# Patient Record
Sex: MALE | Race: WHITE | NOT HISPANIC OR LATINO | Employment: OTHER | URBAN - METROPOLITAN AREA
[De-identification: names, ages, dates, MRNs, and addresses within clinical notes are randomized per-mention and may not be internally consistent; named-entity substitution may affect disease eponyms.]

---

## 2023-03-20 DIAGNOSIS — I10 ESSENTIAL HYPERTENSION: Primary | ICD-10-CM

## 2023-03-20 RX ORDER — CHLORTHALIDONE 25 MG/1
TABLET ORAL DAILY
COMMUNITY
End: 2023-03-20 | Stop reason: SDUPTHER

## 2023-03-20 RX ORDER — CHLORTHALIDONE 25 MG/1
12.5 TABLET ORAL DAILY
Qty: 15 TABLET | Refills: 0 | Status: SHIPPED | OUTPATIENT
Start: 2023-03-20 | End: 2023-04-24

## 2023-04-24 DIAGNOSIS — I10 ESSENTIAL HYPERTENSION: ICD-10-CM

## 2023-04-24 RX ORDER — CHLORTHALIDONE 25 MG/1
TABLET ORAL
Qty: 15 TABLET | Refills: 0 | Status: SHIPPED | OUTPATIENT
Start: 2023-04-24 | End: 2023-05-19 | Stop reason: SDUPTHER

## 2023-05-19 DIAGNOSIS — I10 ESSENTIAL HYPERTENSION: ICD-10-CM

## 2023-05-19 DIAGNOSIS — G89.29 CHRONIC BILATERAL LOW BACK PAIN WITHOUT SCIATICA: ICD-10-CM

## 2023-05-19 DIAGNOSIS — M54.50 CHRONIC BILATERAL LOW BACK PAIN WITHOUT SCIATICA: ICD-10-CM

## 2023-05-19 RX ORDER — CHLORTHALIDONE 25 MG/1
12.5 TABLET ORAL 2 TIMES DAILY
Qty: 15 TABLET | Refills: 3 | Status: SHIPPED | OUTPATIENT
Start: 2023-05-19 | End: 2023-07-14 | Stop reason: SDUPTHER

## 2023-05-19 RX ORDER — DULOXETIN HYDROCHLORIDE 20 MG/1
20 CAPSULE, DELAYED RELEASE ORAL DAILY
Qty: 30 CAPSULE | Refills: 3 | Status: SHIPPED | OUTPATIENT
Start: 2023-05-19 | End: 2023-08-09 | Stop reason: SDUPTHER

## 2023-05-19 RX ORDER — DULOXETIN HYDROCHLORIDE 20 MG/1
20 CAPSULE, DELAYED RELEASE ORAL DAILY
COMMUNITY
End: 2023-05-19 | Stop reason: SDUPTHER

## 2023-07-06 ENCOUNTER — TELEPHONE (OUTPATIENT)
Dept: PRIMARY CARE | Facility: CLINIC | Age: 73
End: 2023-07-06
Payer: MEDICARE

## 2023-07-06 NOTE — TELEPHONE ENCOUNTER
Pt's cousin called; requesting an appt at your earliest convenience due to multiple issues - med review, wt loss, fatigue, diabetes. Pt's cousin stated that they cannot get to the [facility] office as they are reliant on outside transportation. Please advise, thank you.

## 2023-07-06 NOTE — TELEPHONE ENCOUNTER
Called pt's cousin; put pt on the schedule for 08/04/23. Informed pt of recommendations if having any urgent symptoms to go the ER. Thank you.

## 2023-07-14 DIAGNOSIS — I10 ESSENTIAL HYPERTENSION: ICD-10-CM

## 2023-07-14 RX ORDER — CHLORTHALIDONE 25 MG/1
12.5 TABLET ORAL 2 TIMES DAILY
Qty: 30 TABLET | Refills: 0 | Status: SHIPPED | OUTPATIENT
Start: 2023-07-14 | End: 2024-02-15

## 2023-08-02 ENCOUNTER — DOCUMENTATION (OUTPATIENT)
Dept: PRIMARY CARE | Facility: CLINIC | Age: 73
End: 2023-08-02
Payer: MEDICARE

## 2023-08-02 ENCOUNTER — PATIENT OUTREACH (OUTPATIENT)
Dept: PRIMARY CARE | Facility: CLINIC | Age: 73
End: 2023-08-02
Payer: MEDICARE

## 2023-08-02 RX ORDER — AMLODIPINE BESYLATE 5 MG/1
5 TABLET ORAL DAILY
COMMUNITY
Start: 2023-08-01 | End: 2023-08-09 | Stop reason: SDUPTHER

## 2023-08-02 RX ORDER — METFORMIN HYDROCHLORIDE 1000 MG/1
1000 TABLET ORAL 2 TIMES DAILY
COMMUNITY
End: 2023-08-09 | Stop reason: SDUPTHER

## 2023-08-02 RX ORDER — LOSARTAN POTASSIUM 100 MG/1
100 TABLET ORAL DAILY
COMMUNITY
End: 2023-08-09 | Stop reason: SDUPTHER

## 2023-08-02 RX ORDER — METOPROLOL TARTRATE 25 MG/1
12.5 TABLET, FILM COATED ORAL 2 TIMES DAILY
COMMUNITY
End: 2023-08-09 | Stop reason: SDUPTHER

## 2023-08-02 NOTE — PROGRESS NOTES
Discharge Facility:[facility]  Discharge Diagnosis:hypertension, DM2  Admission Date:7/17/2023  Discharge Date: 8/1/2023    PCP Appointment Date:8/4/2023  Specialist Appointment Date: NONE  Hospital Encounter and Summary: Linked   See discharge assessment below for further details    Engagement  Call Start Time: 0911 (8/2/2023  9:11 AM)    Medications  Medications reviewed with patient/caregiver?: Yes (8/2/2023  9:11 AM)  Is the patient having any side effects they believe may be caused by any medication additions or changes?: No (8/2/2023  9:11 AM)  Does the patient have all medications ordered at discharge?: Yes (8/2/2023  9:11 AM)  Care Management Interventions: No intervention needed (8/2/2023  9:11 AM)  Is the patient taking all medications as directed (includes completed medication regime)?: Yes (8/2/2023  9:11 AM)  Medication Comments: see med list (8/2/2023  9:11 AM)    Appointments  Does the patient have a primary care provider?: Yes (8/2/2023  9:11 AM)  Care Management Interventions: Verified appointment date/time/provider (8/2/2023  9:11 AM)  Has the patient kept scheduled appointments due by today?: Yes (8/2/2023  9:11 AM)  Care Management Interventions: Advised patient to keep appointment (8/2/2023  9:11 AM)    Self Management  What is the home health agency?: [facility] with pt/ot ordered from snf- no agency listed (8/2/2023  9:11 AM)  Has home health visited the patient within 72 hours of discharge?: Yes (8/2/2023  9:11 AM)    Patient Teaching  Does the patient have access to their discharge instructions?: Yes (8/2/2023  9:11 AM)  Care Management Interventions: Reviewed instructions with patient (8/2/2023  9:11 AM)  What is the patient's perception of their health status since discharge?: Improving (8/2/2023  9:11 AM)  Is the patient/caregiver able to teach back the hierarchy of who to call/visit for symptoms/problems? PCP, Specialist, Home Health nurse, Urgent Care, ED, 911: Yes (8/2/2023   9:11 AM)    Wrap Up  Wrap Up Additional Comments: spoke with spouse. she stated that patient was doing well. getting around without any assissted devices. all meds ar going to be delievered today. no questions or concerns at this time. has a follow up scheduled with pcp , date and time verified. (8/2/2023  9:11 AM)  Call End Time: 0916 (8/2/2023  9:11 AM)

## 2023-08-04 ENCOUNTER — OFFICE VISIT (OUTPATIENT)
Dept: PRIMARY CARE | Facility: CLINIC | Age: 73
End: 2023-08-04
Payer: MEDICARE

## 2023-08-04 VITALS
WEIGHT: 129 LBS | OXYGEN SATURATION: 99 % | DIASTOLIC BLOOD PRESSURE: 68 MMHG | HEIGHT: 70 IN | BODY MASS INDEX: 18.47 KG/M2 | SYSTOLIC BLOOD PRESSURE: 122 MMHG | HEART RATE: 72 BPM

## 2023-08-04 DIAGNOSIS — I10 ESSENTIAL HYPERTENSION: ICD-10-CM

## 2023-08-04 DIAGNOSIS — D64.9 ANEMIA, UNSPECIFIED TYPE: ICD-10-CM

## 2023-08-04 DIAGNOSIS — Z12.5 SCREENING FOR PROSTATE CANCER: ICD-10-CM

## 2023-08-04 DIAGNOSIS — Z79.4 TYPE 2 DIABETES MELLITUS WITH KETOACIDOSIS WITHOUT COMA, WITH LONG-TERM CURRENT USE OF INSULIN (MULTI): Primary | ICD-10-CM

## 2023-08-04 DIAGNOSIS — R31.9 HEMATURIA, UNSPECIFIED TYPE: ICD-10-CM

## 2023-08-04 DIAGNOSIS — E11.10 TYPE 2 DIABETES MELLITUS WITH KETOACIDOSIS WITHOUT COMA, WITH LONG-TERM CURRENT USE OF INSULIN (MULTI): Primary | ICD-10-CM

## 2023-08-04 DIAGNOSIS — N40.0 BENIGN PROSTATIC HYPERPLASIA, UNSPECIFIED WHETHER LOWER URINARY TRACT SYMPTOMS PRESENT: ICD-10-CM

## 2023-08-04 DIAGNOSIS — Z00.00 ROUTINE GENERAL MEDICAL EXAMINATION AT HEALTH CARE FACILITY: ICD-10-CM

## 2023-08-04 PROBLEM — F41.9 ANXIETY: Status: ACTIVE | Noted: 2023-08-04

## 2023-08-04 PROBLEM — T50.905A BRADYCARDIA, DRUG INDUCED: Status: RESOLVED | Noted: 2023-07-16 | Resolved: 2023-08-04

## 2023-08-04 PROBLEM — L89.150 PRESSURE INJURY OF COCCYGEAL REGION, UNSTAGEABLE (MULTI): Status: RESOLVED | Noted: 2023-07-17 | Resolved: 2023-08-04

## 2023-08-04 PROBLEM — E11.9 TYPE 2 DIABETES MELLITUS WITHOUT COMPLICATION (MULTI): Status: ACTIVE | Noted: 2023-08-04

## 2023-08-04 PROBLEM — R00.1 BRADYCARDIA, DRUG INDUCED: Status: RESOLVED | Noted: 2023-07-16 | Resolved: 2023-08-04

## 2023-08-04 PROBLEM — R00.1 BRADYCARDIA, DRUG INDUCED: Status: ACTIVE | Noted: 2023-07-16

## 2023-08-04 PROBLEM — K85.90 ACUTE PANCREATITIS (HHS-HCC): Status: ACTIVE | Noted: 2023-08-04

## 2023-08-04 PROBLEM — E78.5 HYPERLIPIDEMIA: Status: ACTIVE | Noted: 2023-08-04

## 2023-08-04 PROBLEM — D50.9 IDA (IRON DEFICIENCY ANEMIA): Status: ACTIVE | Noted: 2023-08-04

## 2023-08-04 PROBLEM — R32 URINARY INCONTINENCE: Status: ACTIVE | Noted: 2023-07-17

## 2023-08-04 PROBLEM — R79.89 ABNORMAL TSH: Status: ACTIVE | Noted: 2023-08-04

## 2023-08-04 PROBLEM — M10.9 GOUT: Status: ACTIVE | Noted: 2023-08-04

## 2023-08-04 PROBLEM — E43 SEVERE PROTEIN-CALORIE MALNUTRITION (MULTI): Status: ACTIVE | Noted: 2023-07-13

## 2023-08-04 PROBLEM — K59.09 CHRONIC CONSTIPATION: Status: ACTIVE | Noted: 2023-08-04

## 2023-08-04 PROBLEM — T50.905A BRADYCARDIA, DRUG INDUCED: Status: ACTIVE | Noted: 2023-07-16

## 2023-08-04 PROBLEM — E11.9 TYPE 2 DIABETES MELLITUS WITHOUT COMPLICATION (MULTI): Status: RESOLVED | Noted: 2023-08-04 | Resolved: 2023-08-04

## 2023-08-04 PROBLEM — R79.89 ABNORMAL TSH: Status: RESOLVED | Noted: 2023-08-04 | Resolved: 2023-08-04

## 2023-08-04 PROBLEM — D75.89 MACROCYTOSIS: Status: ACTIVE | Noted: 2023-08-04

## 2023-08-04 PROBLEM — L89.150 PRESSURE INJURY OF COCCYGEAL REGION, UNSTAGEABLE (MULTI): Status: ACTIVE | Noted: 2023-07-17

## 2023-08-04 PROCEDURE — 1159F MED LIST DOCD IN RCRD: CPT | Performed by: FAMILY MEDICINE

## 2023-08-04 PROCEDURE — G0439 PPPS, SUBSEQ VISIT: HCPCS | Performed by: FAMILY MEDICINE

## 2023-08-04 PROCEDURE — 1170F FXNL STATUS ASSESSED: CPT | Performed by: FAMILY MEDICINE

## 2023-08-04 PROCEDURE — 90677 PCV20 VACCINE IM: CPT | Performed by: FAMILY MEDICINE

## 2023-08-04 PROCEDURE — 3078F DIAST BP <80 MM HG: CPT | Performed by: FAMILY MEDICINE

## 2023-08-04 PROCEDURE — 90715 TDAP VACCINE 7 YRS/> IM: CPT | Performed by: FAMILY MEDICINE

## 2023-08-04 PROCEDURE — 4010F ACE/ARB THERAPY RXD/TAKEN: CPT | Performed by: FAMILY MEDICINE

## 2023-08-04 PROCEDURE — 3074F SYST BP LT 130 MM HG: CPT | Performed by: FAMILY MEDICINE

## 2023-08-04 PROCEDURE — 1160F RVW MEDS BY RX/DR IN RCRD: CPT | Performed by: FAMILY MEDICINE

## 2023-08-04 PROCEDURE — 90472 IMMUNIZATION ADMIN EACH ADD: CPT | Performed by: FAMILY MEDICINE

## 2023-08-04 PROCEDURE — 99214 OFFICE O/P EST MOD 30 MIN: CPT | Performed by: FAMILY MEDICINE

## 2023-08-04 PROCEDURE — G0009 ADMIN PNEUMOCOCCAL VACCINE: HCPCS | Performed by: FAMILY MEDICINE

## 2023-08-04 RX ORDER — INSULIN LISPRO 100 [IU]/ML
3 INJECTION, SOLUTION INTRAVENOUS; SUBCUTANEOUS
COMMUNITY
Start: 2023-07-17 | End: 2023-08-09 | Stop reason: SDUPTHER

## 2023-08-04 RX ORDER — AMOXICILLIN 250 MG
1 CAPSULE ORAL 2 TIMES DAILY
COMMUNITY
Start: 2023-07-17

## 2023-08-04 RX ORDER — TAMSULOSIN HYDROCHLORIDE 0.4 MG/1
0.4 CAPSULE ORAL DAILY
Qty: 30 CAPSULE | Refills: 11 | Status: SHIPPED | OUTPATIENT
Start: 2023-08-04 | End: 2023-08-04 | Stop reason: SDUPTHER

## 2023-08-04 RX ORDER — INSULIN GLARGINE 100 [IU]/ML
15 INJECTION, SOLUTION SUBCUTANEOUS
COMMUNITY
Start: 2023-07-17 | End: 2023-08-09 | Stop reason: SDUPTHER

## 2023-08-04 RX ORDER — TAMSULOSIN HYDROCHLORIDE 0.4 MG/1
0.4 CAPSULE ORAL DAILY
Qty: 30 CAPSULE | Refills: 11 | Status: SHIPPED | OUTPATIENT
Start: 2023-08-04 | End: 2024-08-03

## 2023-08-04 ASSESSMENT — ENCOUNTER SYMPTOMS
FATIGUE: 1
LOSS OF SENSATION IN FEET: 0
DEPRESSION: 0
CONSTIPATION: 1
FEVER: 0
WHEEZING: 0
NERVOUS/ANXIOUS: 1
OCCASIONAL FEELINGS OF UNSTEADINESS: 0
HYPERTENSION: 1
ABDOMINAL PAIN: 0
COUGH: 0

## 2023-08-04 ASSESSMENT — ACTIVITIES OF DAILY LIVING (ADL)
TAKING_MEDICATION: TOTAL CARE
DOING_HOUSEWORK: TOTAL CARE
MANAGING_FINANCES: TOTAL CARE
GROCERY_SHOPPING: TOTAL CARE
BATHING: INDEPENDENT
DRESSING: INDEPENDENT

## 2023-08-04 ASSESSMENT — PATIENT HEALTH QUESTIONNAIRE - PHQ9
SUM OF ALL RESPONSES TO PHQ9 QUESTIONS 1 AND 2: 0
2. FEELING DOWN, DEPRESSED OR HOPELESS: NOT AT ALL
1. LITTLE INTEREST OR PLEASURE IN DOING THINGS: NOT AT ALL

## 2023-08-04 NOTE — PROGRESS NOTES
Subjective   Patient ID: Jorge A Madden is a 73 y.o. male who presents for Hypertension and Diabetes.    Hypertension  This is a recurrent problem. Associated symptoms include anxiety.   Diabetes  Hypoglycemia symptoms include nervousness/anxiousness. Associated symptoms include fatigue. There are no hypoglycemic complications. There are no diabetic complications.        Review of Systems   Constitutional:  Positive for fatigue.   Psychiatric/Behavioral:  The patient is nervous/anxious.        Objective   There were no vitals taken for this visit.    Physical Exam    Assessment/Plan   Problem List Items Addressed This Visit       Essential hypertension    Hematuria    Type 2 diabetes mellitus with ketoacidosis without coma (CMS/HCC) - Primary

## 2023-08-04 NOTE — PROGRESS NOTES
Subjective   Patient ID: Jorge A Madden is a 73 y.o. male who presents for Hypertension and Diabetes.    HPI       He is here today for follow-up on hospitalization and annual wellness visit.  He was last seen in our office in September 2022  Past medical history is significant for diabetes mellitus, hypertension, recurrent pancreatitis, and iron deficiency anemia      He was hospitalized at [facility] 7/13 through 7/17/2023 with diabetic ketoacidosis  He had presented to the ED via EMS with symptoms including confusion, nausea, weakness, as well as elevated blood glucose levels in the 600s for 1 week  He was admitted in diagnosed with diabetic ketoacidosis and UTI.  Treated with Rocephin, insulin drip and IV fluids.  He saw endocrinology during his stay.  His A1c during stay was elevated at 14.3.  He was discharged with Lantus and Humalog  Urine cultures were positive for Proteus.  He was switched to p.o. Keflex on discharge  His antihypertensive medications were adjusted.  Chlorthalidone was stopped due to risk of dehydration and he was started on amlodipine.  His dose of metoprolol was decreased from 25 mg twice daily to 12.5 mg twice daily.  Losartan was continued  He was evaluated by PT and skilled nursing placement was recommended    He was then admitted to skilled nursing at the [facility] [place]17 through 8/1/2023 for PT/OT for generalized weakness posthospitalization.  During that stay he received PT and OT and tolerated well and showed improvement, and was discharged home on 8/1/2023      Hypertension: Currently taking amlodipine 5 mg daily, losartan 100 mg daily and metoprolol 12.5 mg twice daily  Diabetes mellitus: Takes metformin 1000 mg twice daily, Lantus 15 units in the morning, and Humalog 4 units with meals.  He had previously been on a sliding scale of Humalog 3 times daily when he was hospitalized      At his last visit with me 9/16/2022 we had discussed several issues including  chronic constipation, hematuria and iron deficiency anemia  Hematuria: UA done September 2022 was positive for hematuria with 14 RBC per high-powered field.  He was referred to urology at that time  Constipation: Present for approximately 5 years.  At that visit I had referred him for a colonoscopy  Iron deficiency anemia: He had labs done last year which showed iron deficiency anemia with hemoglobin of 10.8.  At that time we had started him on ferrous sulfate, ordered a repeat CBC in 6 weeks, and had referred to GI for screening colonoscopy.  Stool Hemoccult test was ordered at that time      He is feeling much better since being discharged home.  His strength has been improving and he has been walking more  Diabetes mellitus: Currently taking Lantus 15 units daily and Humalog 4 units 3 times daily.  Checks glucose out of the office 3 times per day and his glucose readings are variable, and range anywhere from 93-73  He did see a nutritionist in the hospital.  Appetite has been doing well and he has been trying to avoid concentrated sugars in his diet  His constipation has improved since last visit.  He did not get a chance to get the colonoscopy done that was ordered at last visit.  He also did not get a chance to get the urology referral done ordered at last visit  He does have a history of BPH and previously was treated with Flomax.  He states that he has been getting nocturia several times per night.  Denies any stream changes including weak stream  He is a former smoker.  Quit September 2021.  Prior to this he had smoked 1 pack/day for 25 years  He is not currently taking any iron tablets because it was contributing to constipation    Review of Systems   Constitutional:  Negative for fever.   Respiratory:  Negative for cough and wheezing.    Cardiovascular:  Negative for chest pain and leg swelling.   Gastrointestinal:  Positive for constipation. Negative for abdominal pain.       Objective   /68    "Pulse 72   Ht 1.778 m (5' 10\")   Wt 58.5 kg (129 lb)   SpO2 99%   BMI 18.51 kg/m²     Physical Exam  Vitals reviewed.   Constitutional:       General: He is not in acute distress.     Appearance: Normal appearance. He is well-developed.   HENT:      Head: Normocephalic.      Right Ear: Tympanic membrane, ear canal and external ear normal.      Left Ear: Tympanic membrane, ear canal and external ear normal.      Nose: Nose normal.      Mouth/Throat:      Mouth: Mucous membranes are moist.   Eyes:      Conjunctiva/sclera: Conjunctivae normal.   Neck:      Thyroid: No thyromegaly.      Vascular: No JVD.   Cardiovascular:      Rate and Rhythm: Normal rate and regular rhythm.      Heart sounds: Normal heart sounds.   Pulmonary:      Effort: Pulmonary effort is normal.      Breath sounds: Normal breath sounds.   Abdominal:      Palpations: Abdomen is soft.      Tenderness: There is no abdominal tenderness.   Musculoskeletal:         General: Normal range of motion.      Right lower leg: No edema.      Left lower leg: No edema.   Lymphadenopathy:      Cervical: No cervical adenopathy.   Skin:     Findings: No rash.   Neurological:      Mental Status: He is alert and oriented to person, place, and time.   Psychiatric:         Mood and Affect: Mood normal.         Behavior: Behavior normal.         Assessment/Plan   Problem List Items Addressed This Visit          Medium    Essential hypertension    Relevant Orders    CBC and Auto Differential    Basic Metabolic Panel    Lipid Panel    Vitamin B12    Folate    Iron and TIBC    Hematuria    Type 2 diabetes mellitus with ketoacidosis without coma (CMS/HCC) - Primary     Other Visit Diagnoses       Benign prostatic hyperplasia, unspecified whether lower urinary tract symptoms present        Relevant Medications    tamsulosin (Flomax) 0.4 mg 24 hr capsule    Screening for prostate cancer        Relevant Orders    Prostate Specific Antigen, Screen    Anemia, unspecified type  "       Relevant Orders    Iron and TIBC    Routine general medical examination at health care facility              #1 recent hospitalization: We reviewed all of his recent hospital and nursing home summaries.  He was hospitalized 7/13 through 7/17/2023 with diabetic ketoacidosis and UTI, and recently returned home from skilled nursing 3 days ago.  He is overall feeling better and strength has been improving.  His home glucose readings have still been variable and range anywhere from 93-2 73.  Continue Lantus, and we will adjust his Humalog to 3 times per day sliding scale (administer 2 units insulin for every 50 glucose greater than 150).  He is scheduled to see endocrinology next month to discuss further.  We also discussed diet modification.  He did see a nutritionist during hospitalization.  We discussed that he should have a low threshold for returning to the ER if getting glucose readings greater than 400, any confusion, abdominal pain or other new or worsening symptoms  2.  Preventative health  Given tetanus and pneumococcal vaccines today.  I did recommend referral for colonoscopy for colon cancer screening, CT scan for lung cancer screening, and ultrasound for AAA screening.  He declines these today  3.  Hypertension: His blood pressure today is at goal at 122/68.  Continue current medications and follow-up in 3 months for recheck  4.  BPH: We will restart tamsulosin 0.4 mg daily.  Adverse effects discussed including risk of hypotension and dizziness.  Check PSA with next labs  Due to his history of hematuria we will recheck a UA.  If still shows RBCs in urine we will discuss a urology referral at that time  Follow-up in 3 months for recheck

## 2023-08-09 ENCOUNTER — PATIENT OUTREACH (OUTPATIENT)
Dept: PRIMARY CARE | Facility: CLINIC | Age: 73
End: 2023-08-09
Payer: MEDICARE

## 2023-08-09 DIAGNOSIS — F41.9 ANXIETY: ICD-10-CM

## 2023-08-09 DIAGNOSIS — G89.29 CHRONIC BILATERAL LOW BACK PAIN WITHOUT SCIATICA: ICD-10-CM

## 2023-08-09 DIAGNOSIS — M54.50 CHRONIC BILATERAL LOW BACK PAIN WITHOUT SCIATICA: ICD-10-CM

## 2023-08-09 DIAGNOSIS — I10 ESSENTIAL HYPERTENSION: ICD-10-CM

## 2023-08-09 DIAGNOSIS — Z79.4 TYPE 2 DIABETES MELLITUS WITH KETOACIDOSIS WITHOUT COMA, WITH LONG-TERM CURRENT USE OF INSULIN (MULTI): ICD-10-CM

## 2023-08-09 DIAGNOSIS — E11.10 TYPE 2 DIABETES MELLITUS WITH KETOACIDOSIS WITHOUT COMA, WITH LONG-TERM CURRENT USE OF INSULIN (MULTI): ICD-10-CM

## 2023-08-09 RX ORDER — METOPROLOL TARTRATE 25 MG/1
12.5 TABLET, FILM COATED ORAL 2 TIMES DAILY
Qty: 90 TABLET | Refills: 3 | Status: SHIPPED | OUTPATIENT
Start: 2023-08-09

## 2023-08-09 RX ORDER — INSULIN GLARGINE 100 [IU]/ML
15 INJECTION, SOLUTION SUBCUTANEOUS EVERY MORNING
Qty: 3 ML | Refills: 11 | Status: SHIPPED | OUTPATIENT
Start: 2023-08-09 | End: 2024-03-21 | Stop reason: SDUPTHER

## 2023-08-09 RX ORDER — DULOXETIN HYDROCHLORIDE 20 MG/1
20 CAPSULE, DELAYED RELEASE ORAL DAILY
Qty: 30 CAPSULE | Refills: 3 | Status: SHIPPED | OUTPATIENT
Start: 2023-08-09

## 2023-08-09 RX ORDER — LOSARTAN POTASSIUM 100 MG/1
100 TABLET ORAL DAILY
Qty: 90 TABLET | Refills: 3 | Status: SHIPPED | OUTPATIENT
Start: 2023-08-09

## 2023-08-09 RX ORDER — INSULIN LISPRO 100 [IU]/ML
3 INJECTION, SOLUTION INTRAVENOUS; SUBCUTANEOUS
Qty: 10 ML | Refills: 3 | Status: SHIPPED | OUTPATIENT
Start: 2023-08-09 | End: 2024-01-22 | Stop reason: SDUPTHER

## 2023-08-09 RX ORDER — AMLODIPINE BESYLATE 5 MG/1
5 TABLET ORAL DAILY
Qty: 90 TABLET | Refills: 3 | Status: SHIPPED | OUTPATIENT
Start: 2023-08-09 | End: 2024-02-15

## 2023-08-09 RX ORDER — METFORMIN HYDROCHLORIDE 1000 MG/1
1000 TABLET ORAL 2 TIMES DAILY
Qty: 180 TABLET | Refills: 3 | Status: SHIPPED | OUTPATIENT
Start: 2023-08-09

## 2023-08-09 NOTE — PROGRESS NOTES
Call regarding appt. with PCP on 8/4/2023  after hospitalization.  At time of outreach call the patient feels as if their condition has improved since last visit.  Reviewed the PCP appointment with the pt and addressed any questions or concerns.

## 2023-08-09 NOTE — TELEPHONE ENCOUNTER
Rx Refill Request Telephone Encounter    Name:  Jorge A Madden  :  [id number]  Medication Name:    amLODIPine (Norvasc) 5 mg tablet   losartan (Cozaar) 100 mg tablet   metoprolol tartrate (Lopressor) 25 mg tablet   DULoxetine (Cymbalta) 20 mg DR capsule   metFORMIN (Glucophage) 1,000 mg tablet   insulin lispro (HumaLOG) 100 unit/mL injection   Basaglar Kwikpen U-100 insulin pen              Specific Pharmacy location:  [facility], [facility], [place]  Date of last appointment:  n/a  Date of next appointment:  n/a  Best number to reach patient:  [phone]

## 2023-08-10 ENCOUNTER — TELEPHONE (OUTPATIENT)
Dept: PRIMARY CARE | Facility: CLINIC | Age: 73
End: 2023-08-10
Payer: MEDICARE

## 2023-08-10 NOTE — TELEPHONE ENCOUNTER
Patient called stated that  [facility] called and said they couldn't do the visits this week for PT and Nursing because they need an Authorization.   Please Advise

## 2023-08-10 NOTE — TELEPHONE ENCOUNTER
Verbal order was given for authorization for PT OT and Nursing on 08-10-23 . [facility] will fax a copy for signature.

## 2023-08-18 ENCOUNTER — APPOINTMENT (OUTPATIENT)
Dept: PRIMARY CARE | Facility: CLINIC | Age: 73
End: 2023-08-18
Payer: MEDICARE

## 2023-09-08 ENCOUNTER — PATIENT OUTREACH (OUTPATIENT)
Dept: PRIMARY CARE | Facility: CLINIC | Age: 73
End: 2023-09-08
Payer: MEDICARE

## 2023-09-08 NOTE — PROGRESS NOTES
Call placed regarding one month post discharge follow up call.  At time of outreach call the patient feels as if their condition has improved since initial visit with PCP or specialist.  Questions or concerns regarding recovery period addressed at this time. Spoke with wife and she states that he is doing really well. Has sugar under control and is exercising. Has only a couple more therapy visits. No needs or concerns.

## 2023-10-16 ENCOUNTER — TELEPHONE (OUTPATIENT)
Dept: PRIMARY CARE | Facility: CLINIC | Age: 73
End: 2023-10-16
Payer: MEDICARE

## 2023-10-16 DIAGNOSIS — E11.10 TYPE 2 DIABETES MELLITUS WITH KETOACIDOSIS WITHOUT COMA, WITH LONG-TERM CURRENT USE OF INSULIN (MULTI): Primary | ICD-10-CM

## 2023-10-16 DIAGNOSIS — E11.10 TYPE 2 DIABETES MELLITUS WITH KETOACIDOSIS WITHOUT COMA, UNSPECIFIED WHETHER LONG TERM INSULIN USE (MULTI): ICD-10-CM

## 2023-10-16 DIAGNOSIS — Z79.4 TYPE 2 DIABETES MELLITUS WITH KETOACIDOSIS WITHOUT COMA, WITH LONG-TERM CURRENT USE OF INSULIN (MULTI): Primary | ICD-10-CM

## 2023-10-16 RX ORDER — PEN NEEDLE, DIABETIC 30 GX3/16"
NEEDLE, DISPOSABLE MISCELLANEOUS
Qty: 100 EACH | Refills: 3 | Status: CANCELLED | OUTPATIENT
Start: 2023-10-16

## 2023-10-16 RX ORDER — PEN NEEDLE, DIABETIC 30 GX3/16"
1 NEEDLE, DISPOSABLE MISCELLANEOUS 4 TIMES DAILY
Qty: 120 EACH | Refills: 11 | Status: SHIPPED | OUTPATIENT
Start: 2023-10-16 | End: 2023-11-21

## 2023-10-16 NOTE — TELEPHONE ENCOUNTER
Home health calling. [facility]. I did not see a script for them. He takes 15units/d.

## 2023-10-16 NOTE — TELEPHONE ENCOUNTER
"Requested Prescriptions     Pending Prescriptions Disp Refills    pen needle, diabetic (Pen Needle) 31 gauge x 3/16\" needle 100 each 3     Sig: Use with insulin pen. 3-4 times daily       "

## 2023-11-01 ENCOUNTER — PATIENT OUTREACH (OUTPATIENT)
Dept: PRIMARY CARE | Facility: CLINIC | Age: 73
End: 2023-11-01
Payer: MEDICARE

## 2023-11-02 ENCOUNTER — TELEPHONE (OUTPATIENT)
Dept: PRIMARY CARE | Facility: CLINIC | Age: 73
End: 2023-11-02
Payer: MEDICARE

## 2023-11-02 NOTE — TELEPHONE ENCOUNTER
Patient going to get blood work done tomorrow. Wants to know if he need to hold on medication before going to get drawn please advise.

## 2023-11-03 ENCOUNTER — LAB (OUTPATIENT)
Dept: LAB | Facility: LAB | Age: 73
End: 2023-11-03
Payer: MEDICARE

## 2023-11-03 DIAGNOSIS — Z12.5 SCREENING FOR PROSTATE CANCER: ICD-10-CM

## 2023-11-03 DIAGNOSIS — R31.9 HEMATURIA, UNSPECIFIED TYPE: ICD-10-CM

## 2023-11-03 DIAGNOSIS — E11.10 TYPE 2 DIABETES MELLITUS WITH KETOACIDOSIS WITHOUT COMA, WITH LONG-TERM CURRENT USE OF INSULIN (MULTI): ICD-10-CM

## 2023-11-03 DIAGNOSIS — Z79.4 TYPE 2 DIABETES MELLITUS WITH KETOACIDOSIS WITHOUT COMA, WITH LONG-TERM CURRENT USE OF INSULIN (MULTI): Primary | ICD-10-CM

## 2023-11-03 DIAGNOSIS — I10 ESSENTIAL HYPERTENSION: ICD-10-CM

## 2023-11-03 DIAGNOSIS — E11.10 TYPE 2 DIABETES MELLITUS WITH KETOACIDOSIS WITHOUT COMA, WITH LONG-TERM CURRENT USE OF INSULIN (MULTI): Primary | ICD-10-CM

## 2023-11-03 DIAGNOSIS — D64.9 ANEMIA, UNSPECIFIED TYPE: ICD-10-CM

## 2023-11-03 DIAGNOSIS — Z79.4 TYPE 2 DIABETES MELLITUS WITH KETOACIDOSIS WITHOUT COMA, WITH LONG-TERM CURRENT USE OF INSULIN (MULTI): ICD-10-CM

## 2023-11-03 LAB
ANION GAP SERPL CALC-SCNC: 12 MMOL/L (ref 10–20)
APPEARANCE UR: ABNORMAL
BASOPHILS # BLD AUTO: 0.02 X10*3/UL (ref 0–0.1)
BASOPHILS NFR BLD AUTO: 0.3 %
BILIRUB UR STRIP.AUTO-MCNC: NEGATIVE MG/DL
BUN SERPL-MCNC: 28 MG/DL (ref 6–23)
CALCIUM SERPL-MCNC: 9.6 MG/DL (ref 8.6–10.3)
CHLORIDE SERPL-SCNC: 106 MMOL/L (ref 98–107)
CHOLEST SERPL-MCNC: 146 MG/DL (ref 0–199)
CHOLESTEROL/HDL RATIO: 2.8
CO2 SERPL-SCNC: 28 MMOL/L (ref 21–32)
COLOR UR: YELLOW
CREAT SERPL-MCNC: 1.28 MG/DL (ref 0.5–1.3)
EOSINOPHIL # BLD AUTO: 0.18 X10*3/UL (ref 0–0.4)
EOSINOPHIL NFR BLD AUTO: 2.5 %
ERYTHROCYTE [DISTWIDTH] IN BLOOD BY AUTOMATED COUNT: 11.9 % (ref 11.5–14.5)
EST. AVERAGE GLUCOSE BLD GHB EST-MCNC: 131 MG/DL
FOLATE SERPL-MCNC: 11.9 NG/ML
GFR SERPL CREATININE-BSD FRML MDRD: 60 ML/MIN/1.73M*2
GLUCOSE SERPL-MCNC: 87 MG/DL (ref 74–99)
GLUCOSE UR STRIP.AUTO-MCNC: NEGATIVE MG/DL
HBA1C MFR BLD: 6.2 %
HCT VFR BLD AUTO: 36 % (ref 41–52)
HDLC SERPL-MCNC: 52.9 MG/DL
HGB BLD-MCNC: 12 G/DL (ref 13.5–17.5)
HYALINE CASTS #/AREA URNS AUTO: ABNORMAL /LPF
IMM GRANULOCYTES # BLD AUTO: 0.01 X10*3/UL (ref 0–0.5)
IMM GRANULOCYTES NFR BLD AUTO: 0.1 % (ref 0–0.9)
IRON SATN MFR SERPL: 33 % (ref 25–45)
IRON SERPL-MCNC: 55 UG/DL (ref 35–150)
KETONES UR STRIP.AUTO-MCNC: NEGATIVE MG/DL
LDLC SERPL CALC-MCNC: 73 MG/DL
LEUKOCYTE ESTERASE UR QL STRIP.AUTO: ABNORMAL
LYMPHOCYTES # BLD AUTO: 2.9 X10*3/UL (ref 0.8–3)
LYMPHOCYTES NFR BLD AUTO: 39.5 %
MCH RBC QN AUTO: 33.8 PG (ref 26–34)
MCHC RBC AUTO-ENTMCNC: 33.3 G/DL (ref 32–36)
MCV RBC AUTO: 101 FL (ref 80–100)
MONOCYTES # BLD AUTO: 0.49 X10*3/UL (ref 0.05–0.8)
MONOCYTES NFR BLD AUTO: 6.7 %
MUCOUS THREADS #/AREA URNS AUTO: ABNORMAL /LPF
NEUTROPHILS # BLD AUTO: 3.74 X10*3/UL (ref 1.6–5.5)
NEUTROPHILS NFR BLD AUTO: 50.9 %
NITRITE UR QL STRIP.AUTO: NEGATIVE
NON HDL CHOLESTEROL: 93 MG/DL (ref 0–149)
NRBC BLD-RTO: 0 /100 WBCS (ref 0–0)
PH UR STRIP.AUTO: 6 [PH]
PLATELET # BLD AUTO: 222 X10*3/UL (ref 150–450)
POTASSIUM SERPL-SCNC: 4 MMOL/L (ref 3.5–5.3)
PROT UR STRIP.AUTO-MCNC: ABNORMAL MG/DL
PSA SERPL-MCNC: 0.38 NG/ML
RBC # BLD AUTO: 3.55 X10*6/UL (ref 4.5–5.9)
RBC # UR STRIP.AUTO: ABNORMAL /UL
RBC #/AREA URNS AUTO: ABNORMAL /HPF
SODIUM SERPL-SCNC: 142 MMOL/L (ref 136–145)
SP GR UR STRIP.AUTO: 1.01
SQUAMOUS #/AREA URNS AUTO: ABNORMAL /HPF
TIBC SERPL-MCNC: 165 UG/DL (ref 240–445)
TRIGL SERPL-MCNC: 102 MG/DL (ref 0–149)
UIBC SERPL-MCNC: 110 UG/DL (ref 110–370)
UROBILINOGEN UR STRIP.AUTO-MCNC: <2 MG/DL
VIT B12 SERPL-MCNC: 237 PG/ML (ref 211–911)
VLDL: 20 MG/DL (ref 0–40)
WBC # BLD AUTO: 7.3 X10*3/UL (ref 4.4–11.3)
WBC #/AREA URNS AUTO: >50 /HPF
WBC CLUMPS #/AREA URNS AUTO: ABNORMAL /HPF

## 2023-11-03 PROCEDURE — 83550 IRON BINDING TEST: CPT

## 2023-11-03 PROCEDURE — G0103 PSA SCREENING: HCPCS

## 2023-11-03 PROCEDURE — 83036 HEMOGLOBIN GLYCOSYLATED A1C: CPT

## 2023-11-03 PROCEDURE — 82607 VITAMIN B-12: CPT

## 2023-11-03 PROCEDURE — 83540 ASSAY OF IRON: CPT

## 2023-11-03 PROCEDURE — 36415 COLL VENOUS BLD VENIPUNCTURE: CPT

## 2023-11-03 PROCEDURE — 85025 COMPLETE CBC W/AUTO DIFF WBC: CPT

## 2023-11-03 PROCEDURE — 80061 LIPID PANEL: CPT

## 2023-11-03 PROCEDURE — 81001 URINALYSIS AUTO W/SCOPE: CPT

## 2023-11-03 PROCEDURE — 80048 BASIC METABOLIC PNL TOTAL CA: CPT

## 2023-11-03 PROCEDURE — 82746 ASSAY OF FOLIC ACID SERUM: CPT

## 2023-11-09 ENCOUNTER — OFFICE VISIT (OUTPATIENT)
Dept: PRIMARY CARE | Facility: CLINIC | Age: 73
End: 2023-11-09
Payer: MEDICARE

## 2023-11-09 VITALS
OXYGEN SATURATION: 98 % | DIASTOLIC BLOOD PRESSURE: 77 MMHG | BODY MASS INDEX: 18.51 KG/M2 | TEMPERATURE: 97.6 F | WEIGHT: 129 LBS | SYSTOLIC BLOOD PRESSURE: 122 MMHG | HEART RATE: 61 BPM

## 2023-11-09 DIAGNOSIS — I10 ESSENTIAL HYPERTENSION: ICD-10-CM

## 2023-11-09 DIAGNOSIS — E11.10 TYPE 2 DIABETES MELLITUS WITH KETOACIDOSIS WITHOUT COMA, WITH LONG-TERM CURRENT USE OF INSULIN (MULTI): ICD-10-CM

## 2023-11-09 DIAGNOSIS — N39.0 URINARY TRACT INFECTION WITH HEMATURIA, SITE UNSPECIFIED: Primary | ICD-10-CM

## 2023-11-09 DIAGNOSIS — Z79.4 TYPE 2 DIABETES MELLITUS WITH KETOACIDOSIS WITHOUT COMA, WITH LONG-TERM CURRENT USE OF INSULIN (MULTI): ICD-10-CM

## 2023-11-09 DIAGNOSIS — R31.9 URINARY TRACT INFECTION WITH HEMATURIA, SITE UNSPECIFIED: Primary | ICD-10-CM

## 2023-11-09 DIAGNOSIS — D64.9 ANEMIA, UNSPECIFIED TYPE: ICD-10-CM

## 2023-11-09 LAB
POC APPEARANCE, URINE: ABNORMAL
POC BILIRUBIN, URINE: NEGATIVE
POC BLOOD, URINE: ABNORMAL
POC COLOR, URINE: ABNORMAL
POC GLUCOSE, URINE: NEGATIVE MG/DL
POC KETONES, URINE: NEGATIVE MG/DL
POC LEUKOCYTES, URINE: ABNORMAL
POC NITRITE,URINE: POSITIVE
POC PH, URINE: 7.5 PH
POC PROTEIN, URINE: ABNORMAL MG/DL
POC SPECIFIC GRAVITY, URINE: 1.02
POC UROBILINOGEN, URINE: 0.2 EU/DL

## 2023-11-09 PROCEDURE — 99214 OFFICE O/P EST MOD 30 MIN: CPT | Performed by: FAMILY MEDICINE

## 2023-11-09 PROCEDURE — 1160F RVW MEDS BY RX/DR IN RCRD: CPT | Performed by: FAMILY MEDICINE

## 2023-11-09 PROCEDURE — 3044F HG A1C LEVEL LT 7.0%: CPT | Performed by: FAMILY MEDICINE

## 2023-11-09 PROCEDURE — 3078F DIAST BP <80 MM HG: CPT | Performed by: FAMILY MEDICINE

## 2023-11-09 PROCEDURE — 3048F LDL-C <100 MG/DL: CPT | Performed by: FAMILY MEDICINE

## 2023-11-09 PROCEDURE — 81003 URINALYSIS AUTO W/O SCOPE: CPT | Performed by: FAMILY MEDICINE

## 2023-11-09 PROCEDURE — 87186 SC STD MICRODIL/AGAR DIL: CPT

## 2023-11-09 PROCEDURE — 3074F SYST BP LT 130 MM HG: CPT | Performed by: FAMILY MEDICINE

## 2023-11-09 PROCEDURE — 4010F ACE/ARB THERAPY RXD/TAKEN: CPT | Performed by: FAMILY MEDICINE

## 2023-11-09 PROCEDURE — 87086 URINE CULTURE/COLONY COUNT: CPT

## 2023-11-09 PROCEDURE — 1159F MED LIST DOCD IN RCRD: CPT | Performed by: FAMILY MEDICINE

## 2023-11-09 RX ORDER — CEPHALEXIN 500 MG/1
500 CAPSULE ORAL 3 TIMES DAILY
Qty: 21 CAPSULE | Refills: 0 | Status: SHIPPED | OUTPATIENT
Start: 2023-11-09 | End: 2023-11-16

## 2023-11-09 ASSESSMENT — ENCOUNTER SYMPTOMS
FEVER: 0
HEMATURIA: 1
ABDOMINAL PAIN: 0
DYSURIA: 1
VOMITING: 0
DIZZINESS: 0

## 2023-11-09 NOTE — PROGRESS NOTES
Subjective   Patient ID: Jorge A Madden is a 73 y.o. male who presents for Diabetes (A1c checked with labs ) and UTI.    Diabetes  He presents for his follow-up diabetic visit. He has type 2 diabetes mellitus. Pertinent negatives for hypoglycemia include no dizziness. He participates in exercise three times a week. His breakfast blood glucose range is generally 180-200 mg/dl. His dinner blood glucose range is generally 110-130 mg/dl. Eye exam is not current.   UTI   Associated symptoms include hematuria and urgency. Pertinent negatives include no vomiting. His past medical history is significant for recurrent UTIs.        He is here today for follow-up  Diabetes mellitus: He is currently taking Lantus 15 units daily, metformin 1000 mg twice daily, and Humalog sliding scale 3 times daily (administers 2 units of insulin for every 50 glucose greater than 150).  His last A1c prior to this had been elevated at 14.3 when he was previously hospitalized in July.  I reviewed his home glucose readings, and readings have been variable and ranged anywhere from 10 1-2 50 recently.  No hypoglycemia  He is currently taking amlodipine 5 mg daily, losartan 100 mg daily metoprolol 12.5 mg twice daily for hypertension.  He is concerned about a possible UTI.  He has had a 5-day history of dysuria, and urgency and frequency.  No fever.  Urine has been cloudy    Past medical history is significant for a history of hospitalization at [facility] in July with diabetic ketoacidosis, as well as UTI.  During that stay he was treated with IV fluids, insulin and Rocephin.  Urine culture was positive for Proteus and he was discharged with p.o. Keflex    We reviewed his most recent labs from 11/3/2023  A1c: 6.2%  PSA: Normal range at 0.38  CBC showed mild anemia with hemoglobin of 12.0.    Lipid panel showed total cholesterol 146.  LDL 73  B12 was at the lower end of normal at 237  Folate normal.  Iron panel did not show any signs  of significant iron deficiency      Review of Systems   Constitutional:  Negative for fever.   Gastrointestinal:  Negative for abdominal pain and vomiting.   Genitourinary:  Positive for dysuria, hematuria and urgency.   Neurological:  Negative for dizziness.       Objective   /77   Pulse 61   Temp 36.4 °C (97.6 °F) (Temporal)   Wt 58.5 kg (129 lb)   SpO2 98%   BMI 18.51 kg/m²     Physical Exam  Vitals reviewed.   Constitutional:       General: He is not in acute distress.     Appearance: Normal appearance. He is well-developed.   HENT:      Head: Normocephalic.   Eyes:      Conjunctiva/sclera: Conjunctivae normal.   Cardiovascular:      Rate and Rhythm: Normal rate and regular rhythm.      Heart sounds: Normal heart sounds.   Pulmonary:      Effort: Pulmonary effort is normal.      Breath sounds: Normal breath sounds.   Abdominal:      Palpations: Abdomen is soft.      Tenderness: There is no abdominal tenderness. There is no right CVA tenderness or left CVA tenderness.   Musculoskeletal:      Right lower leg: No edema.      Left lower leg: No edema.   Skin:     Findings: No rash.   Neurological:      Mental Status: He is alert.   Psychiatric:         Mood and Affect: Mood normal.         Behavior: Behavior normal.         Assessment/Plan   Problem List Items Addressed This Visit          Medium    Essential hypertension    Type 2 diabetes mellitus with ketoacidosis without coma (CMS/[facility])     Other Visit Diagnoses       Urinary tract infection with hematuria, site unspecified    -  Primary    Relevant Medications    cephalexin (Keflex) 500 mg capsule    Other Relevant Orders    POCT UA Automated manually resulted (Completed)    Urine Culture    Urinalysis with Reflex Microscopic    Anemia, unspecified type        Relevant Orders    Basic Metabolic Panel    CBC and Auto Differential    Vitamin B12          Diabetes mellitus: His A1c has improved significantly from 14.3% and most recently is 6.2%.   Continue Lantus, Humalog and metformin and continue to monitor out of office.  Follow-up in 3 months to recheck A1c  Hypertension: Blood pressure is at goal, continue current medications  UTI: He reports a 5-day history of UTI symptoms and his UA today does indicate UTI.  We will send urine for culture, and get him started on cephalexin, which is the antibiotic that he was discharged with after hospitalization in July with UTI due to Proteus.  With his history, I recommended that they monitor his symptoms very closely, and have a low threshold for going to the ER if getting any significantly elevated glucose readings greater than 300, confusion, fever, or vomiting, or any other new or worsening symptoms.  We will plan on following up by phone on Monday once I have his culture results  His labs did show a mild macrocytic anemia, and B12 level was at the low-end of normal range.  I recommended that he start over-the-counter vitamin B12 100 to 500 mcg daily and we will recheck a CBC and B12 level again in 1 month  His UA today is positive for blood, which is likely due to UTI.  He does have a history of recurrent hematuria in the past, and I had referred him to a urologist previously.  He declines referral today, but would like to recheck this again in 1 month.  We will repeat UA in 1 month to confirm that hematuria has resolved

## 2023-11-12 LAB — BACTERIA UR CULT: ABNORMAL

## 2023-11-21 DIAGNOSIS — E11.10 TYPE 2 DIABETES MELLITUS WITH KETOACIDOSIS WITHOUT COMA, WITH LONG-TERM CURRENT USE OF INSULIN (MULTI): ICD-10-CM

## 2023-11-21 DIAGNOSIS — Z79.4 TYPE 2 DIABETES MELLITUS WITH KETOACIDOSIS WITHOUT COMA, WITH LONG-TERM CURRENT USE OF INSULIN (MULTI): ICD-10-CM

## 2023-11-21 RX ORDER — PEN NEEDLE, DIABETIC 31 GX5/16"
NEEDLE, DISPOSABLE MISCELLANEOUS
Qty: 100 EACH | Refills: 11 | Status: SHIPPED | OUTPATIENT
Start: 2023-11-21

## 2023-11-28 ENCOUNTER — OFFICE VISIT (OUTPATIENT)
Dept: PRIMARY CARE | Facility: CLINIC | Age: 73
End: 2023-11-28
Payer: MEDICARE

## 2023-11-28 VITALS
SYSTOLIC BLOOD PRESSURE: 129 MMHG | RESPIRATION RATE: 14 BRPM | WEIGHT: 122 LBS | DIASTOLIC BLOOD PRESSURE: 82 MMHG | HEART RATE: 62 BPM | TEMPERATURE: 98.2 F | BODY MASS INDEX: 17.47 KG/M2 | HEIGHT: 70 IN

## 2023-11-28 DIAGNOSIS — N39.0 URINARY TRACT INFECTION WITH HEMATURIA, SITE UNSPECIFIED: Primary | ICD-10-CM

## 2023-11-28 DIAGNOSIS — R31.0 GROSS HEMATURIA: ICD-10-CM

## 2023-11-28 DIAGNOSIS — R31.9 URINARY TRACT INFECTION WITH HEMATURIA, SITE UNSPECIFIED: Primary | ICD-10-CM

## 2023-11-28 DIAGNOSIS — R30.0 DYSURIA: ICD-10-CM

## 2023-11-28 LAB
POC APPEARANCE, URINE: ABNORMAL
POC BILIRUBIN, URINE: NEGATIVE
POC BLOOD, URINE: ABNORMAL
POC COLOR, URINE: ABNORMAL
POC GLUCOSE, URINE: NEGATIVE MG/DL
POC KETONES, URINE: NEGATIVE MG/DL
POC LEUKOCYTES, URINE: ABNORMAL
POC NITRITE,URINE: NEGATIVE
POC PH, URINE: 8.5 PH
POC PROTEIN, URINE: ABNORMAL MG/DL
POC SPECIFIC GRAVITY, URINE: 1.02
POC UROBILINOGEN, URINE: 0.2 EU/DL

## 2023-11-28 PROCEDURE — 1160F RVW MEDS BY RX/DR IN RCRD: CPT | Performed by: FAMILY MEDICINE

## 2023-11-28 PROCEDURE — 87186 SC STD MICRODIL/AGAR DIL: CPT

## 2023-11-28 PROCEDURE — 99214 OFFICE O/P EST MOD 30 MIN: CPT | Performed by: FAMILY MEDICINE

## 2023-11-28 PROCEDURE — 4010F ACE/ARB THERAPY RXD/TAKEN: CPT | Performed by: FAMILY MEDICINE

## 2023-11-28 PROCEDURE — 1159F MED LIST DOCD IN RCRD: CPT | Performed by: FAMILY MEDICINE

## 2023-11-28 PROCEDURE — 3079F DIAST BP 80-89 MM HG: CPT | Performed by: FAMILY MEDICINE

## 2023-11-28 PROCEDURE — 87086 URINE CULTURE/COLONY COUNT: CPT

## 2023-11-28 PROCEDURE — 81003 URINALYSIS AUTO W/O SCOPE: CPT | Performed by: FAMILY MEDICINE

## 2023-11-28 PROCEDURE — 3048F LDL-C <100 MG/DL: CPT | Performed by: FAMILY MEDICINE

## 2023-11-28 PROCEDURE — 3074F SYST BP LT 130 MM HG: CPT | Performed by: FAMILY MEDICINE

## 2023-11-28 PROCEDURE — 3044F HG A1C LEVEL LT 7.0%: CPT | Performed by: FAMILY MEDICINE

## 2023-11-28 RX ORDER — CIPROFLOXACIN 250 MG/1
250 TABLET, FILM COATED ORAL 2 TIMES DAILY
Qty: 14 TABLET | Refills: 0 | Status: SHIPPED | OUTPATIENT
Start: 2023-11-28 | End: 2023-12-05

## 2023-11-28 RX ORDER — PNV NO.95/FERROUS FUM/FOLIC AC 28MG-0.8MG
100 TABLET ORAL DAILY
COMMUNITY

## 2023-11-28 ASSESSMENT — ENCOUNTER SYMPTOMS
FEVER: 0
HEMATURIA: 1
NAUSEA: 0
ABDOMINAL PAIN: 0
FREQUENCY: 1
VOMITING: 0

## 2023-11-28 NOTE — PROGRESS NOTES
"Subjective   Patient ID: Jorge A Madden is a 73 y.o. male who presents for Urinary Frequency.    Urinary Frequency   This is a recurrent problem. Episode onset: about 2 weeks ago. The problem occurs intermittently. The quality of the pain is described as burning. The pain is mild. There has been no fever. Associated symptoms include frequency and hematuria. Pertinent negatives include no nausea or vomiting. He has tried antibiotics for the symptoms. The treatment provided moderate relief.      Symptoms started 5 days ago.  He has noticed visible blood in his urine several occasions.  Also has had dysuria and urinary frequency and urgency.  No fever.  No abdominal pain, flank pain, or confusion.  He is a diabetic.  Recent glucose readings have been in the low 100s  He is a smoker.  He smoked 1 pack/day for 25 years and stopped smoking regularly in 2021, but does admit to smoking 1 to 2 cigarettes every few days  He has had 2 other recent UTIs  He was most recently seen on 11/9 with similar symptoms and treated with p.o. cephalexin.  Urine culture at that time was positive for greater than 100,000 Proteus which was sensitive to cephalosporins.  He states that symptoms resolved completely with antibiotic, before returning 5 days ago  He was also previously hospitalized in July of this year with diabetic ketoacidosis.  During that stay he was found to have UTI and was treated with Rocephin and discharged on p.o. Keflex.  Urine culture at that time was also positive for Proteus    Review of Systems   Constitutional:  Negative for fever.   Cardiovascular:  Negative for chest pain.   Gastrointestinal:  Negative for abdominal pain, nausea and vomiting.   Genitourinary:  Positive for frequency and hematuria.       Objective   /82   Pulse 62   Temp 36.8 °C (98.2 °F)   Resp 14   Ht 1.778 m (5' 10\")   Wt 55.3 kg (122 lb)   BMI 17.51 kg/m²     Physical Exam  Vitals reviewed.   Constitutional:       General: He is " not in acute distress.     Appearance: Normal appearance. He is well-developed.   HENT:      Head: Normocephalic.   Eyes:      Conjunctiva/sclera: Conjunctivae normal.   Cardiovascular:      Rate and Rhythm: Normal rate and regular rhythm.      Heart sounds: Normal heart sounds.   Pulmonary:      Effort: Pulmonary effort is normal.      Breath sounds: Normal breath sounds.   Abdominal:      Palpations: Abdomen is soft.      Tenderness: There is no abdominal tenderness. There is no right CVA tenderness or left CVA tenderness.   Skin:     Findings: No rash.   Neurological:      Mental Status: He is alert.   Psychiatric:         Mood and Affect: Mood normal.         Behavior: Behavior normal.         Assessment/Plan   Problem List Items Addressed This Visit          Medium    Hematuria    Relevant Orders    Referral to Urology    CT urography w 3D volume rendered imaging     Other Visit Diagnoses       Urinary tract infection with hematuria, site unspecified    -  Primary    Relevant Medications    ciprofloxacin (Cipro) 250 mg tablet    Other Relevant Orders    Referral to Urology    CT urography w 3D volume rendered imaging    Urine Culture    Dysuria        Relevant Orders    POCT UA Automated manually resulted (Completed)        His UA today is positive for large leukocytes and large blood.  He did recently have a UTI earlier this month, and also in July, both positive for Proteus.  We will send urine for culture and start antibiotic for suspected UTI.  Since this did recur shortly after completing course of cephalexin, I would like to start a different antibiotic.  We will start treatment with Cipro 250 mg twice daily for 7 days.  We discussed adverse effects of fluoroquinolones including risk of tendon injury and neuropathy.  Advise no heavy lifting and discussed that if he does have any numbness/tingling or muscle or tendon pain, he should hold this medication and call us right away.  There is also a risk of  medication interaction with his Cymbalta.  He is only taking 20 mg daily, and is going to hold this medication while taking Cipro.  He has had several other urine studies which were positive for blood this year, including 11/9 (large blood, but no RBCs seen on urinalysis), 7/13/23 (UA was positive for 11-25 RBC), and also 9/6/2022 (UA positive for 14 RBC).  We we will obtain a CT urogram, and also refer to urology to further evaluate recurrent hematuria as well as recurrent UTI.  Plan on following up by phone in the next 3 days once I have urine culture results.  Advised to follow-up in 1 week if symptoms not resolved.  We discussed indications for ED including any high fever, vomiting, flank or abdominal pain, confusion, elevated glucose readings, or any other new or worsening symptoms  He also has an order for labs including CBC and BMP which were ordered at last visit.  He will be getting these done when he has his CT scan completed

## 2023-12-01 LAB — BACTERIA UR CULT: ABNORMAL

## 2023-12-06 ENCOUNTER — ANCILLARY PROCEDURE (OUTPATIENT)
Dept: RADIOLOGY | Facility: CLINIC | Age: 73
End: 2023-12-06
Payer: MEDICARE

## 2023-12-06 ENCOUNTER — LAB (OUTPATIENT)
Dept: LAB | Facility: LAB | Age: 73
End: 2023-12-06
Payer: MEDICARE

## 2023-12-06 ENCOUNTER — TELEPHONE (OUTPATIENT)
Dept: PRIMARY CARE | Facility: CLINIC | Age: 73
End: 2023-12-06

## 2023-12-06 DIAGNOSIS — D64.9 ANEMIA, UNSPECIFIED TYPE: ICD-10-CM

## 2023-12-06 DIAGNOSIS — N39.0 URINARY TRACT INFECTION WITH HEMATURIA, SITE UNSPECIFIED: Primary | ICD-10-CM

## 2023-12-06 DIAGNOSIS — R31.9 URINARY TRACT INFECTION WITH HEMATURIA, SITE UNSPECIFIED: Primary | ICD-10-CM

## 2023-12-06 DIAGNOSIS — R31.0 GROSS HEMATURIA: ICD-10-CM

## 2023-12-06 DIAGNOSIS — N39.0 URINARY TRACT INFECTION WITH HEMATURIA, SITE UNSPECIFIED: ICD-10-CM

## 2023-12-06 DIAGNOSIS — R31.9 URINARY TRACT INFECTION WITH HEMATURIA, SITE UNSPECIFIED: ICD-10-CM

## 2023-12-06 LAB
ANION GAP SERPL CALC-SCNC: 11 MMOL/L (ref 10–20)
APPEARANCE UR: ABNORMAL
BACTERIA #/AREA URNS AUTO: ABNORMAL /HPF
BASOPHILS # BLD AUTO: 0.09 X10*3/UL (ref 0–0.1)
BASOPHILS NFR BLD AUTO: 0.9 %
BILIRUB UR STRIP.AUTO-MCNC: NEGATIVE MG/DL
BUN SERPL-MCNC: 26 MG/DL (ref 6–23)
CALCIUM SERPL-MCNC: 9 MG/DL (ref 8.6–10.3)
CHLORIDE SERPL-SCNC: 103 MMOL/L (ref 98–107)
CO2 SERPL-SCNC: 24 MMOL/L (ref 21–32)
COLOR UR: YELLOW
CREAT SERPL-MCNC: 1.14 MG/DL (ref 0.5–1.3)
EOSINOPHIL # BLD AUTO: 0.56 X10*3/UL (ref 0–0.4)
EOSINOPHIL NFR BLD AUTO: 5.9 %
ERYTHROCYTE [DISTWIDTH] IN BLOOD BY AUTOMATED COUNT: 13.2 % (ref 11.5–14.5)
GFR SERPL CREATININE-BSD FRML MDRD: 69 ML/MIN/1.73M*2
GLUCOSE SERPL-MCNC: 40 MG/DL (ref 74–99)
GLUCOSE UR STRIP.AUTO-MCNC: NEGATIVE MG/DL
HCT VFR BLD AUTO: 32.3 % (ref 41–52)
HGB BLD-MCNC: 11 G/DL (ref 13.5–17.5)
IMM GRANULOCYTES # BLD AUTO: 0.05 X10*3/UL (ref 0–0.5)
IMM GRANULOCYTES NFR BLD AUTO: 0.5 % (ref 0–0.9)
KETONES UR STRIP.AUTO-MCNC: NEGATIVE MG/DL
LEUKOCYTE ESTERASE UR QL STRIP.AUTO: ABNORMAL
LYMPHOCYTES # BLD AUTO: 4.43 X10*3/UL (ref 0.8–3)
LYMPHOCYTES NFR BLD AUTO: 46.7 %
MCH RBC QN AUTO: 33.8 PG (ref 26–34)
MCHC RBC AUTO-ENTMCNC: 34.1 G/DL (ref 32–36)
MCV RBC AUTO: 99 FL (ref 80–100)
MONOCYTES # BLD AUTO: 0.57 X10*3/UL (ref 0.05–0.8)
MONOCYTES NFR BLD AUTO: 6 %
NEUTROPHILS # BLD AUTO: 3.79 X10*3/UL (ref 1.6–5.5)
NEUTROPHILS NFR BLD AUTO: 40 %
NITRITE UR QL STRIP.AUTO: NEGATIVE
NRBC BLD-RTO: 0 /100 WBCS (ref 0–0)
PH UR STRIP.AUTO: 5 [PH]
PLATELET # BLD AUTO: 228 X10*3/UL (ref 150–450)
POTASSIUM SERPL-SCNC: 3.3 MMOL/L (ref 3.5–5.3)
PROT UR STRIP.AUTO-MCNC: NEGATIVE MG/DL
RBC # BLD AUTO: 3.25 X10*6/UL (ref 4.5–5.9)
RBC # UR STRIP.AUTO: ABNORMAL /UL
RBC #/AREA URNS AUTO: ABNORMAL /HPF
SODIUM SERPL-SCNC: 135 MMOL/L (ref 136–145)
SP GR UR STRIP.AUTO: 1.01
UROBILINOGEN UR STRIP.AUTO-MCNC: <2 MG/DL
VIT B12 SERPL-MCNC: 554 PG/ML (ref 211–911)
WBC # BLD AUTO: 9.5 X10*3/UL (ref 4.4–11.3)
WBC #/AREA URNS AUTO: >50 /HPF
WBC CLUMPS #/AREA URNS AUTO: ABNORMAL /HPF

## 2023-12-06 PROCEDURE — 81001 URINALYSIS AUTO W/SCOPE: CPT

## 2023-12-06 PROCEDURE — 76377 3D RENDER W/INTRP POSTPROCES: CPT

## 2023-12-06 PROCEDURE — 2550000001 HC RX 255 CONTRASTS: Performed by: FAMILY MEDICINE

## 2023-12-06 PROCEDURE — 80048 BASIC METABOLIC PNL TOTAL CA: CPT

## 2023-12-06 PROCEDURE — 76376 3D RENDER W/INTRP POSTPROCES: CPT | Performed by: RADIOLOGY

## 2023-12-06 PROCEDURE — 36415 COLL VENOUS BLD VENIPUNCTURE: CPT

## 2023-12-06 PROCEDURE — 74178 CT ABD&PLV WO CNTR FLWD CNTR: CPT | Performed by: RADIOLOGY

## 2023-12-06 PROCEDURE — 85025 COMPLETE CBC W/AUTO DIFF WBC: CPT

## 2023-12-06 PROCEDURE — 82607 VITAMIN B-12: CPT

## 2023-12-06 RX ADMIN — IOHEXOL 100 ML: 350 INJECTION, SOLUTION INTRAVENOUS at 10:56

## 2023-12-06 NOTE — TELEPHONE ENCOUNTER
I spoke to His wife over the phone regarding lab results. His glucose was critically low at 41 checked earlier this morning. He is currently doing well and his most recent glucose is improved to 192. We discussed continuing to monitor. He has been monitoring his glucose three times per day and a regular basis. He will call us if any any further episodes of hypoglycemia

## 2023-12-07 ENCOUNTER — TELEPHONE (OUTPATIENT)
Dept: PRIMARY CARE | Facility: CLINIC | Age: 73
End: 2023-12-07
Payer: MEDICARE

## 2023-12-07 DIAGNOSIS — K86.1 CHRONIC PANCREATITIS, UNSPECIFIED PANCREATITIS TYPE (MULTI): Primary | ICD-10-CM

## 2023-12-07 NOTE — TELEPHONE ENCOUNTER
I spoke to them over the phone regarding CT urogram results  There was a finding of bilateral hydronephrosis and hydroureter, left greater than right.  No obstructing hyperdense calculi seen.  Also finding of over distended urinary bladder with diffuse bladder wall thickening, and may represent inflammatory, infectious process versus sequela of chronic outlet obstruction  This test was ordered due to microscopic hematuria on UA and also recurrent UTI.  He currently does not have any urinary symptoms including dysuria, urgency or frequency.  His last PSA checked 11/3/2023 was normal range at 0.38.  Most recent BUNs/creatinine was 26/1.14 with GFR 69 when checked yesterday.  He does currently take Flomax.  Scheduled to see urology next month.  We discussed continuing Flomax, making sure he stays adequately hydrated, and he will be seeing urology for further evaluation of these findings, as well as possible cystoscopy.  Will contact us if he does develop any urinary symptoms prior to that appointment, and advised ER if any inability to void    There were also findings consistent with chronic pancreatitis on CT scan.  He does have a history of heavier alcohol use in the past and did previously have several episodes of acute pancreatitis.  We will refer him to establish with gastroenterology to discuss further  There was also finding of severe constipation.  Recommended starting daily MiraLAX and will discuss further with GI

## 2024-01-22 ENCOUNTER — TELEPHONE (OUTPATIENT)
Dept: PRIMARY CARE | Facility: CLINIC | Age: 74
End: 2024-01-22
Payer: MEDICARE

## 2024-01-22 DIAGNOSIS — E11.10 TYPE 2 DIABETES MELLITUS WITH KETOACIDOSIS WITHOUT COMA, WITH LONG-TERM CURRENT USE OF INSULIN (MULTI): ICD-10-CM

## 2024-01-22 DIAGNOSIS — Z79.4 TYPE 2 DIABETES MELLITUS WITH KETOACIDOSIS WITHOUT COMA, WITH LONG-TERM CURRENT USE OF INSULIN (MULTI): ICD-10-CM

## 2024-01-22 RX ORDER — INSULIN LISPRO 100 [IU]/ML
3 INJECTION, SOLUTION INTRAVENOUS; SUBCUTANEOUS
Qty: 10 ML | Refills: 3 | Status: SHIPPED | OUTPATIENT
Start: 2024-01-22

## 2024-01-22 RX ORDER — INSULIN LISPRO 100 [IU]/ML
3 INJECTION, SOLUTION INTRAVENOUS; SUBCUTANEOUS
COMMUNITY
End: 2024-01-22

## 2024-01-22 NOTE — TELEPHONE ENCOUNTER
Pt called received Lispro vial instead of Kwik pen from Pharmacy.   Pt needs Kwik pen  [facility] - [place], [place] - [address] Phone: [phone]   Fax: [phone]

## 2024-01-25 ENCOUNTER — OFFICE VISIT (OUTPATIENT)
Dept: UROLOGY | Facility: CLINIC | Age: 74
End: 2024-01-25
Payer: MEDICARE

## 2024-01-25 VITALS
WEIGHT: 121.6 LBS | RESPIRATION RATE: 16 BRPM | DIASTOLIC BLOOD PRESSURE: 65 MMHG | SYSTOLIC BLOOD PRESSURE: 107 MMHG | BODY MASS INDEX: 17.02 KG/M2 | HEIGHT: 71 IN | HEART RATE: 69 BPM

## 2024-01-25 DIAGNOSIS — R33.9 URINARY RETENTION: ICD-10-CM

## 2024-01-25 DIAGNOSIS — N39.0 URINARY TRACT INFECTION WITH HEMATURIA, SITE UNSPECIFIED: ICD-10-CM

## 2024-01-25 DIAGNOSIS — N39.42 URINARY INCONTINENCE WITHOUT SENSORY AWARENESS: Primary | ICD-10-CM

## 2024-01-25 DIAGNOSIS — R31.9 URINARY TRACT INFECTION WITH HEMATURIA, SITE UNSPECIFIED: ICD-10-CM

## 2024-01-25 PROCEDURE — 87086 URINE CULTURE/COLONY COUNT: CPT | Mod: ELYLAB | Performed by: UROLOGY

## 2024-01-25 PROCEDURE — 1160F RVW MEDS BY RX/DR IN RCRD: CPT | Performed by: UROLOGY

## 2024-01-25 PROCEDURE — 3074F SYST BP LT 130 MM HG: CPT | Performed by: UROLOGY

## 2024-01-25 PROCEDURE — 1159F MED LIST DOCD IN RCRD: CPT | Performed by: UROLOGY

## 2024-01-25 PROCEDURE — 3078F DIAST BP <80 MM HG: CPT | Performed by: UROLOGY

## 2024-01-25 PROCEDURE — 4010F ACE/ARB THERAPY RXD/TAKEN: CPT | Performed by: UROLOGY

## 2024-01-25 PROCEDURE — 1126F AMNT PAIN NOTED NONE PRSNT: CPT | Performed by: UROLOGY

## 2024-01-25 PROCEDURE — 99214 OFFICE O/P EST MOD 30 MIN: CPT | Performed by: UROLOGY

## 2024-01-25 PROCEDURE — 99204 OFFICE O/P NEW MOD 45 MIN: CPT | Performed by: UROLOGY

## 2024-01-25 ASSESSMENT — PAIN SCALES - GENERAL: PAINLEVEL: 0-NO PAIN

## 2024-01-25 NOTE — PROGRESS NOTES
History Of Present Illness  73-year-old male referred to me for evaluation of urinary tract infection  PMH: Hypertension, hyperlipidemia, malnutrition, type 2 diabetes, history of ketoacidosis, history of pancreatitis, anxiety, gout, solitary testicle (reports no tube)    Patient was admitted at [facility] 7/17/2023 with DKA secondary to UTI  A1c 14.3  07/2023 - Urine culture grew Proteus  11/28/2023 - Proteus  11/9/23/2023 - Proteus     Scr 1.14  Hgb A1c - 6.2    CT urogram 12/6/2023: Bilateral hydronephrosis and hydroureter, distended bladder, bladder wall thickening.  Fecal impaction. Bladder neck looks open/dilated  Prostate size: 4.5 x 3.1 by 4.1cm = 30g    Pt here with his cousin, who he lives with. Pt reports urinary frequency and occasional frequency, otherwise good stream, no hesitancy. Wears depends at night just in case.   No gross hematuria.   2-3x NTF    Re: constipation, now regular with 1 BM per day    Past Medical History  He has no past medical history on file.    Surgical History  He has a past surgical history that includes Other surgical history (11/14/2019).     Social History  He reports that he has been smoking cigarettes. He has a 30.00 pack-year smoking history. He does not have any smokeless tobacco history on file. No history on file for alcohol use and drug use.    Family History  Family History   Problem Relation Name Age of Onset    Prostate cancer Brother          Allergies  Patient has no known allergies.    ROS: 12 system review was completed and is negative with the exception of those signs and symptoms noted in the history of present illness: A 12 system review was completed and is negative with the exception of those signs and symptoms noted in the history of present illness.     Exam:  General: in NAD, appears stated age  Head: normocephalic, atraumatic  Respiratory: normal effort, no use of accessory muscles  Cardiovascular: no edema noted  Skin: normal turgor, no  "rashes  Neurologic: grossly intact, oriented to person/place/time  Psychiatric: mode and affect appropriate  Abdomen: Soft, scaphoid, no palpable masses or lesions, mild tenderness in the suprapubic area  Digital rectal exam-normal sphincter tone, prostate small, no stool in the vault     Last Recorded Vitals  Blood pressure 107/65, pulse 69, resp. rate 16, height 1.803 m (5' 11\"), weight 55.2 kg (121 lb 9.6 oz).    Lab Results   Component Value Date    PSASCREEN 0.38 11/03/2023    CREATININE 1.14 12/06/2023    HGB 11.0 (L) 12/06/2023         ASSESSMENT/PLAN:  # Urinary retention, bilateral hydronephrosis, recurrent urinary tract infections  -PVR  -Urine culture  -Given small prostate, recommended proceeding with cystoscopy for evaluation  -Continue tamsulosin  -Possibly will need bladder outlet procedure versus urethral dilation  -Initially, suspected fecal impaction may have been the source of obstruction, but this seems to have resolved    Guille Contreras MD    "

## 2024-01-28 LAB — BACTERIA UR CULT: ABNORMAL

## 2024-01-29 ENCOUNTER — TELEPHONE (OUTPATIENT)
Dept: PRIMARY CARE | Facility: CLINIC | Age: 74
End: 2024-01-29
Payer: MEDICARE

## 2024-01-29 NOTE — LETTER
Jorge A Madden  1950    1/29/2024    To Whom This May Concern:    My patient, Jorge A Madden, is unable to perform Jury Duty due to a mental or physical condition that renders the prospective juror unfit for jury service for the remainder of the jury year.    Should you have any questions please do not hesitate to call.    Thank you for your cooperation.    Sincerely,              Dr. Antolin Thao, DO

## 2024-01-29 NOTE — TELEPHONE ENCOUNTER
"Pt was selected to participate in Jury Duty  Pt's cousin called to see if you could write a letter stating pt is not fit for Jury Duty? She states he \"can't even keep his meds straight\"  Requested Jury Duty Dates: 02/09 -02/23  Please advise, thanks!  "

## 2024-02-05 ENCOUNTER — TELEPHONE (OUTPATIENT)
Dept: UROLOGY | Facility: HOSPITAL | Age: 74
End: 2024-02-05
Payer: MEDICARE

## 2024-02-05 DIAGNOSIS — N39.0 COMPLICATED UTI (URINARY TRACT INFECTION): Primary | ICD-10-CM

## 2024-02-05 RX ORDER — CIPROFLOXACIN 500 MG/1
500 TABLET ORAL 2 TIMES DAILY
Qty: 28 TABLET | Refills: 0 | Status: SHIPPED | OUTPATIENT
Start: 2024-02-05 | End: 2024-02-19

## 2024-02-05 NOTE — TELEPHONE ENCOUNTER
Called and informed patient's POA Ekta, who is his cousin, that his urine culture came back positive with Proteus.  Sensitive to Cipro, Bactrim, penicillins.  She reports that he is having some confusion and foul-smelling urine.    -Plan for 14 days of ciprofloxacin for persistent prostatitis, bladder infection  -Cystoscopy with me scheduled in April

## 2024-02-15 ENCOUNTER — OFFICE VISIT (OUTPATIENT)
Dept: PRIMARY CARE | Facility: CLINIC | Age: 74
End: 2024-02-15
Payer: MEDICARE

## 2024-02-15 VITALS
BODY MASS INDEX: 16.6 KG/M2 | WEIGHT: 119 LBS | SYSTOLIC BLOOD PRESSURE: 97 MMHG | OXYGEN SATURATION: 95 % | TEMPERATURE: 97.8 F | HEART RATE: 65 BPM | DIASTOLIC BLOOD PRESSURE: 66 MMHG

## 2024-02-15 DIAGNOSIS — I10 ESSENTIAL HYPERTENSION: Primary | ICD-10-CM

## 2024-02-15 DIAGNOSIS — E11.10 TYPE 2 DIABETES MELLITUS WITH KETOACIDOSIS WITHOUT COMA, WITH LONG-TERM CURRENT USE OF INSULIN (MULTI): ICD-10-CM

## 2024-02-15 DIAGNOSIS — Z79.4 TYPE 2 DIABETES MELLITUS WITH KETOACIDOSIS WITHOUT COMA, WITH LONG-TERM CURRENT USE OF INSULIN (MULTI): ICD-10-CM

## 2024-02-15 PROBLEM — E43 SEVERE PROTEIN-CALORIE MALNUTRITION (MULTI): Status: RESOLVED | Noted: 2023-07-13 | Resolved: 2024-02-15

## 2024-02-15 LAB — POC HEMOGLOBIN A1C: 5.9 % (ref 4.2–6.5)

## 2024-02-15 PROCEDURE — 3074F SYST BP LT 130 MM HG: CPT | Performed by: FAMILY MEDICINE

## 2024-02-15 PROCEDURE — 1124F ACP DISCUSS-NO DSCNMKR DOCD: CPT | Performed by: FAMILY MEDICINE

## 2024-02-15 PROCEDURE — 1159F MED LIST DOCD IN RCRD: CPT | Performed by: FAMILY MEDICINE

## 2024-02-15 PROCEDURE — 1160F RVW MEDS BY RX/DR IN RCRD: CPT | Performed by: FAMILY MEDICINE

## 2024-02-15 PROCEDURE — 3078F DIAST BP <80 MM HG: CPT | Performed by: FAMILY MEDICINE

## 2024-02-15 PROCEDURE — 99214 OFFICE O/P EST MOD 30 MIN: CPT | Performed by: FAMILY MEDICINE

## 2024-02-15 PROCEDURE — 1126F AMNT PAIN NOTED NONE PRSNT: CPT | Performed by: FAMILY MEDICINE

## 2024-02-15 PROCEDURE — 4010F ACE/ARB THERAPY RXD/TAKEN: CPT | Performed by: FAMILY MEDICINE

## 2024-02-15 PROCEDURE — 83036 HEMOGLOBIN GLYCOSYLATED A1C: CPT | Performed by: FAMILY MEDICINE

## 2024-02-15 RX ORDER — ATORVASTATIN CALCIUM 10 MG/1
10 TABLET, FILM COATED ORAL DAILY
Qty: 90 TABLET | Refills: 3 | Status: SHIPPED | OUTPATIENT
Start: 2024-02-15

## 2024-02-15 ASSESSMENT — ENCOUNTER SYMPTOMS
DIABETIC ASSOCIATED SYMPTOMS: 0
COUGH: 1
NUMBNESS: 0
RHINORRHEA: 1
DIZZINESS: 0
FEVER: 0

## 2024-02-15 NOTE — PROGRESS NOTES
Subjective   Patient ID: Jorge A Madden is a 73 y.o. male who presents for Diabetes and URI.    Diabetes  He presents for his follow-up diabetic visit. He has type 2 diabetes mellitus. There are no hypoglycemic associated symptoms. Pertinent negatives for hypoglycemia include no dizziness. There are no diabetic associated symptoms. Pertinent negatives for diabetes include no chest pain. There are no hypoglycemic complications. He never participates in exercise. Eye exam is not current.   URI   This is a new problem. The current episode started in the past 7 days. Associated symptoms include congestion, coughing and rhinorrhea. Pertinent negatives include no chest pain.      He is here today for follow-up on hypertension and diabetes mellitus  He has had URI symptoms over the past few days.  Has had a mild cough, runny nose and stuffy nose.  No fever or wheezing or difficulty breathing.  Symptoms have started to improve  No falls in the past year  He has lost approximately 10 pounds in the last 6 months.  He is still smoking but has cut back  Diabetes mellitus: Currently taking Lantus 15 units daily, metformin 1000 mg twice daily and Humalog sliding scale 3 times per day (2 units for every 50 glucose greater than 150).  He checks his glucose out of the office and most readings are ranging from 77-1 16.  No hypoglycemia.  Checks glucose 2-3 times per day.  He has gotten readings occasionally up to 200.  Has not seen optometry for dilated eye exam in the past year  Hypertension: Currently taking amlodipine 5 mg daily, losartan 100 mg daily and metoprolol 12.5 mg twice daily.  Not checking blood pressure out of the office.  Denies any dizziness or lightheadedness            Review of Systems   Constitutional:  Negative for fever.   HENT:  Positive for congestion and rhinorrhea.    Respiratory:  Positive for cough.    Cardiovascular:  Negative for chest pain and leg swelling.   Neurological:  Negative for dizziness and  numbness.       Objective   BP 97/66   Pulse 65   Temp 36.6 °C (97.8 °F) (Temporal)   Wt 54 kg (119 lb)   SpO2 95%   BMI 16.60 kg/m²     Physical Exam  Vitals reviewed.   Constitutional:       General: He is not in acute distress.     Appearance: Normal appearance. He is well-developed.   HENT:      Head: Normocephalic.      Right Ear: Tympanic membrane, ear canal and external ear normal.      Left Ear: Tympanic membrane, ear canal and external ear normal.      Nose: Congestion present.      Mouth/Throat:      Mouth: Mucous membranes are moist.      Pharynx: No oropharyngeal exudate or posterior oropharyngeal erythema.   Eyes:      Conjunctiva/sclera: Conjunctivae normal.   Cardiovascular:      Rate and Rhythm: Normal rate and regular rhythm.      Heart sounds: Normal heart sounds.   Pulmonary:      Effort: Pulmonary effort is normal.      Breath sounds: Normal breath sounds.   Musculoskeletal:      Right lower leg: No edema.      Left lower leg: No edema.   Lymphadenopathy:      Cervical: No cervical adenopathy.   Skin:     Findings: No rash.   Neurological:      Mental Status: He is alert.   Psychiatric:         Mood and Affect: Mood normal.         Behavior: Behavior normal.         Assessment/Plan   Problem List Items Addressed This Visit          Medium    Type 2 diabetes mellitus with ketoacidosis without coma (CMS/HCC)    Relevant Medications    atorvastatin (Lipitor) 10 mg tablet    Other Relevant Orders    POCT glycosylated hemoglobin (Hb A1C) manually resulted (Completed)   Diabetes mellitus: This is controlled with A1c of 5.9% today.  Continue Lantus, Humalog sliding scale, and metformin and follow-up in 3 to 4 months to recheck A1c  Due to his history of diabetes mellitus, a statin is indicated.  We will start atorvastatin 10 mg daily.  He will call if any adverse effects  Hypertension: His blood pressure today is at the lower end at 97/66.  Due to good control of blood pressure, we will hold  amlodipine and continue metoprolol and losartan.  Recommended trying to monitor blood pressure out of the office, and call if getting any elevated BP readings greater than 140/90.  Recheck at follow-up  Regarding his weight loss, I did discuss the importance of routine age-appropriate cancer screening including colonoscopy and lung cancer screening.  He declines at this time  URI: He reports his symptoms are mild and improving.  Recommend symptomatic care and call in the next 3 to 5 days if not continuing to resolve

## 2024-03-21 ENCOUNTER — TELEPHONE (OUTPATIENT)
Dept: PRIMARY CARE | Facility: CLINIC | Age: 74
End: 2024-03-21
Payer: MEDICARE

## 2024-03-21 DIAGNOSIS — E11.10 TYPE 2 DIABETES MELLITUS WITH KETOACIDOSIS WITHOUT COMA, WITH LONG-TERM CURRENT USE OF INSULIN (MULTI): ICD-10-CM

## 2024-03-21 DIAGNOSIS — Z79.4 TYPE 2 DIABETES MELLITUS WITH KETOACIDOSIS WITHOUT COMA, WITH LONG-TERM CURRENT USE OF INSULIN (MULTI): ICD-10-CM

## 2024-03-21 RX ORDER — INSULIN GLARGINE 100 [IU]/ML
15 INJECTION, SOLUTION SUBCUTANEOUS EVERY MORNING
Qty: 15 ML | Refills: 2 | Status: SHIPPED | OUTPATIENT
Start: 2024-03-21

## 2024-03-21 NOTE — TELEPHONE ENCOUNTER
"Rx Refill Request Telephone Encounter    Name:  Jorge A Madden  :  [id number]  Medication Name:     BD Ultra-Fine Mini Pen Needle 31 gauge x 3/16\" needle [[id number]]    Order Details  Dose, Route, Frequency: As Directed   Dispense Quantity: 100 each Refills: 11          Sig: Inject 1 each under the skin 4 times a day. Use 3-4 times daily with insulin pen         Start Date: 23 End Date: --   Written Date: 23 Rx Expiration Date: 24        Associated Diagnoses: Type 2 diabetes mellitus with ketoacidosis without coma, with long-term current use of insulin (CMS/[facility]) [E11.10, Z79.4]   Original Order: pen needle, diabetic (Pen Needle) 31 gauge x 3/16\" needle [[id number]]   Providers    Ordering and Authorizing Provider: Antolin Thao DO NPI: [id number]   HIEN #: [id number]   Ordering User: Antolin Thao DO          Pharmacy    [facility] [address] [place] [address] - [address]  [address], [place] [address]  Phone: [phone]  Fax: [phone]  HIEN #: --     Warnings Override History    No Interaction Warnings Shown      Pharmacist Clinical Review History    This prescription has not been clinically reviewed.     Order Reconciliation Actions       Order Reconciliation Actions     Outpatient Medication Detail    BD Ultra-Fine Mini Pen Needle 31 gauge x 3/16\" needle        Sig: Inject 1 each under the skin 4 times a day. Use 3-4 times daily with insulin pen        Sent to pharmacy as: BD Ultra-Fine Mini Pen Needle 31 gauge x 3/16\" (pen needle, diabetic)        Class: Normal        E-Prescribing Status: Receipt confirmed by pharmacy (2023  3:21 PM EST)          Event History       Event History     Pharmacy    [facility] [address] [place] [address] - [address]     Tracking Links    Cosign Tracking Order Transmittal Tracking     Relevant Medication Information    None found.               Specific Pharmacy location:  [facility] [address]  Date of last appointment:  NA  Date of " next appointment:  NA  Best number to reach patient:  NA        Quick Pen not the little bottle (5 in a box)

## 2024-03-28 DIAGNOSIS — Z79.4 TYPE 2 DIABETES MELLITUS WITH KETOACIDOSIS WITHOUT COMA, WITH LONG-TERM CURRENT USE OF INSULIN (MULTI): Primary | ICD-10-CM

## 2024-03-28 DIAGNOSIS — E11.10 TYPE 2 DIABETES MELLITUS WITH KETOACIDOSIS WITHOUT COMA, WITH LONG-TERM CURRENT USE OF INSULIN (MULTI): Primary | ICD-10-CM

## 2024-03-28 RX ORDER — INSULIN GLARGINE 100 [IU]/ML
15 INJECTION, SOLUTION SUBCUTANEOUS EVERY MORNING
Qty: 3 ML | Refills: 3 | Status: SHIPPED | OUTPATIENT
Start: 2024-03-28

## 2024-03-28 RX ORDER — INSULIN GLARGINE 100 [IU]/ML
15 INJECTION, SOLUTION SUBCUTANEOUS EVERY MORNING
COMMUNITY
End: 2024-03-28 | Stop reason: SDUPTHER

## 2024-04-05 ENCOUNTER — TELEPHONE (OUTPATIENT)
Dept: PRIMARY CARE | Facility: CLINIC | Age: 74
End: 2024-04-05
Payer: MEDICARE

## 2024-04-05 NOTE — TELEPHONE ENCOUNTER
[place]  Pt usually takes 15 units in the morning. Can't remember  if he did. Waited awhile then took 5 units. He says he feels fine. Please advise.

## 2024-04-12 ENCOUNTER — TELEPHONE (OUTPATIENT)
Dept: PRIMARY CARE | Facility: CLINIC | Age: 74
End: 2024-04-12
Payer: MEDICARE

## 2024-04-12 NOTE — TELEPHONE ENCOUNTER
Checked b/s around 5 am reading was 70. Gave something sweet. Now the reading is 80. Given an addition sweet snack. Pt is disoriented. Ms. Dash was concerned and wants to know what should be done. Please advise.   
Resident

## 2024-04-15 ENCOUNTER — OFFICE VISIT (OUTPATIENT)
Dept: PRIMARY CARE | Facility: CLINIC | Age: 74
End: 2024-04-15
Payer: MEDICARE

## 2024-04-15 ENCOUNTER — TELEPHONE (OUTPATIENT)
Dept: PRIMARY CARE | Facility: CLINIC | Age: 74
End: 2024-04-15

## 2024-04-15 VITALS
OXYGEN SATURATION: 96 % | WEIGHT: 120 LBS | HEART RATE: 67 BPM | SYSTOLIC BLOOD PRESSURE: 118 MMHG | DIASTOLIC BLOOD PRESSURE: 72 MMHG | BODY MASS INDEX: 16.74 KG/M2

## 2024-04-15 DIAGNOSIS — E16.2 HYPOGLYCEMIA: Primary | ICD-10-CM

## 2024-04-15 DIAGNOSIS — Z79.4 TYPE 2 DIABETES MELLITUS WITH KETOACIDOSIS WITHOUT COMA, WITH LONG-TERM CURRENT USE OF INSULIN (MULTI): ICD-10-CM

## 2024-04-15 DIAGNOSIS — E11.10 TYPE 2 DIABETES MELLITUS WITH KETOACIDOSIS WITHOUT COMA, WITH LONG-TERM CURRENT USE OF INSULIN (MULTI): ICD-10-CM

## 2024-04-15 PROCEDURE — 99214 OFFICE O/P EST MOD 30 MIN: CPT | Performed by: FAMILY MEDICINE

## 2024-04-15 PROCEDURE — 1160F RVW MEDS BY RX/DR IN RCRD: CPT | Performed by: FAMILY MEDICINE

## 2024-04-15 PROCEDURE — 1159F MED LIST DOCD IN RCRD: CPT | Performed by: FAMILY MEDICINE

## 2024-04-15 PROCEDURE — 3078F DIAST BP <80 MM HG: CPT | Performed by: FAMILY MEDICINE

## 2024-04-15 PROCEDURE — 4010F ACE/ARB THERAPY RXD/TAKEN: CPT | Performed by: FAMILY MEDICINE

## 2024-04-15 PROCEDURE — 3074F SYST BP LT 130 MM HG: CPT | Performed by: FAMILY MEDICINE

## 2024-04-15 RX ORDER — GLUCAGON 1 MG
1 VIAL (EA) INJECTION ONCE AS NEEDED
Qty: 1 KIT | Refills: 3 | Status: SHIPPED | OUTPATIENT
Start: 2024-04-15

## 2024-04-15 ASSESSMENT — ENCOUNTER SYMPTOMS
FEVER: 0
DIARRHEA: 1
ABDOMINAL PAIN: 0
COUGH: 0

## 2024-04-15 NOTE — TELEPHONE ENCOUNTER
Pt went to the ED. Was advised to not take any insulin until he speaks with Dr. Thao. Please advise.

## 2024-04-15 NOTE — PROGRESS NOTES
Subjective   Patient ID: Jorge A Madden is a 74 y.o. male who presents for Hypoglycemia (Hospital follow up).    HPI   He is here today to follow-up ED for hypoglycemia  Past medical history is significant for diabetes mellitus.  Prior to going to the ED he had been taking Lantus 15 units daily, metformin 1000 mg twice daily, and Humalog sliding scale 3 times per day (administers 2 units for every 50 glucose greater than 150)  He went to the ED on 4/12.  For approximately 3 days prior to going to the ED, he was getting hypoglycemia.  This had gotten as low as 58.  He was having some disorientation at that time  When he was seen in the ED, his glucose was as low as 44.  He had other labs including CMP, magnesium and CBC which were all unremarkable.  His glucose improved to 161 on discharge.  It was recommended that he hold his insulin until he spoke with PCP  After holding his insulin, he did start to get glucose readings as high as the 300s.  He has since started administering the Humalog sliding scale.          Review of Systems   Constitutional:  Negative for fever.   Respiratory:  Negative for cough.    Gastrointestinal:  Positive for diarrhea (He states he started to get diarrhea after starting atorvastatin). Negative for abdominal pain.       Objective   /72   Pulse 67   Wt 54.4 kg (120 lb)   SpO2 96%   BMI 16.74 kg/m²     Physical Exam  Vitals reviewed.   Constitutional:       General: He is not in acute distress.     Appearance: Normal appearance. He is well-developed.   HENT:      Head: Normocephalic.   Eyes:      Conjunctiva/sclera: Conjunctivae normal.   Cardiovascular:      Rate and Rhythm: Normal rate and regular rhythm.      Heart sounds: Normal heart sounds.   Pulmonary:      Effort: Pulmonary effort is normal.      Breath sounds: Normal breath sounds.   Musculoskeletal:      Right lower leg: No edema.      Left lower leg: No edema.   Skin:     Findings: No rash.   Neurological:      Mental  Status: He is alert.   Psychiatric:         Mood and Affect: Mood normal.         Behavior: Behavior normal.         Assessment/Plan   Problem List Items Addressed This Visit          Medium    Type 2 diabetes mellitus with ketoacidosis without coma (Multi)    Relevant Orders    Follow Up In [facility] - Pharmacy     Other Visit Diagnoses       Hypoglycemia    -  Primary    Relevant Medications    glucagon (Glucagon Emergency Kit, human,) 1 mg injection        We had a long discussion today regarding his glucose readings and diabetes management.  He was seen in the ED 3 days ago with hypoglycemia as low as 44.  This did improve to 161 on discharge.  Unfortunately, he has now been getting glucose readings in the 300s after holding his insulin after being seen in the ED.  We will restart Lantus at 5 units twice daily.  Continue metformin 1000 mg twice daily, and also Humalog sliding scale 3 times daily (2 units for every 50 glucose greater than 150).  Declines order for CGM today  Continue to monitor glucose closely and I recommended that he call in 1 week with an update on his home glucose readings  We also discussed what to do if he does develop any further hypoglycemia.  He should always start by taking something sweet such as a hard candy or glass of juice if hypoglycemia occurs with glucose of less than 70.  I did also send in a prescription for a glucagon pen to use if any severe hypoglycemia, or hypoglycemia that does not improve after eating something  They also do mention that the insulin has been very expensive (1 prescription is approximately $300 and the other is approximately $200).  We will consult clinical pharmacy to help with insulin cost  Recheck at follow-up in 2 months.  He will call in 1 week with update on his home glucose readings

## 2024-04-18 ENCOUNTER — APPOINTMENT (OUTPATIENT)
Dept: UROLOGY | Facility: CLINIC | Age: 74
End: 2024-04-18
Payer: MEDICARE

## 2024-05-09 ENCOUNTER — APPOINTMENT (OUTPATIENT)
Dept: UROLOGY | Facility: CLINIC | Age: 74
End: 2024-05-09
Payer: MEDICARE

## 2024-06-20 ENCOUNTER — APPOINTMENT (OUTPATIENT)
Dept: PRIMARY CARE | Facility: CLINIC | Age: 74
End: 2024-06-20
Payer: MEDICARE

## 2024-06-20 VITALS
OXYGEN SATURATION: 95 % | HEART RATE: 55 BPM | SYSTOLIC BLOOD PRESSURE: 146 MMHG | WEIGHT: 118 LBS | TEMPERATURE: 98.2 F | BODY MASS INDEX: 16.46 KG/M2 | DIASTOLIC BLOOD PRESSURE: 70 MMHG

## 2024-06-20 DIAGNOSIS — I10 ESSENTIAL HYPERTENSION: ICD-10-CM

## 2024-06-20 DIAGNOSIS — J20.9 ACUTE BRONCHITIS, UNSPECIFIED ORGANISM: Primary | ICD-10-CM

## 2024-06-20 DIAGNOSIS — Z79.4 TYPE 2 DIABETES MELLITUS WITH KETOACIDOSIS WITHOUT COMA, WITH LONG-TERM CURRENT USE OF INSULIN (MULTI): ICD-10-CM

## 2024-06-20 DIAGNOSIS — E11.10 TYPE 2 DIABETES MELLITUS WITH KETOACIDOSIS WITHOUT COMA, WITH LONG-TERM CURRENT USE OF INSULIN (MULTI): ICD-10-CM

## 2024-06-20 LAB — POC HEMOGLOBIN A1C: 6.1 % (ref 4.2–6.5)

## 2024-06-20 PROCEDURE — 3077F SYST BP >= 140 MM HG: CPT | Performed by: FAMILY MEDICINE

## 2024-06-20 PROCEDURE — 1159F MED LIST DOCD IN RCRD: CPT | Performed by: FAMILY MEDICINE

## 2024-06-20 PROCEDURE — 99214 OFFICE O/P EST MOD 30 MIN: CPT | Performed by: FAMILY MEDICINE

## 2024-06-20 PROCEDURE — 83036 HEMOGLOBIN GLYCOSYLATED A1C: CPT | Performed by: FAMILY MEDICINE

## 2024-06-20 PROCEDURE — 3078F DIAST BP <80 MM HG: CPT | Performed by: FAMILY MEDICINE

## 2024-06-20 PROCEDURE — 1160F RVW MEDS BY RX/DR IN RCRD: CPT | Performed by: FAMILY MEDICINE

## 2024-06-20 PROCEDURE — 4010F ACE/ARB THERAPY RXD/TAKEN: CPT | Performed by: FAMILY MEDICINE

## 2024-06-20 RX ORDER — AMLODIPINE BESYLATE 5 MG/1
1 TABLET ORAL
COMMUNITY
Start: 2024-05-16

## 2024-06-20 RX ORDER — INSULIN LISPRO 100 [IU]/ML
INJECTION, SOLUTION INTRAVENOUS; SUBCUTANEOUS
Qty: 10 ML | Refills: 3 | Status: SHIPPED | OUTPATIENT
Start: 2024-06-20

## 2024-06-20 RX ORDER — INSULIN GLARGINE 100 [IU]/ML
5 INJECTION, SOLUTION SUBCUTANEOUS 2 TIMES DAILY
Start: 2024-06-20

## 2024-06-20 RX ORDER — ALBUTEROL SULFATE 90 UG/1
2 AEROSOL, METERED RESPIRATORY (INHALATION) EVERY 6 HOURS PRN
Qty: 18 G | Refills: 0 | Status: SHIPPED | OUTPATIENT
Start: 2024-06-20

## 2024-06-20 RX ORDER — AZITHROMYCIN 250 MG/1
TABLET, FILM COATED ORAL DAILY
Qty: 6 TABLET | Refills: 0 | Status: SHIPPED | OUTPATIENT
Start: 2024-06-20 | End: 2024-06-24

## 2024-06-20 ASSESSMENT — ENCOUNTER SYMPTOMS
COUGH: 1
DIABETIC ASSOCIATED SYMPTOMS: 0
FEVER: 0
WHEEZING: 0

## 2024-06-20 NOTE — PROGRESS NOTES
Subjective   Patient ID: Jorge A Madden is a 74 y.o. male who presents for Diabetes.    Diabetes  He presents for his follow-up diabetic visit. He has type 2 diabetes mellitus. There are no hypoglycemic associated symptoms. There are no diabetic associated symptoms. Pertinent negatives for diabetes include no chest pain.   URI   This is a new problem. The current episode started in the past 7 days. Associated symptoms include congestion and coughing. Pertinent negatives include no chest pain or wheezing. Associated symptoms comments: Runny nose.      Here today for follow-up.  Would also like to discuss URI symptoms  He has a history of diabetes mellitus currently taking metformin 1000 mg twice daily, Lantus 5 units twice daily and Humalog sliding scale 3 times daily  (administers 2 units for every 50 glucose greater than 150)  He had been seen in the ED in April with hypoglycemia, and we had adjusted his insulin after labs.  He has had only 3 episodes of hypoglycemia since then  He checks his glucose out of the office.  Average has been 100-1 50.  Hypertension: Currently takes amlodipine 5 mg daily, losartan 100 mg daily, and metoprolol 12.5 mg twice daily.  Does not check blood pressure out of the office  He takes atorvastatin 10 mg daily for hyperlipidemia.  Last lipid panel checked 11/3/2023 showed LDL 73  He has had a productive cough and sinus pressure which has been present for 1 week.  No wheezing or fever.  Currently smokes 1 cigarette/day      Review of Systems   Constitutional:  Negative for fever.   HENT:  Positive for congestion.    Respiratory:  Positive for cough. Negative for wheezing.    Cardiovascular:  Negative for chest pain and leg swelling.       Objective   There were no vitals taken for this visit.    Physical Exam  Vitals reviewed.   Constitutional:       General: He is not in acute distress.     Appearance: Normal appearance. He is well-developed.   HENT:      Head: Normocephalic.   Eyes:       Conjunctiva/sclera: Conjunctivae normal.   Cardiovascular:      Rate and Rhythm: Normal rate and regular rhythm.      Heart sounds: Normal heart sounds.   Pulmonary:      Effort: Pulmonary effort is normal.      Breath sounds: Normal breath sounds.      Comments: Coarse bilateral rhonchi with mild expiratory wheezing.  No focal crackles  Abdominal:      Palpations: Abdomen is soft.      Tenderness: There is no abdominal tenderness.   Musculoskeletal:      Right lower leg: No edema.      Left lower leg: No edema.   Skin:     Findings: No rash.   Neurological:      Mental Status: He is alert.   Psychiatric:         Mood and Affect: Mood normal.         Behavior: Behavior normal.         Assessment/Plan   Problem List Items Addressed This Visit       Type 2 diabetes mellitus with ketoacidosis without coma (Multi)    Relevant Orders    POCT glycosylated hemoglobin (Hb A1C) manually resulted   Diabetes mellitus: A1c today is at goal at 6.1%.  Will continue Lantus, metformin and Humalog at current dose and follow-up in 3 months to recheck A1c  Hypertension: His blood pressure today was slightly elevated at 146/70.  His blood pressure was at goal his last 2 visits, most recently 118/72.  Blood pressure today may be elevated because he has been sick.  I recommended that he start monitoring blood pressure out of the office and we will call him in 3 weeks to get an update on his home BP readings.  Continue amlodipine, losartan and metoprolol  Hyperlipidemia: This is stable and last LDL was at goal, continue atorvastatin at current dose  Acute bronchitis: Will treat with azithromycin and albuterol as needed.  Follow-up in 1 week if not improved  He also did mention to me at the end of the visit that he has lost weight.  We discussed that the first step in evaluating weight loss would be to make sure that he is up-to-date on age-appropriate cancer screening.  At his last physical he had declined routine cancer screening  including lung cancer screening and colon cancer screening.  If he does change his mind on this, he can contact me and we can order this for him  Follow-up in 3 months for recheck and annual wellness visit

## 2024-06-21 ENCOUNTER — TELEPHONE (OUTPATIENT)
Dept: PRIMARY CARE | Facility: CLINIC | Age: 74
End: 2024-06-21
Payer: MEDICARE

## 2024-06-21 DIAGNOSIS — Z79.4 TYPE 2 DIABETES MELLITUS WITH KETOACIDOSIS WITHOUT COMA, WITH LONG-TERM CURRENT USE OF INSULIN (MULTI): Primary | ICD-10-CM

## 2024-06-21 DIAGNOSIS — E11.10 TYPE 2 DIABETES MELLITUS WITH KETOACIDOSIS WITHOUT COMA, WITH LONG-TERM CURRENT USE OF INSULIN (MULTI): Primary | ICD-10-CM

## 2024-06-21 RX ORDER — INSULIN LISPRO 100 [IU]/ML
10 INJECTION, SOLUTION INTRAVENOUS; SUBCUTANEOUS
Qty: 10 ML | Refills: 3 | Status: SHIPPED | OUTPATIENT
Start: 2024-06-21

## 2024-06-21 RX ORDER — INSULIN LISPRO 100 [IU]/ML
10 INJECTION, SOLUTION INTRAVENOUS; SUBCUTANEOUS
COMMUNITY
End: 2024-06-21 | Stop reason: SDUPTHER

## 2024-06-21 NOTE — TELEPHONE ENCOUNTER
Patient's cousin called and states that they were seen yesterday and Dr. Thao had sent a Rx for the patient's Humalog to [facility] in [place].  Patient's cousin said, that the Humalog was in vials, but he uses the Kwik Pen.  She is asking if you can please send the Humalog Kwik Pen to the pharmacy as soon as possible. Thank you!

## 2024-07-11 ENCOUNTER — TELEPHONE (OUTPATIENT)
Dept: PRIMARY CARE | Facility: CLINIC | Age: 74
End: 2024-07-11
Payer: MEDICARE

## 2024-07-11 ENCOUNTER — APPOINTMENT (OUTPATIENT)
Dept: UROLOGY | Facility: CLINIC | Age: 74
End: 2024-07-11
Payer: MEDICARE

## 2024-07-11 VITALS — DIASTOLIC BLOOD PRESSURE: 80 MMHG | SYSTOLIC BLOOD PRESSURE: 120 MMHG

## 2024-07-11 NOTE — TELEPHONE ENCOUNTER
His blood pressure was elevated at last visit, and we had discussed having him start monitoring blood pressure out of the office.  Please call him and get an update on his home blood pressure readings

## 2024-07-15 DIAGNOSIS — N40.0 BENIGN PROSTATIC HYPERPLASIA, UNSPECIFIED WHETHER LOWER URINARY TRACT SYMPTOMS PRESENT: ICD-10-CM

## 2024-07-15 RX ORDER — TAMSULOSIN HYDROCHLORIDE 0.4 MG/1
0.4 CAPSULE ORAL DAILY
Qty: 30 CAPSULE | Refills: 11 | Status: SHIPPED | OUTPATIENT
Start: 2024-07-15 | End: 2025-07-15

## 2024-08-06 DIAGNOSIS — E11.10 TYPE 2 DIABETES MELLITUS WITH KETOACIDOSIS WITHOUT COMA, WITH LONG-TERM CURRENT USE OF INSULIN (MULTI): ICD-10-CM

## 2024-08-06 DIAGNOSIS — I10 ESSENTIAL HYPERTENSION: ICD-10-CM

## 2024-08-06 DIAGNOSIS — Z79.4 TYPE 2 DIABETES MELLITUS WITH KETOACIDOSIS WITHOUT COMA, WITH LONG-TERM CURRENT USE OF INSULIN (MULTI): ICD-10-CM

## 2024-08-06 RX ORDER — METOPROLOL TARTRATE 25 MG/1
12.5 TABLET, FILM COATED ORAL 2 TIMES DAILY
Qty: 90 TABLET | Refills: 3 | Status: SHIPPED | OUTPATIENT
Start: 2024-08-06

## 2024-08-06 RX ORDER — METFORMIN HYDROCHLORIDE 1000 MG/1
1000 TABLET ORAL 2 TIMES DAILY
Qty: 180 TABLET | Refills: 3 | Status: SHIPPED | OUTPATIENT
Start: 2024-08-06

## 2024-08-14 DIAGNOSIS — I10 ESSENTIAL HYPERTENSION: ICD-10-CM

## 2024-08-14 RX ORDER — LOSARTAN POTASSIUM 100 MG/1
100 TABLET ORAL DAILY
Qty: 90 TABLET | Refills: 3 | Status: SHIPPED | OUTPATIENT
Start: 2024-08-14

## 2024-08-14 RX ORDER — AMLODIPINE BESYLATE 5 MG/1
5 TABLET ORAL
Qty: 90 TABLET | Refills: 3 | Status: SHIPPED | OUTPATIENT
Start: 2024-08-14

## 2024-09-16 DIAGNOSIS — E11.10 TYPE 2 DIABETES MELLITUS WITH KETOACIDOSIS WITHOUT COMA, WITH LONG-TERM CURRENT USE OF INSULIN: ICD-10-CM

## 2024-09-16 DIAGNOSIS — Z79.4 TYPE 2 DIABETES MELLITUS WITH KETOACIDOSIS WITHOUT COMA, WITH LONG-TERM CURRENT USE OF INSULIN: ICD-10-CM

## 2024-09-16 RX ORDER — INSULIN GLARGINE 100 [IU]/ML
5 INJECTION, SOLUTION SUBCUTANEOUS 2 TIMES DAILY
Qty: 3 ML | Refills: 3 | Status: SHIPPED | OUTPATIENT
Start: 2024-09-16

## 2024-09-16 NOTE — TELEPHONE ENCOUNTER
Rx Refill Request Telephone Encounter    Name:  Jorge A Madden  :  [id number]  Medication Name:    insulin glargine (Lantus) 100 unit/mL (3 mL) pen              Specific Pharmacy location:  [facility]  Date of last appointment:  na  Date of next appointment:  na  Best number to reach patient:  na

## 2024-09-27 ENCOUNTER — APPOINTMENT (OUTPATIENT)
Dept: PRIMARY CARE | Facility: CLINIC | Age: 74
End: 2024-09-27
Payer: MEDICARE

## 2024-09-27 VITALS
DIASTOLIC BLOOD PRESSURE: 82 MMHG | BODY MASS INDEX: 16.94 KG/M2 | HEIGHT: 71 IN | HEART RATE: 51 BPM | WEIGHT: 121 LBS | TEMPERATURE: 97 F | RESPIRATION RATE: 18 BRPM | SYSTOLIC BLOOD PRESSURE: 132 MMHG | OXYGEN SATURATION: 95 %

## 2024-09-27 DIAGNOSIS — Z12.5 SCREENING FOR PROSTATE CANCER: ICD-10-CM

## 2024-09-27 DIAGNOSIS — I10 ESSENTIAL HYPERTENSION: ICD-10-CM

## 2024-09-27 DIAGNOSIS — E78.5 HYPERLIPIDEMIA, UNSPECIFIED HYPERLIPIDEMIA TYPE: ICD-10-CM

## 2024-09-27 DIAGNOSIS — Z00.00 ROUTINE GENERAL MEDICAL EXAMINATION AT HEALTH CARE FACILITY: Primary | ICD-10-CM

## 2024-09-27 DIAGNOSIS — Z79.4 TYPE 2 DIABETES MELLITUS WITH KETOACIDOSIS WITHOUT COMA, WITH LONG-TERM CURRENT USE OF INSULIN: ICD-10-CM

## 2024-09-27 DIAGNOSIS — E11.10 TYPE 2 DIABETES MELLITUS WITH KETOACIDOSIS WITHOUT COMA, WITH LONG-TERM CURRENT USE OF INSULIN: ICD-10-CM

## 2024-09-27 PROBLEM — E83.52 HYPERCALCEMIA: Status: ACTIVE | Noted: 2024-09-27

## 2024-09-27 PROBLEM — R10.13 EPIGASTRIC PAIN: Status: ACTIVE | Noted: 2024-09-27

## 2024-09-27 PROBLEM — R73.09 INCREASED GLUCOSE LEVEL: Status: ACTIVE | Noted: 2024-09-27

## 2024-09-27 PROBLEM — D64.9 ANEMIA: Status: ACTIVE | Noted: 2023-11-03

## 2024-09-27 PROBLEM — N39.0 URINARY TRACT INFECTION: Status: ACTIVE | Noted: 2023-12-06

## 2024-09-27 PROBLEM — N40.0 BENIGN PROSTATIC HYPERPLASIA: Status: ACTIVE | Noted: 2024-09-27

## 2024-09-27 PROCEDURE — 3079F DIAST BP 80-89 MM HG: CPT | Performed by: FAMILY MEDICINE

## 2024-09-27 PROCEDURE — 1170F FXNL STATUS ASSESSED: CPT | Performed by: FAMILY MEDICINE

## 2024-09-27 PROCEDURE — 1123F ACP DISCUSS/DSCN MKR DOCD: CPT | Performed by: FAMILY MEDICINE

## 2024-09-27 PROCEDURE — 1160F RVW MEDS BY RX/DR IN RCRD: CPT | Performed by: FAMILY MEDICINE

## 2024-09-27 PROCEDURE — 3075F SYST BP GE 130 - 139MM HG: CPT | Performed by: FAMILY MEDICINE

## 2024-09-27 PROCEDURE — G0439 PPPS, SUBSEQ VISIT: HCPCS | Performed by: FAMILY MEDICINE

## 2024-09-27 PROCEDURE — 1159F MED LIST DOCD IN RCRD: CPT | Performed by: FAMILY MEDICINE

## 2024-09-27 PROCEDURE — 3008F BODY MASS INDEX DOCD: CPT | Performed by: FAMILY MEDICINE

## 2024-09-27 PROCEDURE — 99214 OFFICE O/P EST MOD 30 MIN: CPT | Performed by: FAMILY MEDICINE

## 2024-09-27 PROCEDURE — 4010F ACE/ARB THERAPY RXD/TAKEN: CPT | Performed by: FAMILY MEDICINE

## 2024-09-27 ASSESSMENT — ACTIVITIES OF DAILY LIVING (ADL)
TAKING_MEDICATION: INDEPENDENT
GROCERY_SHOPPING: INDEPENDENT
BATHING: INDEPENDENT
MANAGING_FINANCES: INDEPENDENT
DOING_HOUSEWORK: INDEPENDENT
DRESSING: INDEPENDENT

## 2024-09-27 ASSESSMENT — PATIENT HEALTH QUESTIONNAIRE - PHQ9
2. FEELING DOWN, DEPRESSED OR HOPELESS: NOT AT ALL
1. LITTLE INTEREST OR PLEASURE IN DOING THINGS: NOT AT ALL
SUM OF ALL RESPONSES TO PHQ9 QUESTIONS 1 AND 2: 0

## 2024-09-27 ASSESSMENT — ENCOUNTER SYMPTOMS
COUGH: 0
HEADACHES: 0
DIZZINESS: 0
FEVER: 0
ABDOMINAL PAIN: 0
OCCASIONAL FEELINGS OF UNSTEADINESS: 0
LOSS OF SENSATION IN FEET: 0
DEPRESSION: 0

## 2024-09-27 NOTE — PROGRESS NOTES
"Subjective   Reason for Visit: Jorge A Madden is an 74 y.o. male here for a Medicare Wellness visit.     Past Medical, Surgical, and Family History reviewed and updated in chart.    Reviewed all medications by prescribing practitioner or clinical pharmacist (such as prescriptions, OTCs, herbal therapies and supplements) and documented in the medical record.    HPI      He is here today for his annual wellness visit and also 3-month follow-up  Diabetes mellitus: He is currently taking Lantus 5 units twice daily, metformin 1000 mg twice daily and Humalog sliding scale 3 times daily (administers 2 units for every 50 glucose greater than 150.  His A1c at last visit was controlled at 6.1%.  He has been checking his glucose) out of the office 3 times per day.  His home readings have been variable.  Some readings will be between 122 and 132, but others can range from 2 12-2 53  He does not get much sweets in his diet.  Due for diabetic eye exam  Hypertension: Currently takes amlodipine 5 mg daily, metoprolol 12.5 mg twice daily and losartan 100 mg daily.  Does not check blood pressure out of the office  He is taking atorvastatin 10 mg daily for hyperlipidemia.  His last LDL checked November 2023 was 73  He is still smoking.  Currently smokes 2 cigarettes/day.  He has overall averaged 1 pack/day for approximately 25 years  He received Tdap vaccine and Prevnar 20 vaccine in 2023        Patient Care Team:  Antolin Thao DO as PCP - General  Antolin Thao DO as PCP - [facility]     Review of Systems   Constitutional:  Negative for fever.   Respiratory:  Negative for cough.    Cardiovascular:  Negative for chest pain and leg swelling.   Gastrointestinal:  Negative for abdominal pain.   Neurological:  Negative for dizziness and headaches.   All other systems reviewed and are negative.      Objective   Vitals:  /82   Pulse 51   Temp 36.1 °C (97 °F)   Resp 18   Ht 1.803 m (5' 11\")   Wt 54.9 kg (121 lb)  "  SpO2 95%   BMI 16.88 kg/m²       Physical Exam  Vitals reviewed.   Constitutional:       General: He is not in acute distress.     Appearance: Normal appearance. He is well-developed.   HENT:      Head: Normocephalic.      Right Ear: Tympanic membrane, ear canal and external ear normal.      Left Ear: Tympanic membrane, ear canal and external ear normal.      Nose: Nose normal.      Mouth/Throat:      Mouth: Mucous membranes are moist.   Eyes:      Conjunctiva/sclera: Conjunctivae normal.   Neck:      Thyroid: No thyromegaly.      Vascular: No JVD.   Cardiovascular:      Rate and Rhythm: Normal rate and regular rhythm.      Heart sounds: Normal heart sounds.   Pulmonary:      Effort: Pulmonary effort is normal.      Breath sounds: Normal breath sounds.   Abdominal:      Palpations: Abdomen is soft.      Tenderness: There is no abdominal tenderness.   Musculoskeletal:         General: Normal range of motion.   Lymphadenopathy:      Cervical: No cervical adenopathy.   Skin:     Findings: No rash.   Neurological:      Mental Status: He is alert and oriented to person, place, and time.   Psychiatric:         Mood and Affect: Mood normal.         Behavior: Behavior normal.         Assessment & Plan  Routine general medical examination at health care facility    Orders:    1 Year Follow Up In [facility] - PCP - Wellness Exam; Future    CBC and Auto Differential; Future    Comprehensive Metabolic Panel; Future    Lipid Panel; Future    TSH with reflex to Free T4 if abnormal; Future    Albumin-Creatinine Ratio, Urine Random; Future    Type 2 diabetes mellitus with ketoacidosis without coma, with long-term current use of insulin    Orders:    CBC and Auto Differential; Future    Comprehensive Metabolic Panel; Future    Lipid Panel; Future    TSH with reflex to Free T4 if abnormal; Future    Albumin-Creatinine Ratio, Urine Random; Future    Hemoglobin A1C; Future    Essential hypertension    Orders:    CBC and  Auto Differential; Future    Comprehensive Metabolic Panel; Future    Lipid Panel; Future    TSH with reflex to Free T4 if abnormal; Future    Albumin-Creatinine Ratio, Urine Random; Future     Screening for prostate cancer    Orders:    CBC and Auto Differential; Future    Comprehensive Metabolic Panel; Future    Lipid Panel; Future    TSH with reflex to Free T4 if abnormal; Future    Prostate Specific Antigen, Screen; Future    Albumin-Creatinine Ratio, Urine Random; Future    Hyperlipidemia, unspecified hyperlipidemia type         #1 preventative health  Healthy diet and exercise recommended.  Up-to-date on tetanus and pneumococcal vaccines.  He declines flu vaccine today.  I did recommend influenza, RSV and shingles vaccine  He declines all routine cancer screening including low-dose CT lung cancer screening and colon cancer screening.  Has declined AAA screening due to smoking history  2.  Diabetes mellitus: His last A1c was at goal at 6.1%.  We did attempt to get a fingerstick A1c on him today but were not able to run test due to a machine error.  We will add an A1c to his labs.  Continue Lantus, Humalog sliding scale and metformin and follow-up in 4 months  Hypertension: Blood pressure 132/82.  Continue amlodipine, losartan and metoprolol current dose.  Recommended monitoring periodically out of the office and call if getting any readings greater than 140/90  Hyperlipidemia: Stable on atorvastatin.  Last lipid panel was at goal.  Will recheck a lipid panel  Check full labs including CBC, CMP, lipid panel, screening PSA, and A1c.  Follow-up in 4 months for recheck

## 2024-09-27 NOTE — ASSESSMENT & PLAN NOTE
Orders:    CBC and Auto Differential; Future    Comprehensive Metabolic Panel; Future    Lipid Panel; Future    TSH with reflex to Free T4 if abnormal; Future    Albumin-Creatinine Ratio, Urine Random; Future    Hemoglobin A1C; Future

## 2024-09-27 NOTE — PROGRESS NOTES
"Subjective   Patient ID: Jorge A Madden is a 74 y.o. male who presents for Medicare Annual Wellness Visit Subsequent.    HPI     Review of Systems    Objective   /82   Pulse 51   Temp 36.1 °C (97 °F)   Resp 18   Ht 1.803 m (5' 11\")   Wt 54.9 kg (121 lb)   SpO2 95%   BMI 16.88 kg/m²     Physical Exam    Assessment/Plan   Assessment & Plan  Type 2 diabetes mellitus with ketoacidosis without coma, with long-term current use of insulin    Orders:    CBC and Auto Differential; Future    Comprehensive Metabolic Panel; Future    Lipid Panel; Future    TSH with reflex to Free T4 if abnormal; Future    Albumin-Creatinine Ratio, Urine Random; Future    Hemoglobin A1C; Future       "

## 2024-09-27 NOTE — ASSESSMENT & PLAN NOTE
Orders:    CBC and Auto Differential; Future    Comprehensive Metabolic Panel; Future    Lipid Panel; Future    TSH with reflex to Free T4 if abnormal; Future    Albumin-Creatinine Ratio, Urine Random; Future

## 2024-11-27 DIAGNOSIS — E11.10 TYPE 2 DIABETES MELLITUS WITH KETOACIDOSIS WITHOUT COMA, WITH LONG-TERM CURRENT USE OF INSULIN: ICD-10-CM

## 2024-11-27 DIAGNOSIS — Z79.4 TYPE 2 DIABETES MELLITUS WITH KETOACIDOSIS WITHOUT COMA, WITH LONG-TERM CURRENT USE OF INSULIN: ICD-10-CM

## 2024-11-27 RX ORDER — LORATADINE, PSEUDOEPHEDRINE 5; 120 MG/1; MG/1
TABLET, EXTENDED RELEASE ORAL
Qty: 100 EACH | Refills: 11 | Status: SHIPPED | OUTPATIENT
Start: 2024-11-27

## 2025-01-01 ENCOUNTER — APPOINTMENT (OUTPATIENT)
Dept: RADIOLOGY | Facility: HOSPITAL | Age: 75
End: 2025-01-01
Payer: MEDICARE

## 2025-01-01 ENCOUNTER — HOSPITAL ENCOUNTER (INPATIENT)
Facility: HOSPITAL | Age: 75
LOS: 1 days | End: 2025-07-16
Attending: INTERNAL MEDICINE | Admitting: INTERNAL MEDICINE
Payer: MEDICARE

## 2025-01-01 ENCOUNTER — APPOINTMENT (OUTPATIENT)
Dept: CARDIOLOGY | Facility: HOSPITAL | Age: 75
End: 2025-01-01
Payer: MEDICARE

## 2025-01-01 VITALS
SYSTOLIC BLOOD PRESSURE: 162 MMHG | RESPIRATION RATE: 18 BRPM | OXYGEN SATURATION: 85 % | HEART RATE: 82 BPM | DIASTOLIC BLOOD PRESSURE: 95 MMHG | TEMPERATURE: 100 F

## 2025-01-01 PROCEDURE — 71045 X-RAY EXAM CHEST 1 VIEW: CPT

## 2025-01-01 PROCEDURE — 99238 HOSP IP/OBS DSCHRG MGMT 30/<: CPT

## 2025-01-01 PROCEDURE — 74018 RADEX ABDOMEN 1 VIEW: CPT

## 2025-01-01 PROCEDURE — 2500000004 HC RX 250 GENERAL PHARMACY W/ HCPCS (ALT 636 FOR OP/ED)

## 2025-01-01 PROCEDURE — 93005 ELECTROCARDIOGRAM TRACING: CPT

## 2025-01-01 PROCEDURE — 71275 CT ANGIOGRAPHY CHEST: CPT

## 2025-01-01 PROCEDURE — 93306 TTE W/DOPPLER COMPLETE: CPT

## 2025-01-01 PROCEDURE — 74177 CT ABD & PELVIS W/CONTRAST: CPT

## 2025-01-01 PROCEDURE — 1150000001 HC HOSPICE PRIVATE ROOM DAILY

## 2025-01-01 PROCEDURE — 70450 CT HEAD/BRAIN W/O DYE: CPT

## 2025-01-01 PROCEDURE — 99239 HOSP IP/OBS DSCHRG MGMT >30: CPT | Performed by: INTERNAL MEDICINE

## 2025-01-01 RX ORDER — MORPHINE SULFATE 2 MG/ML
2 INJECTION, SOLUTION INTRAMUSCULAR; INTRAVENOUS
Status: DISCONTINUED | OUTPATIENT
Start: 2025-01-01 | End: 2025-01-01 | Stop reason: HOSPADM

## 2025-01-01 RX ORDER — HALOPERIDOL LACTATE 5 MG/ML
1 INJECTION, SOLUTION INTRAMUSCULAR EVERY 4 HOURS PRN
Status: DISCONTINUED | OUTPATIENT
Start: 2025-01-01 | End: 2025-01-01 | Stop reason: HOSPADM

## 2025-01-01 RX ORDER — MORPHINE SULFATE 2 MG/ML
2 INJECTION, SOLUTION INTRAMUSCULAR; INTRAVENOUS EVERY 4 HOURS
Status: DISCONTINUED | OUTPATIENT
Start: 2025-01-01 | End: 2025-01-01 | Stop reason: HOSPADM

## 2025-01-01 RX ORDER — KETOROLAC TROMETHAMINE 15 MG/ML
15 INJECTION, SOLUTION INTRAMUSCULAR; INTRAVENOUS EVERY 6 HOURS PRN
Status: DISCONTINUED | OUTPATIENT
Start: 2025-01-01 | End: 2025-01-01 | Stop reason: HOSPADM

## 2025-01-01 RX ORDER — GLYCOPYRROLATE 0.2 MG/ML
0.2 INJECTION INTRAMUSCULAR; INTRAVENOUS EVERY 4 HOURS PRN
Status: DISCONTINUED | OUTPATIENT
Start: 2025-01-01 | End: 2025-01-01 | Stop reason: HOSPADM

## 2025-01-01 RX ORDER — MIDAZOLAM HYDROCHLORIDE 1 MG/ML
2 INJECTION, SOLUTION INTRAMUSCULAR; INTRAVENOUS AS NEEDED
Status: DISCONTINUED | OUTPATIENT
Start: 2025-01-01 | End: 2025-01-01 | Stop reason: HOSPADM

## 2025-01-01 RX ADMIN — MORPHINE SULFATE 2 MG: 2 INJECTION, SOLUTION INTRAMUSCULAR; INTRAVENOUS at 20:19

## 2025-01-01 RX ADMIN — MORPHINE SULFATE 2 MG: 2 INJECTION, SOLUTION INTRAMUSCULAR; INTRAVENOUS at 00:37

## 2025-01-01 RX ADMIN — MORPHINE SULFATE 2 MG: 2 INJECTION, SOLUTION INTRAMUSCULAR; INTRAVENOUS at 17:01

## 2025-01-01 ASSESSMENT — COGNITIVE AND FUNCTIONAL STATUS - GENERAL
DRESSING REGULAR LOWER BODY CLOTHING: TOTAL
PERSONAL GROOMING: TOTAL
MOVING TO AND FROM BED TO CHAIR: TOTAL
PERSONAL GROOMING: TOTAL
DRESSING REGULAR UPPER BODY CLOTHING: TOTAL
MOVING FROM LYING ON BACK TO SITTING ON SIDE OF FLAT BED WITH BEDRAILS: A LOT
MOVING FROM LYING ON BACK TO SITTING ON SIDE OF FLAT BED WITH BEDRAILS: A LOT
MOBILITY SCORE: 7
TOILETING: TOTAL
MOVING TO AND FROM BED TO CHAIR: TOTAL
WALKING IN HOSPITAL ROOM: TOTAL
TURNING FROM BACK TO SIDE WHILE IN FLAT BAD: TOTAL
CLIMB 3 TO 5 STEPS WITH RAILING: TOTAL
EATING MEALS: A LOT
DAILY ACTIVITIY SCORE: 7
HELP NEEDED FOR BATHING: TOTAL
DAILY ACTIVITIY SCORE: 7
HELP NEEDED FOR BATHING: TOTAL
DRESSING REGULAR UPPER BODY CLOTHING: TOTAL
EATING MEALS: A LOT
DRESSING REGULAR LOWER BODY CLOTHING: TOTAL
STANDING UP FROM CHAIR USING ARMS: TOTAL
CLIMB 3 TO 5 STEPS WITH RAILING: TOTAL
MOBILITY SCORE: 7
TOILETING: TOTAL
STANDING UP FROM CHAIR USING ARMS: TOTAL
TURNING FROM BACK TO SIDE WHILE IN FLAT BAD: TOTAL
WALKING IN HOSPITAL ROOM: TOTAL

## 2025-01-01 ASSESSMENT — PAIN - FUNCTIONAL ASSESSMENT
PAIN_FUNCTIONAL_ASSESSMENT: 0-10
PAIN_FUNCTIONAL_ASSESSMENT: UNABLE TO SELF-REPORT

## 2025-01-01 ASSESSMENT — PAIN SCALES - GENERAL: PAINLEVEL_OUTOF10: 0 - NO PAIN

## 2025-01-28 ENCOUNTER — APPOINTMENT (OUTPATIENT)
Dept: PRIMARY CARE | Facility: CLINIC | Age: 75
End: 2025-01-28
Payer: MEDICARE

## 2025-01-28 VITALS
TEMPERATURE: 97.9 F | BODY MASS INDEX: 16.6 KG/M2 | OXYGEN SATURATION: 98 % | DIASTOLIC BLOOD PRESSURE: 96 MMHG | SYSTOLIC BLOOD PRESSURE: 164 MMHG | WEIGHT: 119 LBS | HEART RATE: 67 BPM

## 2025-01-28 DIAGNOSIS — E78.5 HYPERLIPIDEMIA, UNSPECIFIED HYPERLIPIDEMIA TYPE: ICD-10-CM

## 2025-01-28 DIAGNOSIS — E11.10 TYPE 2 DIABETES MELLITUS WITH KETOACIDOSIS WITHOUT COMA, WITH LONG-TERM CURRENT USE OF INSULIN: Primary | ICD-10-CM

## 2025-01-28 DIAGNOSIS — I10 ESSENTIAL HYPERTENSION: ICD-10-CM

## 2025-01-28 DIAGNOSIS — Z12.5 SCREENING FOR PROSTATE CANCER: ICD-10-CM

## 2025-01-28 DIAGNOSIS — Z79.4 TYPE 2 DIABETES MELLITUS WITH KETOACIDOSIS WITHOUT COMA, WITH LONG-TERM CURRENT USE OF INSULIN: Primary | ICD-10-CM

## 2025-01-28 PROBLEM — D64.9 ANEMIA: Status: RESOLVED | Noted: 2023-11-03 | Resolved: 2025-01-28

## 2025-01-28 LAB — POC HEMOGLOBIN A1C: 5.9 % (ref 4.2–6.5)

## 2025-01-28 PROCEDURE — 83036 HEMOGLOBIN GLYCOSYLATED A1C: CPT | Performed by: FAMILY MEDICINE

## 2025-01-28 PROCEDURE — 3077F SYST BP >= 140 MM HG: CPT | Performed by: FAMILY MEDICINE

## 2025-01-28 PROCEDURE — 1160F RVW MEDS BY RX/DR IN RCRD: CPT | Performed by: FAMILY MEDICINE

## 2025-01-28 PROCEDURE — 3080F DIAST BP >= 90 MM HG: CPT | Performed by: FAMILY MEDICINE

## 2025-01-28 PROCEDURE — 1123F ACP DISCUSS/DSCN MKR DOCD: CPT | Performed by: FAMILY MEDICINE

## 2025-01-28 PROCEDURE — 1159F MED LIST DOCD IN RCRD: CPT | Performed by: FAMILY MEDICINE

## 2025-01-28 PROCEDURE — 99214 OFFICE O/P EST MOD 30 MIN: CPT | Performed by: FAMILY MEDICINE

## 2025-01-28 PROCEDURE — 4010F ACE/ARB THERAPY RXD/TAKEN: CPT | Performed by: FAMILY MEDICINE

## 2025-01-28 PROCEDURE — G2211 COMPLEX E/M VISIT ADD ON: HCPCS | Performed by: FAMILY MEDICINE

## 2025-01-28 RX ORDER — INSULIN LISPRO 100 [IU]/ML
10 INJECTION, SOLUTION INTRAVENOUS; SUBCUTANEOUS
Qty: 10 ML | Refills: 3 | Status: SHIPPED | OUTPATIENT
Start: 2025-01-28

## 2025-01-28 RX ORDER — INSULIN GLARGINE-YFGN 100 [IU]/ML
INJECTION, SOLUTION SUBCUTANEOUS
COMMUNITY
Start: 2024-12-12 | End: 2025-01-28

## 2025-01-28 RX ORDER — AMLODIPINE BESYLATE 10 MG/1
10 TABLET ORAL
Qty: 90 TABLET | Refills: 3 | Status: SHIPPED | OUTPATIENT
Start: 2025-01-28

## 2025-01-28 RX ORDER — INSULIN GLARGINE 100 [IU]/ML
5 INJECTION, SOLUTION SUBCUTANEOUS 2 TIMES DAILY
Qty: 3 ML | Refills: 3 | Status: SHIPPED | OUTPATIENT
Start: 2025-01-28

## 2025-01-28 RX ORDER — ATORVASTATIN CALCIUM 10 MG/1
10 TABLET, FILM COATED ORAL DAILY
Qty: 90 TABLET | Refills: 3 | Status: SHIPPED | OUTPATIENT
Start: 2025-01-28

## 2025-01-28 ASSESSMENT — PATIENT HEALTH QUESTIONNAIRE - PHQ9
SUM OF ALL RESPONSES TO PHQ9 QUESTIONS 1 & 2: 0
2. FEELING DOWN, DEPRESSED OR HOPELESS: NOT AT ALL
1. LITTLE INTEREST OR PLEASURE IN DOING THINGS: NOT AT ALL

## 2025-01-28 ASSESSMENT — ENCOUNTER SYMPTOMS
FEVER: 0
COUGH: 0

## 2025-01-28 NOTE — ASSESSMENT & PLAN NOTE
Orders:    CBC and Auto Differential; Future    Comprehensive Metabolic Panel; Future    Lipid Panel; Future    TSH with reflex to Free T4 if abnormal; Future    Vitamin B12; Future    Albumin-Creatinine Ratio, Urine Random

## 2025-01-28 NOTE — ASSESSMENT & PLAN NOTE
Orders:    CBC and Auto Differential; Future    Comprehensive Metabolic Panel; Future    Lipid Panel; Future    TSH with reflex to Free T4 if abnormal; Future    Vitamin B12; Future    Albumin-Creatinine Ratio, Urine Random    Diabetic Retinopathy Luminetics; Future    Referral to Ophthalmology; Future    atorvastatin (Lipitor) 10 mg tablet; Take 1 tablet (10 mg) by mouth once daily.    insulin glargine (Lantus) 100 unit/mL (3 mL) pen; Inject 5 Units under the skin 2 times a day.    insulin lispro (HumaLOG KwikPen Insulin) 100 unit/mL injection; Inject 10 Units under the skin 3 times daily (morning, midday, late afternoon). Use 3x/day per sliding scale. Max 30 U per day    POCT glycosylated hemoglobin (Hb A1C) manually resulted

## 2025-01-28 NOTE — PROGRESS NOTES
Subjective   Patient ID: Jorge A Madden is a 74 y.o. male who presents for Diabetes and Other (PHQ2- negative ).    HPI     Here today for follow-up  Diabetes mellitus: Currently takes Lantus 5 units twice daily, metformin 1000 mg twice daily and Humalog sliding scale 3 times per day (2 units for every 50 glucose greater than 150)  He checks his glucose out of the office.  In the mornings his average is little bit over 100.  In the afternoons his glucose will increase to 1   He has not had a diabetic eye exam in the past year  He has a history of hypertension currently taking amlodipine 5 mg daily, metoprolol 12.5 mg twice daily, and losartan 100 mg daily.  Does not check blood pressure out of the office  He takes atorvastatin 10 mg daily for hyperlipidemia.  Last lipid panel checked 11/3/2023 showed LDL of 73  He has had forgetfulness which has gradually been getting worse over the past few years.  He will forget things like to take his medications, or what he was going to do when he walks into a room.          Patient Health Questionnaire-2 Score: 0 (1/28/2025  9:50 AM)        Review of Systems   Constitutional:  Negative for fever.   Respiratory:  Negative for cough.    Cardiovascular:  Negative for chest pain and leg swelling.       Objective   BP (!) 164/96   Pulse 67   Temp 36.6 °C (97.9 °F) (Temporal)   Wt 54 kg (119 lb)   SpO2 98%   BMI 16.60 kg/m²     Physical Exam  Vitals reviewed.   Constitutional:       General: He is not in acute distress.     Appearance: Normal appearance. He is well-developed.   HENT:      Head: Normocephalic.   Eyes:      Conjunctiva/sclera: Conjunctivae normal.   Cardiovascular:      Rate and Rhythm: Normal rate and regular rhythm.      Heart sounds: Normal heart sounds.   Pulmonary:      Effort: Pulmonary effort is normal.      Breath sounds: Normal breath sounds.   Musculoskeletal:      Right lower leg: No edema.   Skin:     Findings: No rash.   Neurological:       Mental Status: He is alert.   Psychiatric:         Mood and Affect: Mood normal.         Behavior: Behavior normal.         Assessment/Plan   Assessment & Plan  Type 2 diabetes mellitus with ketoacidosis without coma, with long-term current use of insulin    Orders:    CBC and Auto Differential; Future    Comprehensive Metabolic Panel; Future    Lipid Panel; Future    TSH with reflex to Free T4 if abnormal; Future    Vitamin B12; Future    Albumin-Creatinine Ratio, Urine Random    Diabetic Retinopathy Luminetics; Future    Referral to Ophthalmology; Future    atorvastatin (Lipitor) 10 mg tablet; Take 1 tablet (10 mg) by mouth once daily.    insulin glargine (Lantus) 100 unit/mL (3 mL) pen; Inject 5 Units under the skin 2 times a day.    insulin lispro (HumaLOG KwikPen Insulin) 100 unit/mL injection; Inject 10 Units under the skin 3 times daily (morning, midday, late afternoon). Use 3x/day per sliding scale. Max 30 U per day    POCT glycosylated hemoglobin (Hb A1C) manually resulted    Essential hypertension    Orders:    CBC and Auto Differential; Future    Comprehensive Metabolic Panel; Future    Lipid Panel; Future    TSH with reflex to Free T4 if abnormal; Future    Vitamin B12; Future    Albumin-Creatinine Ratio, Urine Random    amLODIPine (Norvasc) 10 mg tablet; Take 1 tablet (10 mg) by mouth early in the morning..    Hyperlipidemia, unspecified hyperlipidemia type    Orders:    CBC and Auto Differential; Future    Comprehensive Metabolic Panel; Future    Lipid Panel; Future    TSH with reflex to Free T4 if abnormal; Future    Vitamin B12; Future    Albumin-Creatinine Ratio, Urine Random    Screening for prostate cancer    Orders:    CBC and Auto Differential; Future    Comprehensive Metabolic Panel; Future    Lipid Panel; Future    TSH with reflex to Free T4 if abnormal; Future    Vitamin B12; Future    Prostate Specific Antigen, Screen; Future    Albumin-Creatinine Ratio, Urine Random    Diabetes mellitus:  This is well-controlled with A1c of 5.9%.  Will continue current meds including Lantus, metformin and Humalog  His diabetic eye exam today is inconclusive.  Refer to see ophthalmology for a full diabetic eye exam  Hypertension: Blood pressure is elevated at 164/96.  Will increase dose of amlodipine from 5 to 10 mg daily.  Recommended monitoring out of the office and we will recheck blood pressure follow-up  Hyperlipidemia: Stable on atorvastatin.  Last LDL was at goal.  Check labs including lipid panel  Regarding his forgetfulness, we will check labs including TSH and B12.  Declines referral to neurology at this time.  If this progresses or worsens, can contact me and we will plan on referring him to neurology  Follow-up in June for recheck

## 2025-01-28 NOTE — ASSESSMENT & PLAN NOTE
Orders:    CBC and Auto Differential; Future    Comprehensive Metabolic Panel; Future    Lipid Panel; Future    TSH with reflex to Free T4 if abnormal; Future    Vitamin B12; Future    Albumin-Creatinine Ratio, Urine Random    amLODIPine (Norvasc) 10 mg tablet; Take 1 tablet (10 mg) by mouth early in the morning..

## 2025-01-29 LAB
ALBUMIN/CREAT UR: 221 MG/G CREAT
CREAT UR-MCNC: 67 MG/DL (ref 20–320)
MICROALBUMIN UR-MCNC: 14.8 MG/DL

## 2025-06-13 LAB
ALBUMIN SERPL-MCNC: 4.4 G/DL (ref 3.6–5.1)
ALP SERPL-CCNC: 99 U/L (ref 35–144)
ALT SERPL-CCNC: 18 U/L (ref 9–46)
ANION GAP SERPL CALCULATED.4IONS-SCNC: 12 MMOL/L (CALC) (ref 7–17)
AST SERPL-CCNC: 15 U/L (ref 10–35)
BASOPHILS # BLD AUTO: 91 CELLS/UL (ref 0–200)
BASOPHILS NFR BLD AUTO: 1.3 %
BILIRUB SERPL-MCNC: 0.4 MG/DL (ref 0.2–1.2)
BUN SERPL-MCNC: 38 MG/DL (ref 7–25)
CALCIUM SERPL-MCNC: 9.5 MG/DL (ref 8.6–10.3)
CHLORIDE SERPL-SCNC: 106 MMOL/L (ref 98–110)
CHOLEST SERPL-MCNC: 146 MG/DL
CHOLEST/HDLC SERPL: 2.2 (CALC)
CO2 SERPL-SCNC: 21 MMOL/L (ref 20–32)
CREAT SERPL-MCNC: 1.31 MG/DL (ref 0.7–1.28)
EGFRCR SERPLBLD CKD-EPI 2021: 57 ML/MIN/1.73M2
EOSINOPHIL # BLD AUTO: 427 CELLS/UL (ref 15–500)
EOSINOPHIL NFR BLD AUTO: 6.1 %
ERYTHROCYTE [DISTWIDTH] IN BLOOD BY AUTOMATED COUNT: 13.3 % (ref 11–15)
GLUCOSE SERPL-MCNC: 83 MG/DL (ref 65–99)
HCT VFR BLD AUTO: 38.1 % (ref 38.5–50)
HDLC SERPL-MCNC: 66 MG/DL
HGB BLD-MCNC: 13.1 G/DL (ref 13.2–17.1)
LDLC SERPL CALC-MCNC: 59 MG/DL (CALC)
LYMPHOCYTES # BLD AUTO: 2681 CELLS/UL (ref 850–3900)
LYMPHOCYTES NFR BLD AUTO: 38.3 %
MCH RBC QN AUTO: 35.6 PG (ref 27–33)
MCHC RBC AUTO-ENTMCNC: 34.4 G/DL (ref 32–36)
MCV RBC AUTO: 103.5 FL (ref 80–100)
MONOCYTES # BLD AUTO: 441 CELLS/UL (ref 200–950)
MONOCYTES NFR BLD AUTO: 6.3 %
NEUTROPHILS # BLD AUTO: 3360 CELLS/UL (ref 1500–7800)
NEUTROPHILS NFR BLD AUTO: 48 %
NONHDLC SERPL-MCNC: 80 MG/DL (CALC)
PLATELET # BLD AUTO: 195 THOUSAND/UL (ref 140–400)
PMV BLD REES-ECKER: 9.7 FL (ref 7.5–12.5)
POTASSIUM SERPL-SCNC: 4.1 MMOL/L (ref 3.5–5.3)
PROT SERPL-MCNC: 6.7 G/DL (ref 6.1–8.1)
PSA SERPL-MCNC: 0.75 NG/ML
RBC # BLD AUTO: 3.68 MILLION/UL (ref 4.2–5.8)
SODIUM SERPL-SCNC: 139 MMOL/L (ref 135–146)
T4 FREE SERPL-MCNC: 1.2 NG/DL (ref 0.8–1.8)
TRIGL SERPL-MCNC: 129 MG/DL
TSH SERPL-ACNC: 5.19 MIU/L (ref 0.4–4.5)
WBC # BLD AUTO: 7 THOUSAND/UL (ref 3.8–10.8)

## 2025-06-17 ENCOUNTER — APPOINTMENT (OUTPATIENT)
Dept: PRIMARY CARE | Facility: CLINIC | Age: 75
End: 2025-06-17
Payer: MEDICARE

## 2025-06-17 VITALS
SYSTOLIC BLOOD PRESSURE: 166 MMHG | DIASTOLIC BLOOD PRESSURE: 85 MMHG | HEART RATE: 54 BPM | TEMPERATURE: 97.6 F | WEIGHT: 117 LBS | BODY MASS INDEX: 16.32 KG/M2 | OXYGEN SATURATION: 98 %

## 2025-06-17 DIAGNOSIS — E11.10 TYPE 2 DIABETES MELLITUS WITH KETOACIDOSIS WITHOUT COMA, WITH LONG-TERM CURRENT USE OF INSULIN: Primary | ICD-10-CM

## 2025-06-17 DIAGNOSIS — K40.90 NON-RECURRENT UNILATERAL INGUINAL HERNIA WITHOUT OBSTRUCTION OR GANGRENE: ICD-10-CM

## 2025-06-17 DIAGNOSIS — E78.5 HYPERLIPIDEMIA, UNSPECIFIED HYPERLIPIDEMIA TYPE: ICD-10-CM

## 2025-06-17 DIAGNOSIS — R94.4 DECREASED GFR: ICD-10-CM

## 2025-06-17 DIAGNOSIS — R79.89 ABNORMAL TSH: ICD-10-CM

## 2025-06-17 DIAGNOSIS — D64.9 ANEMIA, UNSPECIFIED TYPE: ICD-10-CM

## 2025-06-17 DIAGNOSIS — Z79.4 TYPE 2 DIABETES MELLITUS WITH KETOACIDOSIS WITHOUT COMA, WITH LONG-TERM CURRENT USE OF INSULIN: Primary | ICD-10-CM

## 2025-06-17 DIAGNOSIS — I10 ESSENTIAL HYPERTENSION: ICD-10-CM

## 2025-06-17 PROBLEM — N39.0 URINARY TRACT INFECTION: Status: RESOLVED | Noted: 2023-12-06 | Resolved: 2025-06-17

## 2025-06-17 PROBLEM — E83.52 HYPERCALCEMIA: Status: RESOLVED | Noted: 2024-09-27 | Resolved: 2025-06-17

## 2025-06-17 LAB — POC HEMOGLOBIN A1C: 6.1 % (ref 4.2–6.5)

## 2025-06-17 PROCEDURE — 4010F ACE/ARB THERAPY RXD/TAKEN: CPT | Performed by: FAMILY MEDICINE

## 2025-06-17 PROCEDURE — 1159F MED LIST DOCD IN RCRD: CPT | Performed by: FAMILY MEDICINE

## 2025-06-17 PROCEDURE — G2211 COMPLEX E/M VISIT ADD ON: HCPCS | Performed by: FAMILY MEDICINE

## 2025-06-17 PROCEDURE — 3044F HG A1C LEVEL LT 7.0%: CPT | Performed by: FAMILY MEDICINE

## 2025-06-17 PROCEDURE — 3077F SYST BP >= 140 MM HG: CPT | Performed by: FAMILY MEDICINE

## 2025-06-17 PROCEDURE — 99214 OFFICE O/P EST MOD 30 MIN: CPT | Performed by: FAMILY MEDICINE

## 2025-06-17 PROCEDURE — 83036 HEMOGLOBIN GLYCOSYLATED A1C: CPT | Performed by: FAMILY MEDICINE

## 2025-06-17 PROCEDURE — 1160F RVW MEDS BY RX/DR IN RCRD: CPT | Performed by: FAMILY MEDICINE

## 2025-06-17 PROCEDURE — 3079F DIAST BP 80-89 MM HG: CPT | Performed by: FAMILY MEDICINE

## 2025-06-17 RX ORDER — NAPROXEN 500 MG/1
500 TABLET ORAL 2 TIMES DAILY PRN
Qty: 30 TABLET | Refills: 0 | Status: SHIPPED | OUTPATIENT
Start: 2025-06-17

## 2025-06-17 RX ORDER — METOPROLOL TARTRATE 25 MG/1
25 TABLET, FILM COATED ORAL 2 TIMES DAILY
Qty: 180 TABLET | Refills: 3 | Status: SHIPPED | OUTPATIENT
Start: 2025-06-17

## 2025-06-17 ASSESSMENT — ENCOUNTER SYMPTOMS
COUGH: 0
FEVER: 0

## 2025-06-17 NOTE — ASSESSMENT & PLAN NOTE
Orders:    CBC and Auto Differential; Future    Vitamin B12; Future    Folate; Future    TSH with reflex to Free T4 if abnormal; Future    Basic Metabolic Panel; Future    metoprolol tartrate (Lopressor) 25 mg tablet; Take 1 tablet (25 mg) by mouth 2 times a day.

## 2025-06-17 NOTE — PROGRESS NOTES
Subjective   Patient ID: Jorge A Madden is a 75 y.o. male who presents for Diabetes and Back Pain (Placed something on a high shelf next day developed a bump. Pain to straighten up. 2 weeks. Pain 8/10. ).    HPI       He is here today for follow-up  Diabetes mellitus: Currently takes Lantus 5 units twice daily, metformin 1000 mg twice daily and Humalog sliding scale (2 units for every 50 glucose greater than 150)  He monitors his glucose regularly out of the office.  In the mornings were ranged from 113-118.  After eating in the afternoon, his readings can increase to 280  He has history of hypertension currently take amlodipine 10 mg daily, losartan 100 mg daily, and metoprolol 25 mg twice daily.  Does not check blood pressure at the office.  No lightheadedness  He is taking atorvastatin 10 mg daily for hyperlipidemia  He has had a tender area of swelling in his left groin area for approximately 2 weeks.  He noticed that the day after he was reaching to put a crockpot on a high shelf.    We reviewed his most recent labs from 6/12/2025  TSH: Elevated at 5.19  Lipid panel: LDL 59  CMP: GFR slightly decreased at 59  PSA: Normal range  CBC: Showed macrocytic anemia with hemoglobin of 13.1.  .5    Review of Systems   Constitutional:  Negative for fever.   Respiratory:  Negative for cough.    Cardiovascular:  Negative for chest pain.       Objective   /85   Pulse 54   Temp 36.4 °C (97.6 °F) (Temporal)   Wt 53.1 kg (117 lb)   SpO2 98%   BMI 16.32 kg/m²     Physical Exam  Vitals reviewed.   Constitutional:       General: He is not in acute distress.     Appearance: Normal appearance. He is well-developed.   HENT:      Head: Normocephalic.   Eyes:      Conjunctiva/sclera: Conjunctivae normal.   Cardiovascular:      Rate and Rhythm: Normal rate and regular rhythm.      Heart sounds: Normal heart sounds.   Pulmonary:      Effort: Pulmonary effort is normal.      Breath sounds: Normal breath sounds.    Abdominal:      Hernia: A hernia is present.      Comments: There is a left inguinal hernia present measuring approximately 2 cm in diameter.  This is soft and reducible   Musculoskeletal:      Right lower leg: No edema.      Left lower leg: No edema.   Skin:     Findings: No rash.   Neurological:      Mental Status: He is alert.   Psychiatric:         Mood and Affect: Mood normal.         Behavior: Behavior normal.         Assessment/Plan   Assessment & Plan  Type 2 diabetes mellitus with ketoacidosis without coma, with long-term current use of insulin    Orders:    POCT glycosylated hemoglobin (Hb A1C) manually resulted    CBC and Auto Differential; Future    Vitamin B12; Future    Folate; Future    TSH with reflex to Free T4 if abnormal; Future    Basic Metabolic Panel; Future    Abnormal TSH    Orders:    CBC and Auto Differential; Future    Vitamin B12; Future    Folate; Future    TSH with reflex to Free T4 if abnormal; Future    Basic Metabolic Panel; Future    Anemia, unspecified type    Orders:    CBC and Auto Differential; Future    Vitamin B12; Future    Folate; Future    TSH with reflex to Free T4 if abnormal; Future    Basic Metabolic Panel; Future    Decreased GFR    Orders:    CBC and Auto Differential; Future    Vitamin B12; Future    Folate; Future    TSH with reflex to Free T4 if abnormal; Future    Basic Metabolic Panel; Future    Essential hypertension    Orders:    CBC and Auto Differential; Future    Vitamin B12; Future    Folate; Future    TSH with reflex to Free T4 if abnormal; Future    Basic Metabolic Panel; Future    metoprolol tartrate (Lopressor) 25 mg tablet; Take 1 tablet (25 mg) by mouth 2 times a day.    Hyperlipidemia, unspecified hyperlipidemia type    Orders:    CBC and Auto Differential; Future    Vitamin B12; Future    Folate; Future    TSH with reflex to Free T4 if abnormal; Future    Basic Metabolic Panel; Future    Non-recurrent unilateral inguinal hernia without obstruction  or gangrene    Orders:    Referral to General Surgery; Future    naproxen (Naprosyn) 500 mg tablet; Take 1 tablet (500 mg) by mouth 2 times a day as needed (pain).    Diabetes mellitus: A1c today is at goal at 6.1%.  Continue Lantus, metformin and Humalog at current dose and follow-up in 3 months  Hypertension: Blood pressure today is elevated 166/85.  This is 170/93 on recheck.  Will increase dose of metoprolol from 12.5 to 25 mg twice daily.  Continue amlodipine and losartan at current dose.  Recommended that he start to monitor his blood pressure out of the office.  Follow-up in 3 months to recheck BP  Hyperlipidemia: This is controlled with LDL 59.  Continue atorvastatin  Elevated TSH: He had elevated TSH at 5.19 on recent labs.  Will recheck this in 1 to 2 months and evaluate further still abnormal  Decreased GFR: GFR is slightly decreased at 57.  We discussed importance of adequate fluid intake and we will recheck this in 1 to 2 months also  Macrocytic anemia: Recheck CBC along with B12 and folate levels in 1 to 2 months  Left inguinal hernia: This is soft and reducible.  I gave him a prescription for naproxen to take as needed for pain, and will refer him to general surgery.  Discussed importance of going to ED if any signs of incarceration or strangulation of hernia occur  Follow-up in 3 months for recheck   General Sunscreen Counseling: I recommended a broad spectrum sunscreen with a SPF of 30 or higher.  I explained that SPF 30 sunscreens block approximately 97 percent of the sun's harmful rays.  Sunscreens should be applied at least 15 minutes prior to expected sun exposure and then every 2 hours after that as long as sun exposure continues. If swimming or exercising sunscreen should be reapplied every 45 minutes to an hour after getting wet or sweating.  One ounce, or the equivalent of a shot glass full of sunscreen, is adequate to protect the skin not covered by a bathing suit. I also recommended a lip balm with a sunscreen as well. Sun protective clothing can be used in lieu of sunscreen but must be worn the entire time you are exposed to the sun's rays. Detail Level: Zone Products Recommended: such as: neutrogena sheer zinc, cerave am, la roche posay toleriane uv

## 2025-06-17 NOTE — ASSESSMENT & PLAN NOTE
Orders:    POCT glycosylated hemoglobin (Hb A1C) manually resulted    CBC and Auto Differential; Future    Vitamin B12; Future    Folate; Future    TSH with reflex to Free T4 if abnormal; Future    Basic Metabolic Panel; Future

## 2025-06-17 NOTE — ASSESSMENT & PLAN NOTE
Orders:    CBC and Auto Differential; Future    Vitamin B12; Future    Folate; Future    TSH with reflex to Free T4 if abnormal; Future    Basic Metabolic Panel; Future

## 2025-06-18 DIAGNOSIS — I10 ESSENTIAL HYPERTENSION: ICD-10-CM

## 2025-06-18 RX ORDER — LOSARTAN POTASSIUM 100 MG/1
100 TABLET ORAL DAILY
Qty: 90 TABLET | Refills: 3 | Status: SHIPPED | OUTPATIENT
Start: 2025-06-18

## 2025-07-02 ENCOUNTER — HOSPITAL ENCOUNTER (INPATIENT)
Facility: HOSPITAL | Age: 75
End: 2025-07-02
Attending: EMERGENCY MEDICINE
Payer: MEDICARE

## 2025-07-02 DIAGNOSIS — R09.02 HYPOXEMIA: ICD-10-CM

## 2025-07-02 DIAGNOSIS — N28.1 RENAL CYST: ICD-10-CM

## 2025-07-02 DIAGNOSIS — J96.02 ACUTE RESPIRATORY FAILURE WITH HYPOXIA AND HYPERCAPNIA: ICD-10-CM

## 2025-07-02 DIAGNOSIS — R53.1 GENERAL WEAKNESS: ICD-10-CM

## 2025-07-02 DIAGNOSIS — K59.00 CONSTIPATION, UNSPECIFIED CONSTIPATION TYPE: ICD-10-CM

## 2025-07-02 DIAGNOSIS — K86.2 PANCREAS CYST (HHS-HCC): ICD-10-CM

## 2025-07-02 DIAGNOSIS — E16.2 HYPOGLYCEMIA: ICD-10-CM

## 2025-07-02 DIAGNOSIS — J96.01 ACUTE RESPIRATORY FAILURE WITH HYPOXIA AND HYPERCAPNIA: ICD-10-CM

## 2025-07-02 DIAGNOSIS — J18.9 PNEUMONIA OF LEFT LOWER LOBE DUE TO INFECTIOUS ORGANISM: Primary | ICD-10-CM

## 2025-07-02 LAB
ALBUMIN SERPL BCP-MCNC: 3.8 G/DL (ref 3.4–5)
ALP SERPL-CCNC: 71 U/L (ref 33–136)
ALT SERPL W P-5'-P-CCNC: 22 U/L (ref 10–52)
ANION GAP BLDV CALCULATED.4IONS-SCNC: 11 MMOL/L (ref 10–25)
ANION GAP SERPL CALC-SCNC: 15 MMOL/L (ref 10–20)
APPEARANCE UR: CLEAR
AST SERPL W P-5'-P-CCNC: 14 U/L (ref 9–39)
BACTERIA #/AREA URNS AUTO: ABNORMAL /HPF
BASE EXCESS BLDV CALC-SCNC: -6.2 MMOL/L (ref -2–3)
BASOPHILS # BLD AUTO: 0.03 X10*3/UL (ref 0–0.1)
BASOPHILS NFR BLD AUTO: 0.4 %
BILIRUB SERPL-MCNC: 0.6 MG/DL (ref 0–1.2)
BILIRUB UR STRIP.AUTO-MCNC: NEGATIVE MG/DL
BNP SERPL-MCNC: 89 PG/ML (ref 0–99)
BODY TEMPERATURE: ABNORMAL
BUN SERPL-MCNC: 42 MG/DL (ref 6–23)
CA-I BLDV-SCNC: 1.25 MMOL/L (ref 1.1–1.33)
CALCIUM SERPL-MCNC: 9.5 MG/DL (ref 8.6–10.3)
CARDIAC TROPONIN I PNL SERPL HS: 8 NG/L (ref 0–20)
CARDIAC TROPONIN I PNL SERPL HS: 9 NG/L (ref 0–20)
CHLORIDE BLDV-SCNC: 110 MMOL/L (ref 98–107)
CHLORIDE SERPL-SCNC: 107 MMOL/L (ref 98–107)
CO2 SERPL-SCNC: 18 MMOL/L (ref 21–32)
COLOR UR: ABNORMAL
CREAT SERPL-MCNC: 1.14 MG/DL (ref 0.5–1.3)
CRITICAL CALL TIME: 1055
CRITICAL CALLED BY: ABNORMAL
CRITICAL CALLED TO: ABNORMAL
CRITICAL READ BACK: ABNORMAL
EGFRCR SERPLBLD CKD-EPI 2021: 68 ML/MIN/1.73M*2
EOSINOPHIL # BLD AUTO: 0.12 X10*3/UL (ref 0–0.4)
EOSINOPHIL NFR BLD AUTO: 1.8 %
ERYTHROCYTE [DISTWIDTH] IN BLOOD BY AUTOMATED COUNT: 12.9 % (ref 11.5–14.5)
FLUAV RNA RESP QL NAA+PROBE: NOT DETECTED
FLUBV RNA RESP QL NAA+PROBE: NOT DETECTED
GLUCOSE BLD MANUAL STRIP-MCNC: 154 MG/DL (ref 74–99)
GLUCOSE BLD MANUAL STRIP-MCNC: 265 MG/DL (ref 74–99)
GLUCOSE BLD MANUAL STRIP-MCNC: 40 MG/DL (ref 74–99)
GLUCOSE BLD MANUAL STRIP-MCNC: 96 MG/DL (ref 74–99)
GLUCOSE BLDV-MCNC: 44 MG/DL (ref 74–99)
GLUCOSE SERPL-MCNC: 45 MG/DL (ref 74–99)
GLUCOSE UR STRIP.AUTO-MCNC: NORMAL MG/DL
HCO3 BLDV-SCNC: 17.7 MMOL/L (ref 22–26)
HCT VFR BLD AUTO: 38.5 % (ref 41–52)
HCT VFR BLD EST: 41 % (ref 41–52)
HGB BLD-MCNC: 13.8 G/DL (ref 13.5–17.5)
HGB BLDV-MCNC: 13.6 G/DL (ref 13.5–17.5)
IMM GRANULOCYTES # BLD AUTO: 0.04 X10*3/UL (ref 0–0.5)
IMM GRANULOCYTES NFR BLD AUTO: 0.6 % (ref 0–0.9)
INHALED O2 CONCENTRATION: 21 %
KETONES UR STRIP.AUTO-MCNC: NEGATIVE MG/DL
LACTATE BLDV-SCNC: 0.8 MMOL/L (ref 0.4–2)
LEUKOCYTE ESTERASE UR QL STRIP.AUTO: ABNORMAL
LYMPHOCYTES # BLD AUTO: 1.42 X10*3/UL (ref 0.8–3)
LYMPHOCYTES NFR BLD AUTO: 21 %
MAGNESIUM SERPL-MCNC: 1.87 MG/DL (ref 1.6–2.4)
MCH RBC QN AUTO: 35.3 PG (ref 26–34)
MCHC RBC AUTO-ENTMCNC: 35.8 G/DL (ref 32–36)
MCV RBC AUTO: 99 FL (ref 80–100)
MONOCYTES # BLD AUTO: 0.7 X10*3/UL (ref 0.05–0.8)
MONOCYTES NFR BLD AUTO: 10.4 %
NEUTROPHILS # BLD AUTO: 4.44 X10*3/UL (ref 1.6–5.5)
NEUTROPHILS NFR BLD AUTO: 65.8 %
NITRITE UR QL STRIP.AUTO: ABNORMAL
NRBC BLD-RTO: 0 /100 WBCS (ref 0–0)
OXYHGB MFR BLDV: 85.9 % (ref 45–75)
PCO2 BLDV: 30 MM HG (ref 41–51)
PH BLDV: 7.38 PH (ref 7.33–7.43)
PH UR STRIP.AUTO: 5 [PH]
PHOSPHATE SERPL-MCNC: 3.5 MG/DL (ref 2.5–4.9)
PLATELET # BLD AUTO: 195 X10*3/UL (ref 150–450)
PO2 BLDV: 51 MM HG (ref 35–45)
POTASSIUM BLDV-SCNC: 3.3 MMOL/L (ref 3.5–5.3)
POTASSIUM SERPL-SCNC: 3.4 MMOL/L (ref 3.5–5.3)
PROT SERPL-MCNC: 7.3 G/DL (ref 6.4–8.2)
PROT UR STRIP.AUTO-MCNC: ABNORMAL MG/DL
RBC # BLD AUTO: 3.91 X10*6/UL (ref 4.5–5.9)
RBC # UR STRIP.AUTO: ABNORMAL MG/DL
RBC #/AREA URNS AUTO: ABNORMAL /HPF
RSV RNA RESP QL NAA+PROBE: NOT DETECTED
SAO2 % BLDV: 88 % (ref 45–75)
SARS-COV-2 RNA RESP QL NAA+PROBE: NOT DETECTED
SODIUM BLDV-SCNC: 135 MMOL/L (ref 136–145)
SODIUM SERPL-SCNC: 137 MMOL/L (ref 136–145)
SP GR UR STRIP.AUTO: 1.02
T4 FREE SERPL-MCNC: 0.95 NG/DL (ref 0.61–1.12)
TSH SERPL-ACNC: 4.34 MIU/L (ref 0.44–3.98)
UROBILINOGEN UR STRIP.AUTO-MCNC: NORMAL MG/DL
WBC # BLD AUTO: 6.8 X10*3/UL (ref 4.4–11.3)
WBC #/AREA URNS AUTO: ABNORMAL /HPF
WBC CLUMPS #/AREA URNS AUTO: ABNORMAL /HPF

## 2025-07-02 PROCEDURE — 87086 URINE CULTURE/COLONY COUNT: CPT | Mod: STJLAB | Performed by: PHYSICIAN ASSISTANT

## 2025-07-02 PROCEDURE — 84439 ASSAY OF FREE THYROXINE: CPT | Performed by: PHYSICIAN ASSISTANT

## 2025-07-02 PROCEDURE — 96365 THER/PROPH/DIAG IV INF INIT: CPT | Mod: 59

## 2025-07-02 PROCEDURE — 2500000004 HC RX 250 GENERAL PHARMACY W/ HCPCS (ALT 636 FOR OP/ED)

## 2025-07-02 PROCEDURE — 36415 COLL VENOUS BLD VENIPUNCTURE: CPT | Performed by: PHYSICIAN ASSISTANT

## 2025-07-02 PROCEDURE — 2500000002 HC RX 250 W HCPCS SELF ADMINISTERED DRUGS (ALT 637 FOR MEDICARE OP, ALT 636 FOR OP/ED)

## 2025-07-02 PROCEDURE — 2500000005 HC RX 250 GENERAL PHARMACY W/O HCPCS: Performed by: PHYSICIAN ASSISTANT

## 2025-07-02 PROCEDURE — 83880 ASSAY OF NATRIURETIC PEPTIDE: CPT | Performed by: PHYSICIAN ASSISTANT

## 2025-07-02 PROCEDURE — 2550000001 HC RX 255 CONTRASTS: Performed by: EMERGENCY MEDICINE

## 2025-07-02 PROCEDURE — 87449 NOS EACH ORGANISM AG IA: CPT | Mod: STJLAB

## 2025-07-02 PROCEDURE — 96367 TX/PROPH/DG ADDL SEQ IV INF: CPT

## 2025-07-02 PROCEDURE — 84132 ASSAY OF SERUM POTASSIUM: CPT | Performed by: PHYSICIAN ASSISTANT

## 2025-07-02 PROCEDURE — 83735 ASSAY OF MAGNESIUM: CPT | Performed by: PHYSICIAN ASSISTANT

## 2025-07-02 PROCEDURE — 84484 ASSAY OF TROPONIN QUANT: CPT | Performed by: PHYSICIAN ASSISTANT

## 2025-07-02 PROCEDURE — 87637 SARSCOV2&INF A&B&RSV AMP PRB: CPT | Performed by: PHYSICIAN ASSISTANT

## 2025-07-02 PROCEDURE — 84075 ASSAY ALKALINE PHOSPHATASE: CPT | Performed by: PHYSICIAN ASSISTANT

## 2025-07-02 PROCEDURE — 85025 COMPLETE CBC W/AUTO DIFF WBC: CPT | Performed by: PHYSICIAN ASSISTANT

## 2025-07-02 PROCEDURE — 96361 HYDRATE IV INFUSION ADD-ON: CPT

## 2025-07-02 PROCEDURE — 96375 TX/PRO/DX INJ NEW DRUG ADDON: CPT

## 2025-07-02 PROCEDURE — 94640 AIRWAY INHALATION TREATMENT: CPT

## 2025-07-02 PROCEDURE — 82947 ASSAY GLUCOSE BLOOD QUANT: CPT

## 2025-07-02 PROCEDURE — 87040 BLOOD CULTURE FOR BACTERIA: CPT | Mod: STJLAB | Performed by: PHYSICIAN ASSISTANT

## 2025-07-02 PROCEDURE — 71045 X-RAY EXAM CHEST 1 VIEW: CPT | Performed by: RADIOLOGY

## 2025-07-02 PROCEDURE — 2500000002 HC RX 250 W HCPCS SELF ADMINISTERED DRUGS (ALT 637 FOR MEDICARE OP, ALT 636 FOR OP/ED): Performed by: PHYSICIAN ASSISTANT

## 2025-07-02 PROCEDURE — 99285 EMERGENCY DEPT VISIT HI MDM: CPT | Performed by: PHYSICIAN ASSISTANT

## 2025-07-02 PROCEDURE — 2500000004 HC RX 250 GENERAL PHARMACY W/ HCPCS (ALT 636 FOR OP/ED): Performed by: PHYSICIAN ASSISTANT

## 2025-07-02 PROCEDURE — 87899 AGENT NOS ASSAY W/OPTIC: CPT | Mod: STJLAB

## 2025-07-02 PROCEDURE — 99223 1ST HOSP IP/OBS HIGH 75: CPT

## 2025-07-02 PROCEDURE — 99285 EMERGENCY DEPT VISIT HI MDM: CPT | Mod: 25 | Performed by: EMERGENCY MEDICINE

## 2025-07-02 PROCEDURE — 2500000005 HC RX 250 GENERAL PHARMACY W/O HCPCS: Performed by: EMERGENCY MEDICINE

## 2025-07-02 PROCEDURE — 84443 ASSAY THYROID STIM HORMONE: CPT | Performed by: PHYSICIAN ASSISTANT

## 2025-07-02 PROCEDURE — 84100 ASSAY OF PHOSPHORUS: CPT | Performed by: PHYSICIAN ASSISTANT

## 2025-07-02 PROCEDURE — 96372 THER/PROPH/DIAG INJ SC/IM: CPT

## 2025-07-02 PROCEDURE — 81001 URINALYSIS AUTO W/SCOPE: CPT | Performed by: PHYSICIAN ASSISTANT

## 2025-07-02 PROCEDURE — 51798 US URINE CAPACITY MEASURE: CPT

## 2025-07-02 PROCEDURE — 2500000001 HC RX 250 WO HCPCS SELF ADMINISTERED DRUGS (ALT 637 FOR MEDICARE OP)

## 2025-07-02 RX ORDER — AMLODIPINE BESYLATE 10 MG/1
10 TABLET ORAL
Status: DISCONTINUED | OUTPATIENT
Start: 2025-07-03 | End: 2025-07-06

## 2025-07-02 RX ORDER — LOSARTAN POTASSIUM 100 MG/1
100 TABLET ORAL DAILY
Status: DISCONTINUED | OUTPATIENT
Start: 2025-07-03 | End: 2025-07-06

## 2025-07-02 RX ORDER — ENOXAPARIN SODIUM 100 MG/ML
40 INJECTION SUBCUTANEOUS EVERY 24 HOURS
Status: DISPENSED | OUTPATIENT
Start: 2025-07-02

## 2025-07-02 RX ORDER — AZITHROMYCIN 500 MG/1
500 TABLET, FILM COATED ORAL EVERY 24 HOURS
Status: DISCONTINUED | OUTPATIENT
Start: 2025-07-03 | End: 2025-07-03

## 2025-07-02 RX ORDER — DEXTROSE 50 % IN WATER (D50W) INTRAVENOUS SYRINGE
12.5
Status: ACTIVE | OUTPATIENT
Start: 2025-07-02

## 2025-07-02 RX ORDER — CEFTRIAXONE 1 G/50ML
1 INJECTION, SOLUTION INTRAVENOUS ONCE
Status: COMPLETED | OUTPATIENT
Start: 2025-07-02 | End: 2025-07-02

## 2025-07-02 RX ORDER — PNV NO.95/FERROUS FUM/FOLIC AC 28MG-0.8MG
100 TABLET ORAL DAILY
Status: DISPENSED | OUTPATIENT
Start: 2025-07-03

## 2025-07-02 RX ORDER — SODIUM CHLORIDE, SODIUM LACTATE, POTASSIUM CHLORIDE, CALCIUM CHLORIDE 600; 310; 30; 20 MG/100ML; MG/100ML; MG/100ML; MG/100ML
75 INJECTION, SOLUTION INTRAVENOUS CONTINUOUS
Status: DISCONTINUED | OUTPATIENT
Start: 2025-07-02 | End: 2025-07-03

## 2025-07-02 RX ORDER — CEFTRIAXONE 1 G/50ML
1 INJECTION, SOLUTION INTRAVENOUS EVERY 24 HOURS
Status: DISCONTINUED | OUTPATIENT
Start: 2025-07-03 | End: 2025-07-06

## 2025-07-02 RX ORDER — DOCUSATE SODIUM 100 MG/1
100 CAPSULE, LIQUID FILLED ORAL 2 TIMES DAILY
Status: DISCONTINUED | OUTPATIENT
Start: 2025-07-02 | End: 2025-07-06

## 2025-07-02 RX ORDER — DEXTROSE 50 % IN WATER (D50W) INTRAVENOUS SYRINGE
25
Status: ACTIVE | OUTPATIENT
Start: 2025-07-02

## 2025-07-02 RX ORDER — POTASSIUM CHLORIDE 20 MEQ/1
40 TABLET, EXTENDED RELEASE ORAL ONCE
Status: COMPLETED | OUTPATIENT
Start: 2025-07-02 | End: 2025-07-02

## 2025-07-02 RX ORDER — METOPROLOL TARTRATE 25 MG/1
25 TABLET, FILM COATED ORAL 2 TIMES DAILY
Status: DISPENSED | OUTPATIENT
Start: 2025-07-02

## 2025-07-02 RX ORDER — IPRATROPIUM BROMIDE AND ALBUTEROL SULFATE 2.5; .5 MG/3ML; MG/3ML
3 SOLUTION RESPIRATORY (INHALATION)
Status: DISCONTINUED | OUTPATIENT
Start: 2025-07-02 | End: 2025-07-03

## 2025-07-02 RX ORDER — INSULIN LISPRO 100 [IU]/ML
0-10 INJECTION, SOLUTION INTRAVENOUS; SUBCUTANEOUS
Status: DISCONTINUED | OUTPATIENT
Start: 2025-07-02 | End: 2025-07-06

## 2025-07-02 RX ORDER — POLYETHYLENE GLYCOL 3350 17 G/17G
17 POWDER, FOR SOLUTION ORAL DAILY
Status: DISPENSED | OUTPATIENT
Start: 2025-07-02

## 2025-07-02 RX ORDER — DEXTROSE 50 % IN WATER (D50W) INTRAVENOUS SYRINGE
25 ONCE
Status: COMPLETED | OUTPATIENT
Start: 2025-07-02 | End: 2025-07-02

## 2025-07-02 RX ORDER — TAMSULOSIN HYDROCHLORIDE 0.4 MG/1
0.4 CAPSULE ORAL DAILY
Status: DISCONTINUED | OUTPATIENT
Start: 2025-07-03 | End: 2025-07-06

## 2025-07-02 RX ADMIN — METOPROLOL TARTRATE 25 MG: 25 TABLET, FILM COATED ORAL at 21:57

## 2025-07-02 RX ADMIN — ENOXAPARIN SODIUM 40 MG: 100 INJECTION SUBCUTANEOUS at 16:59

## 2025-07-02 RX ADMIN — Medication 4 L/MIN: at 13:30

## 2025-07-02 RX ADMIN — DEXTROSE MONOHYDRATE 25 G: 25 INJECTION, SOLUTION INTRAVENOUS at 10:58

## 2025-07-02 RX ADMIN — SODIUM CHLORIDE 1000 ML: 0.9 INJECTION, SOLUTION INTRAVENOUS at 10:48

## 2025-07-02 RX ADMIN — AZITHROMYCIN MONOHYDRATE 500 MG: 500 INJECTION, POWDER, LYOPHILIZED, FOR SOLUTION INTRAVENOUS at 14:19

## 2025-07-02 RX ADMIN — IPRATROPIUM BROMIDE AND ALBUTEROL SULFATE 3 ML: 2.5; .5 SOLUTION RESPIRATORY (INHALATION) at 19:16

## 2025-07-02 RX ADMIN — CEFTRIAXONE 1 G: 1 INJECTION, SOLUTION INTRAVENOUS at 13:24

## 2025-07-02 RX ADMIN — POTASSIUM CHLORIDE 40 MEQ: 1500 TABLET, EXTENDED RELEASE ORAL at 13:24

## 2025-07-02 RX ADMIN — Medication 4.5 L/MIN: at 19:26

## 2025-07-02 RX ADMIN — DOCUSATE SODIUM 100 MG: 100 CAPSULE, LIQUID FILLED ORAL at 21:57

## 2025-07-02 RX ADMIN — IOHEXOL 75 ML: 350 INJECTION, SOLUTION INTRAVENOUS at 12:06

## 2025-07-02 RX ADMIN — Medication 4 L/MIN: at 12:41

## 2025-07-02 RX ADMIN — INSULIN LISPRO 2 UNITS: 100 INJECTION, SOLUTION INTRAVENOUS; SUBCUTANEOUS at 16:58

## 2025-07-02 RX ADMIN — SODIUM CHLORIDE, SODIUM LACTATE, POTASSIUM CHLORIDE, AND CALCIUM CHLORIDE 75 ML/HR: .6; .31; .03; .02 INJECTION, SOLUTION INTRAVENOUS at 16:59

## 2025-07-02 ASSESSMENT — ENCOUNTER SYMPTOMS
UNEXPECTED WEIGHT CHANGE: 1
DIFFICULTY URINATING: 1
ALLERGIC/IMMUNOLOGIC NEGATIVE: 1
ABDOMINAL PAIN: 1
WEAKNESS: 1
CARDIOVASCULAR NEGATIVE: 1
ACTIVITY CHANGE: 1
SHORTNESS OF BREATH: 1
APPETITE CHANGE: 1
PSYCHIATRIC NEGATIVE: 1
FATIGUE: 1
ENDOCRINE NEGATIVE: 1

## 2025-07-02 ASSESSMENT — PAIN SCALES - GENERAL
PAINLEVEL_OUTOF10: 0 - NO PAIN

## 2025-07-02 ASSESSMENT — LIFESTYLE VARIABLES
HAVE PEOPLE ANNOYED YOU BY CRITICIZING YOUR DRINKING: NO
EVER FELT BAD OR GUILTY ABOUT YOUR DRINKING: NO
HAVE YOU EVER FELT YOU SHOULD CUT DOWN ON YOUR DRINKING: NO
EVER HAD A DRINK FIRST THING IN THE MORNING TO STEADY YOUR NERVES TO GET RID OF A HANGOVER: NO
TOTAL SCORE: 0

## 2025-07-02 ASSESSMENT — PAIN - FUNCTIONAL ASSESSMENT: PAIN_FUNCTIONAL_ASSESSMENT: 0-10

## 2025-07-02 NOTE — H&P
History Of Present Illness  Jorge A Madden is a 75 y.o. male with past medical history of pancreatitis, T2DM insulin dependent and HTN, HLD. Presenting to ED from home with weakness, generalized malaise, poor oral intake and SOB over the last few days, additionally patient admits to weight loss over the last several months. States SOB has increased over the last 2 days, denies respiratory disease but does currently smoke cigarettes, denies chest pain. Patient does walk with a cane at baseline but states he has barely been able to get around at home. Endorses mild abdominal pain but denies n/v/d. Denies fevers, chills, cough, congestion. Does state presence of an inguinal hernia for which he has appointment scheduled with a surgeon for next week.     In ED patients lab work was notable for glucose of 45 upon arrival, given 1 amp of D50 with repeat 96, potassium of 3.4 given 40 mg oral replacement, Troponins 9-->8, chest xray showed scant linear atelectasis versus scarring at the left lung base. Viral swabs negative. CT PE showing multiple findings including bibasilar pneumonia with dense consolidation particularly in the left lower lobe. Patient started on IV rocephin and azithromycin. Additional findings including extensive pancreatic calcifications with pancreatic ductal dilatation and beading consistent with chronic pancreatitis. 2. Multiple small cystic lesions in the pancreas which could represent small pseudocysts, small cystic neoplasms or intraductal mucinous papillary neoplasms. Patient will be admitted to medicine service for treatment of pneumonia along with GI consult and possible MRCP related to CT findings.         Past Review History  Review of Systems   Constitutional:  Positive for activity change, appetite change, fatigue and unexpected weight change.   HENT: Negative.     Respiratory:  Positive for shortness of breath.    Cardiovascular: Negative.    Gastrointestinal:  Positive for abdominal  pain.   Endocrine: Negative.    Genitourinary:  Positive for difficulty urinating.   Musculoskeletal:  Positive for gait problem.   Skin: Negative.    Allergic/Immunologic: Negative.    Neurological:  Positive for weakness.   Psychiatric/Behavioral: Negative.         Past Medical History  Essential hypertension  Hyperlipidemia  Type 2 diabetes mellitus with ketoacidosis without coma ([facility])  Acute pancreatitis ([facility])  Chronic constipation  Epigastric pain  Hematuria  Urinary incontinence  Urinary retention  Benign prostatic hyperplasia  MARTI (iron deficiency anemia)  Macrocytosis  Anxiety  Chronic bilateral low back pain without sciatica  Gout    Surgical History  He has a past surgical history that includes Other surgical history (11/14/2019).     Social History  He reports that he has been smoking cigarettes. He has a 30 pack-year smoking history. He has never used smokeless tobacco. He reports that he does not drink alcohol and does not use drugs.    Family History  Family History[1]     Allergies  Patient has no known allergies.    Scheduled medications  Scheduled Medications[2]  Continuous medications  Continuous Medications[3]  PRN medications  PRN Medications[4]      Physical Exam:  Physical Exam  Constitutional:       Appearance: He is ill-appearing.      Comments: cachectic   HENT:      Head: Normocephalic and atraumatic.      Mouth/Throat:      Mouth: Mucous membranes are dry.   Eyes:      Pupils: Pupils are equal, round, and reactive to light.   Cardiovascular:      Rate and Rhythm: Normal rate and regular rhythm.      Pulses: Normal pulses.      Heart sounds: Normal heart sounds.   Pulmonary:      Effort: Pulmonary effort is normal.      Breath sounds: Normal breath sounds.   Abdominal:      General: Abdomen is flat.   Musculoskeletal:      Cervical back: Normal range of motion.      Comments: weakness   Skin:     General: Skin is dry.      Coloration: Skin is pale.   Neurological:      General: No  focal deficit present.   Psychiatric:         Mood and Affect: Mood normal.          Last Recorded Vitals  /83   Pulse 65   Temp 36.6 °C (97.9 °F)   Resp (!) 22   Wt 56.7 kg (125 lb)   SpO2 (!) 87%     Relevant Results  Imaging  CT angio chest for pulmonary embolism  Result Date: 7/2/2025  CHEST: 1.  No pulmonary embolism. 2. Bibasilar pneumonia with dense consolidation particularly in the left lower lobe.   ABDOMEN AND PELVIS: 1.  Extensive pancreatic calcifications with pancreatic ductal dilatation and beading consistent with chronic pancreatitis. 2. Multiple small cystic lesions in the pancreas which could represent small pseudocysts, small cystic neoplasms or intraductal mucinous papillary neoplasms. 3. Bilateral nonobstructing intrarenal calculi. 4. 1.4 cm Bosniak 2 left renal cyst and simple left renal cyst. 5. Distended urinary bladder with a volume of 501 cc. 6. Marked constipation and possible fecal impaction.   MACRO: None   Signed by: Erin Moreira 7/2/2025 12:29 PM Dictation workstation:   [id number]    CT abdomen pelvis w IV contrast  Result Date: 7/2/2025  CHEST: 1.  No pulmonary embolism. 2. Bibasilar pneumonia with dense consolidation particularly in the left lower lobe.   ABDOMEN AND PELVIS: 1.  Extensive pancreatic calcifications with pancreatic ductal dilatation and beading consistent with chronic pancreatitis. 2. Multiple small cystic lesions in the pancreas which could represent small pseudocysts, small cystic neoplasms or intraductal mucinous papillary neoplasms. 3. Bilateral nonobstructing intrarenal calculi. 4. 1.4 cm Bosniak 2 left renal cyst and simple left renal cyst. 5. Distended urinary bladder with a volume of 501 cc. 6. Marked constipation and possible fecal impaction.   MACRO: None   Signed by: Erin Moreira 7/2/2025 12:29 PM Dictation workstation:   [id number]    XR chest 1 view  Result Date: 7/2/2025  Scant linear atelectasis versus scarring at the left lung base.    Arthritic changes in the thoracic spine as described.     MACRO: None   Signed by: Shaggy Morris 7/2/2025 11:01 AM Dictation workstation:   [id number]      Cardiology, Vascular, and Other Imaging  No other imaging results found for the past 7 days     Results for orders placed or performed during the hospital encounter of 07/02/25 (from the past 24 hours)   CBC and Auto Differential   Result Value Ref Range    WBC 6.8 4.4 - 11.3 x10*3/uL    nRBC 0.0 0.0 - 0.0 /100 WBCs    RBC 3.91 (L) 4.50 - 5.90 x10*6/uL    Hemoglobin 13.8 13.5 - 17.5 g/dL    Hematocrit 38.5 (L) 41.0 - 52.0 %    MCV 99 80 - 100 fL    MCH 35.3 (H) 26.0 - 34.0 pg    MCHC 35.8 32.0 - 36.0 g/dL    RDW 12.9 11.5 - 14.5 %    Platelets 195 150 - 450 x10*3/uL    Neutrophils % 65.8 40.0 - 80.0 %    Immature Granulocytes %, Automated 0.6 0.0 - 0.9 %    Lymphocytes % 21.0 13.0 - 44.0 %    Monocytes % 10.4 2.0 - 10.0 %    Eosinophils % 1.8 0.0 - 6.0 %    Basophils % 0.4 0.0 - 2.0 %    Neutrophils Absolute 4.44 1.60 - 5.50 x10*3/uL    Immature Granulocytes Absolute, Automated 0.04 0.00 - 0.50 x10*3/uL    Lymphocytes Absolute 1.42 0.80 - 3.00 x10*3/uL    Monocytes Absolute 0.70 0.05 - 0.80 x10*3/uL    Eosinophils Absolute 0.12 0.00 - 0.40 x10*3/uL    Basophils Absolute 0.03 0.00 - 0.10 x10*3/uL   Comprehensive metabolic panel   Result Value Ref Range    Glucose 45 (LL) 74 - 99 mg/dL    Sodium 137 136 - 145 mmol/L    Potassium 3.4 (L) 3.5 - 5.3 mmol/L    Chloride 107 98 - 107 mmol/L    Bicarbonate 18 (L) 21 - 32 mmol/L    Anion Gap 15 10 - 20 mmol/L    Urea Nitrogen 42 (H) 6 - 23 mg/dL    Creatinine 1.14 0.50 - 1.30 mg/dL    eGFR 68 >60 mL/min/1.73m*2    Calcium 9.5 8.6 - 10.3 mg/dL    Albumin 3.8 3.4 - 5.0 g/dL    Alkaline Phosphatase 71 33 - 136 U/L    Total Protein 7.3 6.4 - 8.2 g/dL    AST 14 9 - 39 U/L    Bilirubin, Total 0.6 0.0 - 1.2 mg/dL    ALT 22 10 - 52 U/L   Magnesium   Result Value Ref Range    Magnesium 1.87 1.60 - 2.40 mg/dL   TSH with reflex to  Free T4 if abnormal   Result Value Ref Range    Thyroid Stimulating Hormone 4.34 (H) 0.44 - 3.98 mIU/L   B-type natriuretic peptide   Result Value Ref Range    BNP 89 0 - 99 pg/mL   Phosphorus   Result Value Ref Range    Phosphorus 3.5 2.5 - 4.9 mg/dL   Troponin I, High Sensitivity, Initial   Result Value Ref Range    Troponin I, High Sensitivity 9 0 - 20 ng/L   Thyroxine, Free   Result Value Ref Range    Thyroxine, Free 0.95 0.61 - 1.12 ng/dL   BLOOD GAS VENOUS FULL PANEL   Result Value Ref Range    POCT pH, Venous 7.38 7.33 - 7.43 pH    POCT pCO2, Venous 30 (L) 41 - 51 mm Hg    POCT pO2, Venous 51 (H) 35 - 45 mm Hg    POCT SO2, Venous 88 (H) 45 - 75 %    POCT Oxy Hemoglobin, Venous 85.9 (H) 45.0 - 75.0 %    POCT Hematocrit Calculated, Venous 41.0 41.0 - 52.0 %    POCT Sodium, Venous 135 (L) 136 - 145 mmol/L    POCT Potassium, Venous 3.3 (L) 3.5 - 5.3 mmol/L    POCT Chloride, Venous 110 (H) 98 - 107 mmol/L    POCT Ionized Calicum, Venous 1.25 1.10 - 1.33 mmol/L    POCT Glucose, Venous 44 (LL) 74 - 99 mg/dL    POCT Lactate, Venous 0.8 0.4 - 2.0 mmol/L    POCT Base Excess, Venous -6.2 (L) -2.0 - 3.0 mmol/L    POCT HCO3 Calculated, Venous 17.7 (L) 22.0 - 26.0 mmol/L    POCT Hemoglobin, Venous 13.6 13.5 - 17.5 g/dL    POCT Anion Gap, Venous 11.0 10.0 - 25.0 mmol/L    Patient Temperature      FiO2 21 %    Critical Called By CLEMENTINE     Critical Called To JYOTI ARELLANO     Critical Call Time 1055     Critical Read Back Y    Sars-CoV-2, Influenza A/B and RSV PCR   Result Value Ref Range    Coronavirus 2019, PCR Not Detected Not Detected    Flu A Result Not Detected Not Detected    Flu B Result Not Detected Not Detected    RSV PCR Not Detected Not Detected   POCT GLUCOSE   Result Value Ref Range    POCT Glucose 40 (L) 74 - 99 mg/dL   POCT GLUCOSE   Result Value Ref Range    POCT Glucose 96 74 - 99 mg/dL   Troponin, High Sensitivity, 1 Hour   Result Value Ref Range    Troponin I, High Sensitivity 8 0 - 20 ng/L        Assessment/Plan    Jorge A Madden is a 75 y.o. male on day 0 of admission presenting with Pneumonia of left lower lobe due to infectious organism.  Assessment & Plan  Pneumonia of left lower lobe due to infectious organism  Admit to inpatient with tele monitoring and cont pulse ox  Continue supplemental oxygen, to wean down as tolerated  Continue IV rocephin  Continue oral Azithromycin  Sputum cultures  Incentive spirometry  Urine legionella/urine strept  Hypoxemia  Continuous pulse ox  Continue supplemental oxygen, to maintain stats above 92%, to wean down as tolerated  Breathing treatments  Incentive spirometry  Hypoglycemia  Q4 accuchecks  Hypoglycemia protocol  Constipation, unspecified constipation type  Soap suds enema   Monitor stool count  Strict I/O's  Pancreas cyst ([facility])  GI consult   Possible MRCP  General weakness  PT/OT consult   Social work consult   Nutrition consult d/t poor oral intake    Urinary Retention  Q4 bladder scans  Straight cath with volume of 200cc or greater    AM lab work  DVTp: lovenox  Code status: Full code  Dispo: Likely requiring >2 midnight stays for treatment, further workup and safe discharge plan    Plan discussed with patient and attending provider. I spent >50 min in the overall care of this patient.    Lis Morris, [facility] Medicine    This is a shared visit. I have reviewed the Advanced Practice Provider's encounter note, approve the Advanced Practice Provider's documentation, and provide the following additional information from my personal encounter.     75 YOM presenting with complaints of worsening generalized weakness, malaise, anorexia, constipation progressively worsening over the last month, also having subjective fevers and cold sweats.  Found to have pneumonia with new O2 requirement, acute urinary retention, and pancreatic lesion, possible cyst but needs further evaluation. He appears cachetic on exam, and with the subjective fever and coldsweats I am concerned  for occult malignancy. Plan to treat CAP with rocepjohn avila, GI consult for further evaluation of pancreatic cyst. Inpatient vs outpatient MRCP.   Laxatives for constipation, was able to urinate on his own eventually, had 300 ml out.     Complexity high, anticipate > 2 [place] stays    Mohaumd Rodriguez DO  Internal Medicine        [1]   Family History  Problem Relation Name Age of Onset    Prostate cancer Brother     [2] azithromycin, 500 mg, intravenous, Once  cefTRIAXone, 1 g, intravenous, Once  oxygen, , inhalation, Continuous - Inhalation  potassium chloride CR, 40 mEq, oral, Once    [3]    [4] PRN medications: dextrose, dextrose, glucagon, glucagon, oxygen

## 2025-07-02 NOTE — ASSESSMENT & PLAN NOTE
Admit to inpatient with tele monitoring and cont pulse ox  Continue supplemental oxygen, to wean down as tolerated  Continue IV rocephin  Continue oral Azithromycin  Sputum cultures  Incentive spirometry  Urine legionella/urine strept

## 2025-07-02 NOTE — ED PROVIDER NOTES
Call placed to patient informing him that the vitamin D 2000 international units  Was sent to his pharmacy, also informed him that insurance may not cover it since it is over-the-counter. Patient voiced understanding   Emergency Department Provider Note        History of Present Illness     History provided by: Patient  Limitations to History: None  External Records Reviewed with Brief Summary: pmhx    HPI:  Jorge A Madden is a 75 y.o. male with history of hypertension, hyperlipidemia, pancreatitis, diabetes who was sugar for EMS on the way here was in the 70s who presents today for evaluation of 2 days of shortness of breath and generalized weakness, patient states he lives with his grandson who is accompanying him here today, Guille, states that he normally ambulates independently with a cane but the last 2 days has been feeling more weak, states he is still able to ambulate to get to the bathroom, denies any recent falls.  Patient states is a generalized weakness, not unilateral, no slurred speech facial droop.  Patient is noted to be cachectic, states that he has not had any recent weight loss but states he has been losing weight over a period of time, unsure how much.  He states that the shortness of breath started pretty suddenly 2 days ago, he denies any chest pain, hemoptysis, cough, recent surgeries hospitalizations or travel, known history of malignancy, hormone use, leg pain or swelling.  He denies any cough but does endorse he has some slight nasal congestion and a runny nose without fevers or chills.  Denies urinary symptoms, has been eating and drinking without difficulty, no nausea vomiting abdominal pain or diarrhea.  No bloody stools or dark tarry stools.    Physical Exam   Triage vitals:  T 36.6 °C (97.9 °F)  HR 66  /74  RR 20  O2 (!) 93 % None (Room air)    Physical Exam  Vitals and nursing note reviewed.   Constitutional:       General: He is not in acute distress.     Appearance: Normal appearance. He is not toxic-appearing.      Comments: Cachectic in appearance   HENT:      Head: Normocephalic and atraumatic.      Nose: Nose normal.      Mouth/Throat:      Mouth: Mucous membranes are moist.       Pharynx: No oropharyngeal exudate or posterior oropharyngeal erythema.   Eyes:      General: No visual field deficit.     Extraocular Movements: Extraocular movements intact.      Pupils: Pupils are equal, round, and reactive to light.   Cardiovascular:      Rate and Rhythm: Normal rate and regular rhythm.      Heart sounds: No murmur heard.     No gallop.   Pulmonary:      Effort: Pulmonary effort is normal.      Comments: Bilateral end inspiratory crackling in lung bases  Abdominal:      Palpations: Abdomen is soft.      Tenderness: There is abdominal tenderness (suprapubic). There is no right CVA tenderness or left CVA tenderness.   Musculoskeletal:         General: Normal range of motion.      Cervical back: Normal range of motion and neck supple.      Comments: No swelling   Skin:     General: Skin is warm and dry.   Neurological:      General: No focal deficit present.      Mental Status: He is alert and oriented to person, place, and time.      GCS: GCS eye subscore is 4. GCS verbal subscore is 5. GCS motor subscore is 6.      Cranial Nerves: Cranial nerves 2-12 are intact. No cranial nerve deficit, dysarthria or facial asymmetry.      Sensory: Sensation is intact. No sensory deficit.      Motor: Motor function is intact. No weakness, tremor, seizure activity or pronator drift.      Comments: Patient has good strength with  strength and push pull as well as lifting both legs off the bed and resisting.   Psychiatric:         Mood and Affect: Mood normal.         Thought Content: Thought content normal.       CT angio chest for pulmonary embolism   Final Result   CHEST:   1.  No pulmonary embolism.   2. Bibasilar pneumonia with dense consolidation particularly in the   left lower lobe.        ABDOMEN AND PELVIS:   1.  Extensive pancreatic calcifications with pancreatic ductal   dilatation and beading consistent with chronic pancreatitis.   2. Multiple small cystic lesions in the pancreas which could    represent small pseudocysts, small cystic neoplasms or intraductal   mucinous papillary neoplasms.   3. Bilateral nonobstructing intrarenal calculi.   4. 1.4 cm Bosniak 2 left renal cyst and simple left renal cyst.   5. Distended urinary bladder with a volume of 501 cc.   6. Marked constipation and possible fecal impaction.        MACRO:   None        Signed by: Erin Moreira 7/2/2025 12:29 PM   Dictation workstation:   [email or web address]      CT abdomen pelvis w IV contrast   Final Result   CHEST:   1.  No pulmonary embolism.   2. Bibasilar pneumonia with dense consolidation particularly in the   left lower lobe.        ABDOMEN AND PELVIS:   1.  Extensive pancreatic calcifications with pancreatic ductal   dilatation and beading consistent with chronic pancreatitis.   2. Multiple small cystic lesions in the pancreas which could   represent small pseudocysts, small cystic neoplasms or intraductal   mucinous papillary neoplasms.   3. Bilateral nonobstructing intrarenal calculi.   4. 1.4 cm Bosniak 2 left renal cyst and simple left renal cyst.   5. Distended urinary bladder with a volume of 501 cc.   6. Marked constipation and possible fecal impaction.        MACRO:   None        Signed by: Erin Moreira 7/2/2025 12:29 PM   Dictation workstation:   [id number]      XR chest 1 view   Final Result   Scant linear atelectasis versus scarring at the left lung base.        Arthritic changes in the thoracic spine as described.             MACRO:   None        Signed by: Shaggy Morris 7/2/2025 11:01 AM   Dictation workstation:   [id number]        Labs Reviewed   CBC WITH AUTO DIFFERENTIAL - Abnormal       Result Value    WBC 6.8      nRBC 0.0      RBC 3.91 (*)     Hemoglobin 13.8      Hematocrit 38.5 (*)     MCV 99      MCH 35.3 (*)     MCHC 35.8      RDW 12.9      Platelets 195      Neutrophils % 65.8      Immature Granulocytes %, Automated 0.6      Lymphocytes % 21.0      Monocytes % 10.4      Eosinophils % 1.8       Basophils % 0.4      Neutrophils Absolute 4.44      Immature Granulocytes Absolute, Automated 0.04      Lymphocytes Absolute 1.42      Monocytes Absolute 0.70      Eosinophils Absolute 0.12      Basophils Absolute 0.03     COMPREHENSIVE METABOLIC PANEL - Abnormal    Glucose 45 (*)     Sodium 137      Potassium 3.4 (*)     Chloride 107      Bicarbonate 18 (*)     Anion Gap 15      Urea Nitrogen 42 (*)     Creatinine 1.14      eGFR 68      Calcium 9.5      Albumin 3.8      Alkaline Phosphatase 71      Total Protein 7.3      AST 14      Bilirubin, Total 0.6      ALT 22     TSH WITH REFLEX TO FREE T4 IF ABNORMAL - Abnormal    Thyroid Stimulating Hormone 4.34 (*)     Narrative:     TSH testing is performed using different testing methodology at [facility] than at other [facility]. Direct result comparisons should only be made within the same method.     BLOOD GAS VENOUS FULL PANEL - Abnormal    POCT pH, Venous 7.38      POCT pCO2, Venous 30 (*)     POCT pO2, Venous 51 (*)     POCT SO2, Venous 88 (*)     POCT Oxy Hemoglobin, Venous 85.9 (*)     POCT Hematocrit Calculated, Venous 41.0      POCT Sodium, Venous 135 (*)     POCT Potassium, Venous 3.3 (*)     POCT Chloride, Venous 110 (*)     POCT Ionized Calicum, Venous 1.25      POCT Glucose, Venous 44 (*)     POCT Lactate, Venous 0.8      POCT Base Excess, Venous -6.2 (*)     POCT HCO3 Calculated, Venous 17.7 (*)     POCT Hemoglobin, Venous 13.6      POCT Anion Gap, Venous 11.0      Patient Temperature        FiO2 21      Critical Called By CLEMENTINE      Critical Called To JYOTI ARELLANO      Critical Call Time 1055      Critical Read Back Y     URINALYSIS WITH REFLEX CULTURE AND MICROSCOPIC - Abnormal    Color, Urine Light-Yellow      Appearance, Urine Clear      Specific Gravity, Urine 1.020      pH, Urine 5.0      Protein, Urine 10 (TRACE)      Glucose, Urine Normal      Blood, Urine 0.1 (1+) (*)     Ketones, Urine NEGATIVE      Bilirubin, Urine NEGATIVE       Urobilinogen, Urine Normal      Nitrite, Urine 2+ (*)     Leukocyte Esterase, Urine 25 Jose/uL (*)    MICROSCOPIC ONLY, URINE - Abnormal    WBC, Urine 11-20 (*)     WBC Clumps, Urine RARE      RBC, Urine 6-10 (*)     Bacteria, Urine 1+ (*)    POCT GLUCOSE - Abnormal    POCT Glucose 40 (*)    POCT GLUCOSE - Abnormal    POCT Glucose 154 (*)    MAGNESIUM - Normal    Magnesium 1.87     B-TYPE NATRIURETIC PEPTIDE - Normal    BNP 89      Narrative:        <100 pg/mL - Heart failure unlikely  100-299 pg/mL - Intermediate probability of acute heart                  failure exacerbation. Correlate with clinical                  context and patient history.    >=300 pg/mL - Heart Failure likely. Correlate with clinical                  context and patient history.    BNP testing is performed using different testing methodology at [facility] than at other [facility] hospitals. Direct result comparisons should only be made within the same method.      SARS-COV-2, INFLUENZA A/B AND RSV PCR - Normal    Coronavirus 2019, PCR Not Detected      Flu A Result Not Detected      Flu B Result Not Detected      RSV PCR Not Detected      Narrative:     This assay is an FDA-cleared, in vitro diagnostic nucleic acid amplification test for the qualitative detection and differentiation of SARS CoV-2/ Influenza A/B/ RSV from nasopharyngeal specimens collected from individuals with signs and symptoms of respiratory tract infections, and has been validated for use at [facility]. Negative results do not preclude COVID-19/ Influenza A/B/ RSV infections and should not be used as the sole basis for diagnosis, treatment, or other management decisions. Testing for SARS CoV-2 is recommended only for patients who meet current clinical and/or epidemiological criteria defined by federal, state, or local public health directives.   PHOSPHORUS - Normal    Phosphorus 3.5     SERIAL TROPONIN-INITIAL - Normal    Troponin I, High  Sensitivity 9      Narrative:     Less than 99th percentile of normal range cutoff-  Female and children under 18 years old <14 ng/L; Male <21 ng/L: Negative  Repeat testing should be performed if clinically indicated.     Female and children under 18 years old 14-50 ng/L; Male 21-50 ng/L:  Consistent with possible cardiac damage and possible increased clinical   risk. Serial measurements may help to assess extent of myocardial damage.     >50 ng/L: Consistent with cardiac damage, increased clinical risk and  myocardial infarction. Serial measurements may help assess extent of   myocardial damage.      NOTE: Children less than 1 year old may have higher baseline troponin   levels and results should be interpreted in conjunction with the overall   clinical context.     NOTE: Troponin I testing is performed using a different   testing methodology at [facility] than at other   [facility]. Direct result comparisons should only   be made within the same method.   SERIAL TROPONIN, 1 HOUR - Normal    Troponin I, High Sensitivity 8      Narrative:     Less than 99th percentile of normal range cutoff-  Female and children under 18 years old <14 ng/L; Male <21 ng/L: Negative  Repeat testing should be performed if clinically indicated.     Female and children under 18 years old 14-50 ng/L; Male 21-50 ng/L:  Consistent with possible cardiac damage and possible increased clinical   risk. Serial measurements may help to assess extent of myocardial damage.     >50 ng/L: Consistent with cardiac damage, increased clinical risk and  myocardial infarction. Serial measurements may help assess extent of   myocardial damage.      NOTE: Children less than 1 year old may have higher baseline troponin   levels and results should be interpreted in conjunction with the overall   clinical context.     NOTE: Troponin I testing is performed using a different   testing methodology at [facility] than at other    [facility]. Direct result comparisons should only   be made within the same method.   THYROXINE, FREE - Normal    Thyroxine, Free 0.95      Narrative:     Thyroxine Free testing is performed using different testing methodology at [facility] than at other [facility]. Direct result comparisons should only be made within the same method.    Biotin can cause falsely elevated free T4 results. Patients taking a Biotin dose of up to 10 mg/day should refrain from taking Biotin for 24 hours before sample collection. Patient taking a Biotin dose of >10 mg/day should consult with their physician or the laboratory before the blood draw.   POCT GLUCOSE - Normal    POCT Glucose 96     BLOOD CULTURE   BLOOD CULTURE   RESPIRATORY CULTURE/SMEAR   LEGIONELLA ANTIGEN, URINE   STREPTOCOCCUS PNEUMONIAE ANTIGEN, URINE   URINE CULTURE   TROPONIN SERIES- (INITIAL, 1 HR)    Narrative:     The following orders were created for panel order Troponin I Series, High Sensitivity (0, 1 HR).  Procedure                               Abnormality         Status                     ---------                               -----------         ------                     Troponin I, High Sensiti...[[id number]]  Normal              Final result               Troponin, High Sensitivi...[[id number]]  Normal              Final result                 Please view results for these tests on the individual orders.   URINALYSIS WITH REFLEX CULTURE AND MICROSCOPIC    Narrative:     The following orders were created for panel order Urinalysis with Reflex Culture and Microscopic.  Procedure                               Abnormality         Status                     ---------                               -----------         ------                     Urinalysis with Reflex C...[[id number]]  Abnormal            Final result               Extra Urine Gray Tube[[id number]]                            In process                   Please view  results for these tests on the individual orders.   EXTRA URINE GRAY TUBE   PROCALCITONIN   POCT FINGERSTICK GLUCOSE   POCT GLUCOSE METER   POCT GLUCOSE METER   POCT GLUCOSE METER   POCT GLUCOSE METER     ED Course as of 25   1057 POCT Glucose, Venous(!!): 44  Ordered repeat poc glucose and amp d50 []   1125 Patient states he took his insulin today but then did not eat anything. []      ED Course User Index  [] Odalys Thomas PA-C         Diagnoses as of 25   Pneumonia of left lower lobe due to infectious organism   Hypoxemia   Hypoglycemia   Constipation, unspecified constipation type   Pancreas cyst ([facility])   General weakness   Renal cyst          Medical Decision Making & ED Course   Medical Decision Makin y.o. male presents for shortness of breath and generalized weakness, is noted to have an O2 saturation between 91 and 93%, with patient's weight loss, cachectic appearance and sudden onset shortness of breath I do have concerns for a PE, potentially secondary to undiagnosed malignancy, patient is also tender to the lower abdomen, I did order a CT PE study as well as CT abdomen pelvis in addition to labs and viral swabs.    Patient was hypoxic in the low 90s and high 80s prompting a new oxygen requirement of 2 L, he was initially on VBG noted to be hypoglycemic, told the nurse that he took his insulin this morning and then did not eat, he was given an amp of D50 and his glucose improved.  CBC without leukocytosis or anemia, his TSH was high with a normal T4, phosphorus, BMP, magnesium, free T4 and troponin were all within normal limits, CT showed:    IMPRESSION:  CHEST:  1.  No pulmonary embolism.  2. Bibasilar pneumonia with dense consolidation particularly in the  left lower lobe.      ABDOMEN AND PELVIS:  1.  Extensive pancreatic calcifications with pancreatic ductal  dilatation and beading consistent with chronic pancreatitis.  2. Multiple small cystic  lesions in the pancreas which could  represent small pseudocysts, small cystic neoplasms or intraductal  mucinous papillary neoplasms.  3. Bilateral nonobstructing intrarenal calculi.  4. 1.4 cm Bosniak 2 left renal cyst and simple left renal cyst.  5. Distended urinary bladder with a volume of 501 cc.  6. Marked constipation and possible fecal impaction.        I discussed these findings with the patient, he is aware of the chronic pancreatitis however I discussed with him that I am going to referred him to the [facility] as it is unclear whether this could be neoplastic and patient expressed understanding of the need to rule out cancer with the [facility].  With regards to his pneumonia I did order blood cultures and started azithromycin and Rocephin.  Patient CT of the abdomen showed a distended bladder with 500 cc of fluids however I suspect this may be related more to constipation and fecal impaction for this reason I did order him a soapsuds enema and hopefully he will be able to urinate.  Regardless at this point patient warrants admission for new oxygen requirement and pneumonia, will be admitted for further evaluation management.  ----      Differential diagnoses considered include but are not limited to: Pneumonia, infection, anemia, malignancy, PE, heart failure, MI, etc.     Social Determinants of Health which Significantly Impact Care: None identified     EKG Independent Interpretation: EKG interpreted by myself. Please see ED Course for full interpretation.    Independent Result Review and Interpretation: Relevant laboratory and radiographic results were reviewed and independently interpreted by myself.  As necessary, they are commented on in the ED Course.    Chronic conditions affecting the patient's care: As documented above in MDM    The patient was discussed with the following consultants/services: Hospitalist/Admitting Provider who accepted the patient for  admission    Care Considerations: As documented above in MDM    ED Course:  ED Course as of 07/02/25 1840 Wed Jul 02, 2025   1057 POCT Glucose, Venous(!!): 44  Ordered repeat poc glucose and amp d50 []   1125 Patient states he took his insulin today but then did not eat anything. 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  The patient was informed of his diagnosis.  The patient was given the opportunity to ask questions and I answered them. The patient agreed to be admitted to the hospital.    Procedures   Procedures    This was a shared visit with an ED attending.  The patient was seen and discussed with the ED attending    Odalys Thomas PA-C  Emergency Medicine    ===============================  ATTENDING ATTESTATION:    This patient was seen by the advanced practice provider.  I have personally performed a substantive portion of the encounter.  I have seen and examined the patient; agree with the workup, evaluation, MDM, management and diagnosis.  The care plan has been discussed.      I personally saw the patient and made/approved the management plan.    History of Present Illness  This is a patient with a history of high blood pressure, high cholesterol, and diabetes presenting with generalized weakness. The patient arrived on foot.    The patient reports experiencing generalized weakness, which has made it difficult to walk even with the aid of a cane. They describe a sensation of falling forward when they attempt to stand upright. They have been diagnosed with an inguinal hernia, which they believe is contributing to their forward-leaning posture due to the associated  pain. The pain intensifies when they stand, causing them to lean forward.  They have had this issue for a couple of weeks.  They report no nausea, vomiting, diarrhea, fevers, or chills. They have an appointment with a surgeon scheduled for next Tuesday.    Over the past two days, they have noticed a significant increase in shortness of breath, but they report no chest pain. They also mention difficulty in expectorating mucus. They have no history of lung conditions such as COPD, emphysema, or asthma. The patient smokes cigarettes, although not heavily.    Patient did have some recent unintentional weight loss, but it has leveled off and he is not losing weight currently.  He states he has been eating well.    SOCIAL HISTORY  The patient still smokes, but not a lot.       Physical Exam  General Appearance: Appears well, alert  Vital signs: Within normal limits  HEENT: Head atraumatic, Eyes normal inspection, Normal ENT inspection  Respiratory: Coarse breath sounds noted on the left side during exhalation  Cardiovascular: Heart rate and rhythm regular, no murmurs  Gastrointestinal: Mild pain elicited upon palpation of the upper abdomen. Fullness noted in the left lower abdomen when I have him cough, but no signs of incarceration or entrapment.  Hernia easily reduces.  Back, Musculoskeletal: Normal  Extremities: Non-tender, Full ROM, Normal appearance  Skin: Warm and dry, no rash  Neurological: Normal  Psychiatric: Normal     EKG: Normal sinus rhythm with a ventricular rate of 63.  .  QTc fonder 31.  No ST changes.    No acidosis.  CO2 is actually somewhat low.  Lactic acid is normal.  No leukocytosis.  No significant anemia.  TSH is elevated and free thyroxine is normal.  Magnesium is normal.  Glucose was low.  Patient is given dextrose.  Potassium slightly low.  Bicarb slightly low.  There could be a dehydration component of this.  He is given fluids.  COVID and flu are negative.    X-ray shows atelectasis in  the left lung base.  ===============================         Odalys Thomas PA-C  07/02/25 [id number]

## 2025-07-03 ENCOUNTER — APPOINTMENT (OUTPATIENT)
Dept: RADIOLOGY | Facility: HOSPITAL | Age: 75
DRG: 208 | End: 2025-07-03
Payer: MEDICARE

## 2025-07-03 LAB
ALBUMIN SERPL BCP-MCNC: 3.4 G/DL (ref 3.4–5)
ALP SERPL-CCNC: 68 U/L (ref 33–136)
ALT SERPL W P-5'-P-CCNC: 15 U/L (ref 10–52)
ANION GAP SERPL CALC-SCNC: 13 MMOL/L (ref 10–20)
AST SERPL W P-5'-P-CCNC: 7 U/L (ref 9–39)
BACTERIA UR CULT: NORMAL
BASOPHILS # BLD AUTO: 0.03 X10*3/UL (ref 0–0.1)
BASOPHILS NFR BLD AUTO: 0.4 %
BILIRUB SERPL-MCNC: 0.4 MG/DL (ref 0–1.2)
BUN SERPL-MCNC: 28 MG/DL (ref 6–23)
CALCIUM SERPL-MCNC: 9 MG/DL (ref 8.6–10.3)
CHLORIDE SERPL-SCNC: 102 MMOL/L (ref 98–107)
CO2 SERPL-SCNC: 18 MMOL/L (ref 21–32)
CREAT SERPL-MCNC: 1.01 MG/DL (ref 0.5–1.3)
EGFRCR SERPLBLD CKD-EPI 2021: 79 ML/MIN/1.73M*2
EOSINOPHIL # BLD AUTO: 0.12 X10*3/UL (ref 0–0.4)
EOSINOPHIL NFR BLD AUTO: 1.7 %
ERYTHROCYTE [DISTWIDTH] IN BLOOD BY AUTOMATED COUNT: 13 % (ref 11.5–14.5)
GLUCOSE BLD MANUAL STRIP-MCNC: 170 MG/DL (ref 74–99)
GLUCOSE BLD MANUAL STRIP-MCNC: 212 MG/DL (ref 74–99)
GLUCOSE BLD MANUAL STRIP-MCNC: 225 MG/DL (ref 74–99)
GLUCOSE BLD MANUAL STRIP-MCNC: 299 MG/DL (ref 74–99)
GLUCOSE BLD MANUAL STRIP-MCNC: 322 MG/DL (ref 74–99)
GLUCOSE BLD MANUAL STRIP-MCNC: 391 MG/DL (ref 74–99)
GLUCOSE SERPL-MCNC: 328 MG/DL (ref 74–99)
HCT VFR BLD AUTO: 35.3 % (ref 41–52)
HGB BLD-MCNC: 12.4 G/DL (ref 13.5–17.5)
HOLD SPECIMEN: NORMAL
IMM GRANULOCYTES # BLD AUTO: 0.05 X10*3/UL (ref 0–0.5)
IMM GRANULOCYTES NFR BLD AUTO: 0.7 % (ref 0–0.9)
LEGIONELLA AG UR QL: NEGATIVE
LIPASE SERPL-CCNC: 3 U/L (ref 9–82)
LYMPHOCYTES # BLD AUTO: 1.18 X10*3/UL (ref 0.8–3)
LYMPHOCYTES NFR BLD AUTO: 16.3 %
MAGNESIUM SERPL-MCNC: 1.76 MG/DL (ref 1.6–2.4)
MCH RBC QN AUTO: 35.1 PG (ref 26–34)
MCHC RBC AUTO-ENTMCNC: 35.1 G/DL (ref 32–36)
MCV RBC AUTO: 100 FL (ref 80–100)
MONOCYTES # BLD AUTO: 0.63 X10*3/UL (ref 0.05–0.8)
MONOCYTES NFR BLD AUTO: 8.7 %
NEUTROPHILS # BLD AUTO: 5.25 X10*3/UL (ref 1.6–5.5)
NEUTROPHILS NFR BLD AUTO: 72.2 %
NRBC BLD-RTO: 0 /100 WBCS (ref 0–0)
PLATELET # BLD AUTO: 164 X10*3/UL (ref 150–450)
POTASSIUM SERPL-SCNC: 3.8 MMOL/L (ref 3.5–5.3)
PROCALCITONIN SERPL-MCNC: 0.37 NG/ML
PROT SERPL-MCNC: 6.3 G/DL (ref 6.4–8.2)
RBC # BLD AUTO: 3.53 X10*6/UL (ref 4.5–5.9)
S PNEUM AG UR QL: POSITIVE
SODIUM SERPL-SCNC: 129 MMOL/L (ref 136–145)
WBC # BLD AUTO: 7.3 X10*3/UL (ref 4.4–11.3)

## 2025-07-03 PROCEDURE — 96366 THER/PROPH/DIAG IV INF ADDON: CPT

## 2025-07-03 PROCEDURE — 96372 THER/PROPH/DIAG INJ SC/IM: CPT

## 2025-07-03 PROCEDURE — 2550000001 HC RX 255 CONTRASTS

## 2025-07-03 PROCEDURE — 2500000002 HC RX 250 W HCPCS SELF ADMINISTERED DRUGS (ALT 637 FOR MEDICARE OP, ALT 636 FOR OP/ED)

## 2025-07-03 PROCEDURE — 2500000004 HC RX 250 GENERAL PHARMACY W/ HCPCS (ALT 636 FOR OP/ED)

## 2025-07-03 PROCEDURE — 84075 ASSAY ALKALINE PHOSPHATASE: CPT

## 2025-07-03 PROCEDURE — 2500000001 HC RX 250 WO HCPCS SELF ADMINISTERED DRUGS (ALT 637 FOR MEDICARE OP): Performed by: STUDENT IN AN ORGANIZED HEALTH CARE EDUCATION/TRAINING PROGRAM

## 2025-07-03 PROCEDURE — 82947 ASSAY GLUCOSE BLOOD QUANT: CPT

## 2025-07-03 PROCEDURE — 85025 COMPLETE CBC W/AUTO DIFF WBC: CPT

## 2025-07-03 PROCEDURE — 2500000005 HC RX 250 GENERAL PHARMACY W/O HCPCS

## 2025-07-03 PROCEDURE — 96376 TX/PRO/DX INJ SAME DRUG ADON: CPT

## 2025-07-03 PROCEDURE — A9575 INJ GADOTERATE MEGLUMI 0.1ML: HCPCS

## 2025-07-03 PROCEDURE — 76376 3D RENDER W/INTRP POSTPROCES: CPT | Performed by: RADIOLOGY

## 2025-07-03 PROCEDURE — 83735 ASSAY OF MAGNESIUM: CPT

## 2025-07-03 PROCEDURE — 84145 PROCALCITONIN (PCT): CPT | Mod: STJLAB

## 2025-07-03 PROCEDURE — 2500000001 HC RX 250 WO HCPCS SELF ADMINISTERED DRUGS (ALT 637 FOR MEDICARE OP)

## 2025-07-03 PROCEDURE — 96361 HYDRATE IV INFUSION ADD-ON: CPT

## 2025-07-03 PROCEDURE — 2500000004 HC RX 250 GENERAL PHARMACY W/ HCPCS (ALT 636 FOR OP/ED): Mod: JZ | Performed by: STUDENT IN AN ORGANIZED HEALTH CARE EDUCATION/TRAINING PROGRAM

## 2025-07-03 PROCEDURE — 96374 THER/PROPH/DIAG INJ IV PUSH: CPT | Mod: 59

## 2025-07-03 PROCEDURE — 36415 COLL VENOUS BLD VENIPUNCTURE: CPT

## 2025-07-03 PROCEDURE — 74183 MRI ABD W/O CNTR FLWD CNTR: CPT | Performed by: RADIOLOGY

## 2025-07-03 PROCEDURE — 99233 SBSQ HOSP IP/OBS HIGH 50: CPT

## 2025-07-03 PROCEDURE — 94640 AIRWAY INHALATION TREATMENT: CPT

## 2025-07-03 PROCEDURE — 83690 ASSAY OF LIPASE: CPT | Performed by: NURSE PRACTITIONER

## 2025-07-03 PROCEDURE — 74183 MRI ABD W/O CNTR FLWD CNTR: CPT

## 2025-07-03 PROCEDURE — 94664 DEMO&/EVAL PT USE INHALER: CPT

## 2025-07-03 PROCEDURE — 99221 1ST HOSP IP/OBS SF/LOW 40: CPT | Performed by: NURSE PRACTITIONER

## 2025-07-03 PROCEDURE — 1200000002 HC GENERAL ROOM WITH TELEMETRY DAILY

## 2025-07-03 RX ORDER — INSULIN GLARGINE 100 [IU]/ML
10 INJECTION, SOLUTION SUBCUTANEOUS EVERY 24 HOURS
Status: DISPENSED | OUTPATIENT
Start: 2025-07-03

## 2025-07-03 RX ORDER — SODIUM CHLORIDE 9 MG/ML
100 INJECTION, SOLUTION INTRAVENOUS CONTINUOUS
Status: ACTIVE | OUTPATIENT
Start: 2025-07-03 | End: 2025-07-03

## 2025-07-03 RX ORDER — ACETAMINOPHEN 500 MG
10 TABLET ORAL DAILY
Status: DISCONTINUED | OUTPATIENT
Start: 2025-07-03 | End: 2025-07-06

## 2025-07-03 RX ORDER — SODIUM CHLORIDE, SODIUM LACTATE, POTASSIUM CHLORIDE, CALCIUM CHLORIDE 600; 310; 30; 20 MG/100ML; MG/100ML; MG/100ML; MG/100ML
100 INJECTION, SOLUTION INTRAVENOUS CONTINUOUS
Status: ACTIVE | OUTPATIENT
Start: 2025-07-03 | End: 2025-07-04

## 2025-07-03 RX ORDER — LIDOCAINE 560 MG/1
1 PATCH PERCUTANEOUS; TOPICAL; TRANSDERMAL DAILY
Status: DISPENSED | OUTPATIENT
Start: 2025-07-03

## 2025-07-03 RX ORDER — IPRATROPIUM BROMIDE AND ALBUTEROL SULFATE 2.5; .5 MG/3ML; MG/3ML
3 SOLUTION RESPIRATORY (INHALATION) 4 TIMES DAILY
Status: DISCONTINUED | OUTPATIENT
Start: 2025-07-04 | End: 2025-07-04

## 2025-07-03 RX ORDER — GADOTERATE MEGLUMINE 376.9 MG/ML
11 INJECTION INTRAVENOUS
Status: COMPLETED | OUTPATIENT
Start: 2025-07-03 | End: 2025-07-03

## 2025-07-03 RX ADMIN — METOPROLOL TARTRATE 25 MG: 25 TABLET, FILM COATED ORAL at 08:46

## 2025-07-03 RX ADMIN — AZITHROMYCIN DIHYDRATE 500 MG: 500 TABLET ORAL at 12:46

## 2025-07-03 RX ADMIN — POLYETHYLENE GLYCOL 3350 17 G: 17 POWDER, FOR SOLUTION ORAL at 08:46

## 2025-07-03 RX ADMIN — ENOXAPARIN SODIUM 40 MG: 100 INJECTION SUBCUTANEOUS at 16:01

## 2025-07-03 RX ADMIN — INSULIN LISPRO 10 UNITS: 100 INJECTION, SOLUTION INTRAVENOUS; SUBCUTANEOUS at 09:53

## 2025-07-03 RX ADMIN — GADOTERATE MEGLUMINE 11 ML: 376.9 INJECTION INTRAVENOUS at 15:15

## 2025-07-03 RX ADMIN — METOPROLOL TARTRATE 25 MG: 25 TABLET, FILM COATED ORAL at 20:59

## 2025-07-03 RX ADMIN — HYDROMORPHONE HYDROCHLORIDE 0.5 MG: 1 INJECTION, SOLUTION INTRAMUSCULAR; INTRAVENOUS; SUBCUTANEOUS at 01:30

## 2025-07-03 RX ADMIN — IPRATROPIUM BROMIDE AND ALBUTEROL SULFATE 3 ML: 2.5; .5 SOLUTION RESPIRATORY (INHALATION) at 13:17

## 2025-07-03 RX ADMIN — INSULIN LISPRO 2 UNITS: 100 INJECTION, SOLUTION INTRAVENOUS; SUBCUTANEOUS at 12:47

## 2025-07-03 RX ADMIN — VITAM B12 100 MCG: 100 TAB at 09:25

## 2025-07-03 RX ADMIN — INSULIN GLARGINE 10 UNITS: 100 INJECTION, SOLUTION SUBCUTANEOUS at 20:59

## 2025-07-03 RX ADMIN — LIDOCAINE 4% 1 PATCH: 40 PATCH TOPICAL at 16:44

## 2025-07-03 RX ADMIN — DOCUSATE SODIUM 100 MG: 100 CAPSULE, LIQUID FILLED ORAL at 08:46

## 2025-07-03 RX ADMIN — TAMSULOSIN HYDROCHLORIDE 0.4 MG: 0.4 CAPSULE ORAL at 08:46

## 2025-07-03 RX ADMIN — AMLODIPINE BESYLATE 10 MG: 10 TABLET ORAL at 06:07

## 2025-07-03 RX ADMIN — SODIUM CHLORIDE, POTASSIUM CHLORIDE, SODIUM LACTATE AND CALCIUM CHLORIDE 100 ML/HR: 600; 310; 30; 20 INJECTION, SOLUTION INTRAVENOUS at 16:44

## 2025-07-03 RX ADMIN — HYDROMORPHONE HYDROCHLORIDE 0.5 MG: 1 INJECTION, SOLUTION INTRAMUSCULAR; INTRAVENOUS; SUBCUTANEOUS at 14:29

## 2025-07-03 RX ADMIN — IPRATROPIUM BROMIDE AND ALBUTEROL SULFATE 3 ML: 2.5; .5 SOLUTION RESPIRATORY (INHALATION) at 20:09

## 2025-07-03 RX ADMIN — CEFTRIAXONE 1 G: 1 INJECTION, SOLUTION INTRAVENOUS at 12:46

## 2025-07-03 RX ADMIN — Medication 4 L/MIN: at 00:26

## 2025-07-03 RX ADMIN — INSULIN LISPRO 4 UNITS: 100 INJECTION, SOLUTION INTRAVENOUS; SUBCUTANEOUS at 17:49

## 2025-07-03 RX ADMIN — LOSARTAN POTASSIUM 100 MG: 100 TABLET, FILM COATED ORAL at 08:46

## 2025-07-03 RX ADMIN — SODIUM CHLORIDE 100 ML/HR: 0.9 INJECTION, SOLUTION INTRAVENOUS at 09:25

## 2025-07-03 RX ADMIN — Medication 10 MG: at 21:26

## 2025-07-03 RX ADMIN — IPRATROPIUM BROMIDE AND ALBUTEROL SULFATE 3 ML: 2.5; .5 SOLUTION RESPIRATORY (INHALATION) at 00:26

## 2025-07-03 RX ADMIN — DOCUSATE SODIUM 100 MG: 100 CAPSULE, LIQUID FILLED ORAL at 20:59

## 2025-07-03 SDOH — ECONOMIC STABILITY: FOOD INSECURITY: WITHIN THE PAST 12 MONTHS, THE FOOD YOU BOUGHT JUST DIDN'T LAST AND YOU DIDN'T HAVE MONEY TO GET MORE.: NEVER TRUE

## 2025-07-03 SDOH — SOCIAL STABILITY: SOCIAL INSECURITY: ARE YOU OR HAVE YOU BEEN THREATENED OR ABUSED PHYSICALLY, EMOTIONALLY, OR SEXUALLY BY ANYONE?: NO

## 2025-07-03 SDOH — ECONOMIC STABILITY: HOUSING INSECURITY: IN THE LAST 12 MONTHS, WAS THERE A TIME WHEN YOU WERE NOT ABLE TO PAY THE MORTGAGE OR RENT ON TIME?: NO

## 2025-07-03 SDOH — ECONOMIC STABILITY: FOOD INSECURITY: HOW HARD IS IT FOR YOU TO PAY FOR THE VERY BASICS LIKE FOOD, HOUSING, MEDICAL CARE, AND HEATING?: NOT VERY HARD

## 2025-07-03 SDOH — SOCIAL STABILITY: SOCIAL INSECURITY
WITHIN THE LAST YEAR, HAVE YOU BEEN KICKED, HIT, SLAPPED, OR OTHERWISE PHYSICALLY HURT BY YOUR PARTNER OR EX-PARTNER?: NO

## 2025-07-03 SDOH — SOCIAL STABILITY: SOCIAL INSECURITY: WITHIN THE LAST YEAR, HAVE YOU BEEN HUMILIATED OR EMOTIONALLY ABUSED IN OTHER WAYS BY YOUR PARTNER OR EX-PARTNER?: NO

## 2025-07-03 SDOH — SOCIAL STABILITY: SOCIAL INSECURITY
WITHIN THE LAST YEAR, HAVE YOU BEEN RAPED OR FORCED TO HAVE ANY KIND OF SEXUAL ACTIVITY BY YOUR PARTNER OR EX-PARTNER?: NO

## 2025-07-03 SDOH — ECONOMIC STABILITY: HOUSING INSECURITY: AT ANY TIME IN THE PAST 12 MONTHS, WERE YOU HOMELESS OR LIVING IN A SHELTER (INCLUDING NOW)?: NO

## 2025-07-03 SDOH — SOCIAL STABILITY: SOCIAL INSECURITY: DO YOU FEEL UNSAFE GOING BACK TO THE PLACE WHERE YOU ARE LIVING?: NO

## 2025-07-03 SDOH — ECONOMIC STABILITY: FOOD INSECURITY: WITHIN THE PAST 12 MONTHS, YOU WORRIED THAT YOUR FOOD WOULD RUN OUT BEFORE YOU GOT THE MONEY TO BUY MORE.: NEVER TRUE

## 2025-07-03 SDOH — ECONOMIC STABILITY: INCOME INSECURITY: IN THE PAST 12 MONTHS HAS THE ELECTRIC, GAS, OIL, OR WATER COMPANY THREATENED TO SHUT OFF SERVICES IN YOUR HOME?: NO

## 2025-07-03 SDOH — ECONOMIC STABILITY: HOUSING INSECURITY: IN THE PAST 12 MONTHS, HOW MANY TIMES HAVE YOU MOVED WHERE YOU WERE LIVING?: 0

## 2025-07-03 SDOH — SOCIAL STABILITY: SOCIAL INSECURITY: HAVE YOU HAD THOUGHTS OF HARMING ANYONE ELSE?: NO

## 2025-07-03 SDOH — SOCIAL STABILITY: SOCIAL INSECURITY: WITHIN THE LAST YEAR, HAVE YOU BEEN AFRAID OF YOUR PARTNER OR EX-PARTNER?: NO

## 2025-07-03 SDOH — SOCIAL STABILITY: SOCIAL INSECURITY: HAVE YOU HAD ANY THOUGHTS OF HARMING ANYONE ELSE?: NO

## 2025-07-03 SDOH — SOCIAL STABILITY: SOCIAL INSECURITY: ARE THERE ANY APPARENT SIGNS OF INJURIES/BEHAVIORS THAT COULD BE RELATED TO ABUSE/NEGLECT?: NO

## 2025-07-03 SDOH — SOCIAL STABILITY: SOCIAL INSECURITY: HAS ANYONE EVER THREATENED TO HURT YOUR FAMILY OR YOUR PETS?: NO

## 2025-07-03 SDOH — SOCIAL STABILITY: SOCIAL INSECURITY: ABUSE: ADULT

## 2025-07-03 SDOH — SOCIAL STABILITY: SOCIAL INSECURITY: DO YOU FEEL ANYONE HAS EXPLOITED OR TAKEN ADVANTAGE OF YOU FINANCIALLY OR OF YOUR PERSONAL PROPERTY?: NO

## 2025-07-03 SDOH — ECONOMIC STABILITY: TRANSPORTATION INSECURITY: IN THE PAST 12 MONTHS, HAS LACK OF TRANSPORTATION KEPT YOU FROM MEDICAL APPOINTMENTS OR FROM GETTING MEDICATIONS?: NO

## 2025-07-03 SDOH — SOCIAL STABILITY: SOCIAL INSECURITY: DOES ANYONE TRY TO KEEP YOU FROM HAVING/CONTACTING OTHER FRIENDS OR DOING THINGS OUTSIDE YOUR HOME?: NO

## 2025-07-03 ASSESSMENT — PAIN SCALES - GENERAL
PAINLEVEL_OUTOF10: 0 - NO PAIN
PAINLEVEL_OUTOF10: 8
PAINLEVEL_OUTOF10: 8
PAINLEVEL_OUTOF10: 3
PAINLEVEL_OUTOF10: 0 - NO PAIN
PAINLEVEL_OUTOF10: 3

## 2025-07-03 ASSESSMENT — COGNITIVE AND FUNCTIONAL STATUS - GENERAL
HELP NEEDED FOR BATHING: A LITTLE
MOBILITY SCORE: 17
DRESSING REGULAR UPPER BODY CLOTHING: A LITTLE
STANDING UP FROM CHAIR USING ARMS: A LITTLE
DRESSING REGULAR LOWER BODY CLOTHING: A LITTLE
MOVING FROM LYING ON BACK TO SITTING ON SIDE OF FLAT BED WITH BEDRAILS: A LITTLE
MOVING TO AND FROM BED TO CHAIR: A LITTLE
DAILY ACTIVITIY SCORE: 18
PERSONAL GROOMING: A LITTLE
STANDING UP FROM CHAIR USING ARMS: A LITTLE
TOILETING: A LOT
HELP NEEDED FOR BATHING: A LITTLE
HELP NEEDED FOR BATHING: A LITTLE
WALKING IN HOSPITAL ROOM: A LITTLE
TURNING FROM BACK TO SIDE WHILE IN FLAT BAD: A LITTLE
WALKING IN HOSPITAL ROOM: A LOT
DRESSING REGULAR LOWER BODY CLOTHING: A LITTLE
MOBILITY SCORE: 17
CLIMB 3 TO 5 STEPS WITH RAILING: A LOT
DAILY ACTIVITIY SCORE: 18
DRESSING REGULAR UPPER BODY CLOTHING: A LITTLE
CLIMB 3 TO 5 STEPS WITH RAILING: A LOT
DRESSING REGULAR UPPER BODY CLOTHING: A LITTLE
WALKING IN HOSPITAL ROOM: A LITTLE
MOBILITY SCORE: 15
MOVING FROM LYING ON BACK TO SITTING ON SIDE OF FLAT BED WITH BEDRAILS: A LITTLE
PERSONAL GROOMING: A LITTLE
MOVING FROM LYING ON BACK TO SITTING ON SIDE OF FLAT BED WITH BEDRAILS: A LITTLE
TOILETING: A LOT
STANDING UP FROM CHAIR USING ARMS: A LOT
MOVING TO AND FROM BED TO CHAIR: A LITTLE
PATIENT BASELINE BEDBOUND: NO
CLIMB 3 TO 5 STEPS WITH RAILING: A LOT
DRESSING REGULAR LOWER BODY CLOTHING: A LITTLE
MOVING TO AND FROM BED TO CHAIR: A LITTLE
TURNING FROM BACK TO SIDE WHILE IN FLAT BAD: A LITTLE
PERSONAL GROOMING: A LITTLE
TURNING FROM BACK TO SIDE WHILE IN FLAT BAD: A LITTLE
EATING MEALS: A LITTLE
DAILY ACTIVITIY SCORE: 17
TOILETING: A LOT

## 2025-07-03 ASSESSMENT — PAIN - FUNCTIONAL ASSESSMENT
PAIN_FUNCTIONAL_ASSESSMENT: 0-10

## 2025-07-03 ASSESSMENT — LIFESTYLE VARIABLES
HOW MANY STANDARD DRINKS CONTAINING ALCOHOL DO YOU HAVE ON A TYPICAL DAY: PATIENT DOES NOT DRINK
HOW OFTEN DO YOU HAVE A DRINK CONTAINING ALCOHOL: NEVER
AUDIT-C TOTAL SCORE: 0
HOW OFTEN DO YOU HAVE 6 OR MORE DRINKS ON ONE OCCASION: NEVER
AUDIT-C TOTAL SCORE: 0
SKIP TO QUESTIONS 9-10: 1

## 2025-07-03 ASSESSMENT — PATIENT HEALTH QUESTIONNAIRE - PHQ9
SUM OF ALL RESPONSES TO PHQ9 QUESTIONS 1 & 2: 0
1. LITTLE INTEREST OR PLEASURE IN DOING THINGS: NOT AT ALL
2. FEELING DOWN, DEPRESSED OR HOPELESS: NOT AT ALL

## 2025-07-03 ASSESSMENT — ACTIVITIES OF DAILY LIVING (ADL)
LACK_OF_TRANSPORTATION: NO
GROOMING: INDEPENDENT
WALKS IN HOME: NEEDS ASSISTANCE
BATHING: INDEPENDENT
TOILETING: NEEDS ASSISTANCE
ADEQUATE_TO_COMPLETE_ADL: YES
DRESSING YOURSELF: INDEPENDENT
FEEDING YOURSELF: INDEPENDENT
HEARING - RIGHT EAR: FUNCTIONAL
HEARING - LEFT EAR: FUNCTIONAL
JUDGMENT_ADEQUATE_SAFELY_COMPLETE_DAILY_ACTIVITIES: YES
PATIENT'S MEMORY ADEQUATE TO SAFELY COMPLETE DAILY ACTIVITIES?: YES

## 2025-07-03 ASSESSMENT — PAIN DESCRIPTION - LOCATION
LOCATION: BACK
LOCATION: BACK

## 2025-07-03 ASSESSMENT — PAIN DESCRIPTION - ORIENTATION
ORIENTATION: RIGHT
ORIENTATION: MID

## 2025-07-03 ASSESSMENT — PAIN DESCRIPTION - DESCRIPTORS
DESCRIPTORS: ACHING
DESCRIPTORS: ACHING

## 2025-07-03 NOTE — CARE PLAN
"The patient's goals for the shift include \"to feel better.\"    The clinical goals for the shift include Patient will verbalize a decrease in SOB by end of shift.    Patient admitted this afternoon, IV fluids ordered and tele in place.  "

## 2025-07-03 NOTE — CONSULTS
"Reason for consult  Concern for pancreatic neoplasm on CT    HPI  Jorge A Madden is a 75 y.o. male with PMH of T2DM, HTN, HLD presenting from home with progressive weakness, decreased oral intake, increasing SOB as well as reported weight loss though unable to quantify.  He states that he started using a cane at home over the last couple of weeks.  This has progressed to him having much difficulty getting around at home. He endorses left groin discomfort that he attributes to a hernia.  He states that 2 days ago, he began to have significant difficulty breathing prompting him to come to the hospital. He was given 2 breathing treatments with some improvement.  He endorses moist nonproductive cough.  He states that he quit smoking heavily a year ago and only smokes 1-2 of his wife's cigarette butts per day.  He quit heavy alcohol use when he was hospitalized for COVID a few years ago, stating that it took a big toll on him.  fever, chills, lightheadedness, N/V/D, hematochezia or melena.  He has never had an EGD or colonoscopy.  He does not recall having pancreatitis in the past.    CT chest PE protocol notes extensive pancreatic calcifications, PD dilation and beading as well as multiple cystic lesions in the pancreas prompting GI consult for further evaluation for neoplasm.     PMH  T2DM, HTN, HLD    PSH  He has a past surgical history that includes Other surgical history (11/14/2019).    Family  Family History[1]    Social  He reports that he has been smoking cigarettes. He has a 30 pack-year smoking history. He has never used smokeless tobacco. He reports that he does not drink alcohol and does not use drugs.      Review of Systems  ROS negative unless stated otherwise in HPI     Objective  /86 (BP Location: Right arm, Patient Position: Lying)   Pulse 59   Temp 36.1 °C (97 °F) (Temporal)   Resp 16   Ht 1.778 m (5' 10\")   Wt 56.7 kg (125 lb)   SpO2 96%   BMI 17.94 kg/m²     Physical " "Exam  Constitutional: Alert, pleasant and interactive thin male, in NAD  Eyes: PERRL, sclera clear, no conjunctival injection  Skin: Warm and dry, no rash or ecchymosis  ENMT: Mucous membranes moist, no lesions noted  Resp: CTAB, even and unlabored, moist nonproductive cough  CV: RRR, normal S1, S2, no m,r,g  GI: +BS, soft, round, NT, no rebound tenderness or guarding, no palpable masses or organomegaly  Extremities: Extremities warm, no edema, contusions, wounds or cyanosis  Neuro: Alert and oriented x3  Psych: Appropriate mood and behavior    Medications  Scheduled medications  Scheduled Medications[2]  Continuous medications  Continuous Medications[3]  PRN medications  PRN Medications[4]     Labs  Lab Results   Component Value Date    WBC 7.3 07/03/2025    HGB 12.4 (L) 07/03/2025    HCT 35.3 (L) 07/03/2025     07/03/2025     07/03/2025     Lab Results   Component Value Date    GLUCOSE 328 (H) 07/03/2025    CALCIUM 9.0 07/03/2025     (L) 07/03/2025    K 3.8 07/03/2025    CO2 18 (L) 07/03/2025     07/03/2025    BUN 28 (H) 07/03/2025    CREATININE 1.01 07/03/2025     Lab Results   Component Value Date    ALT 15 07/03/2025    AST 7 (L) 07/03/2025    ALKPHOS 68 07/03/2025    BILITOT 0.4 07/03/2025     Lab Results   Component Value Date    IRON 55 11/03/2023    TIBC 165 (L) 11/03/2023    FERRITIN 831 (H) 09/13/2022     No results found for: \"INR\", \"PROTIME\"    Radiology  CT angio chest for PE 7/2/25 noting:  Impression:     CHEST:  1.  No pulmonary embolism.  2. Bibasilar pneumonia with dense consolidation particularly in the  left lower lobe.      ABDOMEN AND PELVIS:  1.  Extensive pancreatic calcifications with pancreatic ductal  dilatation and beading consistent with chronic pancreatitis.  2. Multiple small cystic lesions in the pancreas which could  represent small pseudocysts, small cystic neoplasms or intraductal  mucinous papillary neoplasms.  3. Bilateral nonobstructing intrarenal " calculi.  4. 1.4 cm Bosniak 2 left renal cyst and simple left renal cyst.  5. Distended urinary bladder with a volume of 501 cc.  6. Marked constipation and possible fecal impaction.    Signed by: Erin Moreira 7/2/2025 12:29 PM  Dictation workstation:   [id number]     CT A/P with IV contrast 7/2/2025 noting:  Impression:     CHEST:  1.  No pulmonary embolism.  2. Bibasilar pneumonia with dense consolidation particularly in the  left lower lobe.      ABDOMEN AND PELVIS:  1.  Extensive pancreatic calcifications with pancreatic ductal  dilatation and beading consistent with chronic pancreatitis.  2. Multiple small cystic lesions in the pancreas which could  represent small pseudocysts, small cystic neoplasms or intraductal  mucinous papillary neoplasms.  3. Bilateral nonobstructing intrarenal calculi.  4. 1.4 cm Bosniak 2 left renal cyst and simple left renal cyst.  5. Distended urinary bladder with a volume of 501 cc.  6. Marked constipation and possible fecal impaction.    Signed by: Erin Moreira 7/2/2025 12:29 PM  Dictation workstation:   [id number]     Assessment:  Jorge A Madden is a 75 y.o. male with PMH of HTN, HLD, T2DM presenting from home with progressive weakness, increasing SOB, weight loss, mild left inguinal discomfort he attributes to a hernia.  He denies previous pancreatitis.  Imaging consistent with chronic pancreatitis with multiple small cystic lesions with concern for malignancy prompting GI consult.  Patient with no leukocytosis.  No significant liver enzyme abnormality.  No lipase obtained.  Patient also noted to have pneumonia.    Plan:  - continue supportive care  - keep NPO for  MRCP  - follow up lipase  - continue analgesics and antiemetics as needed  - continue antibiotics per primary team/ID recs    Plan has been discussed with Dr. Nolen. GI will continue to follow.     Enedina Broussard, APRN/CNP           [1]   Family History  Problem Relation Name Age of Onset    Prostate cancer  Brother     [2] amLODIPine, 10 mg, oral, Daily  azithromycin, 500 mg, oral, q24h  cefTRIAXone, 1 g, intravenous, q24h  cyanocobalamin, 100 mcg, oral, Daily  docusate sodium, 100 mg, oral, BID  enoxaparin, 40 mg, subcutaneous, q24h  insulin lispro, 0-10 Units, subcutaneous, TID AC  ipratropium-albuteroL, 3 mL, nebulization, q6h  losartan, 100 mg, oral, Daily  metoprolol tartrate, 25 mg, oral, BID  oxygen, , inhalation, Continuous - Inhalation  polyethylene glycol, 17 g, oral, Daily  tamsulosin, 0.4 mg, oral, Daily  [3] lactated Ringer's, 75 mL/hr, Last Rate: 75 mL/hr (07/02/25 2205)  [4] PRN medications: dextrose, dextrose, glucagon, glucagon, HYDROmorphone, oxygen, oxygen

## 2025-07-03 NOTE — PROGRESS NOTES
Jorge A Madden is a 75 y.o. male on day 0 of admission presenting with Pneumonia of left lower lobe due to infectious organism.      Subjective   Seen and examined at bedside. SARAH. Has no new complaints. Had a BM overnight, c/o lower back pain, still not having much of an appetite. No fever, chills, chest pain or SOB.       Objective     Last Recorded Vitals  /88 (BP Location: Right arm, Patient Position: Lying)   Pulse 67   Temp 36.4 °C (97.5 °F) (Temporal)   Resp 18   Wt 56.7 kg (125 lb)   SpO2 99%   Intake/Output last 3 Shifts:    Intake/Output Summary (Last 24 hours) at 7/3/2025 1548  Last data filed at 7/3/2025 1434  Gross per 24 hour   Intake 1921.25 ml   Output 1775 ml   Net 146.25 ml       Admission Weight  Weight: 56.7 kg (125 lb) (07/02/25 1006)    Daily Weight  07/02/25 : 56.7 kg (125 lb)    Image Results  CT angio chest for pulmonary embolism, CT abdomen pelvis w IV contrast  Narrative: Interpreted By:  Erin Moreira,   STUDY:  CT ANGIO CHEST FOR PULMONARY EMBOLISM; CT ABDOMEN PELVIS W IV  CONTRAST;  7/2/2025 12:16 pm      INDICATION:  Signs/Symptoms:sob, hypoxia, concern for undiagnosed cancer causing  pe; Signs/Symptoms:abd ttp.      COMPARISON:  None      ACCESSION NUMBER(S):  [id number]; [id number]      ORDERING CLINICIAN:  ADAN CHRISTIAN      TECHNIQUE:  CT of the chest, abdomen, and pelvis was performed.  Contiguous axial images were obtained at 3 mm slice thickness through  the chest, abdomen and pelvis. Coronal and sagittal reconstructions  at 3 mm slice thickness were performed.  75 ML of Omnipaque 350 was administered intravenously without  immediate complication.      FINDINGS:  CHEST:      LUNG/PLEURA/LARGE AIRWAYS:  There is bibasilar pneumonia with dense consolidation in the left  lower lobe that. There is no pleural fluid. There is no pulmonary  nodule.      VESSELS:  There is no contrast within the thoracic aorta to evaluate for  dissection. There is atherosclerotic  calcification of the thoracic  aorta. There is no pulmonary embolism.      HEART:  The heart is unremarkable.      MEDIASTINUM AND TALIB:  There is no hilar or mediastinal adenopathy.      CHEST WALL AND LOWER NECK:  The chest wall is unremarkable. There is no abnormality of the lower  neck.      ABDOMEN:      LIVER:  There is no hepatic mass.      BILE DUCTS:  There is no intrahepatic, common hepatic or common bile ductal  dilatation.      GALLBLADDER:  The gallbladder is unremarkable.      PANCREAS:  There are extensive pancreatic calcifications. There is pancreatic  ductal dilatation and beading. Findings are consistent with chronic  pancreatitis. There are multiple cystic lesions in the pancreas which  could represent cirrhosis, small cystic neoplasms or intraductal  mucinous papillary neoplasms.      SPLEEN:  The spleen is unremarkable. There is no splenic mass. There is no  splenomegaly      ADRENAL GLANDS:  The adrenal glands are unremarkable.      KIDNEYS AND URETERS:.  The kidneys function symmetrically.  There is a 1.4 cm cyst projecting posteriorly off the cortex of the  left kidney with an extremely calcification consistent with a Bosniak  2 renal cyst. There is a small benign simple left renal cortical cyst.  There are bilateral nonobstructing intrarenal calculi.          PELVIS:      BLADDER:  The bladder is distended measuring 13.7 x 9.5 x 7.4 cm with a volume  of 500 mL 11 cc.      REPRODUCTIVE ORGANS:  There is no abnormality of the reproductive organs.      BOWEL:  There is no bowel wall thickening, dilatation or obstruction.  There is large amount of stool throughout the colon particularly in  the rectum-possible fecal impaction.      VESSELS:  There is marked atherosclerotic calcification of the abdominal aorta,  iliac and femoral arteries.      PERITONEUM/RETROPERITONEUM/LYMPH NODES:  There is no retroperitoneal or pelvic adenopathy.  There is no  ascites.      ABDOMINAL WALL:  The abdominal wall  is unremarkable.      BONES AND SOFT TISSUES:  There is no acute osseous finding.      There is no soft tissue abnormality.      Impression: CHEST:  1.  No pulmonary embolism.  2. Bibasilar pneumonia with dense consolidation particularly in the  left lower lobe.      ABDOMEN AND PELVIS:  1.  Extensive pancreatic calcifications with pancreatic ductal  dilatation and beading consistent with chronic pancreatitis.  2. Multiple small cystic lesions in the pancreas which could  represent small pseudocysts, small cystic neoplasms or intraductal  mucinous papillary neoplasms.  3. Bilateral nonobstructing intrarenal calculi.  4. 1.4 cm Bosniak 2 left renal cyst and simple left renal cyst.  5. Distended urinary bladder with a volume of 501 cc.  6. Marked constipation and possible fecal impaction.      MACRO:  None      Signed by: Erin Moreira 7/2/2025 12:29 PM  Dictation workstation:   [id number]  XR chest 1 view  Narrative: Interpreted By:  Shaggy Morris,   STUDY:  XR CHEST 1 VIEW;  7/2/2025 10:48 am      INDICATION:  Signs/Symptoms:sob.      COMPARISON:  CT scan abdomen and pelvis from 12/06/2023.      ACCESSION NUMBER(S):  [id number]      ORDERING CLINICIAN:  ADAN CHRISTIAN      TECHNIQUE:  Single AP portable view of the chest was obtained.      FINDINGS:  MEDIASTINUM/ LUNGS/ TALIB:  No cardiomegaly, vascular congestion, or pleural effusion. Scant  linear opacity horizontally oriented at the left lung base just above  the left diaphragm. Calcifications throughout the thoracic aorta. No  abnormal opacity in either lung worrisome for tumor or pneumonia. No  pneumothorax. No tracheal deviation.  No abnormal hilar fullness or gross mass on either side.      BONES:  No lytic or blastic destructive bone lesion. Mild multilevel mid and  distal thoracic spine endplate osteophytosis. There are also some  syndesmophytes present.      UPPER ABDOMEN:  Grossly intact.      Impression: Scant linear atelectasis versus scarring at the  left lung base.      Arthritic changes in the thoracic spine as described.          MACRO:  None      Signed by: Shaggy Morris 7/2/2025 11:01 AM  Dictation workstation:   [id number]      Physical Exam    Relevant Results                              Assessment & Plan  Pneumonia of left lower lobe due to infectious organism  Admit to inpatient with tele monitoring and cont pulse ox  Continue supplemental oxygen, to wean down as tolerated  Continue IV rocephin  Continue oral Azithromycin  Sputum cultures  Incentive spirometry  Urine legionella/urine strept    75 YOM presenting with complaints of worsening generalized weakness, malaise, anorexia, constipation progressively worsening over the last month, also having subjective fevers and cold sweats. Found to have pneumonia with new O2 requirement, acute urinary retention, and pancreatic lesion, possible cyst but needs further evaluation. He appears cachetic on exam, and with the subjective fever and coldsweats I am concerned for occult malignancy. Plan to treat CAP with rocephin, azithro, GI consult for further evaluation of pancreatic cyst. Inpatient vs outpatient MRCP. Laxatives for constipation, was able to urinate on his own eventually, had 300 ml out.        - strep pneumo urine AG positive, procalcitonin 0.31  - Blood cultures pending  - Rocephin/zithromax started -> DC zithromax, 7 days of rocephin/augmentin   - Maintain SpO2 > 92%  - Pulmonary toillet  - Duonebs q6 prn   - MRCP today per GI  - IVF due to poor PO intake, now has mild metabolic acidosis   - SSI for DM, initially hypoglycemic, but now hyperglycemic  - Start Lantus    IVF: LR  DVT Ppx: Lovenox  Diet: regular  Code Status: FULL CODE    Complexity high, anticipate 1-2 additional midnight stays      Mohamud Rodriguez,

## 2025-07-04 LAB
ALBUMIN SERPL BCP-MCNC: 3.6 G/DL (ref 3.4–5)
ALP SERPL-CCNC: 68 U/L (ref 33–136)
ALT SERPL W P-5'-P-CCNC: 14 U/L (ref 10–52)
ANION GAP SERPL CALC-SCNC: 12 MMOL/L (ref 10–20)
AST SERPL W P-5'-P-CCNC: 8 U/L (ref 9–39)
BASOPHILS # BLD AUTO: 0.04 X10*3/UL (ref 0–0.1)
BASOPHILS NFR BLD AUTO: 0.6 %
BILIRUB SERPL-MCNC: 0.4 MG/DL (ref 0–1.2)
BUN SERPL-MCNC: 21 MG/DL (ref 6–23)
CALCIUM SERPL-MCNC: 9.4 MG/DL (ref 8.6–10.3)
CHLORIDE SERPL-SCNC: 104 MMOL/L (ref 98–107)
CO2 SERPL-SCNC: 23 MMOL/L (ref 21–32)
CREAT SERPL-MCNC: 1.01 MG/DL (ref 0.5–1.3)
EGFRCR SERPLBLD CKD-EPI 2021: 79 ML/MIN/1.73M*2
EOSINOPHIL # BLD AUTO: 0.26 X10*3/UL (ref 0–0.4)
EOSINOPHIL NFR BLD AUTO: 3.7 %
ERYTHROCYTE [DISTWIDTH] IN BLOOD BY AUTOMATED COUNT: 12.8 % (ref 11.5–14.5)
GLUCOSE BLD MANUAL STRIP-MCNC: 187 MG/DL (ref 74–99)
GLUCOSE BLD MANUAL STRIP-MCNC: 210 MG/DL (ref 74–99)
GLUCOSE BLD MANUAL STRIP-MCNC: 291 MG/DL (ref 74–99)
GLUCOSE BLD MANUAL STRIP-MCNC: 306 MG/DL (ref 74–99)
GLUCOSE SERPL-MCNC: 182 MG/DL (ref 74–99)
HCT VFR BLD AUTO: 35.9 % (ref 41–52)
HGB BLD-MCNC: 12.6 G/DL (ref 13.5–17.5)
IMM GRANULOCYTES # BLD AUTO: 0.06 X10*3/UL (ref 0–0.5)
IMM GRANULOCYTES NFR BLD AUTO: 0.9 % (ref 0–0.9)
LYMPHOCYTES # BLD AUTO: 1.57 X10*3/UL (ref 0.8–3)
LYMPHOCYTES NFR BLD AUTO: 22.4 %
MAGNESIUM SERPL-MCNC: 1.85 MG/DL (ref 1.6–2.4)
MCH RBC QN AUTO: 34.6 PG (ref 26–34)
MCHC RBC AUTO-ENTMCNC: 35.1 G/DL (ref 32–36)
MCV RBC AUTO: 99 FL (ref 80–100)
MONOCYTES # BLD AUTO: 0.55 X10*3/UL (ref 0.05–0.8)
MONOCYTES NFR BLD AUTO: 7.8 %
NEUTROPHILS # BLD AUTO: 4.54 X10*3/UL (ref 1.6–5.5)
NEUTROPHILS NFR BLD AUTO: 64.6 %
NRBC BLD-RTO: 0 /100 WBCS (ref 0–0)
PHOSPHATE SERPL-MCNC: 3 MG/DL (ref 2.5–4.9)
PLATELET # BLD AUTO: 180 X10*3/UL (ref 150–450)
POTASSIUM SERPL-SCNC: 3.6 MMOL/L (ref 3.5–5.3)
PROT SERPL-MCNC: 6.5 G/DL (ref 6.4–8.2)
RBC # BLD AUTO: 3.64 X10*6/UL (ref 4.5–5.9)
SODIUM SERPL-SCNC: 135 MMOL/L (ref 136–145)
WBC # BLD AUTO: 7 X10*3/UL (ref 4.4–11.3)

## 2025-07-04 PROCEDURE — 1200000002 HC GENERAL ROOM WITH TELEMETRY DAILY

## 2025-07-04 PROCEDURE — 2500000005 HC RX 250 GENERAL PHARMACY W/O HCPCS

## 2025-07-04 PROCEDURE — 83735 ASSAY OF MAGNESIUM: CPT

## 2025-07-04 PROCEDURE — 2500000001 HC RX 250 WO HCPCS SELF ADMINISTERED DRUGS (ALT 637 FOR MEDICARE OP)

## 2025-07-04 PROCEDURE — 85025 COMPLETE CBC W/AUTO DIFF WBC: CPT

## 2025-07-04 PROCEDURE — 82947 ASSAY GLUCOSE BLOOD QUANT: CPT

## 2025-07-04 PROCEDURE — 2500000002 HC RX 250 W HCPCS SELF ADMINISTERED DRUGS (ALT 637 FOR MEDICARE OP, ALT 636 FOR OP/ED)

## 2025-07-04 PROCEDURE — 2500000004 HC RX 250 GENERAL PHARMACY W/ HCPCS (ALT 636 FOR OP/ED)

## 2025-07-04 PROCEDURE — 99233 SBSQ HOSP IP/OBS HIGH 50: CPT

## 2025-07-04 PROCEDURE — 99232 SBSQ HOSP IP/OBS MODERATE 35: CPT | Performed by: INTERNAL MEDICINE

## 2025-07-04 PROCEDURE — 36415 COLL VENOUS BLD VENIPUNCTURE: CPT

## 2025-07-04 PROCEDURE — 2500000001 HC RX 250 WO HCPCS SELF ADMINISTERED DRUGS (ALT 637 FOR MEDICARE OP): Performed by: STUDENT IN AN ORGANIZED HEALTH CARE EDUCATION/TRAINING PROGRAM

## 2025-07-04 PROCEDURE — 84100 ASSAY OF PHOSPHORUS: CPT

## 2025-07-04 PROCEDURE — 80053 COMPREHEN METABOLIC PANEL: CPT

## 2025-07-04 RX ORDER — IPRATROPIUM BROMIDE AND ALBUTEROL SULFATE 2.5; .5 MG/3ML; MG/3ML
3 SOLUTION RESPIRATORY (INHALATION) AS NEEDED
Status: DISPENSED | OUTPATIENT
Start: 2025-07-04

## 2025-07-04 RX ORDER — LIDOCAINE 560 MG/1
1 PATCH PERCUTANEOUS; TOPICAL; TRANSDERMAL DAILY
Status: DISPENSED | OUTPATIENT
Start: 2025-07-04

## 2025-07-04 RX ORDER — BISACODYL 10 MG/1
10 SUPPOSITORY RECTAL DAILY
Status: DISCONTINUED | OUTPATIENT
Start: 2025-07-04 | End: 2025-07-06

## 2025-07-04 RX ADMIN — LIDOCAINE 4% 1 PATCH: 40 PATCH TOPICAL at 12:30

## 2025-07-04 RX ADMIN — METOPROLOL TARTRATE 25 MG: 25 TABLET, FILM COATED ORAL at 08:59

## 2025-07-04 RX ADMIN — SODIUM CHLORIDE, POTASSIUM CHLORIDE, SODIUM LACTATE AND CALCIUM CHLORIDE 100 ML/HR: 600; 310; 30; 20 INJECTION, SOLUTION INTRAVENOUS at 05:23

## 2025-07-04 RX ADMIN — BISACODYL 10 MG: 10 SUPPOSITORY RECTAL at 12:30

## 2025-07-04 RX ADMIN — AMLODIPINE BESYLATE 10 MG: 10 TABLET ORAL at 09:03

## 2025-07-04 RX ADMIN — Medication 10 MG: at 21:22

## 2025-07-04 RX ADMIN — INSULIN LISPRO 6 UNITS: 100 INJECTION, SOLUTION INTRAVENOUS; SUBCUTANEOUS at 16:54

## 2025-07-04 RX ADMIN — INSULIN GLARGINE 10 UNITS: 100 INJECTION, SOLUTION SUBCUTANEOUS at 21:24

## 2025-07-04 RX ADMIN — TAMSULOSIN HYDROCHLORIDE 0.4 MG: 0.4 CAPSULE ORAL at 08:59

## 2025-07-04 RX ADMIN — METOPROLOL TARTRATE 25 MG: 25 TABLET, FILM COATED ORAL at 21:23

## 2025-07-04 RX ADMIN — DOCUSATE SODIUM 100 MG: 100 CAPSULE, LIQUID FILLED ORAL at 08:59

## 2025-07-04 RX ADMIN — CEFTRIAXONE 1 G: 1 INJECTION, SOLUTION INTRAVENOUS at 12:30

## 2025-07-04 RX ADMIN — LOSARTAN POTASSIUM 100 MG: 100 TABLET, FILM COATED ORAL at 08:59

## 2025-07-04 RX ADMIN — INSULIN LISPRO 4 UNITS: 100 INJECTION, SOLUTION INTRAVENOUS; SUBCUTANEOUS at 12:31

## 2025-07-04 RX ADMIN — POLYETHYLENE GLYCOL 3350 17 G: 17 POWDER, FOR SOLUTION ORAL at 09:01

## 2025-07-04 RX ADMIN — INSULIN LISPRO 2 UNITS: 100 INJECTION, SOLUTION INTRAVENOUS; SUBCUTANEOUS at 09:00

## 2025-07-04 RX ADMIN — ENOXAPARIN SODIUM 40 MG: 100 INJECTION SUBCUTANEOUS at 16:54

## 2025-07-04 RX ADMIN — VITAM B12 100 MCG: 100 TAB at 08:59

## 2025-07-04 ASSESSMENT — COGNITIVE AND FUNCTIONAL STATUS - GENERAL
HELP NEEDED FOR BATHING: A LITTLE
STANDING UP FROM CHAIR USING ARMS: A LITTLE
DRESSING REGULAR LOWER BODY CLOTHING: A LITTLE
HELP NEEDED FOR BATHING: A LITTLE
WALKING IN HOSPITAL ROOM: A LOT
TURNING FROM BACK TO SIDE WHILE IN FLAT BAD: A LITTLE
MOVING TO AND FROM BED TO CHAIR: A LITTLE
DRESSING REGULAR UPPER BODY CLOTHING: A LITTLE
WALKING IN HOSPITAL ROOM: A LOT
TOILETING: A LITTLE
STANDING UP FROM CHAIR USING ARMS: A LITTLE
DRESSING REGULAR UPPER BODY CLOTHING: A LITTLE
MOBILITY SCORE: 16
MOVING TO AND FROM BED TO CHAIR: A LITTLE
TURNING FROM BACK TO SIDE WHILE IN FLAT BAD: A LITTLE
MOVING FROM LYING ON BACK TO SITTING ON SIDE OF FLAT BED WITH BEDRAILS: A LITTLE
TOILETING: A LITTLE
DRESSING REGULAR LOWER BODY CLOTHING: A LITTLE
MOBILITY SCORE: 16
PERSONAL GROOMING: A LITTLE
CLIMB 3 TO 5 STEPS WITH RAILING: A LOT
MOVING FROM LYING ON BACK TO SITTING ON SIDE OF FLAT BED WITH BEDRAILS: A LITTLE
DAILY ACTIVITIY SCORE: 19
PERSONAL GROOMING: A LITTLE
CLIMB 3 TO 5 STEPS WITH RAILING: A LOT
DAILY ACTIVITIY SCORE: 19

## 2025-07-04 ASSESSMENT — PAIN SCALES - GENERAL
PAINLEVEL_OUTOF10: 0 - NO PAIN
PAINLEVEL_OUTOF10: 0 - NO PAIN

## 2025-07-04 ASSESSMENT — PAIN - FUNCTIONAL ASSESSMENT: PAIN_FUNCTIONAL_ASSESSMENT: 0-10

## 2025-07-04 NOTE — CARE PLAN
The patient's goals for the shift include      The clinical goals for the shift include Pt will remain heodynamically stable      Problem: Pain - Adult  Goal: Verbalizes/displays adequate comfort level or baseline comfort level  Outcome: Progressing     Problem: Safety - Adult  Goal: Free from fall injury  Outcome: Progressing     Problem: Discharge Planning  Goal: Discharge to home or other facility with appropriate resources  Outcome: Progressing     Problem: Chronic Conditions and Co-morbidities  Goal: Patient's chronic conditions and co-morbidity symptoms are monitored and maintained or improved  Outcome: Progressing     Problem: Nutrition  Goal: Nutrient intake appropriate for maintaining nutritional needs  Outcome: Progressing     Problem: Pain  Goal: Takes deep breaths with improved pain control throughout the shift  Outcome: Progressing  Goal: Turns in bed with improved pain control throughout the shift  Outcome: Progressing  Goal: Walks with improved pain control throughout the shift  Outcome: Progressing     Problem: Fall/Injury  Goal: Not fall by end of shift  Outcome: Progressing  Goal: Be free from injury by end of the shift  Outcome: Progressing  Goal: Use assistive devices by end of the shift  Outcome: Progressing  Goal: Pace activities to prevent fatigue by end of the shift  Outcome: Progressing     Problem: Skin  Goal: Prevent/minimize sheer/friction injuries  Outcome: Progressing  Goal: Promote/optimize nutrition  Outcome: Progressing  Goal: Promote skin healing  Outcome: Progressing  Flowsheets (Taken 7/3/2025 2247 by Marni Watson RN)  Promote skin healing:   Assess skin/pad under line(s)/device(s)   Turn/reposition every 2 hours/use positioning/transfer devices

## 2025-07-04 NOTE — NURSING NOTE
Patient became very agitated and started yelling. I tried to explain to him that he was in the hospital and being treated for pneumonia. He did state he wants something for sleep. I reached out to the hospitalist.

## 2025-07-04 NOTE — NURSING NOTE
Pt has been incontinent and gotten up to the BSC with assistance. He did urinate in the bedside commode.

## 2025-07-04 NOTE — PROGRESS NOTES
"Jorge A Madden is a 75 y.o. male on day 1 of admission presenting with Pneumonia of left lower lobe due to infectious organism.    Subjective      Oing well  MRCP done showed changes consistent with chronic pancreatitis     Objective     Physical Exam  Vitals reviewed.   Constitutional:       Appearance: He is ill-appearing and toxic-appearing.   Cardiovascular:      Rate and Rhythm: Normal rate and regular rhythm.      Pulses: Normal pulses.   Pulmonary:      Effort: Pulmonary effort is normal.      Breath sounds: Normal breath sounds.   Abdominal:      General: Abdomen is flat. Bowel sounds are normal. There is no distension.      Palpations: Abdomen is soft.      Tenderness: There is no abdominal tenderness.   Musculoskeletal:         General: Normal range of motion.         Last Recorded Vitals  Blood pressure (!) 183/92, pulse 57, temperature 36.7 °C (98.1 °F), temperature source Temporal, resp. rate 14, height 1.778 m (5' 10\"), weight 49.4 kg (109 lb), SpO2 98%.  Intake/Output last 3 Shifts:  I/O last 3 completed shifts:  In: 4201.3 (85 mL/kg) [P.O.:700; I.V.:3451.3 (69.8 mL/kg); IV Piggyback:50]  Out: 2125 (43 mL/kg) [Urine:2125 (1.2 mL/kg/hr)]  Weight: 49.4 kg            Assessment & Plan      Jorge A Madden is a 75 y.o. male with PMH of HTN, HLD, T2DM presenting from home with progressive weakness, increasing SOB, weight loss, mild left inguinal discomfort he attributes to a hernia.  He denies previous pancreatitis.  Imaging consistent with chronic pancreatitis with multiple small cystic lesions with concern for malignancy prompting GI consult.      MRCP showed changes of chronic pancreatitis with no evidence of malignancy.    Plan:  - Continue supportive care.  Patient will follow-up in the GI office as an outpatient for further evaluation of her chronic pancreatitis.  At this point he does not have significant abdominal pain or diarrhea so does not need to be on any pancreatic enzymes.      Adam CHAMORRO" MD Madhu

## 2025-07-04 NOTE — CARE PLAN
The patient's goals for the shift include      The clinical goals for the shift include Patient will verbalize a decrease in SOB by end of shift.

## 2025-07-04 NOTE — CARE PLAN
The patient's goals for the shift include      The clinical goals for the shift include Pt will remain heodynamically stable

## 2025-07-05 LAB
ALBUMIN SERPL BCP-MCNC: 3.3 G/DL (ref 3.4–5)
ALP SERPL-CCNC: 63 U/L (ref 33–136)
ALT SERPL W P-5'-P-CCNC: 12 U/L (ref 10–52)
ANION GAP SERPL CALC-SCNC: 11 MMOL/L (ref 10–20)
AST SERPL W P-5'-P-CCNC: 8 U/L (ref 9–39)
BILIRUB SERPL-MCNC: 0.4 MG/DL (ref 0–1.2)
BUN SERPL-MCNC: 21 MG/DL (ref 6–23)
CALCIUM SERPL-MCNC: 9.2 MG/DL (ref 8.6–10.3)
CHLORIDE SERPL-SCNC: 102 MMOL/L (ref 98–107)
CO2 SERPL-SCNC: 25 MMOL/L (ref 21–32)
CREAT SERPL-MCNC: 1.1 MG/DL (ref 0.5–1.3)
EGFRCR SERPLBLD CKD-EPI 2021: 71 ML/MIN/1.73M*2
GLUCOSE BLD MANUAL STRIP-MCNC: 152 MG/DL (ref 74–99)
GLUCOSE BLD MANUAL STRIP-MCNC: 152 MG/DL (ref 74–99)
GLUCOSE BLD MANUAL STRIP-MCNC: 168 MG/DL (ref 74–99)
GLUCOSE BLD MANUAL STRIP-MCNC: 360 MG/DL (ref 74–99)
GLUCOSE SERPL-MCNC: 127 MG/DL (ref 74–99)
HOLD SPECIMEN: NORMAL
MAGNESIUM SERPL-MCNC: 1.78 MG/DL (ref 1.6–2.4)
POTASSIUM SERPL-SCNC: 3.2 MMOL/L (ref 3.5–5.3)
PROT SERPL-MCNC: 6.2 G/DL (ref 6.4–8.2)
SODIUM SERPL-SCNC: 135 MMOL/L (ref 136–145)

## 2025-07-05 PROCEDURE — 2500000001 HC RX 250 WO HCPCS SELF ADMINISTERED DRUGS (ALT 637 FOR MEDICARE OP): Performed by: STUDENT IN AN ORGANIZED HEALTH CARE EDUCATION/TRAINING PROGRAM

## 2025-07-05 PROCEDURE — 1200000002 HC GENERAL ROOM WITH TELEMETRY DAILY

## 2025-07-05 PROCEDURE — 2500000001 HC RX 250 WO HCPCS SELF ADMINISTERED DRUGS (ALT 637 FOR MEDICARE OP)

## 2025-07-05 PROCEDURE — 83735 ASSAY OF MAGNESIUM: CPT

## 2025-07-05 PROCEDURE — 2500000004 HC RX 250 GENERAL PHARMACY W/ HCPCS (ALT 636 FOR OP/ED)

## 2025-07-05 PROCEDURE — 36415 COLL VENOUS BLD VENIPUNCTURE: CPT

## 2025-07-05 PROCEDURE — 2500000005 HC RX 250 GENERAL PHARMACY W/O HCPCS

## 2025-07-05 PROCEDURE — 2500000002 HC RX 250 W HCPCS SELF ADMINISTERED DRUGS (ALT 637 FOR MEDICARE OP, ALT 636 FOR OP/ED)

## 2025-07-05 PROCEDURE — 94640 AIRWAY INHALATION TREATMENT: CPT

## 2025-07-05 PROCEDURE — 97161 PT EVAL LOW COMPLEX 20 MIN: CPT | Mod: GP

## 2025-07-05 PROCEDURE — 97165 OT EVAL LOW COMPLEX 30 MIN: CPT | Mod: GO | Performed by: OCCUPATIONAL THERAPIST

## 2025-07-05 PROCEDURE — 94664 DEMO&/EVAL PT USE INHALER: CPT

## 2025-07-05 PROCEDURE — 99291 CRITICAL CARE FIRST HOUR: CPT

## 2025-07-05 PROCEDURE — 80053 COMPREHEN METABOLIC PANEL: CPT

## 2025-07-05 PROCEDURE — 82947 ASSAY GLUCOSE BLOOD QUANT: CPT

## 2025-07-05 RX ORDER — HYDROXYZINE HYDROCHLORIDE 25 MG/1
25 TABLET, FILM COATED ORAL ONCE
Status: COMPLETED | OUTPATIENT
Start: 2025-07-05 | End: 2025-07-05

## 2025-07-05 RX ORDER — ACETAMINOPHEN 325 MG/1
975 TABLET ORAL ONCE
Status: COMPLETED | OUTPATIENT
Start: 2025-07-05 | End: 2025-07-05

## 2025-07-05 RX ORDER — OXYCODONE HYDROCHLORIDE 5 MG/1
5 TABLET ORAL ONCE
Status: DISCONTINUED | OUTPATIENT
Start: 2025-07-05 | End: 2025-07-05

## 2025-07-05 RX ORDER — POTASSIUM CHLORIDE 1.5 G/1.58G
40 POWDER, FOR SOLUTION ORAL ONCE
Status: COMPLETED | OUTPATIENT
Start: 2025-07-05 | End: 2025-07-05

## 2025-07-05 RX ADMIN — ACETAMINOPHEN 975 MG: 325 TABLET ORAL at 17:16

## 2025-07-05 RX ADMIN — INSULIN LISPRO 8 UNITS: 100 INJECTION, SOLUTION INTRAVENOUS; SUBCUTANEOUS at 16:56

## 2025-07-05 RX ADMIN — CEFTRIAXONE 1 G: 1 INJECTION, SOLUTION INTRAVENOUS at 12:46

## 2025-07-05 RX ADMIN — ENOXAPARIN SODIUM 40 MG: 100 INJECTION SUBCUTANEOUS at 16:55

## 2025-07-05 RX ADMIN — VITAM B12 100 MCG: 100 TAB at 08:29

## 2025-07-05 RX ADMIN — LIDOCAINE 4% 1 PATCH: 40 PATCH TOPICAL at 08:35

## 2025-07-05 RX ADMIN — DOCUSATE SODIUM 100 MG: 100 CAPSULE, LIQUID FILLED ORAL at 21:01

## 2025-07-05 RX ADMIN — METOPROLOL TARTRATE 25 MG: 25 TABLET, FILM COATED ORAL at 08:29

## 2025-07-05 RX ADMIN — POTASSIUM CHLORIDE 40 MEQ: 1.5 POWDER, FOR SOLUTION ORAL at 11:28

## 2025-07-05 RX ADMIN — Medication 2 L/MIN: at 23:12

## 2025-07-05 RX ADMIN — INSULIN LISPRO 2 UNITS: 100 INJECTION, SOLUTION INTRAVENOUS; SUBCUTANEOUS at 08:29

## 2025-07-05 RX ADMIN — IPRATROPIUM BROMIDE AND ALBUTEROL SULFATE 3 ML: 2.5; .5 SOLUTION RESPIRATORY (INHALATION) at 22:59

## 2025-07-05 RX ADMIN — HYDROXYZINE HYDROCHLORIDE 25 MG: 25 TABLET, FILM COATED ORAL at 11:28

## 2025-07-05 RX ADMIN — INSULIN GLARGINE 10 UNITS: 100 INJECTION, SOLUTION SUBCUTANEOUS at 21:01

## 2025-07-05 RX ADMIN — INSULIN LISPRO 2 UNITS: 100 INJECTION, SOLUTION INTRAVENOUS; SUBCUTANEOUS at 12:46

## 2025-07-05 RX ADMIN — METOPROLOL TARTRATE 25 MG: 25 TABLET, FILM COATED ORAL at 21:01

## 2025-07-05 RX ADMIN — LOSARTAN POTASSIUM 100 MG: 100 TABLET, FILM COATED ORAL at 08:28

## 2025-07-05 RX ADMIN — Medication 10 MG: at 21:01

## 2025-07-05 RX ADMIN — AMLODIPINE BESYLATE 10 MG: 10 TABLET ORAL at 06:08

## 2025-07-05 RX ADMIN — TAMSULOSIN HYDROCHLORIDE 0.4 MG: 0.4 CAPSULE ORAL at 08:29

## 2025-07-05 ASSESSMENT — COGNITIVE AND FUNCTIONAL STATUS - GENERAL
WALKING IN HOSPITAL ROOM: A LITTLE
STANDING UP FROM CHAIR USING ARMS: A LITTLE
CLIMB 3 TO 5 STEPS WITH RAILING: A LOT
DRESSING REGULAR UPPER BODY CLOTHING: A LITTLE
CLIMB 3 TO 5 STEPS WITH RAILING: A LITTLE
WALKING IN HOSPITAL ROOM: A LOT
DRESSING REGULAR LOWER BODY CLOTHING: TOTAL
PERSONAL GROOMING: A LITTLE
TOILETING: A LOT
MOBILITY SCORE: 18
MOVING TO AND FROM BED TO CHAIR: A LITTLE
DRESSING REGULAR LOWER BODY CLOTHING: A LITTLE
DAILY ACTIVITIY SCORE: 19
EATING MEALS: A LITTLE
MOBILITY SCORE: 18
HELP NEEDED FOR BATHING: A LITTLE
PERSONAL GROOMING: A LITTLE
TOILETING: A LITTLE
MOVING FROM LYING ON BACK TO SITTING ON SIDE OF FLAT BED WITH BEDRAILS: A LITTLE
TURNING FROM BACK TO SIDE WHILE IN FLAT BAD: A LITTLE
STANDING UP FROM CHAIR USING ARMS: A LITTLE
MOVING TO AND FROM BED TO CHAIR: A LITTLE
HELP NEEDED FOR BATHING: A LOT
DAILY ACTIVITIY SCORE: 13
DRESSING REGULAR UPPER BODY CLOTHING: A LOT

## 2025-07-05 ASSESSMENT — PAIN SCALES - GENERAL
PAINLEVEL_OUTOF10: 0 - NO PAIN
PAINLEVEL_OUTOF10: 8
PAINLEVEL_OUTOF10: 9
PAINLEVEL_OUTOF10: 0 - NO PAIN
PAINLEVEL_OUTOF10: 5 - MODERATE PAIN
PAINLEVEL_OUTOF10: 5 - MODERATE PAIN

## 2025-07-05 ASSESSMENT — PAIN DESCRIPTION - ORIENTATION
ORIENTATION: LEFT
ORIENTATION: LEFT;UPPER

## 2025-07-05 ASSESSMENT — PAIN DESCRIPTION - LOCATION
LOCATION: SHOULDER
LOCATION: BACK

## 2025-07-05 ASSESSMENT — ACTIVITIES OF DAILY LIVING (ADL): ADL_ASSISTANCE: INDEPENDENT

## 2025-07-05 ASSESSMENT — PAIN - FUNCTIONAL ASSESSMENT
PAIN_FUNCTIONAL_ASSESSMENT: 0-10

## 2025-07-05 ASSESSMENT — PAIN DESCRIPTION - DESCRIPTORS
DESCRIPTORS: ACHING
DESCRIPTORS: ACHING

## 2025-07-05 NOTE — PROGRESS NOTES
Occupational Therapy    Evaluation    Patient Name: Jorge A Madden  MRN: [id number]  Today's Date: 7/5/2025  Time Calculation  Start Time: 1209  Stop Time: 1220  Time Calculation (min): 11 min  3121/3121-A  Eval only     Assessment  IP OT Assessment  Prognosis: Fair  End of Session Communication: Bedside nurse  End of Session Patient Position: Bed, 3 rail up, Alarm on  Patient presents with decline in ADLs, functional transfers, and functional mobility tasks and would benefit from OT during acute stay to maximize functional independence and safety.  Patient requires 24 hours hands on assistance.  Recommend moderate intensity OT to maximize functional independence and safety.     Plan:  Treatment Interventions: ADL retraining, Functional transfer training, Patient/family training, Equipment evaluation/education, Compensatory technique education  OT Frequency: 3 times per week (during acute hospitalization)  OT Discharge Recommendations: Moderate intensity level of continued care  OT - OK to Discharge: Yes from acute care OT services to the next level of care when cleared by medical team      Subjective     Current Problem:  1. Pneumonia of left lower lobe due to infectious organism        2. Hypoxemia        3. Hypoglycemia        4. Constipation, unspecified constipation type        5. 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 General weakness        7. Renal cyst            General:  General  Reason for Referral: ADLs  Referred By: Arti Boucher DO  Past Medical History Relevant to Rehab: per EMR:75 y.o. male with past medical history of pancreatitis, T2DM insulin dependent and HTN, HLD. Presenting to ED from home with weakness, generalized malaise, poor oral intake and SOB over the last few days, additionally patient admits to weight loss over the last several months. States SOB has increased over the last 2 days, denies respiratory disease but does currently smoke cigarettes, denies  "chest pain. Patient does walk with a cane at baseline but states he has barely been able to get around at home. Endorses mild abdominal pain but denies n/v/d. Denies fevers, chills, cough, congestion. Does state presence of an inguinal hernia for which he has appointment scheduled with a surgeon for next week.  Co-Treatment: PT  Co-Treatment Reason: for discharge planning  Prior to Session Communication: Bedside nurse  Patient Position Received: Bed, 3 rail up, Alarm on  Preferred Learning Style: verbal  General Comment: Patient in long legged sitting in bed and agreeable to participate in OT evaluation.    Precautions:  Medical Precautions: Fall precautions    Pain:  Pain Assessment  Pain Assessment: 0-10  0-10 (Numeric) Pain Score: 9  Pain Location: Back (left shoulder)  Pain Interventions: Rest (notified RN, patient with pain patch in place)    Objective   Cognition:  Overall Cognitive Status:  (forgetful), perseverating on \"getting out of this place and \"going to a place that is 20 years ahead.\"     Home Living:  Type of Home: Apartment  Lives With:  (family)     Prior Function:  ADL Assistance: Independent  Homemaking Assistance:  (spouse completes IADLs)  Ambulatory Assistance:  (independent without assistive device. Owns cane. reports 1 fall)    ADL:  LE Dressing Assistance: Total    Activity Tolerance:  Endurance: Decreased tolerance for upright activites    Bed Mobility/Transfers:   Bed Mobility  Bed Mobility:  (SBA supine <>sit)  Transfers  Transfer:  (Min assist sit <>stand with max verbal cues for proper hand placement and technique.  Patient pulled self to stand on stabilized walker. CGA to side step to head of bed with WW.  Patient unable to complete further distance due to fatigue.)    Extremities: RUE   RUE : Within Functional Limits and LUE   LUE: Within Functional Limits    Outcome Measures: [facility] Daily Activity  Putting on and taking off regular lower body clothing: Total  Bathing (including " washing, rinsing, drying): A lot  Putting on and taking off regular upper body clothing: A lot  Toileting, which includes using toilet, bedpan or urinal: A lot  Taking care of personal grooming such as brushing teeth: A little  Eating Meals: A little  Daily Activity - Total Score: 13       EDUCATION:  Education  Individual(s) Educated: Patient  Education Provided: Fall precautons  Patient Response to Education: Patient/Caregiver Verbalized Understanding of Information    Goals:   Encounter Problems       Encounter Problems (Active)       Dressings Lower Extremities       STG - Patient will complete lower body dressing with mod assist with comp strategies and AE  (Progressing)       Start:  07/05/25    Expected End:  07/19/25               Functional Balance       STG-Patient will be SBA with assistive device dynamic stand task >5 minutes for ADL completion   (Progressing)       Start:  07/05/25    Expected End:  07/19/25               Grooming       STG - Patient completes grooming with min assist (Progressing)       Start:  07/05/25    Expected End:  07/19/25               OT Transfers       STG-Patient will be SBA with functional transfers demonstrating good safety   (Progressing)       Start:  07/05/25    Expected End:  07/19/25

## 2025-07-05 NOTE — CARE PLAN
The patient's goals for the shift include      The clinical goals for the shift include free from falls    Problem: Pain - Adult  Goal: Verbalizes/displays adequate comfort level or baseline comfort level  Outcome: Progressing     Problem: Safety - Adult  Goal: Free from fall injury  Outcome: Progressing     Problem: Chronic Conditions and Co-morbidities  Goal: Patient's chronic conditions and co-morbidity symptoms are monitored and maintained or improved  Outcome: Progressing     Problem: Nutrition  Goal: Nutrient intake appropriate for maintaining nutritional needs  Outcome: Progressing     Problem: Pain  Goal: Takes deep breaths with improved pain control throughout the shift  Outcome: Progressing  Goal: Turns in bed with improved pain control throughout the shift  Outcome: Progressing     Problem: Fall/Injury  Goal: Not fall by end of shift  Outcome: Progressing  Goal: Be free from injury by end of the shift  Outcome: Progressing  Goal: Use assistive devices by end of the shift  Outcome: Progressing  Goal: Pace activities to prevent fatigue by end of the shift  Outcome: Progressing     Problem: Skin  Goal: Prevent/minimize sheer/friction injuries  Outcome: Progressing  Goal: Promote/optimize nutrition  Outcome: Progressing  Goal: Promote skin healing  Outcome: Progressing

## 2025-07-05 NOTE — PROGRESS NOTES
"Nutrition Diet Education:   Nutrition Assessment         Patient is a 75 y.o. male presenting with from home with grandson with weakness, generalized malaise, poor oral intake and SOB over the last few days, additionally patient admits to weight loss over the last several months. CT- pneumonia left lower lobe and pancreatic calcifications. GI was consulted, had MRCP. Chronic pancreatitis, no malignancy, no need for pancreatic enzymes at this time. Pt to follow up with GI outpatient. Plans to discharge to SNF for rehab.      Nutrition History:  Energy Intake: Good > 75 %  Food and Nutrient History: Reports very good appetite at home. states he eats a lot of food but has not been able to gain any weight. Eats 3 meals/day with some snacks between. Lives with his wife and grandchildren. [facility] services and diet order reviewed. No further questions. Will recc. to send Glucerna and Ensure HP for pt to try.  Vitamin/Herbal Supplement Use: B12  Food Allergy:  (nkfa)  Food Intolerance:  (nkfa)       Anthropometrics:  Height: 177.8 cm (5' 10\")   Weight: 49.4 kg (109 lb)   BMI (Calculated): 15.64  IBW/kg (Dietitian Calculated): 75.3 kg  Percent of IBW: 65.66 %                      Weight History:   Wt Readings from Last 10 Encounters:   07/03/25 49.4 kg (109 lb)   06/17/25 53.1 kg (117 lb)   01/28/25 54 kg (119 lb)   09/27/24 54.9 kg (121 lb)   06/20/24 53.5 kg (118 lb)   04/15/24 54.4 kg (120 lb)   02/15/24 54 kg (119 lb)   01/25/24 55.2 kg (121 lb 9.6 oz)   11/28/23 55.3 kg (122 lb)   11/09/23 58.5 kg (129 lb)       Weight Change %:  Weight History / % Weight Change: 7% weight loss over 30 days. UBW is around 120#.  Significant Weight Loss: Yes  Interpretation of Weight Loss: >5% in 1 month    Nutrition Focused Physical Exam Findings:    Subcutaneous Fat Loss:   Orbital Fat Pads: Severe (dark circles, hollowing and loose skin)  Buccal Fat Pads: Severe (hollow, sunken and narrow face)  Triceps: Severe (negligible fat " tissue)  Muscle Wasting:  Temporalis: Severe (hollowed scooping depression)  Pectoralis (Clavicular Region): Severe (protruding prominent clavicle)  Deltoid/Trapezius: Severe (squared shoulders, acromion process prominent)  Interosseous: Severe (depressed area between thumb and forefinger)  Edema:  Edema: none  Physical Findings:  Skin: Negative    Nutrition Significant Labs:  CBC Trend:   Results from last 7 days   Lab Units 07/04/25  0918 07/03/25 0433 07/02/25  1019   WBC AUTO x10*3/uL 7.0 7.3 6.8   RBC AUTO x10*6/uL 3.64* 3.53* 3.91*   HEMOGLOBIN g/dL 12.6* 12.4* 13.8   HEMATOCRIT % 35.9* 35.3* 38.5*   MCV fL 99 100 99   PLATELETS AUTO x10*3/uL 180 164 195    , BMP Trend:   Results from last 7 days   Lab Units 07/05/25  0605 07/04/25 0918 07/03/25 0433 07/02/25  1019   GLUCOSE mg/dL 127* 182* 328* 45*   CALCIUM mg/dL 9.2 9.4 9.0 9.5   SODIUM mmol/L 135* 135* 129* 137   POTASSIUM mmol/L 3.2* 3.6 3.8 3.4*   CO2 mmol/L 25 23 18* 18*   CHLORIDE mmol/L 102 104 102 107   BUN mg/dL 21 21 28* 42*   CREATININE mg/dL 1.10 1.01 1.01 1.14    , A1C:  Lab Results   Component Value Date    HGBA1C 6.1 06/17/2025   , BG POCT trend:   Results from last 7 days   Lab Units 07/05/25  1139 07/05/25  0730 07/04/25  2050 07/04/25  1639 07/04/25  1214   POCT GLUCOSE mg/dL 168* 152* 306* 291* 210*    , Liver Function Trend:   Results from last 7 days   Lab Units 07/05/25  0605 07/04/25  0918 07/03/25  0433 07/02/25  1019   ALK PHOS U/L 63 68 68 71   AST U/L 8* 8* 7* 14   ALT U/L 12 14 15 22   BILIRUBIN TOTAL mg/dL 0.4 0.4 0.4 0.6        Nutrition Specific Medications:  Scheduled medications  Scheduled Medications[1]  Continuous medications  Continuous Medications[2]  PRN medications  PRN Medications[3]      I/O:   Last BM Date: 07/01/25; Stool Appearance: Formed (07/05/25 0930)    Dietary Orders (From admission, onward)       Start     Ordered    07/03/25 1653  May Participate in Room Service  ( ROOM SERVICE MAY PARTICIPATE)  Once         Question:  .  Answer:  Yes    07/03/25 1652    07/03/25 0832  Adult diet Consistent Carb; CCD 75 gm/meal  Diet effective now        Question Answer Comment   Diet type Consistent Carb    Carb diet selection: CCD 75 gm/meal        07/03/25 0832                     Estimated Needs:   Total Energy Estimated Needs in 24 hours (kCal):  (1485-1733kcal)  Method for Estimating Needs: 30-35kcal/kg  Total Protein Estimated Needs in 24 Hours (g):  (59-74g)  Method for Estimating 24 Hour Protein Needs: 1.2-1.5gkg     Method for Estimating 24 Hour Fluid Needs: 1mL/kcal  Patient on Order Fluid Restriction: No        Nutrition Diagnosis   Malnutrition Diagnosis  Patient has Malnutrition Diagnosis: Yes  Diagnosis Status: New  Malnutrition Diagnosis: Severe malnutrition related to chronic disease or condition  As Evidenced by: >5% weight loss over 30 days, Severe subcutaneous fat loss and muscle wastinging per NFPE            Nutrition Interventions/Recommendations   Nutrition prescription for oral nutrition    Nutrition Recommendations:  Individualized Nutrition Prescription Provided for : Consistent Carb 75gm/meal.    Nutrition Interventions/Goals:   Meals and Snacks: General healthful diet  Goal: Consume >75% of 3 meals, day with snacks/oral supplements between meals  Medical Food Supplement: Commercial beverage medical food supplement therapy  Goal: Glucerna qith breakfast (220kcal,10g pro), Ensure HP with dinner (160kcal,16g pro)      Education Documentation  No documentation found.            Nutrition Monitoring and Evaluation   Intake / Amount of food: Consumes at least 75% or more of meals/snacks/supplements    Body Weight: Body weight - Promote weight restoration    Electrolyte and Renal Panel: Electrolytes within normal limits  Glucose/Endocrine Profile: Glucose within normal limits ( mg/dL)         Goal Status: New goal(s) identified    Time Spent (min): 30 minutes              [1] amLODIPine, 10 mg, oral,  Daily  bisacodyl, 10 mg, rectal, Daily  cefTRIAXone, 1 g, intravenous, q24h  cyanocobalamin, 100 mcg, oral, Daily  docusate sodium, 100 mg, oral, BID  enoxaparin, 40 mg, subcutaneous, q24h  insulin glargine, 10 Units, subcutaneous, q24h  insulin lispro, 0-10 Units, subcutaneous, TID AC  lidocaine, 1 patch, transdermal, Daily  lidocaine, 1 patch, transdermal, Daily  losartan, 100 mg, oral, Daily  melatonin, 10 mg, oral, Daily  metoprolol tartrate, 25 mg, oral, BID  polyethylene glycol, 17 g, oral, Daily  tamsulosin, 0.4 mg, oral, Daily     [2]    [3] PRN medications: dextrose, dextrose, glucagon, glucagon, ipratropium-albuteroL, oxygen

## 2025-07-05 NOTE — PROGRESS NOTES
07/05/25 1302   Discharge Planning   Living Arrangements Family members   Support Systems Family members   Type of Residence Private residence   Home or Post Acute Services Post acute facilities (Rehab/SNF/etc)   Type of Post Acute Facility Services Skilled nursing   Expected Discharge Disposition SNF   Does the patient need discharge transport arranged? Yes   [facility] coordination needed? Yes   Patient Choice   Provider Choice list and CMS website (https://medicare.gov/care-compare#search) for post-acute Quality and Resource Measure Data were provided and reviewed with: Patient     Met with patient at bedside. Admitted for pneumonia. Pt lives with cousinEkta and grandchildren and was independent PTA with no HHC. Pt uses a cane. PCP is Antolin Thao. Pt feels he is able to manage his health and understands his medications. Family provides transport. No financial concerns. Therapy evals pending. Pt feels he will need SNF for rehab before returning home. Requested [facility] send referral to [facility]. Facility verified with [email or web address] sosa.

## 2025-07-05 NOTE — PROGRESS NOTES
Physical Therapy    Physical Therapy Evaluation    Patient Name: Jorge A Madden  MRN: [id number]  Today's Date: 7/5/2025   Time Calculation  Start Time: 1208  Stop Time: 1218  Time Calculation (min): 10 min  3121/3121-A    Assessment/Plan   PT Assessment  PT Assessment Results: Decreased strength, Decreased endurance, Decreased mobility, Pain  Rehab Prognosis: Good  Evaluation/Treatment Tolerance: Patient limited by fatigue, Patient limited by pain  Medical Staff Made Aware: Yes  Strengths: Ability to acquire knowledge, Attitude of self  End of Session Communication: Bedside nurse  Assessment Comment: seems motivated towards a rehab stay.  End of Session Patient Position: Bed, 3 rail up, Alarm on  IP OR SWING BED PT PLAN  Inpatient or Swing Bed: Inpatient  PT Plan  Treatment/Interventions: Bed mobility, Transfer training, Gait training, Balance training, Strengthening, Endurance training, Therapeutic exercise, Therapeutic activity  PT Plan: Ongoing PT  PT Frequency: 4 times per week  PT Discharge Recommendations: Moderate intensity level of continued care  Equipment Recommended upon Discharge: Wheeled walker, Straight cane  PT Recommended Transfer Status: Assist x1, Contact guard  PT - OK to Discharge: Yes (When medically allowed.)    Subjective     Current Problem:  1. Pneumonia of left lower lobe due to infectious organism        2. Hypoxemia        3. Hypoglycemia        4. Constipation, unspecified constipation type        5. Pancreas cyst ([facility])  Referral to [facility]      6. General weakness        7. Renal cyst          Problem List[1]    General Visit Information:  General  Reason for Referral: [facility] PNA /  Impaired Mobility  Referred By: PAUL Boucher  Past Medical History Relevant to Rehab: Admit on 7/2 due to c/o weak, SOB, malaise, and decreased PO.   H/O DMII, HTN, pancreatitis, and hernia.  Family/Caregiver Present: No  Co-Treatment: OT  Co-Treatment Reason: safety  Prior to Session  "Communication: Bedside nurse  Patient Position Received: Bed, 3 rail up, Alarm on  Preferred Learning Style: verbal, visual  General Comment: Agreed to get up and try to walk.    Has telemetry.  Labs saw low H&H.    Home Living:  Home Living  Type of Home: Apartment  Lives With: Spouse (and 2 teen grandsons.)  Home Adaptive Equipment: Cane  Home Layout: One level  Home Access: Elevator    Prior Level of Function:  Prior Function Per Pt/Caregiver Report  Level of [place]: Independent with homemaking with ambulation  Ambulatory Assistance: Independent    Precautions:  Precautions  Medical Precautions: Fall precautions  Precautions Comment: h/o 1 fall recently.  No injuries. Caught his toe on furniture at home.    Vital Signs:     Objective     Pain:  Pain Assessment  Pain Assessment: 0-10  0-10 (Numeric) Pain Score: 8  Pain Type: Chronic pain  Pain Location: Shoulder (Posterior around scapula.)  Pain Orientation: Left  Pain Interventions: Medication (See MAR), Rest (getting a pain patch for pain.)    Cognition:  Cognition  Overall Cognitive Status: Within Functional Limits  Orientation Level: Oriented X4    General Assessments:  General Observation  General Observation: thin, muscle wasted legs.   Activity Tolerance  Endurance: Decreased tolerance for upright activites  Activity Tolerance Comments: reports mild dyspnea when he sat up to EOB.  Minimal endurance in standing as well.  Sensation  Light Touch: No apparent deficits  Strength  Strength Comments: 4/5= b/l Quads..     Coordination  Movements are Fluid and Coordinated: Yes  Postural Control  Postural Control: Impaired  Posture Comment: 5'10\" ht..  Head flexed down, with t-kyphosis.          Functional Assessments:     Bed Mobility  Bed Mobility: Yes  Bed Mobility 1  Bed Mobility 1: Supine to sitting, Sitting to supine  Level of Assistance 1: Close supervision (SBA)  Transfers  Transfer: Yes  Transfer 1  Transfer From 1: Sit to, Stand to  Transfer Device 1: " Gait belt, Walker  Transfer Level of Assistance 1: Minimum assistance (x1.)  Trials/Comments 1: Stood for about 1 min. x 2 trials.  Changed urine soaked covidien pad.  Ambulation/Gait Training  Ambulation/Gait Training Performed: Yes  Ambulation/Gait Training 1  Surface 1: Level tile  Device 1: Rolling walker  Gait Support Devices: Gait belt  Assistance 1: Contact guard  Comments/Distance (ft) 1: attempted 1 step fwd./bwd. on 1st trial, then 2 short sidesteps to the R on 2nd standing trial.  Main limitation was dyspnea in upright positions.  Stairs  Stairs: No       Extremity/Trunk Assessments:        RLE   RLE : Within Functional Limits  LLE   LLE : Within Functional Limits    Outcome Measures:     [facility] Basic Mobility  Turning from your back to your side while in a flat bed without using bedrails: None  Moving from lying on your back to sitting on the side of a flat bed without using bedrails: None  Moving to and from bed to chair (including a wheelchair): A little  Standing up from a chair using your arms (e.g. wheelchair or bedside chair): A little  To walk in hospital room: A lot  Climbing 3-5 steps with railing: A lot  Basic Mobility - Total Score: 18                 Goals:  Encounter Problems       Encounter Problems (Active)       Mobility       STG - Patient will ambulate x 30-40 ft. with w/w, CGA.  Chair follow needed.   (Progressing)       Start:  07/05/25    Expected End:  07/19/25            Goal 1:  Demo 5/5= b/l Quads.   (Progressing)       Start:  07/05/25    Expected End:  07/19/25               PT Transfers       STG - Patient will transfer sit to and from stand into a w/w with CGA.   (Progressing)       Start:  07/05/25    Expected End:  07/19/25               Safety       STG - Patient uses gait belt during all transfers, and w/w amb. trng..   (Progressing)       Start:  07/05/25    Expected End:  07/19/25                 Education Documentation  Handouts, taught by Fidencio Pérez, PT at  7/5/2025  1:26 PM.  Learner: Patient  Readiness: Acceptance  Method: Demonstration, Explanation  Response: Demonstrated Understanding, Needs Reinforcement    Precautions, taught by Fidencio Pérez PT at 7/5/2025  1:26 PM.  Learner: Patient  Readiness: Acceptance  Method: Demonstration, Explanation  Response: Demonstrated Understanding, Needs Reinforcement    Body Mechanics, taught by Fidencio Pérez PT at 7/5/2025  1:26 PM.  Learner: Patient  Readiness: Acceptance  Method: Demonstration, Explanation  Response: Demonstrated Understanding, Needs Reinforcement    Home Exercise Program, taught by Fidencio Pérez, PT at 7/5/2025  1:26 PM.  Learner: Patient  Readiness: Acceptance  Method: Demonstration, Explanation  Response: Demonstrated Understanding, Needs Reinforcement    Mobility Training, taught by Fidencio Pérez PT at 7/5/2025  1:26 PM.  Learner: Patient  Readiness: Acceptance  Method: Demonstration, Explanation  Response: Demonstrated Understanding, Needs Reinforcement    Education Comments  No comments found.             [1]   Patient Active Problem List  Diagnosis    Essential hypertension    Chronic bilateral low back pain without sciatica    Anxiety    Acute pancreatitis    Chronic constipation    Gout    Hematuria    MARTI (iron deficiency anemia)    Hyperlipidemia    Macrocytosis    Type 2 diabetes mellitus with ketoacidosis without coma    Urinary incontinence    Urinary retention    Benign prostatic hyperplasia    Epigastric pain    Increased glucose level    Pneumonia of left lower lobe due to infectious organism

## 2025-07-05 NOTE — PROGRESS NOTES
Jorge A Madden is a 75 y.o. male on day 2 of admission presenting with Pneumonia of left lower lobe due to infectious organism.      Subjective   NAEO. Endorses anxiety, denies cp or discomfort. No n/v. Endorses diarrhea now after aggressive bowel regimen    Objective     Last Recorded Vitals  BP (!) 173/94 (BP Location: Right arm, Patient Position: Lying)   Pulse 60   Temp 36.9 °C (98.4 °F) (Temporal)   Resp 16   Wt 49.4 kg (109 lb)   SpO2 93%   Intake/Output last 3 Shifts:    Intake/Output Summary (Last 24 hours) at 7/5/2025 1713  Last data filed at 7/5/2025 1246  Gross per 24 hour   Intake 473.33 ml   Output 475 ml   Net -1.67 ml       Admission Weight  Weight: 56.7 kg (125 lb) (07/02/25 1006)    Daily Weight  07/03/25 : 49.4 kg (109 lb)    Image Results  MRCP pancreas w and wo IV contrast  Narrative: Interpreted By:  Moriah Seals,   STUDY:  MRCP PANCREAS W AND WO IV CONTRAST;  7/3/2025 3:25 pm      INDICATION:  Signs/Symptoms:c/f pancreatic neoplasm on CT.      COMPARISON:  CT abdomen and pelvis exams 07/02/2025, 12/06/2023, 06/05/2017.      ACCESSION NUMBER(S):  [id number]      ORDERING CLINICIAN:  MAHESH SINGH      TECHNIQUE:  MRI PANCREAS; Multiplanar magnetic resonance images of the abdomen  were obtained including the following sequences; T2-weighted SSFSE  with and without fat saturation, T1-weighted GRE in/opposed phase,  DWI, fat saturated 3D-T1w GRE pre and dynamically post contrast.  Radial thick slab T2w RARE MRCP and coronally reconstructed navigator  gated high resolution 3-D T2w RESTORE MRCP with MIP reconstruction  were also performed for MRCP.  11 mL of Dotarem were administered  intravenously without immediate complication.      FINDINGS:  LIVER:  Liver is enlarged, with the right liver lobe measuring 18.8 cm in  craniocaudal length. Liver surface contour is smooth. Diffuse hepatic  signal decrease on inphase imaging is suggestive of hepatic iron  deposition.      There is a  subcentimeter focus of arterial phase hyperenhancement in  the inferior right liver lobe (series 15, image 30), which becomes  isoenhancing to background liver parenchyma on subsequent phases and  not visualized on T1 or T2 weighted images. This likely represents a  transient hepatic intensity difference (THID).      A couple of additional subcentimeter foci of arterial phase  hyperenhancement in the dome of the right liver lobe (series 15,  image 92) and anteriorly at the junction of segments 8 and 4A (series  15, image 61) remain hyperenhancing throughout all dynamic  postcontrast phases. These lesions are moderately T2 hyperintense and  T1 hypointense, likely representing flash filling hemangiomas.      Tiny 3 mm T2 hyperintense and T1 hypointense lesion in the posterior  right liver lobe without enhancement on subtraction images is  consistent with a tiny simple cyst.      BILE DUCTS:  There is subtle diffuse scattered irregularity/narrowing throughout  the intrahepatic bile ducts without dilation (for example series 5,  images 80 and 90). There are also focal areas of discontinuity of  some intrahepatic bile ducts (for example series 5, images 82 and 83).      Common bile duct is dilated to 10 mm in diameter followed by a short  segment of smooth narrowing of the common bile duct to 3 mm in  diameter (series 5, image 68), and subsequent mild dilation of the  common hepatic duct to 7 mm before gradually tapering distally. No  intraductal filling defect is seen.      GALLBLADDER:  Within normal limits.      PANCREAS:  There is marked diffuse pancreatic parenchymal atrophy associated  with marked diffuse dilation and irregularity of the main pancreatic  duct measuring up to 1.8 cm in diameter, as well as dilation of  numerous contiguous pancreatic duct side branches throughout the  entire pancreas. A few small intraductal filling defects are noted,  for example a 6 mm filling defect in the region of the  pancreatic  head or neck (series 5, image 65). These likely correlate with coarse  calcifications seen on CT. No enhancing solid components or solid  masses are seen. Note that this appearance and main pancreatic ductal  dilation has been present and not significantly changed since  December 2023.      SPLEEN:  Normal spleen size without focal lesion. Diffuse slight signal loss  on inphase imaging is suggestive of iron deposition.      ADRENAL GLANDS:  There is nodular thickening of the right adrenal gland, with a  dominant 1.1 cm round nodule demonstrating signal dropout on out of  phase imaging, consistent with lipid rich adrenal adenoma. Mild left  adrenal gland thickening noted.      KIDNEYS:  Patchy areas of renal cortical thinning/scarring in both kidneys.  Multiple small simple bilateral renal cysts are present, largest  measures 1 cm in the left upper pole. A band-like area of T2  hypointensity with no enhancement in the left posterior perinephric  region is consistent with a chronic area of scarring, which has been  present and not significantly changed since 2017. No solid renal mass  or hydronephrosis.      LYMPH NODES:  No lymphadenopathy.      ABDOMINAL VESSELS:  There is moderate irregular atherosclerotic plaque of the abdominal  aorta and common iliac arteries. No abdominal aortic aneurysmal  dilation. Portal veins, hepatic veins, splenic vein and superior  mesenteric vein are patent.      BOWEL:  Limited evaluation due to motion. No dilated bowel loops. Moderate  stool burden throughout the colon, which may be correlated for  symptoms of constipation.      PERITONEUM:  Trace free fluid in the perihepatic and left upper quadrant regions.      BONES AND LOWER THORAX:  There are areas of T2 hyperintensity and enhancing consolidation in  the left-greater-than-right lower lobes, which may represent  atelectasis or pneumonia.      No acute bony abnormality or suspicious bone lesion.      Impression: 1.   Marked diffuse pancreatic parenchymal atrophy associated with  marked diffuse main pancreatic ductal dilation and dilation of  numerous contiguous pancreatic duct side branches. Few small  intraductal filling defects, likely correlating with associated  coarse calcifications seen on CT. This constellation of findings  favors sequela of chronic pancreatitis. Superimposed mixed (main and  branch duct) type IPMN is in the differential, but considered less  likely given that diffuse pancreatic ductal dilation has been present  and not significantly changed since December 2023.  2. Subtle diffuse scattered irregularity/narrowing throughout  intrahepatic bile ducts without dilation. There is also dilation of  the common bile duct with a short-segment area of smooth narrowing in  the midportion. These findings are nonspecific, but may be seen with  primary sclerosing cholangitis versus secondary or other cholangitis.  No intraductal filling defect to suggest choledocholithiasis. Advise  correlation with LFTs.  3. Hepatomegaly with findings suggestive of hepatic and splenic iron  deposition.  4. Few small arterially enhancing liver lesions described above  likely representing a combination of flash filling hemangiomas and  THID.  5. Simple bilateral renal cysts. Stable nonenhancing chronic area of  scarring in the left posterior perinephric region.  6. Moderate stool burden throughout the colon, which may be  correlated for symptoms of constipation.  7. Opacities in the left-greater-than-right lower lobes, which may  represent atelectasis or pneumonia.  8. Additional chronic findings, as detailed          Signed by: Moriah Seals 7/3/2025 6:15 PM  Dictation workstation:   [id number]      Physical Exam  GENERAL: Alert and oriented x 3. cachetic  EYES: Anicteric.  HENT: Moist mucous membranes. No scleral icterus.   LUNGS: CTA BL, some rhonchi and expiratory wheezing. No accessory muscle use.  CV: RRR. No murmur. No  JVD.  ABDOMEN: Soft, non-tender and non-distended.   EXTREMITIES: No edema. Non-tender.?  SKIN: No rashes or lesions. Warm.  NEUROLOGIC: No focal neurological deficits.  PSYCHIATRIC: Cooperative. anxious    Relevant Results  Results for orders placed or performed during the hospital encounter of 07/02/25 (from the past 24 hours)   POCT GLUCOSE   Result Value Ref Range    POCT Glucose 306 (H) 74 - 99 mg/dL   Comprehensive Metabolic Panel   Result Value Ref Range    Glucose 127 (H) 74 - 99 mg/dL    Sodium 135 (L) 136 - 145 mmol/L    Potassium 3.2 (L) 3.5 - 5.3 mmol/L    Chloride 102 98 - 107 mmol/L    Bicarbonate 25 21 - 32 mmol/L    Anion Gap 11 10 - 20 mmol/L    Urea Nitrogen 21 6 - 23 mg/dL    Creatinine 1.10 0.50 - 1.30 mg/dL    eGFR 71 >60 mL/min/1.73m*2    Calcium 9.2 8.6 - 10.3 mg/dL    Albumin 3.3 (L) 3.4 - 5.0 g/dL    Alkaline Phosphatase 63 33 - 136 U/L    Total Protein 6.2 (L) 6.4 - 8.2 g/dL    AST 8 (L) 9 - 39 U/L    Bilirubin, Total 0.4 0.0 - 1.2 mg/dL    ALT 12 10 - 52 U/L   Magnesium   Result Value Ref Range    Magnesium 1.78 1.60 - 2.40 mg/dL   Lavender Top   Result Value Ref Range    Extra Tube Hold for add-ons.    POCT GLUCOSE   Result Value Ref Range    POCT Glucose 152 (H) 74 - 99 mg/dL   POCT GLUCOSE   Result Value Ref Range    POCT Glucose 168 (H) 74 - 99 mg/dL   POCT GLUCOSE   Result Value Ref Range    POCT Glucose 360 (H) 74 - 99 mg/dL               Assessment & Plan    75yoM with PMHx of  chronic pancreatitis, T2DM insulin dependent and HTN, HLD, tobacco use who presented from home for generalized weakness and malaise, reduced oral intake and SOB over a few days as well as weight loss x 18months. Also endorsed night sweats and subjective fevers. In ED, CTA PE found to have bibasilar pneumonia with dense consolidation particularly in the left lower lobe. Also CT noteworthy for extensive pancreatic calcifications with pancreatic ductal dilatation and beading consistent with chronic pancreatitis.  2. Multiple small cystic lesions in the pancreas which could represent small pseudocysts, small cystic neoplasms or intraductal mucinous papillary neoplasms. Acute urinary retention in ED though self resolved. GI consulted given CT findings concerning for malignancy, rec MRCP which was performed and negative for mass, found to have chronic pancreatitis. Found to be strep pneumo positive and continued on rocephin.      CAP due to strep pneumo  chronic pancreatitis   T2DM insulin dependent   HTN  HLD  tobacco use   Severe Constipation vs ?possible fecal impaction  Acute urinary retention- resovled  Anxiety  Hypokalemia  -continue rocephin, transition to oral abx on dc for 7d total  -atypical coverage DC given negative legionella  -Duonebs q6 prn   -Weaned to room air  -continue home meds  -suppository ordered per pt request, having bowel movements  -rec tobacco cessation, will need low dose CT screening annually as currenetly smoking zain after CAP to ensure resolves  -GI consulted given CT on admission c/f malignacy, reviewed imaging with myself, no concern for malignancy, revealing chronic pancreatitis. No need for pancreatic enxymes, follow up as OP  -cont lantus 10 units, is hyperglycemic (takes lantus 5units BID at home)  -prn atarax, will discuss remeron with patient tomorrow (might help w/ weight gain and appetite stim as well as anti anxiety)     IVF: not indicated  Tele: not indication  Diet: diabetic  Consults: GI  DVT ppx: Lovenox  Code status: FULL     Dispo: medically stable for DC to SNF     Complexity: high      Malnutrition Diagnosis Status: New  Malnutrition Diagnosis: Severe malnutrition related to chronic disease or condition     As Evidenced by: >5% weight loss over 30 days, Severe subcutaneous fat loss and muscle wastinging per NFPE  I agree with the dietitian's malnutrition diagnosis.      Arti Boucher,

## 2025-07-05 NOTE — NURSING NOTE
Slept at short intervals. Last 02 sat was=98%POOL alarm on mepilex sacral area maintained.bed alarm on.

## 2025-07-05 NOTE — PROGRESS NOTES
Jorge A Madden is a 75 y.o. male on day 1 of admission presenting with Pneumonia of left lower lobe due to infectious organism.    Subjective   Interval History: NAEO  . Denies complaints at this time other than constipation and is requesting enema. Also endorses chronic back pain No abd pain n/v diarrhea constipation, chest pain. States has lost 50+ pounds in 18 months as doesn't feel hungry.    Mrcp negative for obvious mass, +chronic pancreatitis    Objective   Range of Vitals (last 24 hours)  Heart Rate:  [57-82]   Temp:  [36.5 °C (97.7 °F)-37.1 °C (98.8 °F)]   Resp:  [14-18]   BP: (135-183)/(68-98)   SpO2:  [93 %-98 %]   Daily Weight  07/03/25 : 49.4 kg (109 lb)    Body mass index is 15.64 kg/m².    Physical Exam  GENERAL: Alert and oriented x 3. cachetic  EYES: Anicteric.  HENT: Moist mucous membranes. No scleral icterus.   LUNGS: CTA BL. No accessory muscle use.  CV: RRR. No murmur. No JVD.  ABDOMEN: Soft, non-tender and non-distended.   EXTREMITIES: No edema. Non-tender.?  SKIN: No rashes or lesions. Warm.  NEUROLOGIC: No focal neurological deficits.  PSYCHIATRIC: Cooperative.       Antibiotics  cefTRIAXone - 1 gram/50 mL    Relevant Results  Labs  Results from last 72 hours   Lab Units 07/04/25 0918 07/03/25  0433 07/02/25  1019   WBC AUTO x10*3/uL 7.0 7.3 6.8   HEMOGLOBIN g/dL 12.6* 12.4* 13.8   HEMATOCRIT % 35.9* 35.3* 38.5*   PLATELETS AUTO x10*3/uL 180 164 195   NEUTROS PCT AUTO % 64.6 72.2 65.8   LYMPHS PCT AUTO % 22.4 16.3 21.0   MONOS PCT AUTO % 7.8 8.7 10.4   EOS PCT AUTO % 3.7 1.7 1.8     Results from last 72 hours   Lab Units 07/04/25 0918 07/03/25  0433 07/02/25  1019   SODIUM mmol/L 135* 129* 137   POTASSIUM mmol/L 3.6 3.8 3.4*   CHLORIDE mmol/L 104 102 107   CO2 mmol/L 23 18* 18*   BUN mg/dL 21 28* 42*   CREATININE mg/dL 1.01 1.01 1.14   GLUCOSE mg/dL 182* 328* 45*   CALCIUM mg/dL 9.4 9.0 9.5   ANION GAP mmol/L 12 13 15   EGFR mL/min/1.73m*2 79 79 68   PHOSPHORUS mg/dL 3.0  --  3.5  "    Results from last 72 hours   Lab Units 07/04/25  0918 07/03/25  0433 07/02/25  1019   ALK PHOS U/L 68 68 71   BILIRUBIN TOTAL mg/dL 0.4 0.4 0.6   PROTEIN TOTAL g/dL 6.5 6.3* 7.3   ALT U/L 14 15 22   AST U/L 8* 7* 14   ALBUMIN g/dL 3.6 3.4 3.8     Estimated Creatinine Clearance: 45.5 mL/min (by C-G formula based on SCr of 1.01 mg/dL).  No results found for: \"CRP\"  Microbiology  No results found for the last 14 days.    Imaging  MRCP pancreas w and wo IV contrast  Result Date: 7/3/2025  Interpreted By:  Moriah Seals, STUDY: MRCP PANCREAS W AND WO IV CONTRAST;  7/3/2025 3:25 pm   INDICATION: Signs/Symptoms:c/f pancreatic neoplasm on CT.   COMPARISON: CT abdomen and pelvis exams 07/02/2025, 12/06/2023, 06/05/2017.   ACCESSION NUMBER(S): [id number]   ORDERING CLINICIAN: MAHESH SINGH   TECHNIQUE: MRI PANCREAS; Multiplanar magnetic resonance images of the abdomen were obtained including the following sequences; T2-weighted SSFSE with and without fat saturation, T1-weighted GRE in/opposed phase, DWI, fat saturated 3D-T1w GRE pre and dynamically post contrast. Radial thick slab T2w RARE MRCP and coronally reconstructed navigator gated high resolution 3-D T2w RESTORE MRCP with MIP reconstruction were also performed for MRCP.  11 mL of Dotarem were administered intravenously without immediate complication.   FINDINGS: LIVER: Liver is enlarged, with the right liver lobe measuring 18.8 cm in craniocaudal length. Liver surface contour is smooth. Diffuse hepatic signal decrease on inphase imaging is suggestive of hepatic iron deposition.   There is a subcentimeter focus of arterial phase hyperenhancement in the inferior right liver lobe (series 15, image 30), which becomes isoenhancing to background liver parenchyma on subsequent phases and not visualized on T1 or T2 weighted images. This likely represents a transient hepatic intensity difference (THID).   A couple of additional subcentimeter foci of arterial phase " hyperenhancement in the dome of the right liver lobe (series 15, image 92) and anteriorly at the junction of segments 8 and 4A (series 15, image 61) remain hyperenhancing throughout all dynamic postcontrast phases. These lesions are moderately T2 hyperintense and T1 hypointense, likely representing flash filling hemangiomas.   Tiny 3 mm T2 hyperintense and T1 hypointense lesion in the posterior right liver lobe without enhancement on subtraction images is consistent with a tiny simple cyst.   BILE DUCTS: There is subtle diffuse scattered irregularity/narrowing throughout the intrahepatic bile ducts without dilation (for example series 5, images 80 and 90). There are also focal areas of discontinuity of some intrahepatic bile ducts (for example series 5, images 82 and 83).   Common bile duct is dilated to 10 mm in diameter followed by a short segment of smooth narrowing of the common bile duct to 3 mm in diameter (series 5, image 68), and subsequent mild dilation of the common hepatic duct to 7 mm before gradually tapering distally. No intraductal filling defect is seen.   GALLBLADDER: Within normal limits.   PANCREAS: There is marked diffuse pancreatic parenchymal atrophy associated with marked diffuse dilation and irregularity of the main pancreatic duct measuring up to 1.8 cm in diameter, as well as dilation of numerous contiguous pancreatic duct side branches throughout the entire pancreas. A few small intraductal filling defects are noted, for example a 6 mm filling defect in the region of the pancreatic head or neck (series 5, image 65). These likely correlate with coarse calcifications seen on CT. No enhancing solid components or solid masses are seen. Note that this appearance and main pancreatic ductal dilation has been present and not significantly changed since December 2023.   SPLEEN: Normal spleen size without focal lesion. Diffuse slight signal loss on inphase imaging is suggestive of iron deposition.    ADRENAL GLANDS: There is nodular thickening of the right adrenal gland, with a dominant 1.1 cm round nodule demonstrating signal dropout on out of phase imaging, consistent with lipid rich adrenal adenoma. Mild left adrenal gland thickening noted.   KIDNEYS: Patchy areas of renal cortical thinning/scarring in both kidneys. Multiple small simple bilateral renal cysts are present, largest measures 1 cm in the left upper pole. A band-like area of T2 hypointensity with no enhancement in the left posterior perinephric region is consistent with a chronic area of scarring, which has been present and not significantly changed since 2017. No solid renal mass or hydronephrosis.   LYMPH NODES: No lymphadenopathy.   ABDOMINAL VESSELS: There is moderate irregular atherosclerotic plaque of the abdominal aorta and common iliac arteries. No abdominal aortic aneurysmal dilation. Portal veins, hepatic veins, splenic vein and superior mesenteric vein are patent.   BOWEL: Limited evaluation due to motion. No dilated bowel loops. Moderate stool burden throughout the colon, which may be correlated for symptoms of constipation.   PERITONEUM: Trace free fluid in the perihepatic and left upper quadrant regions.   BONES AND LOWER THORAX: There are areas of T2 hyperintensity and enhancing consolidation in the left-greater-than-right lower lobes, which may represent atelectasis or pneumonia.   No acute bony abnormality or suspicious bone lesion.       1.  Marked diffuse pancreatic parenchymal atrophy associated with marked diffuse main pancreatic ductal dilation and dilation of numerous contiguous pancreatic duct side branches. Few small intraductal filling defects, likely correlating with associated coarse calcifications seen on CT. This constellation of findings favors sequela of chronic pancreatitis. Superimposed mixed (main and branch duct) type IPMN is in the differential, but considered less likely given that diffuse pancreatic ductal  dilation has been present and not significantly changed since December 2023. 2. Subtle diffuse scattered irregularity/narrowing throughout intrahepatic bile ducts without dilation. There is also dilation of the common bile duct with a short-segment area of smooth narrowing in the midportion. These findings are nonspecific, but may be seen with primary sclerosing cholangitis versus secondary or other cholangitis. No intraductal filling defect to suggest choledocholithiasis. Advise correlation with LFTs. 3. Hepatomegaly with findings suggestive of hepatic and splenic iron deposition. 4. Few small arterially enhancing liver lesions described above likely representing a combination of flash filling hemangiomas and THID. 5. Simple bilateral renal cysts. Stable nonenhancing chronic area of scarring in the left posterior perinephric region. 6. Moderate stool burden throughout the colon, which may be correlated for symptoms of constipation. 7. Opacities in the left-greater-than-right lower lobes, which may represent atelectasis or pneumonia. 8. Additional chronic findings, as detailed     Signed by: Moriah Seals 7/3/2025 6:15 PM Dictation workstation:   [id number]    CT angio chest for pulmonary embolism  Result Date: 7/2/2025  Interpreted By:  Erin Moreira, STUDY: CT ANGIO CHEST FOR PULMONARY EMBOLISM; CT ABDOMEN PELVIS W IV CONTRAST;  7/2/2025 12:16 pm   INDICATION: Signs/Symptoms:sob, hypoxia, concern for undiagnosed cancer causing pe; Signs/Symptoms:abd ttp.   COMPARISON: None   ACCESSION NUMBER(S): [id number]; [id number]   ORDERING CLINICIAN: ADAN CHRISTIAN   TECHNIQUE: CT of the chest, abdomen, and pelvis was performed. Contiguous axial images were obtained at 3 mm slice thickness through the chest, abdomen and pelvis. Coronal and sagittal reconstructions at 3 mm slice thickness were performed. 75 ML of Omnipaque 350 was administered intravenously without immediate complication.   FINDINGS: CHEST:    LUNG/PLEURA/LARGE AIRWAYS: There is bibasilar pneumonia with dense consolidation in the left lower lobe that. There is no pleural fluid. There is no pulmonary nodule.   VESSELS: There is no contrast within the thoracic aorta to evaluate for dissection. There is atherosclerotic calcification of the thoracic aorta. There is no pulmonary embolism.   HEART: The heart is unremarkable.   MEDIASTINUM AND TALIB: There is no hilar or mediastinal adenopathy.   CHEST WALL AND LOWER NECK: The chest wall is unremarkable. There is no abnormality of the lower neck.   ABDOMEN:   LIVER: There is no hepatic mass.   BILE DUCTS: There is no intrahepatic, common hepatic or common bile ductal dilatation.   GALLBLADDER: The gallbladder is unremarkable.   PANCREAS: There are extensive pancreatic calcifications. There is pancreatic ductal dilatation and beading. Findings are consistent with chronic pancreatitis. There are multiple cystic lesions in the pancreas which could represent cirrhosis, small cystic neoplasms or intraductal mucinous papillary neoplasms.   SPLEEN: The spleen is unremarkable. There is no splenic mass. There is no splenomegaly   ADRENAL GLANDS: The adrenal glands are unremarkable.   KIDNEYS AND URETERS:. The kidneys function symmetrically. There is a 1.4 cm cyst projecting posteriorly off the cortex of the left kidney with an extremely calcification consistent with a Bosniak 2 renal cyst. There is a small benign simple left renal cortical cyst. There are bilateral nonobstructing intrarenal calculi.     PELVIS:   BLADDER: The bladder is distended measuring 13.7 x 9.5 x 7.4 cm with a volume of 500 mL 11 cc.   REPRODUCTIVE ORGANS: There is no abnormality of the reproductive organs.   BOWEL: There is no bowel wall thickening, dilatation or obstruction. There is large amount of stool throughout the colon particularly in the rectum-possible fecal impaction.   VESSELS: There is marked atherosclerotic calcification of the  abdominal aorta, iliac and femoral arteries.   PERITONEUM/RETROPERITONEUM/LYMPH NODES: There is no retroperitoneal or pelvic adenopathy.  There is no ascites.   ABDOMINAL WALL: The abdominal wall is unremarkable.   BONES AND SOFT TISSUES: There is no acute osseous finding.   There is no soft tissue abnormality.       CHEST: 1.  No pulmonary embolism. 2. Bibasilar pneumonia with dense consolidation particularly in the left lower lobe.   ABDOMEN AND PELVIS: 1.  Extensive pancreatic calcifications with pancreatic ductal dilatation and beading consistent with chronic pancreatitis. 2. Multiple small cystic lesions in the pancreas which could represent small pseudocysts, small cystic neoplasms or intraductal mucinous papillary neoplasms. 3. Bilateral nonobstructing intrarenal calculi. 4. 1.4 cm Bosniak 2 left renal cyst and simple left renal cyst. 5. Distended urinary bladder with a volume of 501 cc. 6. Marked constipation and possible fecal impaction.   MACRO: None   Signed by: Erin Moreira 7/2/2025 12:29 PM Dictation workstation:   [id number]    CT abdomen pelvis w IV contrast  Result Date: 7/2/2025  Interpreted By:  Erin Moreira, STUDY: CT ANGIO CHEST FOR PULMONARY EMBOLISM; CT ABDOMEN PELVIS W IV CONTRAST;  7/2/2025 12:16 pm   INDICATION: Signs/Symptoms:sob, hypoxia, concern for undiagnosed cancer causing pe; Signs/Symptoms:abd ttp.   COMPARISON: None   ACCESSION NUMBER(S): [id number]; [id number]   ORDERING CLINICIAN: ADAN CHRISTIAN   TECHNIQUE: CT of the chest, abdomen, and pelvis was performed. Contiguous axial images were obtained at 3 mm slice thickness through the chest, abdomen and pelvis. Coronal and sagittal reconstructions at 3 mm slice thickness were performed. 75 ML of Omnipaque 350 was administered intravenously without immediate complication.   FINDINGS: CHEST:   LUNG/PLEURA/LARGE AIRWAYS: There is bibasilar pneumonia with dense consolidation in the left lower lobe that. There is no pleural  fluid. There is no pulmonary nodule.   VESSELS: There is no contrast within the thoracic aorta to evaluate for dissection. There is atherosclerotic calcification of the thoracic aorta. There is no pulmonary embolism.   HEART: The heart is unremarkable.   MEDIASTINUM AND TALIB: There is no hilar or mediastinal adenopathy.   CHEST WALL AND LOWER NECK: The chest wall is unremarkable. There is no abnormality of the lower neck.   ABDOMEN:   LIVER: There is no hepatic mass.   BILE DUCTS: There is no intrahepatic, common hepatic or common bile ductal dilatation.   GALLBLADDER: The gallbladder is unremarkable.   PANCREAS: There are extensive pancreatic calcifications. There is pancreatic ductal dilatation and beading. Findings are consistent with chronic pancreatitis. There are multiple cystic lesions in the pancreas which could represent cirrhosis, small cystic neoplasms or intraductal mucinous papillary neoplasms.   SPLEEN: The spleen is unremarkable. There is no splenic mass. There is no splenomegaly   ADRENAL GLANDS: The adrenal glands are unremarkable.   KIDNEYS AND URETERS:. The kidneys function symmetrically. There is a 1.4 cm cyst projecting posteriorly off the cortex of the left kidney with an extremely calcification consistent with a Bosniak 2 renal cyst. There is a small benign simple left renal cortical cyst. There are bilateral nonobstructing intrarenal calculi.     PELVIS:   BLADDER: The bladder is distended measuring 13.7 x 9.5 x 7.4 cm with a volume of 500 mL 11 cc.   REPRODUCTIVE ORGANS: There is no abnormality of the reproductive organs.   BOWEL: There is no bowel wall thickening, dilatation or obstruction. There is large amount of stool throughout the colon particularly in the rectum-possible fecal impaction.   VESSELS: There is marked atherosclerotic calcification of the abdominal aorta, iliac and femoral arteries.   PERITONEUM/RETROPERITONEUM/LYMPH NODES: There is no retroperitoneal or pelvic adenopathy.   There is no ascites.   ABDOMINAL WALL: The abdominal wall is unremarkable.   BONES AND SOFT TISSUES: There is no acute osseous finding.   There is no soft tissue abnormality.       CHEST: 1.  No pulmonary embolism. 2. Bibasilar pneumonia with dense consolidation particularly in the left lower lobe.   ABDOMEN AND PELVIS: 1.  Extensive pancreatic calcifications with pancreatic ductal dilatation and beading consistent with chronic pancreatitis. 2. Multiple small cystic lesions in the pancreas which could represent small pseudocysts, small cystic neoplasms or intraductal mucinous papillary neoplasms. 3. Bilateral nonobstructing intrarenal calculi. 4. 1.4 cm Bosniak 2 left renal cyst and simple left renal cyst. 5. Distended urinary bladder with a volume of 501 cc. 6. Marked constipation and possible fecal impaction.   MACRO: None   Signed by: Erin Moreira 7/2/2025 12:29 PM Dictation workstation:   [id number]    XR chest 1 view  Result Date: 7/2/2025  Interpreted By:  Shaggy Morris, STUDY: XR CHEST 1 VIEW;  7/2/2025 10:48 am   INDICATION: Signs/Symptoms:sob.   COMPARISON: CT scan abdomen and pelvis from 12/06/2023.   ACCESSION NUMBER(S): [id number]   ORDERING CLINICIAN: ADAN CHRISTIAN   TECHNIQUE: Single AP portable view of the chest was obtained.   FINDINGS: MEDIASTINUM/ LUNGS/ TALIB: No cardiomegaly, vascular congestion, or pleural effusion. Scant linear opacity horizontally oriented at the left lung base just above the left diaphragm. Calcifications throughout the thoracic aorta. No abnormal opacity in either lung worrisome for tumor or pneumonia. No pneumothorax. No tracheal deviation. No abnormal hilar fullness or gross mass on either side.   BONES: No lytic or blastic destructive bone lesion. Mild multilevel mid and distal thoracic spine endplate osteophytosis. There are also some syndesmophytes present.   UPPER ABDOMEN: Grossly intact.       Scant linear atelectasis versus scarring at the left lung base.    Arthritic changes in the thoracic spine as described.     MACRO: None   Signed by: Shaggy Morris 7/2/2025 11:01 AM Dictation workstation:   [id number]      Assessment/Plan   75yoM with PMHx of  chronic pancreatitis, T2DM insulin dependent and HTN, HLD, tobacco use who presented from home for generalized weakness and malaise, reduced oral intake and SOB over a few days as well as weight loss x 18months. Also endorsed night sweats and subjective fevers. In ED, CTA PE found to have bibasilar pneumonia with dense consolidation particularly in the left lower lobe. Also CT noteworthy for extensive pancreatic calcifications with pancreatic ductal dilatation and beading consistent with chronic pancreatitis. 2. Multiple small cystic lesions in the pancreas which could represent small pseudocysts, small cystic neoplasms or intraductal mucinous papillary neoplasms. Acute urinary retention in ED though self resolved. GI consulted given CT findings concerning for malignancy, rec MRCP which was performed and negative for mass, found to have chronic pancreatitis. Found to be strep pneumo positive and continued on rocephin.     CAP due to strep pneumo  chronic pancreatitis   T2DM insulin dependent   HTN  HLD  tobacco use   Severe Constipation vs ?possible fecal impaction  Acute urinary retention- resovled  -continue rocephin, transition to oral abx on dc for 7d total  -atypical coverage DC given negative legionella  -Duonebs q6 prn   -Weaned to room air  -continue home meds  -suppository ordered per pt request, having bowel movements though small, denies abd pain n/v  -rec tobacco cessation, will need low dose CT screening annually as currenetly smoking zain after CAP to ensure resolves  -GI consulted given CT on admission c/f malignacy, reviewed imaging with myself, no concern for malignancy, revealing chronic pancreatitis. No need for pancreatic enxymes, follow up as OP  -cont lantus 10 units, is hyperglycemic (takes lantus  5units BID at home)    IVF: not indicated  Tele: not indication  Diet: diabetic  Consults: GI  DVT ppx: Lovenox  Code status: FULL    Dispo: Likely tomorrow to home pending Pt/OT evaluations.    Complexity: high    Arti Boucher DO

## 2025-07-05 NOTE — CARE PLAN
The patient's goals for the shift include comfort ,safety    The clinical goals for the shift include See care plans/continue discharge planning

## 2025-07-06 ENCOUNTER — APPOINTMENT (OUTPATIENT)
Dept: RADIOLOGY | Facility: HOSPITAL | Age: 75
DRG: 208 | End: 2025-07-06
Payer: MEDICARE

## 2025-07-06 PROBLEM — J96.02 ACUTE RESPIRATORY FAILURE WITH HYPOXIA AND HYPERCAPNIA: Status: ACTIVE | Noted: 2025-07-06

## 2025-07-06 PROBLEM — J96.01 ACUTE RESPIRATORY FAILURE WITH HYPOXIA AND HYPERCAPNIA: Status: ACTIVE | Noted: 2025-07-06

## 2025-07-06 LAB
ALBUMIN SERPL BCP-MCNC: 3.5 G/DL (ref 3.4–5)
ALBUMIN SERPL BCP-MCNC: 3.8 G/DL (ref 3.4–5)
ALP SERPL-CCNC: 71 U/L (ref 33–136)
ALP SERPL-CCNC: 77 U/L (ref 33–136)
ALT SERPL W P-5'-P-CCNC: 12 U/L (ref 10–52)
ALT SERPL W P-5'-P-CCNC: 15 U/L (ref 10–52)
ANION GAP BLDA CALCULATED.4IONS-SCNC: 11 MMO/L (ref 10–25)
ANION GAP BLDA CALCULATED.4IONS-SCNC: 13 MMO/L (ref 10–25)
ANION GAP SERPL CALC-SCNC: 13 MMOL/L (ref 10–20)
ANION GAP SERPL CALC-SCNC: 14 MMOL/L (ref 10–20)
ANION GAP SERPL CALC-SCNC: 17 MMOL/L (ref 10–20)
APPEARANCE UR: CLEAR
APTT PPP: 33 SECONDS (ref 26–36)
ARTERIAL PATENCY WRIST A: ABNORMAL
ARTERIAL PATENCY WRIST A: POSITIVE
ARTERIAL PATENCY WRIST A: POSITIVE
AST SERPL W P-5'-P-CCNC: 10 U/L (ref 9–39)
AST SERPL W P-5'-P-CCNC: 11 U/L (ref 9–39)
ATRIAL RATE: 99 BPM
BACTERIA #/AREA URNS AUTO: ABNORMAL /HPF
BACTERIA BLD CULT: NORMAL
BACTERIA BLD CULT: NORMAL
BASE EXCESS BLDA CALC-SCNC: -2.9 MMOL/L (ref -2–3)
BASE EXCESS BLDA CALC-SCNC: -2.9 MMOL/L (ref -2–3)
BASE EXCESS BLDA CALC-SCNC: -3.1 MMOL/L (ref -2–3)
BASE EXCESS BLDA CALC-SCNC: -5.1 MMOL/L (ref -2–3)
BASE EXCESS BLDA CALC-SCNC: -6.6 MMOL/L (ref -2–3)
BASOPHILS # BLD AUTO: 0.06 X10*3/UL (ref 0–0.1)
BASOPHILS NFR BLD AUTO: 0.4 %
BILIRUB SERPL-MCNC: 0.4 MG/DL (ref 0–1.2)
BILIRUB SERPL-MCNC: 0.5 MG/DL (ref 0–1.2)
BILIRUB UR STRIP.AUTO-MCNC: NEGATIVE MG/DL
BNP SERPL-MCNC: 79 PG/ML (ref 0–99)
BODY TEMPERATURE: ABNORMAL
BUN SERPL-MCNC: 24 MG/DL (ref 6–23)
BUN SERPL-MCNC: 25 MG/DL (ref 6–23)
BUN SERPL-MCNC: 29 MG/DL (ref 6–23)
CA-I BLDA-SCNC: 1.23 MMOL/L (ref 1.1–1.33)
CA-I BLDA-SCNC: 1.3 MMOL/L (ref 1.1–1.33)
CALCIUM SERPL-MCNC: 9 MG/DL (ref 8.6–10.3)
CALCIUM SERPL-MCNC: 9.2 MG/DL (ref 8.6–10.3)
CALCIUM SERPL-MCNC: 9.4 MG/DL (ref 8.6–10.3)
CARDIAC TROPONIN I PNL SERPL HS: 13 NG/L (ref 0–20)
CHLORIDE BLDA-SCNC: 100 MMOL/L (ref 98–107)
CHLORIDE BLDA-SCNC: 101 MMOL/L (ref 98–107)
CHLORIDE SERPL-SCNC: 100 MMOL/L (ref 98–107)
CHLORIDE SERPL-SCNC: 102 MMOL/L (ref 98–107)
CHLORIDE SERPL-SCNC: 99 MMOL/L (ref 98–107)
CO2 SERPL-SCNC: 23 MMOL/L (ref 21–32)
CO2 SERPL-SCNC: 23 MMOL/L (ref 21–32)
CO2 SERPL-SCNC: 24 MMOL/L (ref 21–32)
COLOR UR: YELLOW
CREAT SERPL-MCNC: 1.12 MG/DL (ref 0.5–1.3)
CREAT SERPL-MCNC: 1.19 MG/DL (ref 0.5–1.3)
CREAT SERPL-MCNC: 1.4 MG/DL (ref 0.5–1.3)
CRITICAL CALL TIME: 1202
CRITICAL CALL TIME: 1500
CRITICAL CALLED BY: ABNORMAL
CRITICAL CALLED BY: ABNORMAL
CRITICAL CALLED TO: ABNORMAL
CRITICAL CALLED TO: ABNORMAL
CRITICAL READ BACK: ABNORMAL
CRITICAL READ BACK: ABNORMAL
D DIMER PPP FEU-MCNC: 1311 NG/ML FEU
EGFRCR SERPLBLD CKD-EPI 2021: 53 ML/MIN/1.73M*2
EGFRCR SERPLBLD CKD-EPI 2021: 64 ML/MIN/1.73M*2
EGFRCR SERPLBLD CKD-EPI 2021: 69 ML/MIN/1.73M*2
EOSINOPHIL # BLD AUTO: 0.28 X10*3/UL (ref 0–0.4)
EOSINOPHIL NFR BLD AUTO: 2.1 %
ERYTHROCYTE [DISTWIDTH] IN BLOOD BY AUTOMATED COUNT: 12.6 % (ref 11.5–14.5)
GLUCOSE BLD MANUAL STRIP-MCNC: 147 MG/DL (ref 74–99)
GLUCOSE BLD MANUAL STRIP-MCNC: 154 MG/DL (ref 74–99)
GLUCOSE BLD MANUAL STRIP-MCNC: 257 MG/DL (ref 74–99)
GLUCOSE BLD MANUAL STRIP-MCNC: 263 MG/DL (ref 74–99)
GLUCOSE BLD MANUAL STRIP-MCNC: 284 MG/DL (ref 74–99)
GLUCOSE BLDA-MCNC: 269 MG/DL (ref 74–99)
GLUCOSE BLDA-MCNC: 290 MG/DL (ref 74–99)
GLUCOSE SERPL-MCNC: 150 MG/DL (ref 74–99)
GLUCOSE SERPL-MCNC: 212 MG/DL (ref 74–99)
GLUCOSE SERPL-MCNC: 256 MG/DL (ref 74–99)
GLUCOSE UR STRIP.AUTO-MCNC: ABNORMAL MG/DL
HCO3 BLDA-SCNC: 22.6 MMOL/L (ref 22–26)
HCO3 BLDA-SCNC: 22.6 MMOL/L (ref 22–26)
HCO3 BLDA-SCNC: 23 MMOL/L (ref 22–26)
HCO3 BLDA-SCNC: 23.6 MMOL/L (ref 22–26)
HCO3 BLDA-SCNC: 24 MMOL/L (ref 22–26)
HCT VFR BLD AUTO: 40.8 % (ref 41–52)
HCT VFR BLD EST: 42 % (ref 41–52)
HCT VFR BLD EST: 44 % (ref 41–52)
HGB BLD-MCNC: 14.1 G/DL (ref 13.5–17.5)
HGB BLDA-MCNC: 13.9 G/DL (ref 13.5–17.5)
HGB BLDA-MCNC: 14.5 G/DL (ref 13.5–17.5)
HOLD SPECIMEN: NORMAL
IMM GRANULOCYTES # BLD AUTO: 0.17 X10*3/UL (ref 0–0.5)
IMM GRANULOCYTES NFR BLD AUTO: 1.3 % (ref 0–0.9)
INHALED O2 CONCENTRATION: 100 %
INHALED O2 CONCENTRATION: 90 %
INR PPP: 1 (ref 0.9–1.1)
KETONES UR STRIP.AUTO-MCNC: NEGATIVE MG/DL
LACTATE BLDA-SCNC: 1.1 MMOL/L (ref 0.4–2)
LACTATE BLDA-SCNC: 1.5 MMOL/L (ref 0.4–2)
LEUKOCYTE ESTERASE UR QL STRIP.AUTO: NEGATIVE
LYMPHOCYTES # BLD AUTO: 2.89 X10*3/UL (ref 0.8–3)
LYMPHOCYTES NFR BLD AUTO: 21.3 %
MAGNESIUM SERPL-MCNC: 1.94 MG/DL (ref 1.6–2.4)
MAGNESIUM SERPL-MCNC: 2.05 MG/DL (ref 1.6–2.4)
MAGNESIUM SERPL-MCNC: 2.05 MG/DL (ref 1.6–2.4)
MCH RBC QN AUTO: 35 PG (ref 26–34)
MCHC RBC AUTO-ENTMCNC: 34.6 G/DL (ref 32–36)
MCV RBC AUTO: 101 FL (ref 80–100)
MONOCYTES # BLD AUTO: 0.48 X10*3/UL (ref 0.05–0.8)
MONOCYTES NFR BLD AUTO: 3.5 %
MUCOUS THREADS #/AREA URNS AUTO: ABNORMAL /LPF
NEUTROPHILS # BLD AUTO: 9.67 X10*3/UL (ref 1.6–5.5)
NEUTROPHILS NFR BLD AUTO: 71.4 %
NITRITE UR QL STRIP.AUTO: NEGATIVE
NRBC BLD-RTO: 0 /100 WBCS (ref 0–0)
OXYHGB MFR BLDA: 93.2 % (ref 94–98)
OXYHGB MFR BLDA: 94.2 % (ref 94–98)
OXYHGB MFR BLDA: 95.7 % (ref 94–98)
OXYHGB MFR BLDA: 97.2 % (ref 94–98)
OXYHGB MFR BLDA: 97.2 % (ref 94–98)
P AXIS: 77 DEGREES
P OFFSET: 192 MS
P ONSET: 104 MS
PCO2 BLDA: 41 MM HG (ref 38–42)
PCO2 BLDA: 41 MM HG (ref 38–42)
PCO2 BLDA: 50 MM HG (ref 38–42)
PCO2 BLDA: 59 MM HG (ref 38–42)
PCO2 BLDA: 63 MM HG (ref 38–42)
PEEP CMH2O: 10 CM H2O
PH BLDA: 7.17 PH (ref 7.38–7.42)
PH BLDA: 7.21 PH (ref 7.38–7.42)
PH BLDA: 7.29 PH (ref 7.38–7.42)
PH BLDA: 7.35 PH (ref 7.38–7.42)
PH BLDA: 7.35 PH (ref 7.38–7.42)
PH UR STRIP.AUTO: 5.5 [PH]
PHOSPHATE SERPL-MCNC: 4.5 MG/DL (ref 2.5–4.9)
PLATELET # BLD AUTO: 232 X10*3/UL (ref 150–450)
PO2 BLDA: 137 MM HG (ref 85–95)
PO2 BLDA: 137 MM HG (ref 85–95)
PO2 BLDA: 67 MM HG (ref 85–95)
PO2 BLDA: 87 MM HG (ref 85–95)
PO2 BLDA: 99 MM HG (ref 85–95)
POTASSIUM BLDA-SCNC: 3.5 MMOL/L (ref 3.5–5.3)
POTASSIUM BLDA-SCNC: 4.7 MMOL/L (ref 3.5–5.3)
POTASSIUM SERPL-SCNC: 3.4 MMOL/L (ref 3.5–5.3)
POTASSIUM SERPL-SCNC: 3.5 MMOL/L (ref 3.5–5.3)
POTASSIUM SERPL-SCNC: 4.7 MMOL/L (ref 3.5–5.3)
PR INTERVAL: 176 MS
PROT SERPL-MCNC: 6.4 G/DL (ref 6.4–8.2)
PROT SERPL-MCNC: 7.3 G/DL (ref 6.4–8.2)
PROT UR STRIP.AUTO-MCNC: ABNORMAL MG/DL
PROTHROMBIN TIME: 11.4 SECONDS (ref 9.8–12.4)
Q ONSET: 218 MS
QRS COUNT: 16 BEATS
QRS DURATION: 82 MS
QT INTERVAL: 350 MS
QTC CALCULATION(BAZETT): 449 MS
QTC FREDERICIA: 413 MS
R AXIS: -48 DEGREES
RBC # BLD AUTO: 4.03 X10*6/UL (ref 4.5–5.9)
RBC # UR STRIP.AUTO: ABNORMAL MG/DL
RBC #/AREA URNS AUTO: ABNORMAL /HPF
SAO2 % BLDA: 95 % (ref 94–100)
SAO2 % BLDA: 96 % (ref 94–100)
SAO2 % BLDA: 98 % (ref 94–100)
SAO2 % BLDA: 99 % (ref 94–100)
SAO2 % BLDA: 99 % (ref 94–100)
SITE OF ARTERIAL PUNCTURE: ABNORMAL
SODIUM BLDA-SCNC: 129 MMOL/L (ref 136–145)
SODIUM BLDA-SCNC: 133 MMOL/L (ref 136–145)
SODIUM SERPL-SCNC: 134 MMOL/L (ref 136–145)
SODIUM SERPL-SCNC: 134 MMOL/L (ref 136–145)
SODIUM SERPL-SCNC: 135 MMOL/L (ref 136–145)
SP GR UR STRIP.AUTO: 1.02
SQUAMOUS #/AREA URNS AUTO: ABNORMAL /HPF
T AXIS: 109 DEGREES
T OFFSET: 393 MS
TEST COMMENT: ABNORMAL
TIDAL VOLUME: 400 ML
UROBILINOGEN UR STRIP.AUTO-MCNC: ABNORMAL MG/DL
VENTILATOR MODE: ABNORMAL
VENTILATOR RATE: 118 BPM
VENTILATOR RATE: 14 BPM
VENTILATOR RATE: 22 BPM
VENTILATOR RATE: 24 BPM
VENTILATOR RATE: 24 BPM
VENTRICULAR RATE: 99 BPM
WBC # BLD AUTO: 13.6 X10*3/UL (ref 4.4–11.3)
WBC #/AREA URNS AUTO: ABNORMAL /HPF

## 2025-07-06 PROCEDURE — 82810 BLOOD GASES O2 SAT ONLY: CPT | Performed by: STUDENT IN AN ORGANIZED HEALTH CARE EDUCATION/TRAINING PROGRAM

## 2025-07-06 PROCEDURE — 2500000001 HC RX 250 WO HCPCS SELF ADMINISTERED DRUGS (ALT 637 FOR MEDICARE OP)

## 2025-07-06 PROCEDURE — 80053 COMPREHEN METABOLIC PANEL: CPT

## 2025-07-06 PROCEDURE — 5A1945Z RESPIRATORY VENTILATION, 24-96 CONSECUTIVE HOURS: ICD-10-PCS | Performed by: INTERNAL MEDICINE

## 2025-07-06 PROCEDURE — 2500000005 HC RX 250 GENERAL PHARMACY W/O HCPCS: Performed by: STUDENT IN AN ORGANIZED HEALTH CARE EDUCATION/TRAINING PROGRAM

## 2025-07-06 PROCEDURE — 2500000004 HC RX 250 GENERAL PHARMACY W/ HCPCS (ALT 636 FOR OP/ED)

## 2025-07-06 PROCEDURE — 84100 ASSAY OF PHOSPHORUS: CPT

## 2025-07-06 PROCEDURE — 2500000002 HC RX 250 W HCPCS SELF ADMINISTERED DRUGS (ALT 637 FOR MEDICARE OP, ALT 636 FOR OP/ED)

## 2025-07-06 PROCEDURE — 84132 ASSAY OF SERUM POTASSIUM: CPT

## 2025-07-06 PROCEDURE — 85025 COMPLETE CBC W/AUTO DIFF WBC: CPT

## 2025-07-06 PROCEDURE — 2500000005 HC RX 250 GENERAL PHARMACY W/O HCPCS

## 2025-07-06 PROCEDURE — 94640 AIRWAY INHALATION TREATMENT: CPT

## 2025-07-06 PROCEDURE — 99291 CRITICAL CARE FIRST HOUR: CPT

## 2025-07-06 PROCEDURE — 85610 PROTHROMBIN TIME: CPT

## 2025-07-06 PROCEDURE — 71045 X-RAY EXAM CHEST 1 VIEW: CPT | Performed by: RADIOLOGY

## 2025-07-06 PROCEDURE — 81001 URINALYSIS AUTO W/SCOPE: CPT

## 2025-07-06 PROCEDURE — 85730 THROMBOPLASTIN TIME PARTIAL: CPT

## 2025-07-06 PROCEDURE — 83735 ASSAY OF MAGNESIUM: CPT

## 2025-07-06 PROCEDURE — 2500000004 HC RX 250 GENERAL PHARMACY W/ HCPCS (ALT 636 FOR OP/ED): Performed by: STUDENT IN AN ORGANIZED HEALTH CARE EDUCATION/TRAINING PROGRAM

## 2025-07-06 PROCEDURE — 74177 CT ABD & PELVIS W/CONTRAST: CPT | Performed by: STUDENT IN AN ORGANIZED HEALTH CARE EDUCATION/TRAINING PROGRAM

## 2025-07-06 PROCEDURE — 36415 COLL VENOUS BLD VENIPUNCTURE: CPT

## 2025-07-06 PROCEDURE — 71045 X-RAY EXAM CHEST 1 VIEW: CPT

## 2025-07-06 PROCEDURE — 87040 BLOOD CULTURE FOR BACTERIA: CPT | Mod: STJLAB

## 2025-07-06 PROCEDURE — 31500 INSERT EMERGENCY AIRWAY: CPT | Performed by: STUDENT IN AN ORGANIZED HEALTH CARE EDUCATION/TRAINING PROGRAM

## 2025-07-06 PROCEDURE — 84484 ASSAY OF TROPONIN QUANT: CPT

## 2025-07-06 PROCEDURE — 94799 UNLISTED PULMONARY SVC/PX: CPT

## 2025-07-06 PROCEDURE — 82947 ASSAY GLUCOSE BLOOD QUANT: CPT

## 2025-07-06 PROCEDURE — 0BH17EZ INSERTION OF ENDOTRACHEAL AIRWAY INTO TRACHEA, VIA NATURAL OR ARTIFICIAL OPENING: ICD-10-PCS | Performed by: INTERNAL MEDICINE

## 2025-07-06 PROCEDURE — 71275 CT ANGIOGRAPHY CHEST: CPT | Performed by: STUDENT IN AN ORGANIZED HEALTH CARE EDUCATION/TRAINING PROGRAM

## 2025-07-06 PROCEDURE — 36600 WITHDRAWAL OF ARTERIAL BLOOD: CPT

## 2025-07-06 PROCEDURE — 82805 BLOOD GASES W/O2 SATURATION: CPT

## 2025-07-06 PROCEDURE — 82810 BLOOD GASES O2 SAT ONLY: CPT

## 2025-07-06 PROCEDURE — 85379 FIBRIN DEGRADATION QUANT: CPT

## 2025-07-06 PROCEDURE — 83880 ASSAY OF NATRIURETIC PEPTIDE: CPT

## 2025-07-06 PROCEDURE — 2020000001 HC ICU ROOM DAILY

## 2025-07-06 PROCEDURE — 2550000001 HC RX 255 CONTRASTS: Performed by: STUDENT IN AN ORGANIZED HEALTH CARE EDUCATION/TRAINING PROGRAM

## 2025-07-06 PROCEDURE — 94002 VENT MGMT INPAT INIT DAY: CPT

## 2025-07-06 PROCEDURE — 74018 RADEX ABDOMEN 1 VIEW: CPT | Performed by: RADIOLOGY

## 2025-07-06 PROCEDURE — 87081 CULTURE SCREEN ONLY: CPT | Mod: STJLAB

## 2025-07-06 RX ORDER — VANCOMYCIN HYDROCHLORIDE 500 MG/100ML
500 INJECTION, SOLUTION INTRAVENOUS EVERY 12 HOURS
Status: DISPENSED | OUTPATIENT
Start: 2025-07-06

## 2025-07-06 RX ORDER — PANTOPRAZOLE SODIUM 40 MG/10ML
40 INJECTION, POWDER, LYOPHILIZED, FOR SOLUTION INTRAVENOUS
Status: ACTIVE | OUTPATIENT
Start: 2025-07-06

## 2025-07-06 RX ORDER — DEXMEDETOMIDINE HYDROCHLORIDE 4 UG/ML
0-1.5 INJECTION, SOLUTION INTRAVENOUS CONTINUOUS
Status: ACTIVE | OUTPATIENT
Start: 2025-07-06

## 2025-07-06 RX ORDER — NITROGLYCERIN 0.4 MG/1
0.4 TABLET SUBLINGUAL ONCE
Status: DISCONTINUED | OUTPATIENT
Start: 2025-07-06 | End: 2025-07-06

## 2025-07-06 RX ORDER — ROCURONIUM BROMIDE 10 MG/ML
INJECTION, SOLUTION INTRAVENOUS CODE/TRAUMA/SEDATION MEDICATION
Status: COMPLETED | OUTPATIENT
Start: 2025-07-06 | End: 2025-07-06

## 2025-07-06 RX ORDER — PROPOFOL 10 MG/ML
5-50 INJECTION, EMULSION INTRAVENOUS CONTINUOUS
Status: DISCONTINUED | OUTPATIENT
Start: 2025-07-06 | End: 2025-07-06

## 2025-07-06 RX ORDER — VANCOMYCIN HYDROCHLORIDE 1 G/20ML
INJECTION, POWDER, LYOPHILIZED, FOR SOLUTION INTRAVENOUS DAILY PRN
Status: DISPENSED | OUTPATIENT
Start: 2025-07-06

## 2025-07-06 RX ORDER — PANTOPRAZOLE SODIUM 40 MG/1
40 TABLET, DELAYED RELEASE ORAL
Status: DISCONTINUED | OUTPATIENT
Start: 2025-07-07 | End: 2025-07-06

## 2025-07-06 RX ORDER — NITROGLYCERIN 20 MG/100ML
5 INJECTION INTRAVENOUS CONTINUOUS
Status: DISCONTINUED | OUTPATIENT
Start: 2025-07-06 | End: 2025-07-06

## 2025-07-06 RX ORDER — PANTOPRAZOLE SODIUM 40 MG/10ML
40 INJECTION, POWDER, LYOPHILIZED, FOR SOLUTION INTRAVENOUS
Status: DISCONTINUED | OUTPATIENT
Start: 2025-07-07 | End: 2025-07-06

## 2025-07-06 RX ORDER — FUROSEMIDE 10 MG/ML
40 INJECTION INTRAMUSCULAR; INTRAVENOUS ONCE
Status: COMPLETED | OUTPATIENT
Start: 2025-07-06 | End: 2025-07-06

## 2025-07-06 RX ORDER — ETOMIDATE 2 MG/ML
INJECTION INTRAVENOUS CODE/TRAUMA/SEDATION MEDICATION
Status: COMPLETED | OUTPATIENT
Start: 2025-07-06 | End: 2025-07-06

## 2025-07-06 RX ORDER — DEXMEDETOMIDINE HYDROCHLORIDE 4 UG/ML
INJECTION, SOLUTION INTRAVENOUS
Status: COMPLETED
Start: 2025-07-06 | End: 2025-07-06

## 2025-07-06 RX ORDER — VANCOMYCIN HYDROCHLORIDE 1 G/20ML
INJECTION, POWDER, LYOPHILIZED, FOR SOLUTION INTRAVENOUS DAILY PRN
Status: DISCONTINUED | OUTPATIENT
Start: 2025-07-06 | End: 2025-07-06

## 2025-07-06 RX ORDER — INSULIN LISPRO 100 [IU]/ML
0-10 INJECTION, SOLUTION INTRAVENOUS; SUBCUTANEOUS EVERY 4 HOURS
Status: DISPENSED | OUTPATIENT
Start: 2025-07-06

## 2025-07-06 RX ORDER — FENTANYL CITRATE-0.9 % NACL/PF 10 MCG/ML
0-300 PLASTIC BAG, INJECTION (ML) INTRAVENOUS CONTINUOUS
Status: DISPENSED | OUTPATIENT
Start: 2025-07-06

## 2025-07-06 RX ORDER — DOCUSATE SODIUM 50 MG/5ML
100 LIQUID ORAL 2 TIMES DAILY
Status: DISPENSED | OUTPATIENT
Start: 2025-07-06

## 2025-07-06 RX ORDER — HYDROXYZINE HYDROCHLORIDE 25 MG/1
25 TABLET, FILM COATED ORAL ONCE
Status: DISCONTINUED | OUTPATIENT
Start: 2025-07-06 | End: 2025-07-06

## 2025-07-06 RX ORDER — BISACODYL 10 MG/1
10 SUPPOSITORY RECTAL DAILY PRN
Status: ACTIVE | OUTPATIENT
Start: 2025-07-06

## 2025-07-06 RX ADMIN — DOCUSATE SODIUM 100 MG: 100 CAPSULE, LIQUID FILLED ORAL at 10:19

## 2025-07-06 RX ADMIN — Medication 90 PERCENT: at 17:09

## 2025-07-06 RX ADMIN — PIPERACILLIN SODIUM AND TAZOBACTAM SODIUM 4.5 G: 4; .5 INJECTION, SOLUTION INTRAVENOUS at 16:32

## 2025-07-06 RX ADMIN — VITAM B12 100 MCG: 100 TAB at 10:19

## 2025-07-06 RX ADMIN — INSULIN LISPRO 6 UNITS: 100 INJECTION, SOLUTION INTRAVENOUS; SUBCUTANEOUS at 17:07

## 2025-07-06 RX ADMIN — METOPROLOL TARTRATE 25 MG: 25 TABLET, FILM COATED ORAL at 10:20

## 2025-07-06 RX ADMIN — IPRATROPIUM BROMIDE AND ALBUTEROL SULFATE 3 ML: 2.5; .5 SOLUTION RESPIRATORY (INHALATION) at 10:16

## 2025-07-06 RX ADMIN — TAMSULOSIN HYDROCHLORIDE 0.4 MG: 0.4 CAPSULE ORAL at 10:19

## 2025-07-06 RX ADMIN — ROCURONIUM BROMIDE 50 MG: 10 INJECTION INTRAVENOUS at 11:21

## 2025-07-06 RX ADMIN — LIDOCAINE 4% 1 PATCH: 40 PATCH TOPICAL at 10:18

## 2025-07-06 RX ADMIN — LOSARTAN POTASSIUM 100 MG: 100 TABLET, FILM COATED ORAL at 10:18

## 2025-07-06 RX ADMIN — Medication 100 PERCENT: at 21:04

## 2025-07-06 RX ADMIN — ENOXAPARIN SODIUM 40 MG: 100 INJECTION SUBCUTANEOUS at 16:31

## 2025-07-06 RX ADMIN — Medication 25 MCG/HR: at 12:06

## 2025-07-06 RX ADMIN — PROPOFOL 5 MCG/KG/MIN: 10 INJECTION, EMULSION INTRAVENOUS at 12:04

## 2025-07-06 RX ADMIN — FUROSEMIDE 40 MG: 10 INJECTION, SOLUTION INTRAMUSCULAR; INTRAVENOUS at 11:03

## 2025-07-06 RX ADMIN — VANCOMYCIN HYDROCHLORIDE 500 MG: 500 INJECTION, SOLUTION INTRAVENOUS at 16:32

## 2025-07-06 RX ADMIN — DEXMEDETOMIDINE HYDROCHLORIDE 0.2 MCG/KG/HR: 4 INJECTION, SOLUTION INTRAVENOUS at 15:17

## 2025-07-06 RX ADMIN — AMLODIPINE BESYLATE 10 MG: 10 TABLET ORAL at 06:03

## 2025-07-06 RX ADMIN — POLYETHYLENE GLYCOL 3350 17 G: 17 POWDER, FOR SOLUTION ORAL at 10:18

## 2025-07-06 RX ADMIN — INSULIN LISPRO 6 UNITS: 100 INJECTION, SOLUTION INTRAVENOUS; SUBCUTANEOUS at 21:55

## 2025-07-06 RX ADMIN — ETOMIDATE 20 MG: 20 INJECTION, SOLUTION INTRAVENOUS at 11:21

## 2025-07-06 RX ADMIN — IPRATROPIUM BROMIDE AND ALBUTEROL SULFATE 3 ML: 2.5; .5 SOLUTION RESPIRATORY (INHALATION) at 17:05

## 2025-07-06 RX ADMIN — IOHEXOL 70 ML: 350 INJECTION, SOLUTION INTRAVENOUS at 14:22

## 2025-07-06 RX ADMIN — DEXMEDETOMIDINE HYDROCHLORIDE 0.2 MCG/KG/HR: 400 INJECTION INTRAVENOUS at 15:17

## 2025-07-06 RX ADMIN — Medication 150 MCG/HR: at 17:06

## 2025-07-06 RX ADMIN — METOPROLOL TARTRATE 25 MG: 25 TABLET, FILM COATED ORAL at 21:55

## 2025-07-06 RX ADMIN — DOCUSATE SODIUM LIQUID 100 MG: 100 LIQUID ORAL at 23:11

## 2025-07-06 ASSESSMENT — RESPIRATORY DISTRESS OBSERVATION SCALE (RDOS)
GRUNTING AT END OF EXPIRATION: 0 - NONE
RDOS TOTAL SCORE: 6
INVOLUNTARY NASAL FLARING: 0 - NONE
LOOK OF FEAR: 0 - NONE
INVOLUNTARY NASAL FLARING: 0 - NONE
ACCESSORY MUSCLE RISE IN CLAVICLE DURING INSPIRATION: 1 - SLIGHT RISE
RESPIRATORY RATE PER MINUTE: 0 - <19 BREATHS
PARADOXICAL BREATHING PATTERN: 0 - NONE
RESTLESS NONPURPOSEFUL MOVEMENTS: 0 - NONE
ACCESSORY MUSCLE RISE IN CLAVICLE DURING INSPIRATION: 1 - SLIGHT RISE
HEART RATE PER MINUTE: 2 - >109 BEATS
INVOLUNTARY NASAL FLARING: 0 - NONE
PARADOXICAL BREATHING PATTERN: 0 - NONE
RDOS TOTAL SCORE: 3
LOOK OF FEAR: 2 - EYES WIDE OPEN, FACIAL MUSCLES TENSE, BROW FURROWED, MOUTH OPEN
PARADOXICAL BREATHING PATTERN: 0 - NONE
HEART RATE PER MINUTE: 1 - 90-109 BEATS
RESTLESS NONPURPOSEFUL MOVEMENTS: 1 - OCCASIONAL, SLIGHT MOVEMENTS
RESPIRATORY RATE PER MINUTE: 1 - 19-30 BREATHS
ACCESSORY MUSCLE RISE IN CLAVICLE DURING INSPIRATION: 1 - SLIGHT RISE
HEART RATE PER MINUTE: 2 - >109 BEATS
GRUNTING AT END OF EXPIRATION: 0 - NONE
GRUNTING AT END OF EXPIRATION: 0 - NONE
RESTLESS NONPURPOSEFUL MOVEMENTS: 1 - OCCASIONAL, SLIGHT MOVEMENTS
LOOK OF FEAR: 2 - EYES WIDE OPEN, FACIAL MUSCLES TENSE, BROW FURROWED, MOUTH OPEN
RESPIRATORY RATE PER MINUTE: 2 - >30 BREATHS
RDOS TOTAL SCORE: 8

## 2025-07-06 ASSESSMENT — COGNITIVE AND FUNCTIONAL STATUS - GENERAL
DAILY ACTIVITIY SCORE: 6
TURNING FROM BACK TO SIDE WHILE IN FLAT BAD: TOTAL
WALKING IN HOSPITAL ROOM: TOTAL
DRESSING REGULAR LOWER BODY CLOTHING: TOTAL
CLIMB 3 TO 5 STEPS WITH RAILING: TOTAL
TOILETING: TOTAL
MOBILITY SCORE: 6
HELP NEEDED FOR BATHING: TOTAL
DRESSING REGULAR UPPER BODY CLOTHING: TOTAL
MOVING FROM LYING ON BACK TO SITTING ON SIDE OF FLAT BED WITH BEDRAILS: TOTAL
STANDING UP FROM CHAIR USING ARMS: TOTAL
EATING MEALS: TOTAL
PERSONAL GROOMING: TOTAL
MOVING TO AND FROM BED TO CHAIR: TOTAL

## 2025-07-06 ASSESSMENT — PAIN SCALES - GENERAL
PAINLEVEL_OUTOF10: 0 - NO PAIN
PAINLEVEL_OUTOF10: 5 - MODERATE PAIN
PAINLEVEL_OUTOF10: 0 - NO PAIN

## 2025-07-06 ASSESSMENT — PAIN - FUNCTIONAL ASSESSMENT
PAIN_FUNCTIONAL_ASSESSMENT: 0-10
PAIN_FUNCTIONAL_ASSESSMENT: CPOT (CRITICAL CARE PAIN OBSERVATION TOOL)
PAIN_FUNCTIONAL_ASSESSMENT: CPOT (CRITICAL CARE PAIN OBSERVATION TOOL)

## 2025-07-06 ASSESSMENT — PAIN DESCRIPTION - DESCRIPTORS: DESCRIPTORS: ACHING

## 2025-07-06 ASSESSMENT — PAIN DESCRIPTION - LOCATION: LOCATION: SHOULDER

## 2025-07-06 ASSESSMENT — PAIN DESCRIPTION - ORIENTATION: ORIENTATION: LEFT

## 2025-07-06 NOTE — PROGRESS NOTES
Vancomycin Dosing by Pharmacy- INITIAL    Jorge A Madden is a 75 y.o. year old male who Pharmacy has been consulted for vancomycin dosing for pneumonia. Based on the patient's indication and renal status this patient will be dosed based on a goal AUC of 400-600.     Renal function is currently stable.    Visit Vitals  BP (!) 155/97   Pulse 104   Temp 36.4 °C (97.5 °F) (Temporal)   Resp 19        Lab Results   Component Value Date    CREATININE 1.12 2025    CREATININE 1.10 2025    CREATININE 1.01 2025    CREATININE 1.01 2025    CREATININE 1.31 (H) 2025        Patient weight is as follows:   Vitals:    25 1836   Weight: 49.4 kg (109 lb)       Cultures:  No results found for the encounter in last 14 days.        I/O last 3 completed shifts:  In: 450 (9.1 mL/kg) [P.O.:400; IV Piggyback:50]  Out: 775 (15.7 mL/kg) [Urine:775 (0.4 mL/kg/hr)]  Weight: 49.4 kg   I/O during current shift:  I/O this shift:  In: -   Out: 300 [Urine:300]    Temp (24hrs), Av.7 °C (98.1 °F), Min:36.4 °C (97.5 °F), Max:37.7 °C (99.9 °F)         Assessment/Plan     Patient will not be given a loading dose.  Will initiate vancomycin maintenance, 500 mg every 12 hours.    This dosing regimen is predicted by [employer] to result in the following pharmacokinetic parameters:    Loading dose: N/A  Regimen: 500 mg IV every 12 hours.  Start time: 12:09 on 2025  Exposure target: AUC24 (range) 400-600 mg/L.hr   [id number]: 332 mg/L.hr  AUC24,ss: 515 mg/L.hr  Probability of AUC24 > 400: 76 %  Ctrough,ss: 17.5 mg/L  Probability of Ctrough,ss > 20: 38 %      Follow-up level will be ordered on  at 0900 unless clinically indicated sooner.  Will continue to monitor renal function daily while on vancomycin and order serum creatinine at least every 48 hours if not already ordered.  Follow for continued vancomycin needs, clinical response, and signs/symptoms of toxicity.       Suleman Rogers, KeeganD

## 2025-07-06 NOTE — SIGNIFICANT EVENT
Rapid Response Note      Subjective:  This is a 75-year-old male with PMHx significant for pancreatitis, IDDM 2, HTN, and dyslipidemia.  He originally presented from home to the ED for weakness, generalized malaise, poor oral intake, and SOB over the several days prior.  Upon arrival to the ED, patient was found to have pneumonia with oxygen requirements, acute urinary retention, and a pancreatic lesion (possible cyst) that needs further evaluation.  He was cachectic on exam and with soft subjective fevers and cold sweats there was concern for occult malignancy.  He was started on Rocephin and azithromycin.    Objective:  At 1050 on 07/06/2025, rapid response was called for hypoxemia in the 70s.  Upon seeing the patient, patient was found to be in NSR with HR 95.  Blood pressure measurement was 224/115.  Patient was originally placed on 2 L nasal cannula that was subsequently increased to 3 L then 6 L then placed on Oxymizer then placed on nonrebreather with minimal improvement in oxygen saturation.  Hovered between 77 to 84% oxygen.    Physical exam:  Constitutional: Patient was in severe distress  Cardiac: Normal sinus rhythm, no murmurs rubs or gallops appreciated on auscultation  Pulmonary: Rhonchi and crackles heard throughout posterior upper and middle lung fields bilaterally, lower lung fields bilaterally were not able to be auscultated due to patient positioning  MSK: No evidence of pitting edema in the lower extremities    Assessment/plan:  Given this patient's consolation of symptoms as well as acute decompensation, most likely diagnosis would include flash pulmonary edema, however, other differentials include pneumothorax or hemothorax.    # Flash pulmonary edema  # Acute hypoxemic respiratory failure  - CXR ordered, on visual examination appeared to have slightly more interstitial fluid as compared to prior CXR, no overt signs for groundglass opacities  - Because blood pressure continued to remain  hypertensive (216/112 => 211/112), patient was given sublingual nitroglycerin  - Due to persistent hypoxemia, trial of BiPAP was initiated => BiPAP was not able to resolve hypoxemia  - Patient was subsequently intubated, transferred to ICU

## 2025-07-06 NOTE — CONSULTS
Subjective   Patient is a 75 y.o. male admitted on 7/2/2025 10:01 AM    History & Physical:  Jorge A Madden is a 75 y.o. male with past medical history of pancreatitis, T2DM insulin dependent and HTN, HLD who presented to the ED on 7/2/2025 with generalized weakness, poor oral intake, and shortness of breath.  He was hypoglycemic in the ED to 45, given 1 amp of D50 with repeat to 96.  His potassium was repleted.  Troponins were normal.  Chest x-ray with atelectasis versus scarring of the left lung base only.  Viral swabs are negative.  CT PE showed basilar pneumonia with a consolidation in the left lower lobe.  He was started on antibiotics.  CT abdomen pelvis consistent with chronic pancreatitis.  There was questionable pancreatic cyst versus intraductal mucinous papillary neoplasms.  Patient was admitted to the medicine service.  This morning, he developed worsening shortness of breath and hypoxia.  He dropped as low 60% despite being on CPAP.  A rapid response was called and patient was intubated at bedside.  He was subsequently transferred to the ICU for further care.    Scheduled Medications:   Scheduled Medications[1]     Continuous Medications:   Continuous Medications[2]     PRN Medications:   PRN Medications[3]    Objective   Vitals:  Temp  Min: 36.4 °C (97.5 °F)  Max: 37.7 °C (99.9 °F)  Pulse  Min: 63  Max: 104  BP  Min: 126/78  Max: 188/104  Resp  Min: 14  Max: 19  SpO2  Min: 88 %  Max: 97 %    Physical Exam:   General: ill-appearing, muscle wasting, frail  Skin: Clammy, dry, intact  HEENT: NC/AT, trachea midline  CV: RRR, normal heart sounds, no lower extremity edema  Pulm: tachypneic but no focal breath sounds  GI: Soft, non-tender, non-distended  MSK: No clear deformities or injuries  Neurology: A&O x3, moving all extremities spontaneously    Labs:     Results for orders placed or performed during the hospital encounter of 07/02/25 (from the past 24 hours)   POCT GLUCOSE   Result Value Ref Range     POCT Glucose 360 (H) 74 - 99 mg/dL   POCT GLUCOSE   Result Value Ref Range    POCT Glucose 152 (H) 74 - 99 mg/dL   Comprehensive Metabolic Panel   Result Value Ref Range    Glucose 150 (H) 74 - 99 mg/dL    Sodium 134 (L) 136 - 145 mmol/L    Potassium 3.5 3.5 - 5.3 mmol/L    Chloride 102 98 - 107 mmol/L    Bicarbonate 23 21 - 32 mmol/L    Anion Gap 13 10 - 20 mmol/L    Urea Nitrogen 24 (H) 6 - 23 mg/dL    Creatinine 1.12 0.50 - 1.30 mg/dL    eGFR 69 >60 mL/min/1.73m*2    Calcium 9.2 8.6 - 10.3 mg/dL    Albumin 3.5 3.4 - 5.0 g/dL    Alkaline Phosphatase 71 33 - 136 U/L    Total Protein 6.4 6.4 - 8.2 g/dL    AST 10 9 - 39 U/L    Bilirubin, Total 0.4 0.0 - 1.2 mg/dL    ALT 12 10 - 52 U/L   Magnesium   Result Value Ref Range    Magnesium 1.94 1.60 - 2.40 mg/dL   Lavender Top   Result Value Ref Range    Extra Tube Hold for add-ons.    POCT GLUCOSE   Result Value Ref Range    POCT Glucose 147 (H) 74 - 99 mg/dL   POCT GLUCOSE   Result Value Ref Range    POCT Glucose 154 (H) 74 - 99 mg/dL   ECG 12 lead   Result Value Ref Range    Ventricular Rate 99 BPM    Atrial Rate 99 BPM    VA Interval 176 ms    QRS Duration 82 ms    QT Interval 350 ms    QTC Calculation(Bazett) 449 ms    P Axis 77 degrees    R Axis -48 degrees    T Axis 109 degrees    QRS Count 16 beats    Q Onset 218 ms    P Onset 104 ms    P Offset 192 ms    T Offset 393 ms    QTC Fredericia 413 ms   Blood Gas Arterial Full Panel Unsolicited   Result Value Ref Range    POCT pH, Arterial 7.17 (LL) 7.38 - 7.42 pH    POCT pCO2, Arterial 63 (H) 38 - 42 mm Hg    POCT pO2, Arterial 87 85 - 95 mm Hg    POCT SO2, Arterial 96 94 - 100 %    POCT Oxy Hemoglobin, Arterial 94.2 94.0 - 98.0 %    POCT Hematocrit Calculated, Arterial 42.0 41.0 - 52.0 %    POCT Sodium, Arterial 133 (L) 136 - 145 mmol/L    POCT Potassium, Arterial 3.5 3.5 - 5.3 mmol/L    POCT Chloride, Arterial 101 98 - 107 mmol/L    POCT Ionized Calcium, Arterial 1.30 1.10 - 1.33 mmol/L    POCT Glucose, Arterial 269  (H) 74 - 99 mg/dL    POCT Lactate, Arterial 1.1 0.4 - 2.0 mmol/L    POCT Base Excess, Arterial -6.6 (L) -2.0 - 3.0 mmol/L    POCT HCO3 Calculated, Arterial 23.0 22.0 - 26.0 mmol/L    POCT Hemoglobin, Arterial 13.9 13.5 - 17.5 g/dL    POCT Anion Gap, Arterial 13 10 - 25 mmo/L    Patient Temperature      FiO2 100 %    Ventilator Rate 14 bpm    Tidal Volume 400 mL    Peep CHM2O 10.0 cm H2O    Test Comment EMERGENCY     [facility]     Critical Called To [place]     Critical Call Time 1202     Critical Read Back Y     Site of Arterial Puncture RR     Kuldip's Test Y    Troponin I, High Sensitivity   Result Value Ref Range    Troponin I, High Sensitivity 13 0 - 20 ng/L   CBC and Auto Differential   Result Value Ref Range    WBC 13.6 (H) 4.4 - 11.3 x10*3/uL    nRBC 0.0 0.0 - 0.0 /100 WBCs    RBC 4.03 (L) 4.50 - 5.90 x10*6/uL    Hemoglobin 14.1 13.5 - 17.5 g/dL    Hematocrit 40.8 (L) 41.0 - 52.0 %     (H) 80 - 100 fL    MCH 35.0 (H) 26.0 - 34.0 pg    MCHC 34.6 32.0 - 36.0 g/dL    RDW 12.6 11.5 - 14.5 %    Platelets 232 150 - 450 x10*3/uL    Neutrophils % 71.4 40.0 - 80.0 %    Immature Granulocytes %, Automated 1.3 (H) 0.0 - 0.9 %    Lymphocytes % 21.3 13.0 - 44.0 %    Monocytes % 3.5 2.0 - 10.0 %    Eosinophils % 2.1 0.0 - 6.0 %    Basophils % 0.4 0.0 - 2.0 %    Neutrophils Absolute 9.67 (H) 1.60 - 5.50 x10*3/uL    Immature Granulocytes Absolute, Automated 0.17 0.00 - 0.50 x10*3/uL    Lymphocytes Absolute 2.89 0.80 - 3.00 x10*3/uL    Monocytes Absolute 0.48 0.05 - 0.80 x10*3/uL    Eosinophils Absolute 0.28 0.00 - 0.40 x10*3/uL    Basophils Absolute 0.06 0.00 - 0.10 x10*3/uL   Comprehensive Metabolic Panel   Result Value Ref Range    Glucose 212 (H) 74 - 99 mg/dL    Sodium 135 (L) 136 - 145 mmol/L    Potassium 3.4 (L) 3.5 - 5.3 mmol/L    Chloride 100 98 - 107 mmol/L    Bicarbonate 24 21 - 32 mmol/L    Anion Gap 14 10 - 20 mmol/L    Urea Nitrogen 25 (H) 6 - 23 mg/dL    Creatinine 1.19 0.50 - 1.30 mg/dL     eGFR 64 >60 mL/min/1.73m*2    Calcium 9.4 8.6 - 10.3 mg/dL    Albumin 3.8 3.4 - 5.0 g/dL    Alkaline Phosphatase 77 33 - 136 U/L    Total Protein 7.3 6.4 - 8.2 g/dL    AST 11 9 - 39 U/L    Bilirubin, Total 0.5 0.0 - 1.2 mg/dL    ALT 15 10 - 52 U/L   Magnesium   Result Value Ref Range    Magnesium 2.05 1.60 - 2.40 mg/dL   Phosphorus   Result Value Ref Range    Phosphorus 4.5 2.5 - 4.9 mg/dL   B-type natriuretic peptide   Result Value Ref Range    BNP 79 0 - 99 pg/mL   Coagulation Screen   Result Value Ref Range    Protime 11.4 9.8 - 12.4 seconds    INR 1.0 0.9 - 1.1    aPTT 33 26 - 36 seconds   D-dimer, VTE Exclusion   Result Value Ref Range    D-Dimer, Quantitative VTE Exclusion 1,311 (H) <=500 ng/mL FEU        Imaging:   Imaging  XR chest 1 view  Result Date: 7/6/2025  1.  Improving left lower lung consolidative infiltrate.   Signed by: Lorenzo Monahan 7/6/2025 12:15 PM Dictation workstation:   [id number]    XR chest 1 view  Result Date: 7/6/2025  Consolidation at the left base which is worse when compared to the previous study.   MACRO: None.   Signed by: Andrea Carter 7/6/2025 11:55 AM Dictation workstation:   [id number]    MRCP pancreas w and wo IV contrast  Result Date: 7/3/2025  1.  Marked diffuse pancreatic parenchymal atrophy associated with marked diffuse main pancreatic ductal dilation and dilation of numerous contiguous pancreatic duct side branches. Few small intraductal filling defects, likely correlating with associated coarse calcifications seen on CT. This constellation of findings favors sequela of chronic pancreatitis. Superimposed mixed (main and branch duct) type IPMN is in the differential, but considered less likely given that diffuse pancreatic ductal dilation has been present and not significantly changed since December 2023. 2. Subtle diffuse scattered irregularity/narrowing throughout intrahepatic bile ducts without dilation. There is also dilation of the common bile duct with a short-segment  area of smooth narrowing in the midportion. These findings are nonspecific, but may be seen with primary sclerosing cholangitis versus secondary or other cholangitis. No intraductal filling defect to suggest choledocholithiasis. Advise correlation with LFTs. 3. Hepatomegaly with findings suggestive of hepatic and splenic iron deposition. 4. Few small arterially enhancing liver lesions described above likely representing a combination of flash filling hemangiomas and THID. 5. Simple bilateral renal cysts. Stable nonenhancing chronic area of scarring in the left posterior perinephric region. 6. Moderate stool burden throughout the colon, which may be correlated for symptoms of constipation. 7. Opacities in the left-greater-than-right lower lobes, which may represent atelectasis or pneumonia. 8. Additional chronic findings, as detailed     Signed by: Moriah Seals 7/3/2025 6:15 PM Dictation workstation:   [id number]    CT angio chest for pulmonary embolism  Result Date: 7/2/2025  CHEST: 1.  No pulmonary embolism. 2. Bibasilar pneumonia with dense consolidation particularly in the left lower lobe.   ABDOMEN AND PELVIS: 1.  Extensive pancreatic calcifications with pancreatic ductal dilatation and beading consistent with chronic pancreatitis. 2. Multiple small cystic lesions in the pancreas which could represent small pseudocysts, small cystic neoplasms or intraductal mucinous papillary neoplasms. 3. Bilateral nonobstructing intrarenal calculi. 4. 1.4 cm Bosniak 2 left renal cyst and simple left renal cyst. 5. Distended urinary bladder with a volume of 501 cc. 6. Marked constipation and possible fecal impaction.   MACRO: None   Signed by: Erin Moreira 7/2/2025 12:29 PM Dictation workstation:   [id number]    CT abdomen pelvis w IV contrast  Result Date: 7/2/2025  CHEST: 1.  No pulmonary embolism. 2. Bibasilar pneumonia with dense consolidation particularly in the left lower lobe.   ABDOMEN AND PELVIS: 1.  Extensive  pancreatic calcifications with pancreatic ductal dilatation and beading consistent with chronic pancreatitis. 2. Multiple small cystic lesions in the pancreas which could represent small pseudocysts, small cystic neoplasms or intraductal mucinous papillary neoplasms. 3. Bilateral nonobstructing intrarenal calculi. 4. 1.4 cm Bosniak 2 left renal cyst and simple left renal cyst. 5. Distended urinary bladder with a volume of 501 cc. 6. Marked constipation and possible fecal impaction.   MACRO: None   Signed by: Erin Moreira 7/2/2025 12:29 PM Dictation workstation:   [id number]    XR chest 1 view  Result Date: 7/2/2025  Scant linear atelectasis versus scarring at the left lung base.   Arthritic changes in the thoracic spine as described.     MACRO: None   Signed by: Shaggy Morris 7/2/2025 11:01 AM Dictation workstation:   [id number]      Cardiology, Vascular, and Other Imaging  ECG 12 lead  Result Date: 7/6/2025  Normal sinus rhythm Left axis deviation Left ventricular hypertrophy with repolarization abnormality Cannot rule out Septal infarct , age undetermined Abnormal ECG No previous ECGs available         Assessment/Plan     Neuro:  - No acute issues  - Sedation: propofol drip  - Pain management: fentanyl drip  - Titrate to RASS 0 to -2  - CAM ICU    Cardiac:  - Hx hypertension: holding home losartan, amlodipine while reassessing vitals on sedation. Resume home metoprolol  - Obtain echocardiogram  - Continuous cardiac monitoring  - Repeat CXR, troponin, BNP, d-dimer  - Maintain MAP > 65 mmHg    Pulmonary:  #Acute hypoxic hypercapnic respiratory failure  - Intubated  7/6  - Maintain spO2 > 92%  - Titrate vent settings as tolerated  - Post intubation ABG with pH 7.17, pCO2 63, pO2 87. Will repeat in an hour.  - CT angio PE pending  - Continuous pulse ox  Vent Mode: Volume control/assist control  S RR:  [14-18] 18  S VT:  [400 mL] 400 mL  PEEP/CPAP (cm H2O):  [10 cm H20] 10 cm H20  MAP (cm H2O):  [14] 14      Gastrointestinal:  - Diet: NPO  - OG tube to suction  - Ppx: Protonix   - Bowel regimen: miralax, docusate  - Last BM: Last BM Date: 25    Renal:  - Daily BMP, Mg, phos  - Replete electrolytes as indicated  - Strict I's & O's  - Friedman to be placed  Net IO Since Admission: 2,066.25 mL [25 1350]    Hematology:  - Daily CBC  - DVT Prophylaxis: Lovenox, SCDs    Infectious Disease:  #Left lower lobe pneumonia  - Expand antibiotic coverage to vanc/zosyn  - Strep ag positive  - Repeat blood cultures, lactate, UA  - CT AP for further infectious causes  Temp (24hrs), Av.7 °C (98.1 °F), Min:36.4 °C (97.5 °F), Max:37.7 °C (99.9 °F)     Endocrine:  - HX DM2  - SSI, Lantus, POC BG  - BG < 180 goal    CODE STATUS: DNR, allows for mechanical ventilation    Disposition: ICU         [1] cyanocobalamin, 100 mcg, oral, Daily  docusate sodium, 100 mg, oral, BID  enoxaparin, 40 mg, subcutaneous, q24h  insulin glargine, 10 Units, subcutaneous, q24h  insulin lispro, 0-10 Units, subcutaneous, q4h  lidocaine, 1 patch, transdermal, Daily  lidocaine, 1 patch, transdermal, Daily  metoprolol tartrate, 25 mg, oral, BID  oxygen, , inhalation, Continuous - Inhalation  pantoprazole, 40 mg, intravenous, Daily before breakfast  perflutren lipid microspheres, 0.5-10 mL of dilution, intravenous, Once in imaging  perflutren protein A microsphere, 0.5 mL, intravenous, Once in imaging  piperacillin-tazobactam, 4.5 g, intravenous, q8h  polyethylene glycol, 17 g, oral, Daily  sulfur hexafluoride microsphr, 2 mL, intravenous, Once in imaging  tamsulosin, 0.4 mg, oral, Daily  vancomycin, 500 mg, intravenous, q12h     [2] dexmedeTOMIDine, 0-1.5 mcg/kg/hr  fentaNYL, 0-300 mcg/hr, Last Rate: 50 mcg/hr (25 1307)     [3] PRN medications: bisacodyl, dextrose, dextrose, glucagon, glucagon, ipratropium-albuteroL, oxygen, vancomycin

## 2025-07-06 NOTE — CARE PLAN
The patient's goals for the shift include  sleep    The clinical goals for the shift include remain free of falls and injury      Problem: Pain - Adult  Goal: Verbalizes/displays adequate comfort level or baseline comfort level  Outcome: Progressing     Problem: Safety - Adult  Goal: Free from fall injury  Outcome: Progressing     Problem: Discharge Planning  Goal: Discharge to home or other facility with appropriate resources  Outcome: Progressing     Problem: Chronic Conditions and Co-morbidities  Goal: Patient's chronic conditions and co-morbidity symptoms are monitored and maintained or improved  Outcome: Progressing

## 2025-07-06 NOTE — PROCEDURES
Emergent Endotracheal Intubation Note  Indication: Acute Hypoxic Respiratory Failure  Attending Christine Ireland MD       Sedation was achieved using:  Etomidate 20 mg and Rocuronium 50 mg     Procedural Description:  A time-out was completed verifying correct patient, procedure, site, positioning, and special equipment if applicable. The patient was placed in the supine position. Patient was on BiPAP. The MAC 4  was used and inserted into the oropharynx at which time there was a  Grade 1 view of the vocal cords. A  7.5 mm endotracheal tube was inserted and visualized going through the vocal cords. The stylette was removed. Colorimetric change was visualized on the CO2 meter. Breath sounds were heard in both lung fields equally. The endotracheal tube was placed at 22 cm, measured at the lips. Intubation required 1 attempt.     A chest x-ray was ordered to assess for pneumothorax and verify endotracheal tube placement. Order is pending.     Complications:  The patient tolerated the procedure well and there were no complications.     Post CXR ETT was advanced to 24 cm at the lip.

## 2025-07-06 NOTE — PROGRESS NOTES
Vancomycin Dosing by Pharmacy- INITIAL    Jorge A Madden is a 75 y.o. year old male who Pharmacy has been consulted for vancomycin dosing for pneumonia. Based on the patient's indication and renal status this patient will be dosed based on a goal AUC of 400-600.     Renal function is currently stable.    Visit Vitals  BP (!) 155/97   Pulse 104   Temp 36.4 °C (97.5 °F) (Temporal)   Resp 19        Lab Results   Component Value Date    CREATININE 1.12 2025    CREATININE 1.10 2025    CREATININE 1.01 2025    CREATININE 1.01 2025    CREATININE 1.31 (H) 2025        Patient weight is as follows:   Vitals:    25 1836   Weight: 49.4 kg (109 lb)       Cultures:  No results found for the encounter in last 14 days.        I/O last 3 completed shifts:  In: 450 (9.1 mL/kg) [P.O.:400; IV Piggyback:50]  Out: 775 (15.7 mL/kg) [Urine:775 (0.4 mL/kg/hr)]  Weight: 49.4 kg   I/O during current shift:  I/O this shift:  In: -   Out: 300 [Urine:300]    Temp (24hrs), Av.7 °C (98.1 °F), Min:36.4 °C (97.5 °F), Max:37.7 °C (99.9 °F)         Assessment/Plan     Patient will not be given a loading dose.  Will initiate vancomycin maintenance, 500 mg every 12 hours.    This dosing regimen is predicted by [facility] to result in the following pharmacokinetic parameters:    Loading dose: N/A  Regimen: 500 mg IV every 12 hours.  Start time: 12:09 on 2025  Exposure target: AUC24 (range) 400-600 mg/L.hr   HTV67-40: 332 mg/L.hr  AUC24,ss: 515 mg/L.hr  Probability of AUC24 > 400: 76 %  Ctrough,ss: 17.5 mg/L  Probability of Ctrough,ss > 20: 38 %      Follow-up level will be ordered on  at 0900 unless clinically indicated sooner.  Will continue to monitor renal function daily while on vancomycin and order serum creatinine at least every 48 hours if not already ordered.  Follow for continued vancomycin needs, clinical response, and signs/symptoms of toxicity.       Suleman Rogers, KeeganD

## 2025-07-06 NOTE — PROGRESS NOTES
07/06/25 0855   Discharge Planning   Living Arrangements Family members   Support Systems Family members   Type of Residence Private residence   Home or Post Acute Services Post acute facilities (Rehab/SNF/etc)   Type of Post Acute Facility Services Skilled nursing   Expected Discharge Disposition SNF     Updates sent in [facility].     1204 Pt transferred to ICU for resp failure. Currently intubated.

## 2025-07-07 ENCOUNTER — APPOINTMENT (OUTPATIENT)
Dept: RADIOLOGY | Facility: HOSPITAL | Age: 75
End: 2025-07-07
Payer: MEDICARE

## 2025-07-07 VITALS
BODY MASS INDEX: 15.6 KG/M2 | WEIGHT: 109 LBS | RESPIRATION RATE: 24 BRPM | DIASTOLIC BLOOD PRESSURE: 63 MMHG | OXYGEN SATURATION: 100 % | HEART RATE: 70 BPM | HEIGHT: 70 IN | TEMPERATURE: 97.7 F | SYSTOLIC BLOOD PRESSURE: 100 MMHG

## 2025-07-07 LAB
ALBUMIN SERPL BCP-MCNC: 3.4 G/DL (ref 3.4–5)
ALBUMIN SERPL BCP-MCNC: 3.4 G/DL (ref 3.4–5)
ALP SERPL-CCNC: 64 U/L (ref 33–136)
ALP SERPL-CCNC: 67 U/L (ref 33–136)
ALT SERPL W P-5'-P-CCNC: 11 U/L (ref 10–52)
ALT SERPL W P-5'-P-CCNC: 12 U/L (ref 10–52)
ANION GAP BLDA CALCULATED.4IONS-SCNC: 12 MMO/L (ref 10–25)
ANION GAP SERPL CALC-SCNC: 16 MMOL/L (ref 10–20)
ANION GAP SERPL CALC-SCNC: 17 MMOL/L (ref 10–20)
AORTIC VALVE PEAK VELOCITY: 0.65 M/S
ARTERIAL PATENCY WRIST A: ABNORMAL
ARTERIAL PATENCY WRIST A: ABNORMAL
AST SERPL W P-5'-P-CCNC: 10 U/L (ref 9–39)
AST SERPL W P-5'-P-CCNC: 9 U/L (ref 9–39)
AV PEAK GRADIENT: 2 MMHG
AVA (PEAK VEL): 2.62 CM2
BASE EXCESS BLDA CALC-SCNC: -1.8 MMOL/L (ref -2–3)
BASE EXCESS BLDA CALC-SCNC: -3.6 MMOL/L (ref -2–3)
BASOPHILS # BLD AUTO: 0.05 X10*3/UL (ref 0–0.1)
BASOPHILS NFR BLD AUTO: 0.3 %
BILIRUB SERPL-MCNC: 0.4 MG/DL (ref 0–1.2)
BILIRUB SERPL-MCNC: 0.5 MG/DL (ref 0–1.2)
BODY TEMPERATURE: ABNORMAL
BODY TEMPERATURE: ABNORMAL
BUN SERPL-MCNC: 39 MG/DL (ref 6–23)
BUN SERPL-MCNC: 42 MG/DL (ref 6–23)
CA-I BLDA-SCNC: 1.18 MMOL/L (ref 1.1–1.33)
CALCIUM SERPL-MCNC: 9 MG/DL (ref 8.6–10.3)
CALCIUM SERPL-MCNC: 9.2 MG/DL (ref 8.6–10.3)
CHLORIDE BLDA-SCNC: 103 MMOL/L (ref 98–107)
CHLORIDE SERPL-SCNC: 100 MMOL/L (ref 98–107)
CHLORIDE SERPL-SCNC: 99 MMOL/L (ref 98–107)
CO2 SERPL-SCNC: 22 MMOL/L (ref 21–32)
CO2 SERPL-SCNC: 23 MMOL/L (ref 21–32)
CREAT SERPL-MCNC: 1.61 MG/DL (ref 0.5–1.3)
CREAT SERPL-MCNC: 1.69 MG/DL (ref 0.5–1.3)
EGFRCR SERPLBLD CKD-EPI 2021: 42 ML/MIN/1.73M*2
EGFRCR SERPLBLD CKD-EPI 2021: 45 ML/MIN/1.73M*2
EJECTION FRACTION APICAL 4 CHAMBER: 46
EJECTION FRACTION: 45 %
EOSINOPHIL # BLD AUTO: 0.02 X10*3/UL (ref 0–0.4)
EOSINOPHIL NFR BLD AUTO: 0.1 %
ERYTHROCYTE [DISTWIDTH] IN BLOOD BY AUTOMATED COUNT: 12.8 % (ref 11.5–14.5)
GLUCOSE BLD MANUAL STRIP-MCNC: 151 MG/DL (ref 74–99)
GLUCOSE BLD MANUAL STRIP-MCNC: 175 MG/DL (ref 74–99)
GLUCOSE BLD MANUAL STRIP-MCNC: 197 MG/DL (ref 74–99)
GLUCOSE BLD MANUAL STRIP-MCNC: 201 MG/DL (ref 74–99)
GLUCOSE BLD MANUAL STRIP-MCNC: 221 MG/DL (ref 74–99)
GLUCOSE BLD MANUAL STRIP-MCNC: 241 MG/DL (ref 74–99)
GLUCOSE BLD MANUAL STRIP-MCNC: 245 MG/DL (ref 74–99)
GLUCOSE BLD MANUAL STRIP-MCNC: 64 MG/DL (ref 74–99)
GLUCOSE BLDA-MCNC: 233 MG/DL (ref 74–99)
GLUCOSE SERPL-MCNC: 175 MG/DL (ref 74–99)
GLUCOSE SERPL-MCNC: 205 MG/DL (ref 74–99)
HCO3 BLDA-SCNC: 21.5 MMOL/L (ref 22–26)
HCO3 BLDA-SCNC: 23.7 MMOL/L (ref 22–26)
HCT VFR BLD AUTO: 34.4 % (ref 41–52)
HCT VFR BLD EST: 38 % (ref 41–52)
HGB BLD-MCNC: 11.6 G/DL (ref 13.5–17.5)
HGB BLDA-MCNC: 12.5 G/DL (ref 13.5–17.5)
HOLD SPECIMEN: NORMAL
IMM GRANULOCYTES # BLD AUTO: 0.2 X10*3/UL (ref 0–0.5)
IMM GRANULOCYTES NFR BLD AUTO: 1.3 % (ref 0–0.9)
INHALED O2 CONCENTRATION: 40 %
INHALED O2 CONCENTRATION: 60 %
LACTATE BLDA-SCNC: 1 MMOL/L (ref 0.4–2)
LEFT VENTRICLE INTERNAL DIMENSION DIASTOLE: 3.9 CM (ref 3.5–6)
LEFT VENTRICULAR OUTFLOW TRACT DIAMETER: 2.33 CM
LV EJECTION FRACTION BIPLANE: 45 %
LYMPHOCYTES # BLD AUTO: 1.12 X10*3/UL (ref 0.8–3)
LYMPHOCYTES NFR BLD AUTO: 7.2 %
MAGNESIUM SERPL-MCNC: 1.98 MG/DL (ref 1.6–2.4)
MAGNESIUM SERPL-MCNC: 2.05 MG/DL (ref 1.6–2.4)
MCH RBC QN AUTO: 34.2 PG (ref 26–34)
MCHC RBC AUTO-ENTMCNC: 33.7 G/DL (ref 32–36)
MCV RBC AUTO: 102 FL (ref 80–100)
MITRAL VALVE E/A RATIO: 0.78
MONOCYTES # BLD AUTO: 0.58 X10*3/UL (ref 0.05–0.8)
MONOCYTES NFR BLD AUTO: 3.7 %
NEUTROPHILS # BLD AUTO: 13.66 X10*3/UL (ref 1.6–5.5)
NEUTROPHILS NFR BLD AUTO: 87.4 %
NRBC BLD-RTO: 0 /100 WBCS (ref 0–0)
OXYHGB MFR BLDA: 96.7 % (ref 94–98)
OXYHGB MFR BLDA: 96.9 % (ref 94–98)
PCO2 BLDA: 38 MM HG (ref 38–42)
PCO2 BLDA: 42 MM HG (ref 38–42)
PEEP CMH2O: 10 CM H2O
PEEP CMH2O: 8 CM H2O
PH BLDA: 7.36 PH (ref 7.38–7.42)
PH BLDA: 7.36 PH (ref 7.38–7.42)
PHOSPHATE SERPL-MCNC: 5.2 MG/DL (ref 2.5–4.9)
PHOSPHATE SERPL-MCNC: 6.1 MG/DL (ref 2.5–4.9)
PLATELET # BLD AUTO: 209 X10*3/UL (ref 150–450)
PO2 BLDA: 108 MM HG (ref 85–95)
PO2 BLDA: 99 MM HG (ref 85–95)
POTASSIUM BLDA-SCNC: 3.4 MMOL/L (ref 3.5–5.3)
POTASSIUM SERPL-SCNC: 4.2 MMOL/L (ref 3.5–5.3)
POTASSIUM SERPL-SCNC: 4.6 MMOL/L (ref 3.5–5.3)
PROT SERPL-MCNC: 6.2 G/DL (ref 6.4–8.2)
PROT SERPL-MCNC: 6.3 G/DL (ref 6.4–8.2)
RBC # BLD AUTO: 3.39 X10*6/UL (ref 4.5–5.9)
RIGHT VENTRICLE FREE WALL PEAK S': 8 CM/S
RIGHT VENTRICLE PEAK SYSTOLIC PRESSURE: 22 MMHG
SAO2 % BLDA: 98 % (ref 94–100)
SAO2 % BLDA: 99 % (ref 94–100)
SITE OF ARTERIAL PUNCTURE: ABNORMAL
SITE OF ARTERIAL PUNCTURE: ABNORMAL
SODIUM BLDA-SCNC: 133 MMOL/L (ref 136–145)
SODIUM SERPL-SCNC: 133 MMOL/L (ref 136–145)
SODIUM SERPL-SCNC: 135 MMOL/L (ref 136–145)
TIDAL VOLUME: 400 ML
TIDAL VOLUME: 450 ML
TRICUSPID ANNULAR PLANE SYSTOLIC EXCURSION: 1.7 CM
VENTILATOR RATE: 24 BPM
VENTILATOR RATE: 24 BPM
WBC # BLD AUTO: 15.6 X10*3/UL (ref 4.4–11.3)

## 2025-07-07 PROCEDURE — 2500000001 HC RX 250 WO HCPCS SELF ADMINISTERED DRUGS (ALT 637 FOR MEDICARE OP)

## 2025-07-07 PROCEDURE — 82947 ASSAY GLUCOSE BLOOD QUANT: CPT

## 2025-07-07 PROCEDURE — 2500000005 HC RX 250 GENERAL PHARMACY W/O HCPCS: Performed by: INTERNAL MEDICINE

## 2025-07-07 PROCEDURE — 84100 ASSAY OF PHOSPHORUS: CPT

## 2025-07-07 PROCEDURE — 84132 ASSAY OF SERUM POTASSIUM: CPT

## 2025-07-07 PROCEDURE — 2500000004 HC RX 250 GENERAL PHARMACY W/ HCPCS (ALT 636 FOR OP/ED)

## 2025-07-07 PROCEDURE — 36620 INSERTION CATHETER ARTERY: CPT

## 2025-07-07 PROCEDURE — 80053 COMPREHEN METABOLIC PANEL: CPT

## 2025-07-07 PROCEDURE — 2500000002 HC RX 250 W HCPCS SELF ADMINISTERED DRUGS (ALT 637 FOR MEDICARE OP, ALT 636 FOR OP/ED)

## 2025-07-07 PROCEDURE — 83735 ASSAY OF MAGNESIUM: CPT

## 2025-07-07 PROCEDURE — 2020000001 HC ICU ROOM DAILY

## 2025-07-07 PROCEDURE — 36620 INSERTION CATHETER ARTERY: CPT | Mod: GC

## 2025-07-07 PROCEDURE — 36600 WITHDRAWAL OF ARTERIAL BLOOD: CPT

## 2025-07-07 PROCEDURE — 99291 CRITICAL CARE FIRST HOUR: CPT | Performed by: INTERNAL MEDICINE

## 2025-07-07 PROCEDURE — 2500000005 HC RX 250 GENERAL PHARMACY W/O HCPCS

## 2025-07-07 PROCEDURE — 85025 COMPLETE CBC W/AUTO DIFF WBC: CPT

## 2025-07-07 PROCEDURE — 2500000004 HC RX 250 GENERAL PHARMACY W/ HCPCS (ALT 636 FOR OP/ED): Performed by: NURSE PRACTITIONER

## 2025-07-07 PROCEDURE — 0B9J8ZZ DRAINAGE OF LEFT LOWER LUNG LOBE, VIA NATURAL OR ARTIFICIAL OPENING ENDOSCOPIC: ICD-10-PCS | Performed by: INTERNAL MEDICINE

## 2025-07-07 PROCEDURE — 36415 COLL VENOUS BLD VENIPUNCTURE: CPT

## 2025-07-07 PROCEDURE — 94640 AIRWAY INHALATION TREATMENT: CPT

## 2025-07-07 PROCEDURE — 94003 VENT MGMT INPAT SUBQ DAY: CPT

## 2025-07-07 PROCEDURE — 82810 BLOOD GASES O2 SAT ONLY: CPT

## 2025-07-07 PROCEDURE — 71045 X-RAY EXAM CHEST 1 VIEW: CPT

## 2025-07-07 PROCEDURE — 71045 X-RAY EXAM CHEST 1 VIEW: CPT | Performed by: INTERNAL MEDICINE

## 2025-07-07 PROCEDURE — 31622 DX BRONCHOSCOPE/WASH: CPT | Performed by: INTERNAL MEDICINE

## 2025-07-07 RX ORDER — ENOXAPARIN SODIUM 100 MG/ML
30 INJECTION SUBCUTANEOUS EVERY 24 HOURS
Status: DISCONTINUED | OUTPATIENT
Start: 2025-07-07 | End: 2025-07-09

## 2025-07-07 RX ORDER — ACETYLCYSTEINE 200 MG/ML
3 SOLUTION ORAL; RESPIRATORY (INHALATION) 3 TIMES DAILY
Status: DISCONTINUED | OUTPATIENT
Start: 2025-07-07 | End: 2025-07-11

## 2025-07-07 RX ORDER — MULTIVIT-MIN/IRON FUM/FOLIC AC 7.5 MG-4
1 TABLET ORAL DAILY
Status: DISCONTINUED | OUTPATIENT
Start: 2025-07-07 | End: 2025-07-15 | Stop reason: HOSPADM

## 2025-07-07 RX ORDER — NOREPINEPHRINE BITARTRATE/D5W 8 MG/250ML
0-.5 PLASTIC BAG, INJECTION (ML) INTRAVENOUS CONTINUOUS
Status: DISCONTINUED | OUTPATIENT
Start: 2025-07-07 | End: 2025-07-08

## 2025-07-07 RX ORDER — THIAMINE HYDROCHLORIDE 100 MG/ML
100 INJECTION, SOLUTION INTRAMUSCULAR; INTRAVENOUS DAILY
Status: COMPLETED | OUTPATIENT
Start: 2025-07-07 | End: 2025-07-13

## 2025-07-07 RX ADMIN — SODIUM CHLORIDE 500 ML: 0.9 INJECTION, SOLUTION INTRAVENOUS at 23:00

## 2025-07-07 RX ADMIN — Medication 50 PERCENT: at 08:16

## 2025-07-07 RX ADMIN — INSULIN GLARGINE 10 UNITS: 100 INJECTION, SOLUTION SUBCUTANEOUS at 20:40

## 2025-07-07 RX ADMIN — Medication 1 TABLET: at 12:23

## 2025-07-07 RX ADMIN — SODIUM CHLORIDE 500 ML: 0.9 INJECTION, SOLUTION INTRAVENOUS at 18:29

## 2025-07-07 RX ADMIN — Medication 40 PERCENT: at 23:48

## 2025-07-07 RX ADMIN — INSULIN LISPRO 2 UNITS: 100 INJECTION, SOLUTION INTRAVENOUS; SUBCUTANEOUS at 06:00

## 2025-07-07 RX ADMIN — Medication 40 PERCENT: at 14:49

## 2025-07-07 RX ADMIN — DEXTROSE MONOHYDRATE 25 G: 25 INJECTION, SOLUTION INTRAVENOUS at 11:36

## 2025-07-07 RX ADMIN — ENOXAPARIN SODIUM 30 MG: 30 INJECTION SUBCUTANEOUS at 15:52

## 2025-07-07 RX ADMIN — INSULIN LISPRO 4 UNITS: 100 INJECTION, SOLUTION INTRAVENOUS; SUBCUTANEOUS at 08:15

## 2025-07-07 RX ADMIN — Medication 40 PERCENT: at 20:08

## 2025-07-07 RX ADMIN — PIPERACILLIN SODIUM AND TAZOBACTAM SODIUM 4.5 G: 4; .5 INJECTION, SOLUTION INTRAVENOUS at 02:58

## 2025-07-07 RX ADMIN — SODIUM CHLORIDE, SODIUM LACTATE, POTASSIUM CHLORIDE, AND CALCIUM CHLORIDE 1000 ML: .6; .31; .03; .02 INJECTION, SOLUTION INTRAVENOUS at 09:53

## 2025-07-07 RX ADMIN — VITAM B12 100 MCG: 100 TAB at 08:15

## 2025-07-07 RX ADMIN — PANTOPRAZOLE SODIUM 40 MG: 40 INJECTION, POWDER, FOR SOLUTION INTRAVENOUS at 07:19

## 2025-07-07 RX ADMIN — INSULIN LISPRO 4 UNITS: 100 INJECTION, SOLUTION INTRAVENOUS; SUBCUTANEOUS at 16:00

## 2025-07-07 RX ADMIN — Medication 125 MCG/HR: at 18:15

## 2025-07-07 RX ADMIN — DOCUSATE SODIUM LIQUID 100 MG: 100 LIQUID ORAL at 20:41

## 2025-07-07 RX ADMIN — VANCOMYCIN HYDROCHLORIDE 500 MG: 500 INJECTION, SOLUTION INTRAVENOUS at 02:57

## 2025-07-07 RX ADMIN — DEXMEDETOMIDINE HYDROCHLORIDE 0.48 MCG/KG/HR: 400 INJECTION INTRAVENOUS at 07:17

## 2025-07-07 RX ADMIN — NOREPINEPHRINE BITARTRATE 0.02 MCG/KG/MIN: 8 INJECTION, SOLUTION INTRAVENOUS at 21:17

## 2025-07-07 RX ADMIN — PIPERACILLIN SODIUM AND TAZOBACTAM SODIUM 4.5 G: 4; .5 INJECTION, SOLUTION INTRAVENOUS at 15:52

## 2025-07-07 RX ADMIN — THIAMINE HYDROCHLORIDE 100 MG: 100 INJECTION, SOLUTION INTRAMUSCULAR; INTRAVENOUS at 12:24

## 2025-07-07 RX ADMIN — IPRATROPIUM BROMIDE AND ALBUTEROL SULFATE 3 ML: 2.5; .5 SOLUTION RESPIRATORY (INHALATION) at 20:01

## 2025-07-07 RX ADMIN — Medication 40 PERCENT: at 16:00

## 2025-07-07 RX ADMIN — PIPERACILLIN SODIUM AND TAZOBACTAM SODIUM 4.5 G: 4; .5 INJECTION, SOLUTION INTRAVENOUS at 08:15

## 2025-07-07 RX ADMIN — IPRATROPIUM BROMIDE AND ALBUTEROL SULFATE 3 ML: 2.5; .5 SOLUTION RESPIRATORY (INHALATION) at 14:49

## 2025-07-07 RX ADMIN — INSULIN LISPRO 4 UNITS: 100 INJECTION, SOLUTION INTRAVENOUS; SUBCUTANEOUS at 20:40

## 2025-07-07 RX ADMIN — ACETYLCYSTEINE 600 MG: 200 SOLUTION ORAL; RESPIRATORY (INHALATION) at 20:01

## 2025-07-07 RX ADMIN — ACETYLCYSTEINE 600 MG: 200 SOLUTION ORAL; RESPIRATORY (INHALATION) at 14:49

## 2025-07-07 ASSESSMENT — COGNITIVE AND FUNCTIONAL STATUS - GENERAL
PERSONAL GROOMING: TOTAL
PERSONAL GROOMING: TOTAL
DRESSING REGULAR LOWER BODY CLOTHING: TOTAL
STANDING UP FROM CHAIR USING ARMS: TOTAL
DAILY ACTIVITIY SCORE: 6
TOILETING: TOTAL
DRESSING REGULAR UPPER BODY CLOTHING: TOTAL
MOBILITY SCORE: 6
EATING MEALS: TOTAL
TOILETING: TOTAL
MOVING FROM LYING ON BACK TO SITTING ON SIDE OF FLAT BED WITH BEDRAILS: TOTAL
MOVING FROM LYING ON BACK TO SITTING ON SIDE OF FLAT BED WITH BEDRAILS: TOTAL
WALKING IN HOSPITAL ROOM: TOTAL
DRESSING REGULAR UPPER BODY CLOTHING: TOTAL
TURNING FROM BACK TO SIDE WHILE IN FLAT BAD: TOTAL
EATING MEALS: TOTAL
TURNING FROM BACK TO SIDE WHILE IN FLAT BAD: TOTAL
MOVING TO AND FROM BED TO CHAIR: TOTAL
DRESSING REGULAR LOWER BODY CLOTHING: TOTAL
DAILY ACTIVITIY SCORE: 6
HELP NEEDED FOR BATHING: TOTAL
HELP NEEDED FOR BATHING: TOTAL
CLIMB 3 TO 5 STEPS WITH RAILING: TOTAL
MOVING TO AND FROM BED TO CHAIR: TOTAL
MOBILITY SCORE: 6
CLIMB 3 TO 5 STEPS WITH RAILING: TOTAL
WALKING IN HOSPITAL ROOM: TOTAL
STANDING UP FROM CHAIR USING ARMS: TOTAL

## 2025-07-07 ASSESSMENT — RESPIRATORY DISTRESS OBSERVATION SCALE (RDOS)
RESTLESS NONPURPOSEFUL MOVEMENTS: 0 - NONE
ACCESSORY MUSCLE RISE IN CLAVICLE DURING INSPIRATION: 1 - SLIGHT RISE
RESPIRATORY RATE PER MINUTE: 1 - 19-30 BREATHS
PARADOXICAL BREATHING PATTERN: 0 - NONE
GRUNTING AT END OF EXPIRATION: 0 - NONE
LOOK OF FEAR: 0 - NONE
RDOS TOTAL SCORE: 2
HEART RATE PER MINUTE: 0 - <90 BEATS
INVOLUNTARY NASAL FLARING: 0 - NONE

## 2025-07-07 ASSESSMENT — PAIN - FUNCTIONAL ASSESSMENT
PAIN_FUNCTIONAL_ASSESSMENT: CPOT (CRITICAL CARE PAIN OBSERVATION TOOL)

## 2025-07-07 ASSESSMENT — PAIN SCALES - GENERAL
PAINLEVEL_OUTOF10: 0 - NO PAIN

## 2025-07-07 NOTE — PROGRESS NOTES
"Nutrition Follow Up Assessment:   Nutrition Assessment         Patient is a 75 y.o. male presenting with from home with grandson with weakness, generalized malaise, poor oral intake and SOB over the last few days, additionally patient admits to weight loss over the last several months. CT- pneumonia left lower lobe and pancreatic calcifications. GI was consulted, had MRCP. Chronic pancreatitis, no malignancy, no need for pancreatic enzymes at this time. Pt to follow up with GI outpatient. Plans to discharge to SNF for rehab.    7/7/2025 Follow up/consult for tube feeds: Chart reviewed and events noted. Length of stay complicated by acute hypoxia with RRT on 7/6; pt transitioned to ICU care and subsequently intubated. CXR revealing LLL collapse with concerns for aspiration. Pt remains intubated and currently sedated with precedex and fentanyl; NGT in place and to start tube feeds today.      Nutrition History:  Food and Nutrient History: 7/5: Reports very good appetite at home. states he eats a lot of food but has not been able to gain any weight. Eats 3 meals/day with some snacks between. Lives with his wife and grandchildren. [facility] services and diet order reviewed. No further questions. Will recc. to send Glucerna and Ensure HP for pt to try.  Vitamin/Herbal Supplement Use: B12  Food Allergy:  (NKFA)  Food Intolerance:  (No intolerances reported)       Anthropometrics:  Height: 177.8 cm (5' 10\")   Weight: 49.9 kg (109 lb 14.4 oz)   BMI (Calculated): 15.77  IBW/kg (Dietitian Calculated): 75.3 kg  Percent of IBW: 65.66 %                  0-10 (Numeric) Pain Score: 0 - No pain  Critical-Care Pain Observation Score:  [0-2]      Weight History:   Wt Readings from Last 10 Encounters:   07/07/25 49.9 kg (109 lb 14.4 oz)   06/17/25 53.1 kg (117 lb)   01/28/25 54 kg (119 lb)   09/27/24 54.9 kg (121 lb)   06/20/24 53.5 kg (118 lb)   04/15/24 54.4 kg (120 lb)   02/15/24 54 kg (119 lb)   01/25/24 55.2 kg (121 lb 9.6 oz) "   11/28/23 55.3 kg (122 lb)   11/09/23 58.5 kg (129 lb)       Weight Change %:  Weight History / % Weight Change: 7% weight loss over 30 days. UBW is around 120#  Significant Weight Loss: Yes  Interpretation of Weight Loss: >5% in 1 month    Nutrition Focused Physical Exam Findings:    Subcutaneous Fat Loss:   Orbital Fat Pads: Severe (dark circles, hollowing and loose skin)  Buccal Fat Pads: Severe (hollow, sunken and narrow face)  Triceps: Severe (negligible fat tissue)  Ribs: Defer  Muscle Wasting:  Temporalis: Severe (hollowed scooping depression)  Pectoralis (Clavicular Region): Severe (protruding prominent clavicle)  Deltoid/Trapezius: Severe (squared shoulders, acromion process prominent)  Interosseous: Severe (depressed area between thumb and forefinger)  Trapezius/Infraspinatus/Supraspinatus (Scapular Region): Defer  Quadriceps: Severe (depressions on inner and outer thigh)  Gastrocnemius: Severe (minimal muscle definition)  Edema:  Edema: none  Physical Findings:  Hair:  (sparce)  Eyes:  (closed)  Nails: Negative  Teeth Findings: Impaired dentition    Nutrition Significant Labs:  CBC Trend:   Results from last 7 days   Lab Units 07/07/25  0452 07/06/25  1214 07/04/25  0918 07/03/25  0433   WBC AUTO x10*3/uL 15.6* 13.6* 7.0 7.3   RBC AUTO x10*6/uL 3.39* 4.03* 3.64* 3.53*   HEMOGLOBIN g/dL 11.6* 14.1 12.6* 12.4*   HEMATOCRIT % 34.4* 40.8* 35.9* 35.3*   MCV fL 102* 101* 99 100   PLATELETS AUTO x10*3/uL 209 232 180 164    , BMP Trend:   Results from last 7 days   Lab Units 07/07/25  0452 07/06/25  1841 07/06/25  1214 07/06/25  0540   GLUCOSE mg/dL 175* 256* 212* 150*   CALCIUM mg/dL 9.0 9.0 9.4 9.2   SODIUM mmol/L 133* 134* 135* 134*   POTASSIUM mmol/L 4.6 4.7 3.4* 3.5   CO2 mmol/L 22 23 24 23   CHLORIDE mmol/L 100 99 100 102   BUN mg/dL 39* 29* 25* 24*   CREATININE mg/dL 1.61* 1.40* 1.19 1.12    , A1C:  Lab Results   Component Value Date    HGBA1C 6.1 06/17/2025   , BG POCT trend:   Results from last 7 days    Lab Units 07/07/25  1133 07/07/25  0741 07/07/25  0409 07/07/25  0034 07/06/25  2111   POCT GLUCOSE mg/dL 64* 201* 175* 197* 263*    , Liver Function Trend:   Results from last 7 days   Lab Units 07/07/25  0452 07/06/25  1214 07/06/25  0540 07/05/25  0605   ALK PHOS U/L 67 77 71 63   AST U/L 10 11 10 8*   ALT U/L 12 15 12 12   BILIRUBIN TOTAL mg/dL 0.4 0.5 0.4 0.4        Nutrition Specific Medications:  Scheduled medications  Scheduled Medications[1]  Continuous medications  Continuous Medications[2]  PRN medications  PRN Medications[3]      I/O:   Last BM Date: 07/07/25; Stool Appearance: Soft (07/07/25 0300)    Dietary Orders (From admission, onward)       Start     Ordered    07/06/25 1309  May Participate in Room Service  ( ROOM SERVICE MAY PARTICIPATE)  Once        Question:  .  Answer:  Yes    07/06/25 1308    07/06/25 1256  NPO Diet; Effective now  Diet effective now         07/06/25 1255                     Estimated Needs:   Total Energy Estimated Needs in 24 hours (kCal):  (1485-1733kcal)  Method for Estimating Needs: 1500-1750kcal or 30-35kcal/kg of 50kg  Total Protein Estimated Needs in 24 Hours (g):  (59-74g)  Method for Estimating 24 Hour Protein Needs: 65-75g pro or 1.3-1.6g/kg of 50kg     Method for Estimating 24 Hour Fluid Needs: 1mL/kcal/d or as per physician  Patient on Order Fluid Restriction: No        Nutrition Diagnosis   Malnutrition Diagnosis  Patient has Malnutrition Diagnosis: Yes  Diagnosis Status: Active  Malnutrition Diagnosis: Severe malnutrition related to chronic disease or condition  As Evidenced by: >5% weight loss over 30 days, Severe subcutaneous fat loss and muscle wastinging per NFPE  Additional Assessment Information: 7/7: Case discussed during CCM rounds; plan to start enteral feeds today. Pt at risk for refeeding syndrome and therefore to start EN low and slow; start MVI and supplemental thiamine.            Nutrition Interventions/Recommendations   Nutrition  prescription for enteral nutrition    Nutrition Recommendations:  Individualized Nutrition Prescription Provided for : initate enteral nutrition; monitor for refeeding syndrome    Nutrition Interventions/Goals:   Enteral Intake: Management of composition of enteral nutrition  Goal: Start tube feeds via NGT with Osmolite 1.5 at 20mL/hr x 24 hours; suggest water flush of 75mL 6x/day. With risk for refeeding, please obtain renal BMP and serum magnesium 2x/day as likely will need to replete electrolytes.  Please start daily MVI and 100mg oral thiamine (via NGT) x7 days.  Coordination of Care with Providers: Nursing, Provider (RN Ana Maria and CCM team during pt care rounds; epic chat with Ana Maria and Dr. Reyes)  Additional Interventions: As tolerated, increase EN by 10mL every 12 hours until INITIAL goal of 35mL/hr for 1260kcal, 53g pro, and 640mL formula free water or ~75% kcal needs and 1.1g pro/kg.  Adjust water flush pending fluid needs--suggest 100mL water flush 6x/day for total water of 1240mL water (formula + flush).  Will further increase EN as tolerated to meet >75% of estimated needs as appropriate.      Education Documentation  No documentation found.     7/7: Pt currently not appropriate for education.        Nutrition Monitoring and Evaluation   Enteral and Parenteral Nutrition Intake Determination: Enteral nutrition intake - Prevent refeeding    Body Weight: Body weight - Promote weight restoration    Electrolyte and Renal Panel: Electrolytes within normal limits  Glucose/Endocrine Profile: Glucose within normal limits - ICU (140-180 mg/dL)         Goal Status: Goal(s) not achieved    Time Spent (min): 45 minutes                [1] cyanocobalamin, 100 mcg, oral, Daily  docusate sodium, 100 mg, oral, BID  enoxaparin, 40 mg, subcutaneous, q24h  insulin glargine, 10 Units, subcutaneous, q24h  insulin lispro, 0-10 Units, subcutaneous, q4h  lidocaine, 1 patch, transdermal, Daily  lidocaine, 1 patch, transdermal,  Daily  [Held by provider] metoprolol tartrate, 25 mg, oral, BID  oxygen, , inhalation, Continuous - Inhalation  pantoprazole, 40 mg, intravenous, Daily before breakfast  perflutren lipid microspheres, 0.5-10 mL of dilution, intravenous, Once in imaging  perflutren protein A microsphere, 0.5 mL, intravenous, Once in imaging  piperacillin-tazobactam, 4.5 g, intravenous, q8h  polyethylene glycol, 17 g, oral, Daily  sulfur hexafluoride microsphr, 2 mL, intravenous, Once in imaging     [2] dexmedeTOMIDine, 0-1.5 mcg/kg/hr, Last Rate: 0.27 mcg/kg/hr (07/07/25 1052)  fentaNYL, 0-300 mcg/hr, Last Rate: 100 mcg/hr (07/07/25 1000)     [3] PRN medications: bisacodyl, dextrose, dextrose, glucagon, glucagon, ipratropium-albuteroL, oxygen

## 2025-07-07 NOTE — PROGRESS NOTES
Vancomycin Dosing by Pharmacy  Cessation of Therapy    Consult to pharmacy for vancomycin dosing has been discontinued by the prescriber, pharmacy will sign off at this time.    Please call pharmacy if there are further questions or re-enter a consult if vancomycin is resumed.     Oma Britton, PharmD, [facility]  7/7/2025 9:37 AM

## 2025-07-07 NOTE — PROGRESS NOTES
07/07/25 1043   Discharge Planning   Living Arrangements Family members   Support Systems Family members   Home or Post Acute Services Post acute facilities (Rehab/SNF/etc)   Type of Post Acute Facility Services Skilled nursing   Expected Discharge Disposition SNF   Does the patient need discharge transport arranged? Yes   [facility] coordination needed? Yes     [facility] on [facility] [address] can accept the patient when cleared for discharged. Updates sent to [facility] in [facility].

## 2025-07-07 NOTE — PROGRESS NOTES
PHARMACIST CONSULT  RENAL DOSING ADJUSTMENT    Patient Name: Jorge A Madden  MRN: [id number]  Admission Date: 7/2/2025 10:01 AM  Date/Time of Consult: 07/07/25 at 3:10 PM    In accordance with the inpatient pharmacy renal dose adjustment consult agreement the following medication changes have been made:  Enoxaparin: currently ordered 40mg q24hr, will be adjusted to 30mg q24hr    Indication if pertinent for dosing: VTE ppx    Results from last 72 hours   Lab Units 07/07/25  0452 07/06/25  1841 07/06/25  1214   CREATININE mg/dL 1.61* 1.40* 1.19   BUN mg/dL 39* 29* 25*       Serum creatinine: 1.61 mg/dL (H) 07/07/25 0452  Estimated creatinine clearance: 28.8 mL/min (A)      Per consult agreement, pharmacy will order related labs, evaluate results, and adjust dosing as it pertains to the drug therapy being managed.  Thank you for allowing me to participate in the care for this patient.    Signature: Oma Britton, KeeganD, [facility]  7/7/2025 3:10 PM

## 2025-07-07 NOTE — CARE PLAN
The patient's goals for the shift include      The clinical goals for the shift include remain free of falls and injury    Over the shift, the patient did no make progress toward the following goals. Recommendations to address these barriers include continue current plan of care.    Problem: Pain - Adult  Goal: Verbalizes/displays adequate comfort level or baseline comfort level  Outcome: Progressing     Problem: Safety - Adult  Goal: Free from fall injury  Outcome: Progressing     Problem: Pain  Goal: Takes deep breaths with improved pain control throughout the shift  Outcome: Progressing     Problem: Fall/Injury  Goal: Not fall by end of shift  Outcome: Progressing  Goal: Use assistive devices by end of the shift  Outcome: Progressing     Problem: Skin  Goal: Prevent/minimize sheer/friction injuries  7/7/2025 0509 by Blake Leavitt RN  Outcome: Progressing  7/7/2025 0506 by Blake Leavitt RN  Outcome: Progressing  Flowsheets (Taken 7/7/2025 0400)  Prevent/minimize sheer/friction injuries:   Increase activity/out of bed for meals   Use pull sheet   Complete micro-shifts as needed if patient unable. Adjust patient position to relieve pressure points, not a full turn   HOB 30 degrees or less   Turn/reposition every 2 hours/use positioning/transfer devices   Utilize specialty bed per algorithm  Goal: Promote/optimize nutrition  7/7/2025 0509 by Blake Leavitt RN  Outcome: Progressing  7/7/2025 0506 by Blake Leavitt RN  Outcome: Progressing  Flowsheets (Taken 7/7/2025 0400)  Promote/optimize nutrition:   Monitor/record intake including meals   Discuss with provider if NPO > 2 days  Goal: Promote skin healing  7/7/2025 0509 by Blake Leavitt RN  Outcome: Progressing  7/7/2025 0506 by Blake Leavitt RN  Flowsheets (Taken 7/7/2025 0400)  Promote skin healing:   Turn/reposition every 2 hours/use positioning/transfer devices   Protective dressings over bony prominences   Rotate device position/do not position patient on device    Ensure correct size (line/device) and apply per  instructions   Assess skin/pad under line(s)/device(s)     Problem: Safety - Medical Restraint  Goal: Remains free of injury from restraints (Restraint for Interference with Medical Device)  7/7/2025 0509 by Blake Leavitt RN  Outcome: Progressing  7/7/2025 0506 by Blake Leavitt RN  Outcome: Progressing  Flowsheets (Taken 7/7/2025 0400)  Remains free of injury from restraints (restraint for interference with medical device):   Every 2 hours: Monitor safety, psychosocial status, comfort, nutrition and hydration   Inform patient/family regarding the reason for restraint   Evaluate the patient's condition at the time of restraint application   Determine that other, less restrictive measures have been tried or would not be effective before applying the restraint  Goal: Free from restraint(s) (Restraint for Interference with Medical Device)  7/7/2025 0509 by Blake Leavitt RN  Outcome: Progressing  7/7/2025 0506 by Blake Leavitt RN  Outcome: Progressing  Flowsheets (Taken 7/7/2025 0400)  Free from restraint(s) (restraint for interference with medical device):   ONCE/SHIFT or MINIMUM Every 12 hours: Assess and document the continuing need for restraints   Every 24 hours: Continued use of restraint requires Licensed Independent Practitioner to perform face to face examination and written order   Identify and implement measures to help patient regain control     Problem: Diabetes  Goal: Achieve decreasing blood glucose levels by end of shift  Outcome: Not Progressing  Goal: Maintain glucose levels >70mg/dl to <250mg/dl throughout shift  Outcome: Not Progressing     Problem: Mechanical Ventilation  Goal: Patient Will Maintain Patent Airway  Outcome: Progressing  Goal: Oral health is maintained or improved  7/7/2025 0509 by Blake Leavitt RN  Flowsheets (Taken 7/7/2025 0400)  Oral Health is Maintained or Improved:   Suction oral pharynx   Apply water-based moisturizer  to lips   Perform oral care with an oral swab   Assess and monitor condition of lips and mouth and perform oral care per hospital policy  7/7/2025 0506 by Blake Leavitt RN  Outcome: Progressing  Flowsheets (Taken 7/7/2025 0400)  Oral Health is Maintained or Improved:   Suction oral pharynx   Apply water-based moisturizer to lips   Perform oral care with an oral swab   Assess and monitor condition of lips and mouth and perform oral care per hospital policy  Goal: ET tube will be managed safely  Outcome: Progressing  Flowsheets (Taken 7/7/2025 0400)  Endotracheal Tube Will Be Managed Safely:   Keep ambu bag, mask, oxygen connection tubing, and extra ETT at bedside and accompanying patient at all times   Support ventilator tubing to avoid pressure from drag of tubing   Reposition endotracheal tube to opposite side of mouth   Utilize endotracheal tube securing device       Problem: Diabetes  Goal: Achieve decreasing blood glucose levels by end of shift  Outcome: Not Progressing  Goal: Maintain glucose levels >70mg/dl to <250mg/dl throughout shift  Outcome: Not Progressing

## 2025-07-07 NOTE — PROGRESS NOTES
Subjective   Patient is a 75 y.o. male admitted on 7/2/2025 10:01 AM    Overnight Events:   - NAEO    Scheduled Medications:   Scheduled Medications[1]     Continuous Medications:   Continuous Medications[2]     PRN Medications:   PRN Medications[3]    Objective   Vitals:  Temp  Min: 36.4 °C (97.5 °F)  Max: 36.6 °C (97.9 °F)  Pulse  Min: 48  Max: 104  BP  Min: 79/60  Max: 155/97  Resp  Min: 9  Max: 34  SpO2  Min: 91 %  Max: 100 %    Physical Exam:   General: ill-appearing, muscle wasting, frail  Skin: Clammy, dry, intact  HEENT: NC/AT, trachea midline  CV: RRR, normal heart sounds, no lower extremity edema  Pulm: intubated, diminished in the LLL  GI: Soft, non-tender, non-distended  MSK: No clear deformities or injuries  Neurology: sedated, moves 4 limbs in response to pain    Labs:     Results for orders placed or performed during the hospital encounter of 07/02/25 (from the past 24 hours)   Blood Gas Arterial Full Panel Unsolicited   Result Value Ref Range    POCT pH, Arterial 7.17 (LL) 7.38 - 7.42 pH    POCT pCO2, Arterial 63 (H) 38 - 42 mm Hg    POCT pO2, Arterial 87 85 - 95 mm Hg    POCT SO2, Arterial 96 94 - 100 %    POCT Oxy Hemoglobin, Arterial 94.2 94.0 - 98.0 %    POCT Hematocrit Calculated, Arterial 42.0 41.0 - 52.0 %    POCT Sodium, Arterial 133 (L) 136 - 145 mmol/L    POCT Potassium, Arterial 3.5 3.5 - 5.3 mmol/L    POCT Chloride, Arterial 101 98 - 107 mmol/L    POCT Ionized Calcium, Arterial 1.30 1.10 - 1.33 mmol/L    POCT Glucose, Arterial 269 (H) 74 - 99 mg/dL    POCT Lactate, Arterial 1.1 0.4 - 2.0 mmol/L    POCT Base Excess, Arterial -6.6 (L) -2.0 - 3.0 mmol/L    POCT HCO3 Calculated, Arterial 23.0 22.0 - 26.0 mmol/L    POCT Hemoglobin, Arterial 13.9 13.5 - 17.5 g/dL    POCT Anion Gap, Arterial 13 10 - 25 mmo/L    Patient Temperature      FiO2 100 %    Ventilator Rate 14 bpm    Tidal Volume 400 mL    Peep CHM2O 10.0 cm H2O    Test Comment EMERGENCY     Critical Called By REHNERT     Critical Called  To CASILLAS     Critical Call Time 1202     Critical Read Back Y     Site of Arterial Puncture RR     Kuldip's Test Y    Troponin I, High Sensitivity   Result Value Ref Range    Troponin I, High Sensitivity 13 0 - 20 ng/L   CBC and Auto Differential   Result Value Ref Range    WBC 13.6 (H) 4.4 - 11.3 x10*3/uL    nRBC 0.0 0.0 - 0.0 /100 WBCs    RBC 4.03 (L) 4.50 - 5.90 x10*6/uL    Hemoglobin 14.1 13.5 - 17.5 g/dL    Hematocrit 40.8 (L) 41.0 - 52.0 %     (H) 80 - 100 fL    MCH 35.0 (H) 26.0 - 34.0 pg    MCHC 34.6 32.0 - 36.0 g/dL    RDW 12.6 11.5 - 14.5 %    Platelets 232 150 - 450 x10*3/uL    Neutrophils % 71.4 40.0 - 80.0 %    Immature Granulocytes %, Automated 1.3 (H) 0.0 - 0.9 %    Lymphocytes % 21.3 13.0 - 44.0 %    Monocytes % 3.5 2.0 - 10.0 %    Eosinophils % 2.1 0.0 - 6.0 %    Basophils % 0.4 0.0 - 2.0 %    Neutrophils Absolute 9.67 (H) 1.60 - 5.50 x10*3/uL    Immature Granulocytes Absolute, Automated 0.17 0.00 - 0.50 x10*3/uL    Lymphocytes Absolute 2.89 0.80 - 3.00 x10*3/uL    Monocytes Absolute 0.48 0.05 - 0.80 x10*3/uL    Eosinophils Absolute 0.28 0.00 - 0.40 x10*3/uL    Basophils Absolute 0.06 0.00 - 0.10 x10*3/uL   Comprehensive Metabolic Panel   Result Value Ref Range    Glucose 212 (H) 74 - 99 mg/dL    Sodium 135 (L) 136 - 145 mmol/L    Potassium 3.4 (L) 3.5 - 5.3 mmol/L    Chloride 100 98 - 107 mmol/L    Bicarbonate 24 21 - 32 mmol/L    Anion Gap 14 10 - 20 mmol/L    Urea Nitrogen 25 (H) 6 - 23 mg/dL    Creatinine 1.19 0.50 - 1.30 mg/dL    eGFR 64 >60 mL/min/1.73m*2    Calcium 9.4 8.6 - 10.3 mg/dL    Albumin 3.8 3.4 - 5.0 g/dL    Alkaline Phosphatase 77 33 - 136 U/L    Total Protein 7.3 6.4 - 8.2 g/dL    AST 11 9 - 39 U/L    Bilirubin, Total 0.5 0.0 - 1.2 mg/dL    ALT 15 10 - 52 U/L   Magnesium   Result Value Ref Range    Magnesium 2.05 1.60 - 2.40 mg/dL   Phosphorus   Result Value Ref Range    Phosphorus 4.5 2.5 - 4.9 mg/dL   B-type natriuretic peptide   Result Value Ref Range    BNP 79 0 - 99  pg/mL   Coagulation Screen   Result Value Ref Range    Protime 11.4 9.8 - 12.4 seconds    INR 1.0 0.9 - 1.1    aPTT 33 26 - 36 seconds   D-dimer, VTE Exclusion   Result Value Ref Range    D-Dimer, Quantitative VTE Exclusion 1,311 (H) <=500 ng/mL FEU   SST TOP   Result Value Ref Range    Extra Tube Hold for add-ons.    Gray Top   Result Value Ref Range    Extra Tube Hold for add-ons.    PST Top   Result Value Ref Range    Extra Tube Hold for add-ons.    Blood Culture    Specimen: Peripheral Venipuncture; Blood culture   Result Value Ref Range    Blood Culture Loaded on Instrument - Culture in progress    Blood Culture    Specimen: Peripheral Venipuncture; Blood culture   Result Value Ref Range    Blood Culture Loaded on Instrument - Culture in progress    Urinalysis with Reflex Culture and Microscopic   Result Value Ref Range    Color, Urine Yellow Straw, Yellow    Appearance, Urine Clear Clear    Specific Gravity, Urine 1.020 1.005 - 1.035    pH, Urine 5.5 5.0, 5.5, 6.0, 6.5, 7.0, 7.5, 8.0    Protein, Urine 30 (1+) (A) NEGATIVE, 10 (TRACE) mg/dL    Glucose, Urine 70 (1+) (A) NEGATIVE mg/dL    Blood, Urine 0.06 (1+) (A) NEGATIVE mg/dL    Ketones, Urine NEGATIVE NEGATIVE mg/dL    Bilirubin, Urine NEGATIVE NEGATIVE mg/dL    Urobilinogen, Urine NORM NORM mg/dL    Nitrite, Urine NEGATIVE NEGATIVE    Leukocyte Esterase, Urine NEGATIVE NEGATIVE   Urinalysis Microscopic   Result Value Ref Range    WBC, Urine NONE 1-5, NONE /HPF    RBC, Urine 6-10 (A) NONE, 1-2, 3-5 /HPF    Squamous Epithelial Cells, Urine 1-9 (SPARSE) Reference range not established. /HPF    Bacteria, Urine 1+ (A) NONE SEEN /HPF    Mucus, Urine FEW Reference range not established. /LPF   Blood Gas Arterial   Result Value Ref Range    POCT pH, Arterial 7.21 (LL) 7.38 - 7.42 pH    POCT pCO2, Arterial 59 (H) 38 - 42 mm Hg    POCT pO2, Arterial 99 (H) 85 - 95 mm Hg    POCT SO2, Arterial 98 94 - 100 %    POCT Oxy Hemoglobin, Arterial 95.7 94.0 - 98.0 %    POCT  Base Excess, Arterial -5.1 (L) -2.0 - 3.0 mmol/L    POCT HCO3 Calculated, Arterial 23.6 22.0 - 26.0 mmol/L    Patient Temperature      FiO2 100 %    Ventilator Mode A/C     Ventilator Rate 118 bpm    Tidal Volume 400 mL    Peep CHM2O 10.0 cm H2O    Critical Called By REHNERT     Critical Called To [place]     Critical Call Time 1500     Critical Read Back Y     Site of Arterial Puncture RB     Kuldip's Test Y    POCT GLUCOSE   Result Value Ref Range    POCT Glucose 257 (H) 74 - 99 mg/dL   POCT GLUCOSE   Result Value Ref Range    POCT Glucose 284 (H) 74 - 99 mg/dL   Blood Gas Arterial   Result Value Ref Range    POCT pH, Arterial 7.29 (L) 7.38 - 7.42 pH    POCT pCO2, Arterial 50 (H) 38 - 42 mm Hg    POCT pO2, Arterial 67 (L) 85 - 95 mm Hg    POCT SO2, Arterial 95 94 - 100 %    POCT Oxy Hemoglobin, Arterial 93.2 (L) 94.0 - 98.0 %    POCT Base Excess, Arterial -3.1 (L) -2.0 - 3.0 mmol/L    POCT HCO3 Calculated, Arterial 24.0 22.0 - 26.0 mmol/L    Patient Temperature      FiO2 90 %    Ventilator Mode A/C     Ventilator Rate 22 bpm    Tidal Volume 400 mL    Peep CHM2O 10.0 cm H2O    Site of Arterial Puncture RB     Kuldip's Test Y    Basic metabolic panel   Result Value Ref Range    Glucose 256 (H) 74 - 99 mg/dL    Sodium 134 (L) 136 - 145 mmol/L    Potassium 4.7 3.5 - 5.3 mmol/L    Chloride 99 98 - 107 mmol/L    Bicarbonate 23 21 - 32 mmol/L    Anion Gap 17 10 - 20 mmol/L    Urea Nitrogen 29 (H) 6 - 23 mg/dL    Creatinine 1.40 (H) 0.50 - 1.30 mg/dL    eGFR 53 (L) >60 mL/min/1.73m*2    Calcium 9.0 8.6 - 10.3 mg/dL   Magnesium   Result Value Ref Range    Magnesium 2.05 1.60 - 2.40 mg/dL   POCT GLUCOSE   Result Value Ref Range    POCT Glucose 263 (H) 74 - 99 mg/dL   Blood Gas Arterial   Result Value Ref Range    POCT pH, Arterial 7.35 (L) 7.38 - 7.42 pH    POCT pCO2, Arterial 41 38 - 42 mm Hg    POCT pO2, Arterial 137 (H) 85 - 95 mm Hg    POCT SO2, Arterial 99 94 - 100 %    POCT Oxy Hemoglobin, Arterial 97.2 94.0 - 98.0 %     POCT Base Excess, Arterial -2.9 (L) -2.0 - 3.0 mmol/L    POCT HCO3 Calculated, Arterial 22.6 22.0 - 26.0 mmol/L    Patient Temperature      FiO2 100 %    Ventilator Mode A/C     Ventilator Rate 24 bpm    Tidal Volume 400 mL    Peep CHM2O 10.0 cm H2O    Site of Arterial Puncture R RAD     Kuldip's Test POSITIVE    Blood Gas Arterial Full Panel   Result Value Ref Range    POCT pH, Arterial 7.35 (L) 7.38 - 7.42 pH    POCT pCO2, Arterial 41 38 - 42 mm Hg    POCT pO2, Arterial 137 (H) 85 - 95 mm Hg    POCT SO2, Arterial 99 94 - 100 %    POCT Oxy Hemoglobin, Arterial 97.2 94.0 - 98.0 %    POCT Hematocrit Calculated, Arterial 44.0 41.0 - 52.0 %    POCT Sodium, Arterial 129 (L) 136 - 145 mmol/L    POCT Potassium, Arterial 4.7 3.5 - 5.3 mmol/L    POCT Chloride, Arterial 100 98 - 107 mmol/L    POCT Ionized Calcium, Arterial 1.23 1.10 - 1.33 mmol/L    POCT Glucose, Arterial 290 (H) 74 - 99 mg/dL    POCT Lactate, Arterial 1.5 0.4 - 2.0 mmol/L    POCT Base Excess, Arterial -2.9 (L) -2.0 - 3.0 mmol/L    POCT HCO3 Calculated, Arterial 22.6 22.0 - 26.0 mmol/L    POCT Hemoglobin, Arterial 14.5 13.5 - 17.5 g/dL    POCT Anion Gap, Arterial 11 10 - 25 mmo/L    Patient Temperature      FiO2 100 %    Ventilator Mode A/C     Ventilator Rate 24 bpm    Tidal Volume 400 mL    Peep CHM2O 10.0 cm H2O    Site of Arterial Puncture R RAD     Kuldip's Test POSITIVE    POCT GLUCOSE   Result Value Ref Range    POCT Glucose 197 (H) 74 - 99 mg/dL   POCT GLUCOSE   Result Value Ref Range    POCT Glucose 175 (H) 74 - 99 mg/dL   CBC and Auto Differential   Result Value Ref Range    WBC 15.6 (H) 4.4 - 11.3 x10*3/uL    nRBC 0.0 0.0 - 0.0 /100 WBCs    RBC 3.39 (L) 4.50 - 5.90 x10*6/uL    Hemoglobin 11.6 (L) 13.5 - 17.5 g/dL    Hematocrit 34.4 (L) 41.0 - 52.0 %     (H) 80 - 100 fL    MCH 34.2 (H) 26.0 - 34.0 pg    MCHC 33.7 32.0 - 36.0 g/dL    RDW 12.8 11.5 - 14.5 %    Platelets 209 150 - 450 x10*3/uL    Neutrophils % 87.4 40.0 - 80.0 %    Immature  Granulocytes %, Automated 1.3 (H) 0.0 - 0.9 %    Lymphocytes % 7.2 13.0 - 44.0 %    Monocytes % 3.7 2.0 - 10.0 %    Eosinophils % 0.1 0.0 - 6.0 %    Basophils % 0.3 0.0 - 2.0 %    Neutrophils Absolute 13.66 (H) 1.60 - 5.50 x10*3/uL    Immature Granulocytes Absolute, Automated 0.20 0.00 - 0.50 x10*3/uL    Lymphocytes Absolute 1.12 0.80 - 3.00 x10*3/uL    Monocytes Absolute 0.58 0.05 - 0.80 x10*3/uL    Eosinophils Absolute 0.02 0.00 - 0.40 x10*3/uL    Basophils Absolute 0.05 0.00 - 0.10 x10*3/uL   Comprehensive metabolic panel   Result Value Ref Range    Glucose 175 (H) 74 - 99 mg/dL    Sodium 133 (L) 136 - 145 mmol/L    Potassium 4.6 3.5 - 5.3 mmol/L    Chloride 100 98 - 107 mmol/L    Bicarbonate 22 21 - 32 mmol/L    Anion Gap 16 10 - 20 mmol/L    Urea Nitrogen 39 (H) 6 - 23 mg/dL    Creatinine 1.61 (H) 0.50 - 1.30 mg/dL    eGFR 45 (L) >60 mL/min/1.73m*2    Calcium 9.0 8.6 - 10.3 mg/dL    Albumin 3.4 3.4 - 5.0 g/dL    Alkaline Phosphatase 67 33 - 136 U/L    Total Protein 6.2 (L) 6.4 - 8.2 g/dL    AST 10 9 - 39 U/L    Bilirubin, Total 0.4 0.0 - 1.2 mg/dL    ALT 12 10 - 52 U/L   Magnesium   Result Value Ref Range    Magnesium 2.05 1.60 - 2.40 mg/dL   Phosphorus   Result Value Ref Range    Phosphorus 6.1 (H) 2.5 - 4.9 mg/dL   Blood Gas Arterial   Result Value Ref Range    POCT pH, Arterial 7.36 (L) 7.38 - 7.42 pH    POCT pCO2, Arterial 42 38 - 42 mm Hg    POCT pO2, Arterial 99 (H) 85 - 95 mm Hg    POCT SO2, Arterial 98 94 - 100 %    POCT Oxy Hemoglobin, Arterial 96.7 94.0 - 98.0 %    POCT Base Excess, Arterial -1.8 -2.0 - 3.0 mmol/L    POCT HCO3 Calculated, Arterial 23.7 22.0 - 26.0 mmol/L    Patient Temperature      FiO2 60 %    Ventilator Rate 24 bpm    Tidal Volume 400 mL    Peep CHM2O 10.0 cm H2O    Site of Arterial Puncture LR     Kuldip's Test POS    POCT GLUCOSE   Result Value Ref Range    POCT Glucose 201 (H) 74 - 99 mg/dL   Transthoracic Echo Complete   Result Value Ref Range    BSA 1.57 m2        Imaging:    Imaging  XR chest 1 view  Result Date: 7/7/2025  The enteric tube distal tip projects over the gastric body, in the side-hole projects near the GE junction. Advancement by 5 cm and confirmation of repositioning with upper abdominal radiograph can be obtained as per clinical need. Unchanged left basilar opacification.   Signed by: Sara Torres 7/7/2025 7:49 AM Dictation workstation:   [id number]    XR abdomen 1 view  Result Date: 7/6/2025  1.  See discussion above   MACRO: None   Signed by: Joseph Schoenberger 7/6/2025 5:43 PM Dictation workstation:   [id number]    CT abdomen pelvis w IV contrast  Result Date: 7/6/2025  1. Massive amount of stool impacted in the rectum which is dilated up to 8.7 cm x 8.4 cm with also large amount of stool in the sigmoid colon and distal descending colon. Recommend intervention to avoid stercoral colitis (pressure ischemia of the colonic wall). This is on a back diffuse infectious/inflammatory colitis of the ascending through descending colon.   2. Extensive pancreatic parenchymal calcifications. There is irregular ductal dilatation from the uncinate process to the tail. The duct measures up to 1.5 cm. Superimposed upon main ductal dilatation are dilatation of numerous side branch ducts resulting in parenchymal simple cysts. No solid parenchymal mass. The differential diagnosis includes both or either of the following: Sequela of chronic pancreatitis and/or combined side-branch/main duct intraductal papillary mucinous neoplasm. A follow-up MRI pancreas protocol with contrast/MRCP in 6 months is recommended to ensure stability and further characterize as necessary.   3. Mild bilateral nonobstructing renal calculi and cortical scarring.   MACRO: None.   Signed by: Shaggy Mcgovern 7/6/2025 4:24 PM Dictation workstation:   [id number]    CT angio chest for pulmonary embolism  Result Date: 7/6/2025  1. Complete left lower lobe collapse due to debris occluding all of the left lower  lobe segmental and subsegmental bronchi and the lower lobar bronchus. There is also debris in the left upper lobe and lingular bronchi and right lower lobe segmental subsegmental bronchi associated with aspiration pneumonitis in the right lower lobe.   2. No acute pulmonary embolism.   3. Concentric left ventricular hypertrophy.   MACRO: None.   Signed by: Shaggy Mcgovern 7/6/2025 4:10 PM Dictation workstation:   [id number]    XR chest 1 view  Result Date: 7/6/2025  1.  Improving left lower lung consolidative infiltrate.   Signed by: Lorenzo Monahan 7/6/2025 12:15 PM Dictation workstation:   [id number]    XR chest 1 view  Result Date: 7/6/2025  Consolidation at the left base which is worse when compared to the previous study.   MACRO: None.   Signed by: Andrea Carter 7/6/2025 11:55 AM Dictation workstation:   [id number]    MRCP pancreas w and wo IV contrast  Result Date: 7/3/2025  1.  Marked diffuse pancreatic parenchymal atrophy associated with marked diffuse main pancreatic ductal dilation and dilation of numerous contiguous pancreatic duct side branches. Few small intraductal filling defects, likely correlating with associated coarse calcifications seen on CT. This constellation of findings favors sequela of chronic pancreatitis. Superimposed mixed (main and branch duct) type IPMN is in the differential, but considered less likely given that diffuse pancreatic ductal dilation has been present and not significantly changed since December 2023. 2. Subtle diffuse scattered irregularity/narrowing throughout intrahepatic bile ducts without dilation. There is also dilation of the common bile duct with a short-segment area of smooth narrowing in the midportion. These findings are nonspecific, but may be seen with primary sclerosing cholangitis versus secondary or other cholangitis. No intraductal filling defect to suggest choledocholithiasis. Advise correlation with LFTs. 3. Hepatomegaly with findings suggestive of  hepatic and splenic iron deposition. 4. Few small arterially enhancing liver lesions described above likely representing a combination of flash filling hemangiomas and THID. 5. Simple bilateral renal cysts. Stable nonenhancing chronic area of scarring in the left posterior perinephric region. 6. Moderate stool burden throughout the colon, which may be correlated for symptoms of constipation. 7. Opacities in the left-greater-than-right lower lobes, which may represent atelectasis or pneumonia. 8. Additional chronic findings, as detailed     Signed by: Moriah Seals 7/3/2025 6:15 PM Dictation workstation:   [id number]    CT angio chest for pulmonary embolism  Result Date: 7/2/2025  CHEST: 1.  No pulmonary embolism. 2. Bibasilar pneumonia with dense consolidation particularly in the left lower lobe.   ABDOMEN AND PELVIS: 1.  Extensive pancreatic calcifications with pancreatic ductal dilatation and beading consistent with chronic pancreatitis. 2. Multiple small cystic lesions in the pancreas which could represent small pseudocysts, small cystic neoplasms or intraductal mucinous papillary neoplasms. 3. Bilateral nonobstructing intrarenal calculi. 4. 1.4 cm Bosniak 2 left renal cyst and simple left renal cyst. 5. Distended urinary bladder with a volume of 501 cc. 6. Marked constipation and possible fecal impaction.   MACRO: None   Signed by: Erin Moreira 7/2/2025 12:29 PM Dictation workstation:   [id number]    CT abdomen pelvis w IV contrast  Result Date: 7/2/2025  CHEST: 1.  No pulmonary embolism. 2. Bibasilar pneumonia with dense consolidation particularly in the left lower lobe.   ABDOMEN AND PELVIS: 1.  Extensive pancreatic calcifications with pancreatic ductal dilatation and beading consistent with chronic pancreatitis. 2. Multiple small cystic lesions in the pancreas which could represent small pseudocysts, small cystic neoplasms or intraductal mucinous papillary neoplasms. 3. Bilateral nonobstructing  intrarenal calculi. 4. 1.4 cm Bosniak 2 left renal cyst and simple left renal cyst. 5. Distended urinary bladder with a volume of 501 cc. 6. Marked constipation and possible fecal impaction.   MACRO: None   Signed by: Erin Moreira 7/2/2025 12:29 PM Dictation workstation:   [id number]    XR chest 1 view  Result Date: 7/2/2025  Scant linear atelectasis versus scarring at the left lung base.   Arthritic changes in the thoracic spine as described.     MACRO: None   Signed by: Shaggy Morris 7/2/2025 11:01 AM Dictation workstation:   [id number]      Cardiology, Vascular, and Other Imaging  ECG 12 lead  Result Date: 7/6/2025  Normal sinus rhythm Left axis deviation Left ventricular hypertrophy with repolarization abnormality Cannot rule out Septal infarct , age undetermined Abnormal ECG No previous ECGs available         Assessment/Plan     Neuro:  - No acute issues  - Sedation: switched to Precedex on interval due to sedation-induced borderline hypotension  - Pain management: fentanyl drip  - Titrate to RASS 0 to -2  - CAM ICU     Cardiac:  - Hx hypertension: holding home losartan, amlodipine while reassessing vitals on sedation. Resume home metoprolol  - Obtain echocardiogram  - Continuous cardiac monitoring  - Repeat CXR, troponin, BNP, d-dimer  - Maintain MAP > 65 mmHg     Pulmonary:  #Acute hypoxic hypercapnic respiratory failure  - Intubated  7/6  - Maintain spO2 > 92%  - Titrate vent settings as tolerated  - Post intubation ABG with pH 7.17, pCO2 63, pO2 87. AM pending.  - CT angio PE demonstrated concern for aspiration and collapse of LLL  - Bronchoscopy today  - Continuous pulse ox  Vent Mode: Volume control/assist control  FiO2 (%):  [50 %-100 %] 50 %  S RR:  [14-24] 24  S VT:  [400 mL] 400 mL  PEEP/CPAP (cm H2O):  [8 cm H20-10 cm H20] 8 cm H20  MAP (cm H2O):  [13-15] 13     Gastrointestinal:  - Diet: NPO  - OG tube to suction  - CT AP 7/6 demonstrated large amount of impacted stool and possible colitis with  concern for developing stercoral colitis. However, patient had a very large BM overnight.  - Ppx: Protonix   - Bowel regimen: miralax, docusate  - Last BM: Last BM Date: 25    Renal:  - Daily BMP, Mg, phos  - Replete electrolytes as indicated  - Strict I's & O's  - Friedman to be placed  Net IO Since Admission: 696.56 mL [25 1125]    Hematology:  - Daily CBC  - DVT Prophylaxis: Lovenox, SCDs    Infectious Disease:  #Left lower lobe pneumonia  - MRSA negative, stop vanc, continue Zosyn  - Strep ag positive  - Repeat blood cultures NGTD  - Lactate normal  - UA without evidence of UTI  Temp (24hrs), Av.5 °C (97.7 °F), Min:36.4 °C (97.5 °F), Max:36.6 °C (97.9 °F)     Endocrine:  - HX DM2  - SSI, Lantus, POC BG  - BG < 180 goal    CODE STATUS: DNR    Disposition: ICU         [1] cyanocobalamin, 100 mcg, oral, Daily  docusate sodium, 100 mg, oral, BID  enoxaparin, 40 mg, subcutaneous, q24h  insulin glargine, 10 Units, subcutaneous, q24h  insulin lispro, 0-10 Units, subcutaneous, q4h  lidocaine, 1 patch, transdermal, Daily  lidocaine, 1 patch, transdermal, Daily  [Held by provider] metoprolol tartrate, 25 mg, oral, BID  oxygen, , inhalation, Continuous - Inhalation  pantoprazole, 40 mg, intravenous, Daily before breakfast  perflutren lipid microspheres, 0.5-10 mL of dilution, intravenous, Once in imaging  perflutren protein A microsphere, 0.5 mL, intravenous, Once in imaging  piperacillin-tazobactam, 4.5 g, intravenous, q8h  polyethylene glycol, 17 g, oral, Daily  sulfur hexafluoride microsphr, 2 mL, intravenous, Once in imaging     [2] dexmedeTOMIDine, 0-1.5 mcg/kg/hr, Last Rate: 0.27 mcg/kg/hr (25 1052)  fentaNYL, 0-300 mcg/hr, Last Rate: 100 mcg/hr (25 1000)     [3] PRN medications: bisacodyl, dextrose, dextrose, glucagon, glucagon, ipratropium-albuteroL, oxygen

## 2025-07-08 ENCOUNTER — APPOINTMENT (OUTPATIENT)
Dept: SURGERY | Facility: CLINIC | Age: 75
End: 2025-07-08
Payer: MEDICARE

## 2025-07-08 LAB
ALBUMIN SERPL BCP-MCNC: 3 G/DL (ref 3.4–5)
ALBUMIN SERPL BCP-MCNC: 3.6 G/DL (ref 3.4–5)
ALP SERPL-CCNC: 53 U/L (ref 33–136)
ALP SERPL-CCNC: 64 U/L (ref 33–136)
ALT SERPL W P-5'-P-CCNC: 13 U/L (ref 10–52)
ALT SERPL W P-5'-P-CCNC: 9 U/L (ref 10–52)
ANION GAP SERPL CALC-SCNC: 14 MMOL/L (ref 10–20)
ANION GAP SERPL CALC-SCNC: 14 MMOL/L (ref 10–20)
ARTERIAL PATENCY WRIST A: ABNORMAL
AST SERPL W P-5'-P-CCNC: 15 U/L (ref 9–39)
AST SERPL W P-5'-P-CCNC: 8 U/L (ref 9–39)
ATRIAL RATE: 45 BPM
BASE EXCESS BLDA CALC-SCNC: -4 MMOL/L (ref -2–3)
BASOPHILS # BLD AUTO: 0.06 X10*3/UL (ref 0–0.1)
BASOPHILS NFR BLD AUTO: 0.4 %
BILIRUB SERPL-MCNC: 0.4 MG/DL (ref 0–1.2)
BILIRUB SERPL-MCNC: 0.5 MG/DL (ref 0–1.2)
BODY TEMPERATURE: ABNORMAL
BUN SERPL-MCNC: 39 MG/DL (ref 6–23)
BUN SERPL-MCNC: 46 MG/DL (ref 6–23)
CALCIUM SERPL-MCNC: 8.4 MG/DL (ref 8.6–10.3)
CALCIUM SERPL-MCNC: 9 MG/DL (ref 8.6–10.3)
CHLORIDE SERPL-SCNC: 102 MMOL/L (ref 98–107)
CHLORIDE SERPL-SCNC: 102 MMOL/L (ref 98–107)
CO2 SERPL-SCNC: 23 MMOL/L (ref 21–32)
CO2 SERPL-SCNC: 25 MMOL/L (ref 21–32)
CREAT SERPL-MCNC: 1.73 MG/DL (ref 0.5–1.3)
CREAT SERPL-MCNC: 1.86 MG/DL (ref 0.5–1.3)
EGFRCR SERPLBLD CKD-EPI 2021: 38 ML/MIN/1.73M*2
EGFRCR SERPLBLD CKD-EPI 2021: 41 ML/MIN/1.73M*2
EOSINOPHIL # BLD AUTO: 0.1 X10*3/UL (ref 0–0.4)
EOSINOPHIL NFR BLD AUTO: 0.7 %
ERYTHROCYTE [DISTWIDTH] IN BLOOD BY AUTOMATED COUNT: 13 % (ref 11.5–14.5)
GLUCOSE BLD MANUAL STRIP-MCNC: 128 MG/DL (ref 74–99)
GLUCOSE BLD MANUAL STRIP-MCNC: 143 MG/DL (ref 74–99)
GLUCOSE BLD MANUAL STRIP-MCNC: 156 MG/DL (ref 74–99)
GLUCOSE BLD MANUAL STRIP-MCNC: 160 MG/DL (ref 74–99)
GLUCOSE BLD MANUAL STRIP-MCNC: 196 MG/DL (ref 74–99)
GLUCOSE BLD MANUAL STRIP-MCNC: 77 MG/DL (ref 74–99)
GLUCOSE SERPL-MCNC: 162 MG/DL (ref 74–99)
GLUCOSE SERPL-MCNC: 240 MG/DL (ref 74–99)
HCO3 BLDA-SCNC: 20.9 MMOL/L (ref 22–26)
HCT VFR BLD AUTO: 29.7 % (ref 41–52)
HGB BLD-MCNC: 10.3 G/DL (ref 13.5–17.5)
IMM GRANULOCYTES # BLD AUTO: 0.12 X10*3/UL (ref 0–0.5)
IMM GRANULOCYTES NFR BLD AUTO: 0.8 % (ref 0–0.9)
INHALED O2 CONCENTRATION: 40 %
LYMPHOCYTES # BLD AUTO: 1.49 X10*3/UL (ref 0.8–3)
LYMPHOCYTES NFR BLD AUTO: 10.4 %
MAGNESIUM SERPL-MCNC: 2.07 MG/DL (ref 1.6–2.4)
MAGNESIUM SERPL-MCNC: 2.11 MG/DL (ref 1.6–2.4)
MCH RBC QN AUTO: 35 PG (ref 26–34)
MCHC RBC AUTO-ENTMCNC: 34.7 G/DL (ref 32–36)
MCV RBC AUTO: 101 FL (ref 80–100)
MONOCYTES # BLD AUTO: 0.75 X10*3/UL (ref 0.05–0.8)
MONOCYTES NFR BLD AUTO: 5.2 %
NEUTROPHILS # BLD AUTO: 11.79 X10*3/UL (ref 1.6–5.5)
NEUTROPHILS NFR BLD AUTO: 82.5 %
NRBC BLD-RTO: 0 /100 WBCS (ref 0–0)
OXYHGB MFR BLDA: 97.2 % (ref 94–98)
P AXIS: 89 DEGREES
P OFFSET: 175 MS
P ONSET: 133 MS
PCO2 BLDA: 37 MM HG (ref 38–42)
PEEP CMH2O: 8 CM H2O
PH BLDA: 7.36 PH (ref 7.38–7.42)
PHOSPHATE SERPL-MCNC: 4.7 MG/DL (ref 2.5–4.9)
PHOSPHATE SERPL-MCNC: 5.3 MG/DL (ref 2.5–4.9)
PLATELET # BLD AUTO: 205 X10*3/UL (ref 150–450)
PO2 BLDA: 124 MM HG (ref 85–95)
POTASSIUM SERPL-SCNC: 3.4 MMOL/L (ref 3.5–5.3)
POTASSIUM SERPL-SCNC: 3.5 MMOL/L (ref 3.5–5.3)
PR INTERVAL: 184 MS
PROT SERPL-MCNC: 5.7 G/DL (ref 6.4–8.2)
PROT SERPL-MCNC: 6.9 G/DL (ref 6.4–8.2)
Q ONSET: 225 MS
QRS COUNT: 7 BEATS
QRS DURATION: 76 MS
QT INTERVAL: 480 MS
QTC CALCULATION(BAZETT): 415 MS
QTC FREDERICIA: 436 MS
R AXIS: 10 DEGREES
RBC # BLD AUTO: 2.94 X10*6/UL (ref 4.5–5.9)
SAO2 % BLDA: 99 % (ref 94–100)
SITE OF ARTERIAL PUNCTURE: ABNORMAL
SODIUM SERPL-SCNC: 135 MMOL/L (ref 136–145)
SODIUM SERPL-SCNC: 138 MMOL/L (ref 136–145)
T AXIS: 49 DEGREES
T OFFSET: 465 MS
TIDAL VOLUME: 450 ML
VENTILATOR RATE: 22 BPM
VENTRICULAR RATE: 45 BPM
WBC # BLD AUTO: 14.3 X10*3/UL (ref 4.4–11.3)

## 2025-07-08 PROCEDURE — 71045 X-RAY EXAM CHEST 1 VIEW: CPT | Performed by: RADIOLOGY

## 2025-07-08 PROCEDURE — 2500000004 HC RX 250 GENERAL PHARMACY W/ HCPCS (ALT 636 FOR OP/ED)

## 2025-07-08 PROCEDURE — 2500000002 HC RX 250 W HCPCS SELF ADMINISTERED DRUGS (ALT 637 FOR MEDICARE OP, ALT 636 FOR OP/ED)

## 2025-07-08 PROCEDURE — 5A09357 ASSISTANCE WITH RESPIRATORY VENTILATION, LESS THAN 24 CONSECUTIVE HOURS, CONTINUOUS POSITIVE AIRWAY PRESSURE: ICD-10-PCS | Performed by: INTERNAL MEDICINE

## 2025-07-08 PROCEDURE — 94640 AIRWAY INHALATION TREATMENT: CPT

## 2025-07-08 PROCEDURE — 80053 COMPREHEN METABOLIC PANEL: CPT

## 2025-07-08 PROCEDURE — 84100 ASSAY OF PHOSPHORUS: CPT

## 2025-07-08 PROCEDURE — 2500000005 HC RX 250 GENERAL PHARMACY W/O HCPCS: Performed by: INTERNAL MEDICINE

## 2025-07-08 PROCEDURE — 94003 VENT MGMT INPAT SUBQ DAY: CPT

## 2025-07-08 PROCEDURE — 82947 ASSAY GLUCOSE BLOOD QUANT: CPT

## 2025-07-08 PROCEDURE — 37799 UNLISTED PX VASCULAR SURGERY: CPT

## 2025-07-08 PROCEDURE — 36415 COLL VENOUS BLD VENIPUNCTURE: CPT

## 2025-07-08 PROCEDURE — 2500000001 HC RX 250 WO HCPCS SELF ADMINISTERED DRUGS (ALT 637 FOR MEDICARE OP)

## 2025-07-08 PROCEDURE — 82805 BLOOD GASES W/O2 SATURATION: CPT

## 2025-07-08 PROCEDURE — 83735 ASSAY OF MAGNESIUM: CPT

## 2025-07-08 PROCEDURE — 99291 CRITICAL CARE FIRST HOUR: CPT | Performed by: INTERNAL MEDICINE

## 2025-07-08 PROCEDURE — 2500000004 HC RX 250 GENERAL PHARMACY W/ HCPCS (ALT 636 FOR OP/ED): Mod: JW

## 2025-07-08 PROCEDURE — 36620 INSERTION CATHETER ARTERY: CPT

## 2025-07-08 PROCEDURE — 94660 CPAP INITIATION&MGMT: CPT

## 2025-07-08 PROCEDURE — 2020000001 HC ICU ROOM DAILY

## 2025-07-08 PROCEDURE — 71045 X-RAY EXAM CHEST 1 VIEW: CPT | Performed by: STUDENT IN AN ORGANIZED HEALTH CARE EDUCATION/TRAINING PROGRAM

## 2025-07-08 PROCEDURE — 85025 COMPLETE CBC W/AUTO DIFF WBC: CPT

## 2025-07-08 RX ORDER — LORAZEPAM 2 MG/ML
0.5 INJECTION INTRAMUSCULAR ONCE
Status: COMPLETED | OUTPATIENT
Start: 2025-07-08 | End: 2025-07-08

## 2025-07-08 RX ORDER — SODIUM CHLORIDE, SODIUM LACTATE, POTASSIUM CHLORIDE, CALCIUM CHLORIDE 600; 310; 30; 20 MG/100ML; MG/100ML; MG/100ML; MG/100ML
75 INJECTION, SOLUTION INTRAVENOUS CONTINUOUS
Status: ACTIVE | OUTPATIENT
Start: 2025-07-08 | End: 2025-07-09

## 2025-07-08 RX ORDER — HYDROCODONE BITARTRATE AND ACETAMINOPHEN 5; 325 MG/1; MG/1
1 TABLET ORAL ONCE
Refills: 0 | Status: DISCONTINUED | OUTPATIENT
Start: 2025-07-08 | End: 2025-07-08

## 2025-07-08 RX ORDER — LABETALOL HYDROCHLORIDE 5 MG/ML
10 INJECTION, SOLUTION INTRAVENOUS ONCE
Status: COMPLETED | OUTPATIENT
Start: 2025-07-08 | End: 2025-07-08

## 2025-07-08 RX ORDER — DEXMEDETOMIDINE HYDROCHLORIDE 4 UG/ML
0-1.5 INJECTION, SOLUTION INTRAVENOUS CONTINUOUS
Status: DISCONTINUED | OUTPATIENT
Start: 2025-07-08 | End: 2025-07-10

## 2025-07-08 RX ORDER — MORPHINE SULFATE 2 MG/ML
2 INJECTION, SOLUTION INTRAMUSCULAR; INTRAVENOUS ONCE
Status: COMPLETED | OUTPATIENT
Start: 2025-07-08 | End: 2025-07-08

## 2025-07-08 RX ADMIN — IPRATROPIUM BROMIDE AND ALBUTEROL SULFATE 3 ML: 2.5; .5 SOLUTION RESPIRATORY (INHALATION) at 20:09

## 2025-07-08 RX ADMIN — Medication 1 TABLET: at 08:36

## 2025-07-08 RX ADMIN — Medication 75 MCG/HR: at 07:52

## 2025-07-08 RX ADMIN — ENOXAPARIN SODIUM 30 MG: 30 INJECTION SUBCUTANEOUS at 15:52

## 2025-07-08 RX ADMIN — IPRATROPIUM BROMIDE AND ALBUTEROL SULFATE 3 ML: 2.5; .5 SOLUTION RESPIRATORY (INHALATION) at 08:57

## 2025-07-08 RX ADMIN — Medication 4 L/MIN: at 11:21

## 2025-07-08 RX ADMIN — INSULIN GLARGINE 10 UNITS: 100 INJECTION, SOLUTION SUBCUTANEOUS at 20:08

## 2025-07-08 RX ADMIN — INSULIN LISPRO 2 UNITS: 100 INJECTION, SOLUTION INTRAVENOUS; SUBCUTANEOUS at 08:36

## 2025-07-08 RX ADMIN — Medication 40 PERCENT: at 04:22

## 2025-07-08 RX ADMIN — INSULIN LISPRO 2 UNITS: 100 INJECTION, SOLUTION INTRAVENOUS; SUBCUTANEOUS at 20:16

## 2025-07-08 RX ADMIN — INSULIN LISPRO 2 UNITS: 100 INJECTION, SOLUTION INTRAVENOUS; SUBCUTANEOUS at 04:27

## 2025-07-08 RX ADMIN — Medication 70 PERCENT: at 20:13

## 2025-07-08 RX ADMIN — ACETYLCYSTEINE 600 MG: 200 SOLUTION ORAL; RESPIRATORY (INHALATION) at 08:57

## 2025-07-08 RX ADMIN — PIPERACILLIN SODIUM AND TAZOBACTAM SODIUM 4.5 G: 4; .5 INJECTION, SOLUTION INTRAVENOUS at 00:50

## 2025-07-08 RX ADMIN — ACETYLCYSTEINE 600 MG: 200 SOLUTION ORAL; RESPIRATORY (INHALATION) at 14:54

## 2025-07-08 RX ADMIN — PANTOPRAZOLE SODIUM 40 MG: 40 INJECTION, POWDER, FOR SOLUTION INTRAVENOUS at 07:16

## 2025-07-08 RX ADMIN — Medication 70 PERCENT: at 19:00

## 2025-07-08 RX ADMIN — DEXMEDETOMIDINE HYDROCHLORIDE 0.2 MCG/KG/HR: 400 INJECTION INTRAVENOUS at 21:00

## 2025-07-08 RX ADMIN — PIPERACILLIN SODIUM AND TAZOBACTAM SODIUM 4.5 G: 4; .5 INJECTION, SOLUTION INTRAVENOUS at 07:52

## 2025-07-08 RX ADMIN — IPRATROPIUM BROMIDE AND ALBUTEROL SULFATE 3 ML: 2.5; .5 SOLUTION RESPIRATORY (INHALATION) at 14:54

## 2025-07-08 RX ADMIN — AMPICILLIN SODIUM AND SULBACTAM SODIUM 3 G: 2; 1 INJECTION, POWDER, FOR SOLUTION INTRAMUSCULAR; INTRAVENOUS at 23:58

## 2025-07-08 RX ADMIN — THIAMINE HYDROCHLORIDE 100 MG: 100 INJECTION, SOLUTION INTRAMUSCULAR; INTRAVENOUS at 08:36

## 2025-07-08 RX ADMIN — MORPHINE SULFATE 2 MG: 2 INJECTION, SOLUTION INTRAMUSCULAR; INTRAVENOUS at 18:41

## 2025-07-08 RX ADMIN — LABETALOL HYDROCHLORIDE 10 MG: 5 INJECTION, SOLUTION INTRAVENOUS at 20:08

## 2025-07-08 RX ADMIN — SODIUM CHLORIDE, SODIUM LACTATE, POTASSIUM CHLORIDE, AND CALCIUM CHLORIDE 75 ML/HR: .6; .31; .03; .02 INJECTION, SOLUTION INTRAVENOUS at 10:39

## 2025-07-08 RX ADMIN — AMPICILLIN SODIUM AND SULBACTAM SODIUM 3 G: 2; 1 INJECTION, POWDER, FOR SOLUTION INTRAMUSCULAR; INTRAVENOUS at 12:19

## 2025-07-08 RX ADMIN — INSULIN LISPRO 4 UNITS: 100 INJECTION, SOLUTION INTRAVENOUS; SUBCUTANEOUS at 00:51

## 2025-07-08 RX ADMIN — DOCUSATE SODIUM LIQUID 100 MG: 100 LIQUID ORAL at 20:08

## 2025-07-08 RX ADMIN — LORAZEPAM 0.5 MG: 2 INJECTION INTRAMUSCULAR; INTRAVENOUS at 13:55

## 2025-07-08 RX ADMIN — Medication 40 PERCENT: at 08:57

## 2025-07-08 RX ADMIN — ACETYLCYSTEINE 600 MG: 200 SOLUTION ORAL; RESPIRATORY (INHALATION) at 20:09

## 2025-07-08 ASSESSMENT — PAIN - FUNCTIONAL ASSESSMENT
PAIN_FUNCTIONAL_ASSESSMENT: 0-10
PAIN_FUNCTIONAL_ASSESSMENT: CPOT (CRITICAL CARE PAIN OBSERVATION TOOL)
PAIN_FUNCTIONAL_ASSESSMENT: 0-10

## 2025-07-08 ASSESSMENT — COGNITIVE AND FUNCTIONAL STATUS - GENERAL
PERSONAL GROOMING: A LITTLE
WALKING IN HOSPITAL ROOM: TOTAL
PERSONAL GROOMING: TOTAL
TOILETING: A LITTLE
PERSONAL GROOMING: A LITTLE
CLIMB 3 TO 5 STEPS WITH RAILING: A LOT
MOBILITY SCORE: 18
WALKING IN HOSPITAL ROOM: A LOT
MOBILITY SCORE: 18
DRESSING REGULAR LOWER BODY CLOTHING: TOTAL
DRESSING REGULAR UPPER BODY CLOTHING: TOTAL
DRESSING REGULAR UPPER BODY CLOTHING: A LITTLE
MOVING FROM LYING ON BACK TO SITTING ON SIDE OF FLAT BED WITH BEDRAILS: TOTAL
DRESSING REGULAR LOWER BODY CLOTHING: A LITTLE
STANDING UP FROM CHAIR USING ARMS: TOTAL
MOVING TO AND FROM BED TO CHAIR: TOTAL
TOILETING: A LITTLE
TURNING FROM BACK TO SIDE WHILE IN FLAT BAD: TOTAL
MOBILITY SCORE: 6
WALKING IN HOSPITAL ROOM: A LOT
DRESSING REGULAR UPPER BODY CLOTHING: A LITTLE
HELP NEEDED FOR BATHING: A LITTLE
CLIMB 3 TO 5 STEPS WITH RAILING: A LOT
HELP NEEDED FOR BATHING: A LITTLE
MOVING TO AND FROM BED TO CHAIR: A LITTLE
DRESSING REGULAR LOWER BODY CLOTHING: A LITTLE
STANDING UP FROM CHAIR USING ARMS: A LITTLE
DAILY ACTIVITIY SCORE: 19
TOILETING: TOTAL
DAILY ACTIVITIY SCORE: 19
MOVING TO AND FROM BED TO CHAIR: A LITTLE
CLIMB 3 TO 5 STEPS WITH RAILING: TOTAL
STANDING UP FROM CHAIR USING ARMS: A LITTLE
DAILY ACTIVITIY SCORE: 6
EATING MEALS: TOTAL
HELP NEEDED FOR BATHING: TOTAL

## 2025-07-08 ASSESSMENT — PAIN SCALES - GENERAL
PAINLEVEL_OUTOF10: 0 - NO PAIN
PAINLEVEL_OUTOF10: 5 - MODERATE PAIN
PAINLEVEL_OUTOF10: 0 - NO PAIN
PAINLEVEL_OUTOF10: 2
PAINLEVEL_OUTOF10: 0 - NO PAIN

## 2025-07-08 ASSESSMENT — PAIN DESCRIPTION - DESCRIPTORS: DESCRIPTORS: ACHING

## 2025-07-08 NOTE — CARE PLAN
Problem: Pain - Adult  Goal: Verbalizes/displays adequate comfort level or baseline comfort level  Outcome: Progressing  Flowsheets (Taken 7/8/2025 0236)  Verbalizes/displays adequate comfort level or baseline comfort level: Assess pain using appropriate pain scale     Problem: Safety - Adult  Goal: Free from fall injury  Outcome: Progressing     Problem: Chronic Conditions and Co-morbidities  Goal: Patient's chronic conditions and co-morbidity symptoms are monitored and maintained or improved  Outcome: Progressing  Flowsheets (Taken 7/8/2025 0236)  Care Plan - Patient's Chronic Conditions and Co-Morbidity Symptoms are Monitored and Maintained or Improved: Monitor and assess patient's chronic conditions and comorbid symptoms for stability, deterioration, or improvement     Problem: Nutrition  Goal: Nutrient intake appropriate for maintaining nutritional needs  Outcome: Progressing     Problem: Fall/Injury  Goal: Not fall by end of shift  Outcome: Progressing     Problem: Skin  Goal: Prevent/minimize sheer/friction injuries  Outcome: Progressing  Flowsheets (Taken 7/7/2025 0400 by Blake Leavitt RN)  Prevent/minimize sheer/friction injuries:   Increase activity/out of bed for meals   Use pull sheet   Complete micro-shifts as needed if patient unable. Adjust patient position to relieve pressure points, not a full turn   HOB 30 degrees or less   Turn/reposition every 2 hours/use positioning/transfer devices   Utilize specialty bed per algorithm  Goal: Promote/optimize nutrition  Outcome: Progressing  Flowsheets (Taken 7/7/2025 0400 by Blake Leavitt, RN)  Promote/optimize nutrition:   Monitor/record intake including meals   Discuss with provider if NPO > 2 days  Goal: Promote skin healing  Outcome: Progressing  Flowsheets (Taken 7/7/2025 0400 by Blake Leavitt RN)  Promote skin healing:   Turn/reposition every 2 hours/use positioning/transfer devices   Protective dressings over bony prominences   Rotate device  position/do not position patient on device   Ensure correct size (line/device) and apply per  instructions   Assess skin/pad under line(s)/device(s)     Problem: Safety - Medical Restraint  Goal: Remains free of injury from restraints (Restraint for Interference with Medical Device)  Outcome: Progressing  Flowsheets (Taken 7/7/2025 0400 by Blake Leavitt RN)  Remains free of injury from restraints (restraint for interference with medical device):   Every 2 hours: Monitor safety, psychosocial status, comfort, nutrition and hydration   Inform patient/family regarding the reason for restraint   Evaluate the patient's condition at the time of restraint application   Determine that other, less restrictive measures have been tried or would not be effective before applying the restraint     Problem: Mechanical Ventilation  Goal: Oral health is maintained or improved  Outcome: Progressing  Flowsheets (Taken 7/7/2025 0400 by Blake Leavitt RN)  Oral Health is Maintained or Improved:   Suction oral pharynx   Apply water-based moisturizer to lips   Perform oral care with an oral swab   Assess and monitor condition of lips and mouth and perform oral care per hospital policy  Goal: ET tube will be managed safely  Outcome: Progressing  Flowsheets (Taken 7/7/2025 0400 by Blake Leavitt RN)  Endotracheal Tube Will Be Managed Safely:   Keep ambu bag, mask, oxygen connection tubing, and extra ETT at bedside and accompanying patient at all times   Support ventilator tubing to avoid pressure from drag of tubing   Reposition endotracheal tube to opposite side of mouth   Utilize endotracheal tube securing device

## 2025-07-08 NOTE — CARE PLAN
The patient's goals for the shift include      The clinical goals for the shift include Pt to remain HDS throughout shift    Over the shift, the patient did not make progress toward the following goals. Barriers to progression include just extubated. Recommendations to address these barriers include PT/OT consults/ increasing mobility  Problem: Pain - Adult  Goal: Verbalizes/displays adequate comfort level or baseline comfort level  Outcome: Progressing     Problem: Safety - Adult  Goal: Free from fall injury  Outcome: Progressing     Problem: Discharge Planning  Goal: Discharge to home or other facility with appropriate resources  Outcome: Progressing     Problem: Chronic Conditions and Co-morbidities  Goal: Patient's chronic conditions and co-morbidity symptoms are monitored and maintained or improved  Outcome: Progressing     Problem: Nutrition  Goal: Nutrient intake appropriate for maintaining nutritional needs  Outcome: Progressing     Problem: Pain  Goal: Takes deep breaths with improved pain control throughout the shift  Outcome: Progressing  Goal: Turns in bed with improved pain control throughout the shift  Outcome: Progressing  Goal: Walks with improved pain control throughout the shift  Outcome: Progressing     Problem: Fall/Injury  Goal: Not fall by end of shift  Outcome: Progressing  Goal: Be free from injury by end of the shift  Outcome: Progressing  Goal: Use assistive devices by end of the shift  Outcome: Progressing  Goal: Pace activities to prevent fatigue by end of the shift  Outcome: Progressing     Problem: Skin  Goal: Prevent/minimize sheer/friction injuries  Outcome: Progressing  Flowsheets (Taken 7/8/2025 1245)  Prevent/minimize sheer/friction injuries:   Turn/reposition every 2 hours/use positioning/transfer devices   Utilize specialty bed per algorithm  Goal: Promote/optimize nutrition  Outcome: Progressing  Flowsheets (Taken 7/8/2025 1245)  Promote/optimize nutrition:   Discuss with  provider if NPO > 2 days   Monitor/record intake including meals  Goal: Promote skin healing  Outcome: Progressing  Flowsheets (Taken 7/8/2025 1245)  Promote skin healing:   Protective dressings over bony prominences   Assess skin/pad under line(s)/device(s)   Turn/reposition every 2 hours/use positioning/transfer devices     Problem: Diabetes  Goal: Achieve decreasing blood glucose levels by end of shift  Outcome: Progressing  Goal: Increase stability of blood glucose readings by end of shift  Outcome: Progressing  Goal: Maintain electrolyte levels within acceptable range throughout shift  Outcome: Progressing  Goal: Maintain glucose levels >70mg/dl to <250mg/dl throughout shift  Outcome: Progressing  Goal: No changes in neurological exam by end of shift  Outcome: Progressing  Goal: Learn about and adhere to nutrition recommendations by end of shift  Outcome: Progressing  Goal: Vital signs within normal range for age by end of shift  Outcome: Progressing  Goal: Increase self care and/or family involovement by end of shift  Outcome: Progressing  Goal: Receive DSME education by end of shift  Outcome: Progressing   .

## 2025-07-08 NOTE — NURSING NOTE
Pt purwick out of place at 1830. Pt placed flat to change and reposition, during this time patient oxygen saturation declined into 70%s. Dr Garnica alerted as well as respiratory who both came to bedside. Patient placed on oximyzer then placed on bipap. Pt up to 96% post bipap application. Patient educated on importance of leaving bipap in place. Report given to GENO Houser.

## 2025-07-08 NOTE — PROGRESS NOTES
Subjective   Patient is a 75 y.o. male admitted on 7/2/2025 10:01 AM    Overnight Events:   - Since yesterday and again overnight, intermittent issues with hypotension versus hypertension in the setting of sedation.  He is particularly labile to his sedation is.  I have no evidence for other shock or infectious process at this time.  - Right radial arterial line placed overnight due to blood pressure lability    Scheduled Medications:   Scheduled Medications[1]     Continuous Medications:   Continuous Medications[2]     PRN Medications:   PRN Medications[3]    Objective   Vitals:  Temp  Min: 36.1 °C (97 °F)  Max: 36.8 °C (98.2 °F)  Pulse  Min: 42  Max: 98  BP  Min: 72/53  Max: 178/74  Resp  Min: 13  Max: 29  SpO2  Min: 92 %  Max: 100 %    Physical Exam:   General: ill-appearing, muscle wasting, frail  Skin: Clammy, dry, intact  HEENT: NC/AT, trachea midline  CV: RRR, normal heart sounds, no lower extremity edema  Pulm: intubated, diminished in the LLL  GI: Soft, non-tender, non-distended  MSK: No clear deformities or injuries  Neurology: sedated, moves 4 limbs in response to pain    Labs:     Results for orders placed or performed during the hospital encounter of 07/02/25 (from the past 24 hours)   POCT GLUCOSE   Result Value Ref Range    POCT Glucose 151 (H) 74 - 99 mg/dL   POCT GLUCOSE   Result Value Ref Range    POCT Glucose 221 (H) 74 - 99 mg/dL   Comprehensive metabolic panel   Result Value Ref Range    Glucose 205 (H) 74 - 99 mg/dL    Sodium 135 (L) 136 - 145 mmol/L    Potassium 4.2 3.5 - 5.3 mmol/L    Chloride 99 98 - 107 mmol/L    Bicarbonate 23 21 - 32 mmol/L    Anion Gap 17 10 - 20 mmol/L    Urea Nitrogen 42 (H) 6 - 23 mg/dL    Creatinine 1.69 (H) 0.50 - 1.30 mg/dL    eGFR 42 (L) >60 mL/min/1.73m*2    Calcium 9.2 8.6 - 10.3 mg/dL    Albumin 3.4 3.4 - 5.0 g/dL    Alkaline Phosphatase 64 33 - 136 U/L    Total Protein 6.3 (L) 6.4 - 8.2 g/dL    AST 9 9 - 39 U/L    Bilirubin, Total 0.5 0.0 - 1.2 mg/dL    ALT 11  10 - 52 U/L   Magnesium   Result Value Ref Range    Magnesium 1.98 1.60 - 2.40 mg/dL   Phosphorus   Result Value Ref Range    Phosphorus 5.2 (H) 2.5 - 4.9 mg/dL   POCT GLUCOSE   Result Value Ref Range    POCT Glucose 245 (H) 74 - 99 mg/dL   Blood Gas Arterial Full Panel   Result Value Ref Range    POCT pH, Arterial 7.36 (L) 7.38 - 7.42 pH    POCT pCO2, Arterial 38 38 - 42 mm Hg    POCT pO2, Arterial 108 (H) 85 - 95 mm Hg    POCT SO2, Arterial 99 94 - 100 %    POCT Oxy Hemoglobin, Arterial 96.9 94.0 - 98.0 %    POCT Hematocrit Calculated, Arterial 38.0 (L) 41.0 - 52.0 %    POCT Sodium, Arterial 133 (L) 136 - 145 mmol/L    POCT Potassium, Arterial 3.4 (L) 3.5 - 5.3 mmol/L    POCT Chloride, Arterial 103 98 - 107 mmol/L    POCT Ionized Calcium, Arterial 1.18 1.10 - 1.33 mmol/L    POCT Glucose, Arterial 233 (H) 74 - 99 mg/dL    POCT Lactate, Arterial 1.0 0.4 - 2.0 mmol/L    POCT Base Excess, Arterial -3.6 (L) -2.0 - 3.0 mmol/L    POCT HCO3 Calculated, Arterial 21.5 (L) 22.0 - 26.0 mmol/L    POCT Hemoglobin, Arterial 12.5 (L) 13.5 - 17.5 g/dL    POCT Anion Gap, Arterial 12 10 - 25 mmo/L    Patient Temperature      FiO2 40 %    Ventilator Rate 24 bpm    Tidal Volume 450 mL    Peep CHM2O 8.0 cm H2O    Site of Arterial Puncture TINO     Kuldip's Test TINO    POCT GLUCOSE   Result Value Ref Range    POCT Glucose 241 (H) 74 - 99 mg/dL   POCT GLUCOSE   Result Value Ref Range    POCT Glucose 196 (H) 74 - 99 mg/dL   Blood Gas Arterial   Result Value Ref Range    POCT pH, Arterial 7.36 (L) 7.38 - 7.42 pH    POCT pCO2, Arterial 37 (L) 38 - 42 mm Hg    POCT pO2, Arterial 124 (H) 85 - 95 mm Hg    POCT SO2, Arterial 99 94 - 100 %    POCT Oxy Hemoglobin, Arterial 97.2 94.0 - 98.0 %    POCT Base Excess, Arterial -4.0 (L) -2.0 - 3.0 mmol/L    POCT HCO3 Calculated, Arterial 20.9 (L) 22.0 - 26.0 mmol/L    Patient Temperature      FiO2 40 %    Ventilator Rate 22 bpm    Tidal Volume 450 mL    Peep CHM2O 8.0 cm H2O    Site of Arterial  Puncture TINO     Kuldip's Test TINO    CBC and Auto Differential   Result Value Ref Range    WBC 14.3 (H) 4.4 - 11.3 x10*3/uL    nRBC 0.0 0.0 - 0.0 /100 WBCs    RBC 2.94 (L) 4.50 - 5.90 x10*6/uL    Hemoglobin 10.3 (L) 13.5 - 17.5 g/dL    Hematocrit 29.7 (L) 41.0 - 52.0 %     (H) 80 - 100 fL    MCH 35.0 (H) 26.0 - 34.0 pg    MCHC 34.7 32.0 - 36.0 g/dL    RDW 13.0 11.5 - 14.5 %    Platelets 205 150 - 450 x10*3/uL    Neutrophils % 82.5 40.0 - 80.0 %    Immature Granulocytes %, Automated 0.8 0.0 - 0.9 %    Lymphocytes % 10.4 13.0 - 44.0 %    Monocytes % 5.2 2.0 - 10.0 %    Eosinophils % 0.7 0.0 - 6.0 %    Basophils % 0.4 0.0 - 2.0 %    Neutrophils Absolute 11.79 (H) 1.60 - 5.50 x10*3/uL    Immature Granulocytes Absolute, Automated 0.12 0.00 - 0.50 x10*3/uL    Lymphocytes Absolute 1.49 0.80 - 3.00 x10*3/uL    Monocytes Absolute 0.75 0.05 - 0.80 x10*3/uL    Eosinophils Absolute 0.10 0.00 - 0.40 x10*3/uL    Basophils Absolute 0.06 0.00 - 0.10 x10*3/uL   Comprehensive metabolic panel   Result Value Ref Range    Glucose 240 (H) 74 - 99 mg/dL    Sodium 135 (L) 136 - 145 mmol/L    Potassium 3.5 3.5 - 5.3 mmol/L    Chloride 102 98 - 107 mmol/L    Bicarbonate 23 21 - 32 mmol/L    Anion Gap 14 10 - 20 mmol/L    Urea Nitrogen 46 (H) 6 - 23 mg/dL    Creatinine 1.86 (H) 0.50 - 1.30 mg/dL    eGFR 38 (L) >60 mL/min/1.73m*2    Calcium 8.4 (L) 8.6 - 10.3 mg/dL    Albumin 3.0 (L) 3.4 - 5.0 g/dL    Alkaline Phosphatase 53 33 - 136 U/L    Total Protein 5.7 (L) 6.4 - 8.2 g/dL    AST 8 (L) 9 - 39 U/L    Bilirubin, Total 0.4 0.0 - 1.2 mg/dL    ALT 9 (L) 10 - 52 U/L   Magnesium   Result Value Ref Range    Magnesium 2.07 1.60 - 2.40 mg/dL   Phosphorus   Result Value Ref Range    Phosphorus 4.7 2.5 - 4.9 mg/dL   POCT GLUCOSE   Result Value Ref Range    POCT Glucose 160 (H) 74 - 99 mg/dL   POCT GLUCOSE   Result Value Ref Range    POCT Glucose 128 (H) 74 - 99 mg/dL        Imaging:   Imaging  XR chest 1 view  Result Date: 7/8/2025  1.   Stable retrocardiac opacity.     Signed by: Chuck Ivan 7/8/2025 8:11 AM Dictation workstation:   [id number]    XR chest 1 view  Result Date: 7/7/2025  The enteric tube distal tip projects over the gastric body, in the side-hole projects near the GE junction. Advancement by 5 cm and confirmation of repositioning with upper abdominal radiograph can be obtained as per clinical need. Unchanged left basilar opacification.   Signed by: Sara Torres 7/7/2025 7:49 AM Dictation workstation:   [id number]    XR abdomen 1 view  Result Date: 7/6/2025  1.  See discussion above   MACRO: None   Signed by: Joseph Schoenberger 7/6/2025 5:43 PM Dictation workstation:   [id number]    CT abdomen pelvis w IV contrast  Result Date: 7/6/2025  1. Massive amount of stool impacted in the rectum which is dilated up to 8.7 cm x 8.4 cm with also large amount of stool in the sigmoid colon and distal descending colon. Recommend intervention to avoid stercoral colitis (pressure ischemia of the colonic wall). This is on a back diffuse infectious/inflammatory colitis of the ascending through descending colon.   2. Extensive pancreatic parenchymal calcifications. There is irregular ductal dilatation from the uncinate process to the tail. The duct measures up to 1.5 cm. Superimposed upon main ductal dilatation are dilatation of numerous side branch ducts resulting in parenchymal simple cysts. No solid parenchymal mass. The differential diagnosis includes both or either of the following: Sequela of chronic pancreatitis and/or combined side-branch/main duct intraductal papillary mucinous neoplasm. A follow-up MRI pancreas protocol with contrast/MRCP in 6 months is recommended to ensure stability and further characterize as necessary.   3. Mild bilateral nonobstructing renal calculi and cortical scarring.   MACRO: None.   Signed by: Shaggy Mcgovern 7/6/2025 4:24 PM Dictation workstation:   [id number]    CT angio chest for pulmonary  embolism  Result Date: 7/6/2025  1. Complete left lower lobe collapse due to debris occluding all of the left lower lobe segmental and subsegmental bronchi and the lower lobar bronchus. There is also debris in the left upper lobe and lingular bronchi and right lower lobe segmental subsegmental bronchi associated with aspiration pneumonitis in the right lower lobe.   2. No acute pulmonary embolism.   3. Concentric left ventricular hypertrophy.   MACRO: None.   Signed by: Shaggy Mcgovern 7/6/2025 4:10 PM Dictation workstation:   [id number]    XR chest 1 view  Result Date: 7/6/2025  1.  Improving left lower lung consolidative infiltrate.   Signed by: Lorenzo Monahan 7/6/2025 12:15 PM Dictation workstation:   [id number]    XR chest 1 view  Result Date: 7/6/2025  Consolidation at the left base which is worse when compared to the previous study.   MACRO: None.   Signed by: Andrea Carter 7/6/2025 11:55 AM Dictation workstation:   [id number]    MRCP pancreas w and wo IV contrast  Result Date: 7/3/2025  1.  Marked diffuse pancreatic parenchymal atrophy associated with marked diffuse main pancreatic ductal dilation and dilation of numerous contiguous pancreatic duct side branches. Few small intraductal filling defects, likely correlating with associated coarse calcifications seen on CT. This constellation of findings favors sequela of chronic pancreatitis. Superimposed mixed (main and branch duct) type IPMN is in the differential, but considered less likely given that diffuse pancreatic ductal dilation has been present and not significantly changed since December 2023. 2. Subtle diffuse scattered irregularity/narrowing throughout intrahepatic bile ducts without dilation. There is also dilation of the common bile duct with a short-segment area of smooth narrowing in the midportion. These findings are nonspecific, but may be seen with primary sclerosing cholangitis versus secondary or other cholangitis. No intraductal filling  defect to suggest choledocholithiasis. Advise correlation with LFTs. 3. Hepatomegaly with findings suggestive of hepatic and splenic iron deposition. 4. Few small arterially enhancing liver lesions described above likely representing a combination of flash filling hemangiomas and THID. 5. Simple bilateral renal cysts. Stable nonenhancing chronic area of scarring in the left posterior perinephric region. 6. Moderate stool burden throughout the colon, which may be correlated for symptoms of constipation. 7. Opacities in the left-greater-than-right lower lobes, which may represent atelectasis or pneumonia. 8. Additional chronic findings, as detailed     Signed by: Moriah Seals 7/3/2025 6:15 PM Dictation workstation:   [id number]    CT angio chest for pulmonary embolism  Result Date: 7/2/2025  CHEST: 1.  No pulmonary embolism. 2. Bibasilar pneumonia with dense consolidation particularly in the left lower lobe.   ABDOMEN AND PELVIS: 1.  Extensive pancreatic calcifications with pancreatic ductal dilatation and beading consistent with chronic pancreatitis. 2. Multiple small cystic lesions in the pancreas which could represent small pseudocysts, small cystic neoplasms or intraductal mucinous papillary neoplasms. 3. Bilateral nonobstructing intrarenal calculi. 4. 1.4 cm Bosniak 2 left renal cyst and simple left renal cyst. 5. Distended urinary bladder with a volume of 501 cc. 6. Marked constipation and possible fecal impaction.   MACRO: None   Signed by: Erin Moreira 7/2/2025 12:29 PM Dictation workstation:   [id number]    CT abdomen pelvis w IV contrast  Result Date: 7/2/2025  CHEST: 1.  No pulmonary embolism. 2. Bibasilar pneumonia with dense consolidation particularly in the left lower lobe.   ABDOMEN AND PELVIS: 1.  Extensive pancreatic calcifications with pancreatic ductal dilatation and beading consistent with chronic pancreatitis. 2. Multiple small cystic lesions in the pancreas which could represent small  pseudocysts, small cystic neoplasms or intraductal mucinous papillary neoplasms. 3. Bilateral nonobstructing intrarenal calculi. 4. 1.4 cm Bosniak 2 left renal cyst and simple left renal cyst. 5. Distended urinary bladder with a volume of 501 cc. 6. Marked constipation and possible fecal impaction.   MACRO: None   Signed by: Erin Moreira 7/2/2025 12:29 PM Dictation workstation:   [id number]    XR chest 1 view  Result Date: 7/2/2025  Scant linear atelectasis versus scarring at the left lung base.   Arthritic changes in the thoracic spine as described.     MACRO: None   Signed by: Shaggy Morris 7/2/2025 11:01 AM Dictation workstation:   [id number]      Cardiology, Vascular, and Other Imaging  Transthoracic Echo Complete  Result Date: 7/7/2025            [facility] [address] [facility] [address]    Tel [phone] Fax [phone] TRANSTHORACIC ECHOCARDIOGRAM REPORT Patient Name:       ABBY VELARDE     Reading Physician:    [id number] Socrates Yu MD Study Date:         7/7/2025             Ordering Provider:    [id number] AYLIN SCHULER MRN/PID:            [id number]             Fellow: Accession#:         [id number]         Nurse: Date of Birth/Age:  1950 / 75 years Sonographer:          Anette Phoenix RD Gender Assigned at  M                    Additional Staff: Birth: Height:             177.80 cm            Admit Date: Weight:             49.44 kg             Admission Status:     Inpatient -                                                                Routine BSA / BMI:          1.61 m2 / 15.64      Department Location:  [facility] ICU Back                     kg/m2                                      (27-34) Blood Pressure: 101 /69 mmHg Study Type:    TRANSTHORACIC ECHO (TTE) COMPLETE  Diagnosis/ICD: Acute respiratory failure with hypoxia-J96.01 Indication:    acute respiratory failure with hypoxia and hypercapnia CPT Codes:     Echo Complete w Full [id number] Patient History: Pertinent History: HTN. Study Detail: The following Echo studies were performed: 2D, M-Mode, Doppler and               color flow.  PHYSICIAN INTERPRETATION: Left Ventricle: The left ventricular systolic function is mildly decreased with a Lezama's biplane calculated ejection fraction of 45%. There is global hypokinesis of the left ventricle with minor regional variations. The left ventricular cavity size is normal. There is mild increased septal and mildly increased posterior left ventricular wall thickness. There is left ventricular concentric remodeling. Spectral Doppler shows a Grade I (impaired relaxation pattern) of left ventricular diastolic filling with normal left atrial filling pressure. Left Atrium: The left atrial size is normal. Right Ventricle: The right ventricle is normal in size. Unable to determine right ventricular systolic function. Right Atrium: The right atrial size was not well visualized. Aortic Valve: The aortic valve is trileaflet. There is no evidence of aortic valve regurgitation. Mitral Valve: The mitral valve is normal in structure. There is trace mitral valve regurgitation. The E Vmax is 0.33 m/s. Tricuspid Valve: The tricuspid valve is structurally normal. There is trace to mild tricuspid regurgitation. The Doppler estimated right ventricular systolic pressure (RVSP) is within normal limits at 22 mmHg. Pulmonic Valve: The pulmonic valve is not well visualized. The pulmonic valve regurgitation was not assessed. Pericardium: No pericardial effusion noted. Aorta: The aortic root is abnormal. There is mild dilatation of the aortic root. Systemic Veins: The inferior vena cava appears normal in size, IVC inspiratory collapse not assessed due to mechanical ventilation. In comparison to the  previous echocardiogram(s): There are no prior studies on this patient for comparison purposes.  CONCLUSIONS:  1. The left ventricular systolic function is mildly decreased with a Lezama's biplane calculated ejection fraction of 45%.  2. There is global hypokinesis of the left ventricle with minor regional variations.  3. Spectral Doppler shows a Grade I (impaired relaxation pattern) of left ventricular diastolic filling with normal left atrial filling pressure.  4. The Doppler estimated RVSP is within normal limits at 22 mmHg. QUANTITATIVE DATA SUMMARY:  2D MEASUREMENTS:             Normal Ranges: LAs:             2.92 cm     (2.7-4.0cm) IVSd:            1.10 cm     (0.6-1.1cm) LVPWd:           1.13 cm     (0.6-1.1cm) LVIDd:           3.90 cm     (3.9-5.9cm) LVIDs:           2.99 cm LV Mass Index:   88.7 g/m2 LVEDV Index:     35.92 ml/m2 LV % FS          23.3 %  LEFT ATRIUM:                 Normal Ranges: LA Area A4C:      10.0 cm2 LA Area A2C:      9.0 cm2 LA Volume Index:  14.0 ml/m2 LA Vol A4C:       19.7 ml LA Vol A2C:       16.9 ml LA Vol Index BSA: 11.3 ml/m2  M-MODE MEASUREMENTS:         Normal Ranges: AoV Exc:             2.23 cm (1.5-2.5cm)  AORTA MEASUREMENTS:         Normal Ranges: AoV Exc:            2.23 cm (1.5-2.5cm) Ao Sinus, d:        4.10 cm (2.1-3.5cm)  LV SYSTOLIC FUNCTION:                      Normal Ranges: EF-A4C View:    46 % (>=55%) EF-A2C View:    45 % EF-Biplane:     45 % LV EF Reported: 45 %  LV DIASTOLIC FUNCTION:           Normal Ranges: MV Peak E:             0.33 m/s  (0.7-1.2 m/s) MV Peak A:             0.42 m/s  (0.42-0.7 m/s) E/A Ratio:             0.78      (1.0-2.2) MV e'                  0.045 m/s (>8.0) MV lateral e'          0.02 m/s MV medial e'           0.07 m/s E/e' Ratio:            7.37      (<8.0)  MITRAL VALVE:          Normal Ranges: MV DT:        283 msec (150-240msec)  AORTIC VALVE:           Normal Ranges: AoV Vmax:      0.65 m/s (<=1.7m/s) AoV Peak P.7  mmHg (<20mmHg) LVOT Max Azar:  0.40 m/s (<=1.1m/s) LVOT Diameter: 2.33 cm  (1.8-2.4cm) AoV Area,Vmax: 2.62 cm2 (2.5-4.5cm2)  RIGHT VENTRICLE: RV Basal 3.00 cm RV Mid   2.10 cm RV Major 5.4 cm TAPSE:   17.0 mm RV s'    0.08 m/s  TRICUSPID VALVE/RVSP:          Normal Ranges: Peak TR Velocity:     2.16 m/s Est. RA Pressure:     3 mmHg RV Syst Pressure:     22 mmHg  (< 30mmHg) IVC Diam:             2.04 cm  AORTA: Asc Ao Diam 4.37 cm  [id number] Socrates Yu MD Electronically signed on 7/7/2025 at 8:08:13 PM  ** Final **     ECG 12 lead  Result Date: 7/7/2025  Marked sinus bradycardia Septal infarct (cited on or before 06-JUL-2025) Abnormal ECG When compared with ECG of 06-JUL-2025 11:04, (unconfirmed) Vent. rate has decreased BY  54 BPM QRS axis Shifted right Questionable change in initial forces of Septal leads ST no longer depressed in Lateral leads    ECG 12 lead  Result Date: 7/6/2025  Normal sinus rhythm Left axis deviation Left ventricular hypertrophy with repolarization abnormality Cannot rule out Septal infarct , age undetermined Abnormal ECG No previous ECGs available         Assessment/Plan     Neuro:  - No acute issues  - Sedation: Precedex  - Pain management: fentanyl drip  - Titrate to RASS 0 to -2  - CAM ICU     Cardiac:  - Hx hypertension: holding home losartan, amlodipine while on sedation. Resume home metoprolol  - Echocardiogram 7/7: EF 45%, global hypokinesis of the left ventricle, impaired relaxation of left ventricular diastolic filling  - Stable off pressors this morning  - Continuous cardiac monitoring  - Repeat CXR today  - Maintain MAP > 65 mmHg    Pulmonary:  #Acute hypoxic hypercapnic respiratory failure  - Intubated  7/6  - Maintain spO2 > 92%  - Titrate vent settings as tolerated  - Post intubation ABG with pH 7.17, pCO2 63, pO2 87. AM  7/8 7.36, 37, 124  - Plan for SBT and hopeful extubation today  - CT angio PE demonstrated concern for aspiration and collapse of LLL  - Bronchoscopy 7/7 with  minimal secretions  - Continuous pulse ox  Vent Mode: Assist control/Volume control plus  FiO2 (%):  [40 %] 40 %  S RR:  [22-24] 22  S VT:  [400 mL-450 mL] 450 mL  PEEP/CPAP (cm H2O):  [8 cm H20] 8 cm H20  MAP (cm H2O):  [12-15] 12     Gastrointestinal:  - Diet: TF  - OG tube to suction  - CT AP  demonstrated large amount of impacted stool and possible colitis with concern for developing stercoral colitis. However, patient has had multiple large BM since starting bowel regimen .  - Ppx: Protonix   - Bowel regimen: miralax, docusate  - Last BM: Last BM Date: 25    Renal:  #JULIO  - Creatinine increased to 1.86 today, PM BMP  - Daily BMP, Mg, phos  - Replete electrolytes as indicated  - Strict I's & O's  - Friedman to be placed  Net IO Since Admission: 1,432.2 mL [25 1147]    Hematology:  - Daily CBC  - DVT Prophylaxis: Lovenox, SCDs    Infectious Disease:  #Left lower lobe pneumonia  - MRSA negative, stopped vanc, deescalated Zosyn to Unasyn   - Strep ag positive  - Repeat blood cultures NGTD  - Lactate normal  - UA without evidence of UTI  Temp (24hrs), Av.6 °C (97.9 °F), Min:36.1 °C (97 °F), Max:36.8 °C (98.2 °F)     Endocrine:  - HX DM2  - SSI, Lantus, POC BG  - BG < 180 goal    CODE STATUS: DNR    Disposition: ICU         [1] acetylcysteine, 3 mL, nebulization, TID  ampicillin-sulbactam, 3 g, intravenous, q12h  cyanocobalamin, 100 mcg, oral, Daily  docusate sodium, 100 mg, oral, BID  enoxaparin, 30 mg, subcutaneous, q24h  insulin glargine, 10 Units, subcutaneous, q24h  insulin lispro, 0-10 Units, subcutaneous, q4h  lidocaine, 1 patch, transdermal, Daily  lidocaine, 1 patch, transdermal, Daily  [Held by provider] metoprolol tartrate, 25 mg, oral, BID  multivitamin with minerals, 1 tablet, nasoduodenal tube, Daily  pantoprazole, 40 mg, intravenous, Daily before breakfast  perflutren lipid microspheres, 0.5-10 mL of dilution, intravenous, Once in imaging  perflutren protein A microsphere, 0.5 mL,  intravenous, Once in imaging  polyethylene glycol, 17 g, oral, Daily  sulfur hexafluoride microsphr, 2 mL, intravenous, Once in imaging  thiamine, 100 mg, intravenous, Daily     [2] dexmedeTOMIDine, 0-1.5 mcg/kg/hr, Last Rate: Stopped (07/08/25 1101)  fentaNYL, 0-300 mcg/hr, Last Rate: Stopped (07/08/25 1033)  lactated Ringer's, 75 mL/hr, Last Rate: 75 mL/hr (07/08/25 1109)     [3] PRN medications: bisacodyl, dextrose, dextrose, glucagon, glucagon, ipratropium-albuteroL, oxygen

## 2025-07-08 NOTE — PROCEDURES
Insert arterial line    Date/Time: 7/7/2025 11:30 PM    Performed by: Bridger Frost DO  Authorized by: Bridger Frost DO    Consent:     Consent obtained:  Emergent situation  Universal protocol:     Patient identity confirmed:  Anonymous protocol, patient vented/unresponsive and arm band  Indications:     Indications: hemodynamic monitoring and multiple ABGs    Pre-procedure details:     Skin preparation:  Chlorhexidine    Preparation: Patient was prepped and draped in sterile fashion    Sedation:     Sedation type:  None  Anesthesia:     Anesthesia method:  None  Procedure details:     Location:  R radial    Kuldip's test performed: yes      Needle gauge:  20 G    Placement technique:  Shahnazer    Number of attempts:  1    Transducer: waveform confirmed    Post-procedure details:     Post-procedure:  Biopatch applied, secured with tape, sutured and sterile dressing applied    CMS:  Normal    Procedure completion:  Tolerated well, no immediate complications

## 2025-07-09 ENCOUNTER — DOCUMENTATION (OUTPATIENT)
Dept: HEMATOLOGY/ONCOLOGY | Facility: HOSPITAL | Age: 75
End: 2025-07-09
Payer: MEDICARE

## 2025-07-09 LAB
ALBUMIN SERPL BCP-MCNC: 3.3 G/DL (ref 3.4–5)
ALBUMIN SERPL BCP-MCNC: 3.4 G/DL (ref 3.4–5)
ALP SERPL-CCNC: 56 U/L (ref 33–136)
ALP SERPL-CCNC: 59 U/L (ref 33–136)
ALT SERPL W P-5'-P-CCNC: 12 U/L (ref 10–52)
ALT SERPL W P-5'-P-CCNC: 13 U/L (ref 10–52)
ANION GAP SERPL CALC-SCNC: 14 MMOL/L (ref 10–20)
ANION GAP SERPL CALC-SCNC: 14 MMOL/L (ref 10–20)
ANION GAP SERPL CALC-SCNC: 15 MMOL/L (ref 10–20)
ANION GAP SERPL CALC-SCNC: 15 MMOL/L (ref 10–20)
AST SERPL W P-5'-P-CCNC: 15 U/L (ref 9–39)
AST SERPL W P-5'-P-CCNC: 15 U/L (ref 9–39)
BASOPHILS # BLD AUTO: 0.03 X10*3/UL (ref 0–0.1)
BASOPHILS NFR BLD AUTO: 0.2 %
BILIRUB SERPL-MCNC: 0.5 MG/DL (ref 0–1.2)
BILIRUB SERPL-MCNC: 0.7 MG/DL (ref 0–1.2)
BUN SERPL-MCNC: 27 MG/DL (ref 6–23)
BUN SERPL-MCNC: 29 MG/DL (ref 6–23)
BUN SERPL-MCNC: 30 MG/DL (ref 6–23)
BUN SERPL-MCNC: 35 MG/DL (ref 6–23)
CALCIUM SERPL-MCNC: 8.8 MG/DL (ref 8.6–10.3)
CALCIUM SERPL-MCNC: 9 MG/DL (ref 8.6–10.3)
CALCIUM SERPL-MCNC: 9 MG/DL (ref 8.6–10.3)
CALCIUM SERPL-MCNC: 9.2 MG/DL (ref 8.6–10.3)
CHLORIDE SERPL-SCNC: 102 MMOL/L (ref 98–107)
CHLORIDE SERPL-SCNC: 102 MMOL/L (ref 98–107)
CHLORIDE SERPL-SCNC: 104 MMOL/L (ref 98–107)
CHLORIDE SERPL-SCNC: 105 MMOL/L (ref 98–107)
CO2 SERPL-SCNC: 23 MMOL/L (ref 21–32)
CO2 SERPL-SCNC: 25 MMOL/L (ref 21–32)
CREAT SERPL-MCNC: 1.13 MG/DL (ref 0.5–1.3)
CREAT SERPL-MCNC: 1.17 MG/DL (ref 0.5–1.3)
CREAT SERPL-MCNC: 1.23 MG/DL (ref 0.5–1.3)
CREAT SERPL-MCNC: 1.37 MG/DL (ref 0.5–1.3)
EGFRCR SERPLBLD CKD-EPI 2021: 54 ML/MIN/1.73M*2
EGFRCR SERPLBLD CKD-EPI 2021: 62 ML/MIN/1.73M*2
EGFRCR SERPLBLD CKD-EPI 2021: 66 ML/MIN/1.73M*2
EGFRCR SERPLBLD CKD-EPI 2021: 69 ML/MIN/1.73M*2
EOSINOPHIL # BLD AUTO: 0.02 X10*3/UL (ref 0–0.4)
EOSINOPHIL NFR BLD AUTO: 0.1 %
ERYTHROCYTE [DISTWIDTH] IN BLOOD BY AUTOMATED COUNT: 12.8 % (ref 11.5–14.5)
GLUCOSE BLD MANUAL STRIP-MCNC: 121 MG/DL (ref 74–99)
GLUCOSE BLD MANUAL STRIP-MCNC: 127 MG/DL (ref 74–99)
GLUCOSE BLD MANUAL STRIP-MCNC: 135 MG/DL (ref 74–99)
GLUCOSE BLD MANUAL STRIP-MCNC: 161 MG/DL (ref 74–99)
GLUCOSE BLD MANUAL STRIP-MCNC: 162 MG/DL (ref 74–99)
GLUCOSE BLD MANUAL STRIP-MCNC: 56 MG/DL (ref 74–99)
GLUCOSE BLD MANUAL STRIP-MCNC: 61 MG/DL (ref 74–99)
GLUCOSE BLD MANUAL STRIP-MCNC: 68 MG/DL (ref 74–99)
GLUCOSE SERPL-MCNC: 111 MG/DL (ref 74–99)
GLUCOSE SERPL-MCNC: 111 MG/DL (ref 74–99)
GLUCOSE SERPL-MCNC: 52 MG/DL (ref 74–99)
GLUCOSE SERPL-MCNC: 92 MG/DL (ref 74–99)
HCT VFR BLD AUTO: 33.6 % (ref 41–52)
HGB BLD-MCNC: 11.4 G/DL (ref 13.5–17.5)
IMM GRANULOCYTES # BLD AUTO: 0.1 X10*3/UL (ref 0–0.5)
IMM GRANULOCYTES NFR BLD AUTO: 0.7 % (ref 0–0.9)
LYMPHOCYTES # BLD AUTO: 1.12 X10*3/UL (ref 0.8–3)
LYMPHOCYTES NFR BLD AUTO: 8.2 %
MAGNESIUM SERPL-MCNC: 1.92 MG/DL (ref 1.6–2.4)
MAGNESIUM SERPL-MCNC: 1.94 MG/DL (ref 1.6–2.4)
MAGNESIUM SERPL-MCNC: 2.11 MG/DL (ref 1.6–2.4)
MCH RBC QN AUTO: 34.5 PG (ref 26–34)
MCHC RBC AUTO-ENTMCNC: 33.9 G/DL (ref 32–36)
MCV RBC AUTO: 102 FL (ref 80–100)
MONOCYTES # BLD AUTO: 0.71 X10*3/UL (ref 0.05–0.8)
MONOCYTES NFR BLD AUTO: 5.2 %
NEUTROPHILS # BLD AUTO: 11.67 X10*3/UL (ref 1.6–5.5)
NEUTROPHILS NFR BLD AUTO: 85.6 %
NRBC BLD-RTO: 0 /100 WBCS (ref 0–0)
PHOSPHATE SERPL-MCNC: 2.9 MG/DL (ref 2.5–4.9)
PHOSPHATE SERPL-MCNC: 3.8 MG/DL (ref 2.5–4.9)
PLATELET # BLD AUTO: 199 X10*3/UL (ref 150–450)
POTASSIUM SERPL-SCNC: 2.8 MMOL/L (ref 3.5–5.3)
POTASSIUM SERPL-SCNC: 2.9 MMOL/L (ref 3.5–5.3)
POTASSIUM SERPL-SCNC: 2.9 MMOL/L (ref 3.5–5.3)
POTASSIUM SERPL-SCNC: 3.6 MMOL/L (ref 3.5–5.3)
PROT SERPL-MCNC: 6.3 G/DL (ref 6.4–8.2)
PROT SERPL-MCNC: 6.5 G/DL (ref 6.4–8.2)
RBC # BLD AUTO: 3.3 X10*6/UL (ref 4.5–5.9)
SODIUM SERPL-SCNC: 138 MMOL/L (ref 136–145)
SODIUM SERPL-SCNC: 138 MMOL/L (ref 136–145)
SODIUM SERPL-SCNC: 140 MMOL/L (ref 136–145)
SODIUM SERPL-SCNC: 140 MMOL/L (ref 136–145)
STAPHYLOCOCCUS SPEC CULT: NORMAL
WBC # BLD AUTO: 13.7 X10*3/UL (ref 4.4–11.3)

## 2025-07-09 PROCEDURE — 82947 ASSAY GLUCOSE BLOOD QUANT: CPT

## 2025-07-09 PROCEDURE — 80048 BASIC METABOLIC PNL TOTAL CA: CPT | Mod: CCI

## 2025-07-09 PROCEDURE — 92610 EVALUATE SWALLOWING FUNCTION: CPT | Mod: GN

## 2025-07-09 PROCEDURE — 2500000004 HC RX 250 GENERAL PHARMACY W/ HCPCS (ALT 636 FOR OP/ED)

## 2025-07-09 PROCEDURE — 99291 CRITICAL CARE FIRST HOUR: CPT | Performed by: INTERNAL MEDICINE

## 2025-07-09 PROCEDURE — 2500000001 HC RX 250 WO HCPCS SELF ADMINISTERED DRUGS (ALT 637 FOR MEDICARE OP)

## 2025-07-09 PROCEDURE — 36415 COLL VENOUS BLD VENIPUNCTURE: CPT

## 2025-07-09 PROCEDURE — 2500000002 HC RX 250 W HCPCS SELF ADMINISTERED DRUGS (ALT 637 FOR MEDICARE OP, ALT 636 FOR OP/ED)

## 2025-07-09 PROCEDURE — 2500000005 HC RX 250 GENERAL PHARMACY W/O HCPCS

## 2025-07-09 PROCEDURE — 5A0935A ASSISTANCE WITH RESPIRATORY VENTILATION, LESS THAN 24 CONSECUTIVE HOURS, HIGH NASAL FLOW/VELOCITY: ICD-10-PCS | Performed by: INTERNAL MEDICINE

## 2025-07-09 PROCEDURE — 84100 ASSAY OF PHOSPHORUS: CPT

## 2025-07-09 PROCEDURE — 83735 ASSAY OF MAGNESIUM: CPT

## 2025-07-09 PROCEDURE — 2500000004 HC RX 250 GENERAL PHARMACY W/ HCPCS (ALT 636 FOR OP/ED): Mod: JW

## 2025-07-09 PROCEDURE — 2020000001 HC ICU ROOM DAILY

## 2025-07-09 PROCEDURE — 80053 COMPREHEN METABOLIC PANEL: CPT

## 2025-07-09 PROCEDURE — 85025 COMPLETE CBC W/AUTO DIFF WBC: CPT

## 2025-07-09 PROCEDURE — 94640 AIRWAY INHALATION TREATMENT: CPT

## 2025-07-09 RX ORDER — POTASSIUM CHLORIDE 1.5 G/1.58G
40 POWDER, FOR SOLUTION ORAL ONCE
Status: COMPLETED | OUTPATIENT
Start: 2025-07-09 | End: 2025-07-09

## 2025-07-09 RX ORDER — POTASSIUM CHLORIDE 14.9 MG/ML
20 INJECTION INTRAVENOUS ONCE
Status: DISCONTINUED | OUTPATIENT
Start: 2025-07-09 | End: 2025-07-09

## 2025-07-09 RX ORDER — DEXTROSE 50 % IN WATER (D50W) INTRAVENOUS SYRINGE
25 ONCE
Status: COMPLETED | OUTPATIENT
Start: 2025-07-09 | End: 2025-07-09

## 2025-07-09 RX ORDER — ENOXAPARIN SODIUM 100 MG/ML
40 INJECTION SUBCUTANEOUS EVERY 24 HOURS
Status: DISCONTINUED | OUTPATIENT
Start: 2025-07-09 | End: 2025-07-15 | Stop reason: HOSPADM

## 2025-07-09 RX ORDER — POTASSIUM CHLORIDE 14.9 MG/ML
20 INJECTION INTRAVENOUS ONCE
Status: COMPLETED | OUTPATIENT
Start: 2025-07-09 | End: 2025-07-09

## 2025-07-09 RX ORDER — QUETIAPINE FUMARATE 25 MG/1
25 TABLET, FILM COATED ORAL NIGHTLY
Status: DISCONTINUED | OUTPATIENT
Start: 2025-07-09 | End: 2025-07-15 | Stop reason: HOSPADM

## 2025-07-09 RX ORDER — AMLODIPINE BESYLATE 10 MG/1
10 TABLET ORAL DAILY
Status: DISCONTINUED | OUTPATIENT
Start: 2025-07-09 | End: 2025-07-15 | Stop reason: HOSPADM

## 2025-07-09 RX ORDER — POTASSIUM CHLORIDE 14.9 MG/ML
20 INJECTION INTRAVENOUS
Status: COMPLETED | OUTPATIENT
Start: 2025-07-09 | End: 2025-07-09

## 2025-07-09 RX ORDER — LABETALOL HYDROCHLORIDE 5 MG/ML
10 INJECTION, SOLUTION INTRAVENOUS ONCE
Status: COMPLETED | OUTPATIENT
Start: 2025-07-09 | End: 2025-07-09

## 2025-07-09 RX ORDER — QUETIAPINE FUMARATE 25 MG/1
25 TABLET, FILM COATED ORAL ONCE
Status: COMPLETED | OUTPATIENT
Start: 2025-07-09 | End: 2025-07-09

## 2025-07-09 RX ORDER — POTASSIUM CHLORIDE 1.5 G/1.58G
40 POWDER, FOR SOLUTION ORAL ONCE
Status: DISCONTINUED | OUTPATIENT
Start: 2025-07-09 | End: 2025-07-09

## 2025-07-09 RX ORDER — METOPROLOL TARTRATE 25 MG/1
25 TABLET, FILM COATED ORAL 2 TIMES DAILY
Status: DISCONTINUED | OUTPATIENT
Start: 2025-07-09 | End: 2025-07-10

## 2025-07-09 RX ORDER — SODIUM CHLORIDE, SODIUM LACTATE, POTASSIUM CHLORIDE, CALCIUM CHLORIDE 600; 310; 30; 20 MG/100ML; MG/100ML; MG/100ML; MG/100ML
75 INJECTION, SOLUTION INTRAVENOUS CONTINUOUS
Status: DISCONTINUED | OUTPATIENT
Start: 2025-07-09 | End: 2025-07-10

## 2025-07-09 RX ADMIN — DOCUSATE SODIUM LIQUID 100 MG: 100 LIQUID ORAL at 10:16

## 2025-07-09 RX ADMIN — Medication 40 L/MIN: at 09:12

## 2025-07-09 RX ADMIN — Medication 30 L/MIN: at 12:42

## 2025-07-09 RX ADMIN — Medication 35 L/MIN: at 21:26

## 2025-07-09 RX ADMIN — LIDOCAINE 4% 1 PATCH: 40 PATCH TOPICAL at 09:21

## 2025-07-09 RX ADMIN — POLYETHYLENE GLYCOL 3350 17 G: 17 POWDER, FOR SOLUTION ORAL at 10:16

## 2025-07-09 RX ADMIN — IPRATROPIUM BROMIDE AND ALBUTEROL SULFATE 3 ML: 2.5; .5 SOLUTION RESPIRATORY (INHALATION) at 21:01

## 2025-07-09 RX ADMIN — PANTOPRAZOLE SODIUM 40 MG: 40 INJECTION, POWDER, FOR SOLUTION INTRAVENOUS at 06:47

## 2025-07-09 RX ADMIN — AMPICILLIN SODIUM AND SULBACTAM SODIUM 3 G: 2; 1 INJECTION, POWDER, FOR SOLUTION INTRAMUSCULAR; INTRAVENOUS at 23:01

## 2025-07-09 RX ADMIN — THIAMINE HYDROCHLORIDE 100 MG: 100 INJECTION, SOLUTION INTRAMUSCULAR; INTRAVENOUS at 10:19

## 2025-07-09 RX ADMIN — ACETYLCYSTEINE 600 MG: 200 SOLUTION ORAL; RESPIRATORY (INHALATION) at 21:00

## 2025-07-09 RX ADMIN — Medication 35 PERCENT: at 04:02

## 2025-07-09 RX ADMIN — DEXTROSE MONOHYDRATE 25 G: 25 INJECTION, SOLUTION INTRAVENOUS at 08:09

## 2025-07-09 RX ADMIN — DOCUSATE SODIUM LIQUID 100 MG: 100 LIQUID ORAL at 20:03

## 2025-07-09 RX ADMIN — POTASSIUM CHLORIDE 20 MEQ: 14.9 INJECTION, SOLUTION INTRAVENOUS at 04:58

## 2025-07-09 RX ADMIN — DEXTROSE MONOHYDRATE 12.5 G: 25 INJECTION, SOLUTION INTRAVENOUS at 03:58

## 2025-07-09 RX ADMIN — ACETYLCYSTEINE 600 MG: 200 SOLUTION ORAL; RESPIRATORY (INHALATION) at 12:42

## 2025-07-09 RX ADMIN — Medication 1 TABLET: at 09:30

## 2025-07-09 RX ADMIN — SODIUM CHLORIDE, SODIUM LACTATE, POTASSIUM CHLORIDE, AND CALCIUM CHLORIDE 75 ML/HR: .6; .31; .03; .02 INJECTION, SOLUTION INTRAVENOUS at 06:43

## 2025-07-09 RX ADMIN — METOPROLOL TARTRATE 25 MG: 25 TABLET, FILM COATED ORAL at 22:02

## 2025-07-09 RX ADMIN — INSULIN LISPRO 2 UNITS: 100 INJECTION, SOLUTION INTRAVENOUS; SUBCUTANEOUS at 20:16

## 2025-07-09 RX ADMIN — POTASSIUM CHLORIDE 20 MEQ: 14.9 INJECTION, SOLUTION INTRAVENOUS at 15:32

## 2025-07-09 RX ADMIN — POTASSIUM CHLORIDE 20 MEQ: 14.9 INJECTION, SOLUTION INTRAVENOUS at 06:47

## 2025-07-09 RX ADMIN — IPRATROPIUM BROMIDE AND ALBUTEROL SULFATE 3 ML: 2.5; .5 SOLUTION RESPIRATORY (INHALATION) at 09:07

## 2025-07-09 RX ADMIN — VITAM B12 100 MCG: 100 TAB at 10:16

## 2025-07-09 RX ADMIN — POTASSIUM CHLORIDE 40 MEQ: 1.5 POWDER, FOR SOLUTION ORAL at 15:32

## 2025-07-09 RX ADMIN — LABETALOL HYDROCHLORIDE 10 MG: 5 INJECTION, SOLUTION INTRAVENOUS at 11:29

## 2025-07-09 RX ADMIN — LIDOCAINE 4% 1 PATCH: 40 PATCH TOPICAL at 15:32

## 2025-07-09 RX ADMIN — IPRATROPIUM BROMIDE AND ALBUTEROL SULFATE 3 ML: 2.5; .5 SOLUTION RESPIRATORY (INHALATION) at 12:42

## 2025-07-09 RX ADMIN — INSULIN GLARGINE 10 UNITS: 100 INJECTION, SOLUTION SUBCUTANEOUS at 20:13

## 2025-07-09 RX ADMIN — QUETIAPINE FUMARATE 25 MG: 25 TABLET ORAL at 12:31

## 2025-07-09 RX ADMIN — AMPICILLIN SODIUM AND SULBACTAM SODIUM 3 G: 2; 1 INJECTION, POWDER, FOR SOLUTION INTRAMUSCULAR; INTRAVENOUS at 11:30

## 2025-07-09 RX ADMIN — QUETIAPINE FUMARATE 25 MG: 25 TABLET ORAL at 20:03

## 2025-07-09 RX ADMIN — ENOXAPARIN SODIUM 40 MG: 100 INJECTION SUBCUTANEOUS at 15:32

## 2025-07-09 RX ADMIN — DEXMEDETOMIDINE HYDROCHLORIDE 0.25 MCG/KG/HR: 400 INJECTION INTRAVENOUS at 09:18

## 2025-07-09 RX ADMIN — ACETYLCYSTEINE 600 MG: 200 SOLUTION ORAL; RESPIRATORY (INHALATION) at 09:07

## 2025-07-09 RX ADMIN — AMLODIPINE BESYLATE 10 MG: 10 TABLET ORAL at 17:41

## 2025-07-09 RX ADMIN — Medication 35 PERCENT: at 00:13

## 2025-07-09 ASSESSMENT — COGNITIVE AND FUNCTIONAL STATUS - GENERAL
CLIMB 3 TO 5 STEPS WITH RAILING: A LOT
STANDING UP FROM CHAIR USING ARMS: A LITTLE
DAILY ACTIVITIY SCORE: 12
WALKING IN HOSPITAL ROOM: A LOT
MOVING TO AND FROM BED TO CHAIR: A LITTLE
MOVING TO AND FROM BED TO CHAIR: A LITTLE
TURNING FROM BACK TO SIDE WHILE IN FLAT BAD: A LITTLE
WALKING IN HOSPITAL ROOM: A LOT
HELP NEEDED FOR BATHING: A LOT
DRESSING REGULAR UPPER BODY CLOTHING: A LOT
DRESSING REGULAR UPPER BODY CLOTHING: A LOT
DRESSING REGULAR LOWER BODY CLOTHING: A LOT
MOBILITY SCORE: 16
STANDING UP FROM CHAIR USING ARMS: A LITTLE
CLIMB 3 TO 5 STEPS WITH RAILING: A LOT
TOILETING: A LOT
MOBILITY SCORE: 16
TURNING FROM BACK TO SIDE WHILE IN FLAT BAD: A LITTLE
PERSONAL GROOMING: A LOT
DAILY ACTIVITIY SCORE: 12
EATING MEALS: A LOT
MOVING FROM LYING ON BACK TO SITTING ON SIDE OF FLAT BED WITH BEDRAILS: A LITTLE
DRESSING REGULAR LOWER BODY CLOTHING: A LOT
EATING MEALS: A LOT
TOILETING: A LOT
MOVING FROM LYING ON BACK TO SITTING ON SIDE OF FLAT BED WITH BEDRAILS: A LITTLE
HELP NEEDED FOR BATHING: A LOT
PERSONAL GROOMING: A LOT

## 2025-07-09 ASSESSMENT — PAIN - FUNCTIONAL ASSESSMENT
PAIN_FUNCTIONAL_ASSESSMENT: 0-10

## 2025-07-09 ASSESSMENT — PAIN SCALES - GENERAL

## 2025-07-09 NOTE — PROGRESS NOTES
PHARMACIST CONSULT  RENAL DOSING ADJUSTMENT    Patient Name: Jorge A Madden  MRN: [id number]  Admission Date: 7/2/2025 10:01 AM  Date/Time of Consult: 07/09/25 at 10:06 AM    In accordance with the inpatient pharmacy renal dose adjustment consult agreement the following medication changes have been made:  Enoxaparin: currently ordered 30mg q24hr, will be adjusted to 40mg q24hr    Indication if pertinent for dosing: VTE ppx    Results from last 72 hours   Lab Units 07/09/25  0354 07/08/25  1912 07/08/25  0501   CREATININE mg/dL 1.37* 1.73* 1.86*   BUN mg/dL 35* 39* 46*       Serum creatinine: 1.37 mg/dL (H) 07/09/25 0354  Estimated creatinine clearance: 35.7 mL/min (A)      Per consult agreement, pharmacy will order related labs, evaluate results, and adjust dosing as it pertains to the drug therapy being managed.    Thank you for allowing me to participate in the care for this patient.    Signature: Oma Britton, KeeganD, BCSan Luis Obispo [facility] Hospital  7/9/2025 10:07 AM

## 2025-07-09 NOTE — CARE PLAN
Problem: Fall/Injury  Goal: Not fall by end of shift  7/9/2025 0317 by Fabricio Valle RN  Outcome: Progressing  7/9/2025 0141 by Fabricio Valle RN  Outcome: Progressing     Problem: Skin  Goal: Prevent/minimize sheer/friction injuries  7/9/2025 0317 by Fabricio Valle RN  Flowsheets (Taken 7/8/2025 1245 by Neda Sam RN)  Prevent/minimize sheer/friction injuries:   Turn/reposition every 2 hours/use positioning/transfer devices   Utilize specialty bed per algorithm  7/9/2025 0141 by Fabricio Valle RN  Outcome: Progressing  Flowsheets (Taken 7/8/2025 1245 by Neda Sam RN)  Prevent/minimize sheer/friction injuries:   Turn/reposition every 2 hours/use positioning/transfer devices   Utilize specialty bed per algorithm     Problem: Diabetes  Goal: Achieve decreasing blood glucose levels by end of shift  Outcome: Progressing  Flowsheets (Taken 7/9/2025 0317)  Achieve decreasing blood glucose levels by end of shift: Med administration/monitoring of effect  Goal: Maintain glucose levels >70mg/dl to <250mg/dl throughout shift  Outcome: Progressing     Problem: Skin  Goal: Promote skin healing  7/9/2025 0317 by Fabricio Valle RN  Flowsheets (Taken 7/8/2025 1245 by Neda Sam RN)  Promote skin healing:   Protective dressings over bony prominences   Assess skin/pad under line(s)/device(s)   Turn/reposition every 2 hours/use positioning/transfer devices  7/9/2025 0141 by Fabricio Valle RN  Flowsheets (Taken 7/8/2025 1245 by Neda Sam RN)  Promote skin healing:   Protective dressings over bony prominences   Assess skin/pad under line(s)/device(s)   Turn/reposition every 2 hours/use positioning/transfer devices     Problem: Diabetes  Goal: No changes in neurological exam by end of shift  Flowsheets (Taken 7/9/2025 0317)  No changes in neurological exam by end of shift: Complete frequent neurological assessments

## 2025-07-09 NOTE — PROGRESS NOTES
Speech-Language Pathology Clinical Swallow Evaluation    Patient Name: Jorge A Madden  MRN: [id number]  : 1950  Today's Date: 25  Start Time: 1211  Stop Time: 1238  Time Calculation (min): 27 min    ASSESSMENT    Mild oral phase dysphagia and suspected adequate pharyngeal phase of swallowing function based on clinical swallow evaluation. Pt would benefit from skilled ST to minimize aspiration risk and ensure ongoing safety with the least restrictive diet.  Prognosis: Good    PLAN    Recommendations:    Is MBSS recommended? Not at this time due to no pharyngeal dysphagia suspected; SLP will continue to monitor to determine if MBSS is clinically warranted.  Solid consistency: Easy to chew  Liquid consistency: Thin (IDDSI 0)  Medication administration: Whole in liquid vs puree, as best tolerated    Compensatory swallow strategies:  - Upright positioning for all PO intake  - Remain upright for >30 min after meals  - Slow rate of intake  - Small bites  - Small sips  - One bite/sip at a time  - Alternate bites and sips  - Supervision recommended during meals; pt may require assistance to ensure he is eating at a slow rate    Any change to mental/medical/respiratory status please make NPO and alert SLP. MD and RN aware.     Goals (start date 2025):    end date: 2025    - Pt will consume prescribed diet (current diet is easy to chew solids/thin liquids) without overt s/sx aspiration/penetration in 95% of observed trials.              Status: Goal initiated   Progress this date: N/A  - Pt will demonstrate follow-through of trained compensatory strategies during a meal/snack with 90% acc independently.   Status: Goal initiated   Progress this date: Pt educated on safe swallowing strategies; pt verbalized understanding.     Recommended frequency/duration:  Skilled SLP services recommended: Yes  Frequency: 1x/week  Duration: x1 follow-up visit to ensure ongoing safety with current diet  Discharge  recommendation: Unable to determine at this time; please see follow-up notes for DC recommendation.  Treatment/Interventions: Assess diet tolerance  Strengths: Motivation and Family/caregiver support  Barriers to participation in tx: Comorbidities         SUBJECTIVE  PMHx relevant to rehab: pancreatitis, T2DM insulin dependent and HTN, HLD     Chief complaint: Pt was admitted on 7/2/2025 due to   Chief Complaint   Patient presents with    Weakness, Gen     Increased weakness and SOB over the past two days   He was found to have Pneumonia of left lower lobe due to infectious organism.    Relevant imaging results:  Chest XR on 7/8/2025:  LUNGS:  Redemonstration of retrocardiac opacity which corresponds to complete  left lower lobe collapse as seen on prior CT. Right lung is clear. No  pneumothorax.      IMPRESSION:  Persistent retrocardiac opacity corresponding to left lower lobe  atelectasis with superimposed infection not excluded. Clinical  correlation and continued follow-up to ensure complete resolution is  advised.    Chest XR on 7/2/2025:  IMPRESSION:  Scant linear atelectasis versus scarring at the left lung base.  Arthritic changes in the thoracic spine as described.    General Visit Information:     Patient Class: Inpatient  Living Environment: Home  Ordering Physician: Renan Reyes MD  Reason for Referral: Swallow Evaluation  Prior to Session Communication: Bedside nurse    RN cleared pt to participate in session and reported that he passed the nursing swallow screen without difficulty; no coughing and clear voice.    Pt reported that he wants water. Pt reports that he eats whatever he wants; family agrees.        [employer] Diet: Regular/thin  Current Diet : NPO    Status at time of evaluation:  Pain Assessment  Pain Assessment: 0-10  0-10 (Numeric) Pain Score: 0 - No pain    Pt was pleasant and cooperative for session.  Orientation: Oriented to self, Oriented to location, Oriented to month, Oriented to  year, Not oriented to date, and Not oriented to situation  Ability to follow functional commands: WFL  Nutritional status: Recommend dietician consult    Respiratory status: Supplemental oxygen via HFNC 40lpm  Baseline Vocal Quality: Normal  Volitional Cough: Weak  Volitional Swallow: Within Functional Limits  Patient positioning: HOB at 90 degrees, however pt was leaning to the R     OBJECTIVE  Clinical swallow evaluation completed and consisted of interview (family assisted), limited oral motor assessment, and PO trials (water via straw and cup sip, puree, diced fruit with liquid, mari cracker).    ORAL PHASE: Natural dentition in poor condition; pt is edentulous on the top and is missing most of his lower molars; the remaining front, lower teeth are in decent condition. Oral mucosa were pink, moist, and free of obvious lesions. Lingual strength and ROM were WFL. Labial strength/ROM were WFL. Labial seal was slightly diminished; pt w/ anterior spillage of liquid on the R with trials of diced fruit and cup sips of water; no other spillage with any other consistency. Mastication of regular solids was slow and appeared laborious; pt only accepted 2 small bites of mari cracker. A/P transit and oral clearance appeared functional.    PHARYNGEAL PHASE: Laryngeal elevation was visualized or palpated with all trials, however adequacy of hyolaryngeal elevation/excursion cannot be determined at bedside.     Pt coughed x1 after trial of mari cracker; no other overt s/sx of aspiration/penetration with any other consistency was observed. Vocal quality remained clear throughout all trials.     Was 3oz challenge administered: No, deferred due to safety concerns. Pt is on HFNC 40l/m and should take 1 sip at a time.        Inpatient Education:  Extensive education provided to patient and family regarding current swallow function, recommendations/results, and POC.    Barriers to learning: Acuteness of illness barrier and  Cognitive limitations barrier   Method of teaching: Verbal   Topic: role of ST, results of assessment, risk for aspiration, recommended diet modifications, recommended safe swallow strategies, and recommendation for dysphagia follow-up   Outcome of teaching: Pt/family demonstrated good understanding and Education will be reinforced during follow-up visits, as appropriate  Treatment provided: No  Next Treatment Priority: assess diet tolerance

## 2025-07-09 NOTE — PROCEDURES
Bronchoscopy Report  [facility]    INDICATION:   Acute respiratory failure with hypoxemia  LLL pneumonia with mucus plugs     BRONCHOSCOPIST:   Yaritza Valenzuela MD    A history and physical exam has been performed and documented separately.  The patient's medications and allergies have been reviewed.  The procedure is determined to be emergent due to acute respiratory failure and increased oxygen requirement on mechanical ventilation.     ANESTHESIA:  See ICU flowsheet for sedation.     FINDINGS:      The flexible bronchoscope was then introduced through the advanced airway, and an airway examination was performed.    The  ET tube is in good position.  The trachea is of normal caliber. The tammi is sharp. The tracheobronchial tree of the bilateral lung(s) was examined to at least the first subsegmental level.  Bronchial anatomy is normal; there are no endobronchial lesions. Purulent secretions noted in the left lower lobe segmental airways. Secretions were suctioned out. No mucus plug seen in the proximal LLL or JANY.     SPECIMENS:   None    COMPLICATIONS: None.    EBL:  None    The physical status of the patient was re-assessed after the procedure.  Remains critically ill in the ICU in unchanged condition.    IMPRESSION:   Acute respiratory failure with hypoxemia  LLL pneumonia     RECOMMENDATION:   Antibiotics as per ICU progress note  Mucolytics and chest physiotherapy      I, Yaritza Valenzuela MD, was personally present throughout this procedure, including all key and non-key portions.

## 2025-07-09 NOTE — PROGRESS NOTES
Subjective   Patient is a 75 y.o. male admitted on 7/2/2025 10:01 AM    Overnight Events:   - At the end of shift on 7/8, patient became hypoxic, due to lying flat during patient care.  Patient was placed first on max nasal cannula, then nonrebreather, then BiPAP due to shortness of breath, hypoxia.  Patient needed Precedex for further toleration of BiPAP overnight.  Patient then transitioned to high flow nasal cannula.    Scheduled Medications:   Scheduled Medications[1]     Continuous Medications:   Continuous Medications[2]     PRN Medications:   PRN Medications[3]    Objective   Vitals:  Temp  Min: 36.7 °C (98.1 °F)  Max: 36.8 °C (98.2 °F)  Pulse  Min: 50  Max: 134  BP  Min: 138/80  Max: 214/120  Resp  Min: 13  Max: 39  SpO2  Min: 80 %  Max: 100 %    Physical Exam:   General: ill-appearing, muscle wasting, frail  Skin: Clammy, dry, intact  HEENT: NC/AT, trachea midline  CV: RRR, normal heart sounds, no lower extremity edema  Pulm: Diminished in the LLL  GI: Soft, non-tender, non-distended  MSK: No clear deformities or injuries  Neurology: A and O x 3.    Labs:     Results for orders placed or performed during the hospital encounter of 07/02/25 (from the past 24 hours)   POCT GLUCOSE   Result Value Ref Range    POCT Glucose 160 (H) 74 - 99 mg/dL   POCT GLUCOSE   Result Value Ref Range    POCT Glucose 128 (H) 74 - 99 mg/dL   POCT GLUCOSE   Result Value Ref Range    POCT Glucose 143 (H) 74 - 99 mg/dL   Comprehensive metabolic panel   Result Value Ref Range    Glucose 162 (H) 74 - 99 mg/dL    Sodium 138 136 - 145 mmol/L    Potassium 3.4 (L) 3.5 - 5.3 mmol/L    Chloride 102 98 - 107 mmol/L    Bicarbonate 25 21 - 32 mmol/L    Anion Gap 14 10 - 20 mmol/L    Urea Nitrogen 39 (H) 6 - 23 mg/dL    Creatinine 1.73 (H) 0.50 - 1.30 mg/dL    eGFR 41 (L) >60 mL/min/1.73m*2    Calcium 9.0 8.6 - 10.3 mg/dL    Albumin 3.6 3.4 - 5.0 g/dL    Alkaline Phosphatase 64 33 - 136 U/L    Total Protein 6.9 6.4 - 8.2 g/dL    AST 15 9 - 39  U/L    Bilirubin, Total 0.5 0.0 - 1.2 mg/dL    ALT 13 10 - 52 U/L   Magnesium   Result Value Ref Range    Magnesium 2.11 1.60 - 2.40 mg/dL   Phosphorus   Result Value Ref Range    Phosphorus 5.3 (H) 2.5 - 4.9 mg/dL   POCT GLUCOSE   Result Value Ref Range    POCT Glucose 156 (H) 74 - 99 mg/dL   POCT GLUCOSE   Result Value Ref Range    POCT Glucose 77 74 - 99 mg/dL   POCT GLUCOSE   Result Value Ref Range    POCT Glucose 56 (L) 74 - 99 mg/dL   Comprehensive metabolic panel   Result Value Ref Range    Glucose 52 (LL) 74 - 99 mg/dL    Sodium 140 136 - 145 mmol/L    Potassium 2.8 (LL) 3.5 - 5.3 mmol/L    Chloride 105 98 - 107 mmol/L    Bicarbonate 23 21 - 32 mmol/L    Anion Gap 15 10 - 20 mmol/L    Urea Nitrogen 35 (H) 6 - 23 mg/dL    Creatinine 1.37 (H) 0.50 - 1.30 mg/dL    eGFR 54 (L) >60 mL/min/1.73m*2    Calcium 9.2 8.6 - 10.3 mg/dL    Albumin 3.3 (L) 3.4 - 5.0 g/dL    Alkaline Phosphatase 56 33 - 136 U/L    Total Protein 6.3 (L) 6.4 - 8.2 g/dL    AST 15 9 - 39 U/L    Bilirubin, Total 0.5 0.0 - 1.2 mg/dL    ALT 12 10 - 52 U/L   Magnesium   Result Value Ref Range    Magnesium 2.11 1.60 - 2.40 mg/dL   Phosphorus   Result Value Ref Range    Phosphorus 3.8 2.5 - 4.9 mg/dL   CBC and Auto Differential   Result Value Ref Range    WBC 13.7 (H) 4.4 - 11.3 x10*3/uL    nRBC 0.0 0.0 - 0.0 /100 WBCs    RBC 3.30 (L) 4.50 - 5.90 x10*6/uL    Hemoglobin 11.4 (L) 13.5 - 17.5 g/dL    Hematocrit 33.6 (L) 41.0 - 52.0 %     (H) 80 - 100 fL    MCH 34.5 (H) 26.0 - 34.0 pg    MCHC 33.9 32.0 - 36.0 g/dL    RDW 12.8 11.5 - 14.5 %    Platelets 199 150 - 450 x10*3/uL    Neutrophils % 85.6 40.0 - 80.0 %    Immature Granulocytes %, Automated 0.7 0.0 - 0.9 %    Lymphocytes % 8.2 13.0 - 44.0 %    Monocytes % 5.2 2.0 - 10.0 %    Eosinophils % 0.1 0.0 - 6.0 %    Basophils % 0.2 0.0 - 2.0 %    Neutrophils Absolute 11.67 (H) 1.60 - 5.50 x10*3/uL    Immature Granulocytes Absolute, Automated 0.10 0.00 - 0.50 x10*3/uL    Lymphocytes Absolute 1.12  0.80 - 3.00 x10*3/uL    Monocytes Absolute 0.71 0.05 - 0.80 x10*3/uL    Eosinophils Absolute 0.02 0.00 - 0.40 x10*3/uL    Basophils Absolute 0.03 0.00 - 0.10 x10*3/uL   POCT GLUCOSE   Result Value Ref Range    POCT Glucose 121 (H) 74 - 99 mg/dL        Imaging:   Imaging  XR chest 1 view  Result Date: 7/8/2025  Persistent retrocardiac opacity corresponding to left lower lobe atelectasis with superimposed infection not excluded. Clinical correlation and continued follow-up to ensure complete resolution is advised.   Support hardware as described above.   MACRO: None   Signed by: Rohit Lynch 7/8/2025 7:14 PM Dictation workstation:   [id number]    XR chest 1 view  Result Date: 7/8/2025  1.  Stable retrocardiac opacity.     Signed by: Chuck Ivan 7/8/2025 8:11 AM Dictation workstation:   [id number]    XR chest 1 view  Result Date: 7/7/2025  The enteric tube distal tip projects over the gastric body, in the side-hole projects near the GE junction. Advancement by 5 cm and confirmation of repositioning with upper abdominal radiograph can be obtained as per clinical need. Unchanged left basilar opacification.   Signed by: Sara Torres 7/7/2025 7:49 AM Dictation workstation:   [id number]    XR abdomen 1 view  Result Date: 7/6/2025  1.  See discussion above   MACRO: None   Signed by: Joseph Schoenberger 7/6/2025 5:43 PM Dictation workstation:   [id number]    CT abdomen pelvis w IV contrast  Result Date: 7/6/2025  1. Massive amount of stool impacted in the rectum which is dilated up to 8.7 cm x 8.4 cm with also large amount of stool in the sigmoid colon and distal descending colon. Recommend intervention to avoid stercoral colitis (pressure ischemia of the colonic wall). This is on a back diffuse infectious/inflammatory colitis of the ascending through descending colon.   2. Extensive pancreatic parenchymal calcifications. There is irregular ductal dilatation from the uncinate process to the tail. The duct  measures up to 1.5 cm. Superimposed upon main ductal dilatation are dilatation of numerous side branch ducts resulting in parenchymal simple cysts. No solid parenchymal mass. The differential diagnosis includes both or either of the following: Sequela of chronic pancreatitis and/or combined side-branch/main duct intraductal papillary mucinous neoplasm. A follow-up MRI pancreas protocol with contrast/MRCP in 6 months is recommended to ensure stability and further characterize as necessary.   3. Mild bilateral nonobstructing renal calculi and cortical scarring.   MACRO: None.   Signed by: Shaggy Mcgovern 7/6/2025 4:24 PM Dictation workstation:   [id number]    CT angio chest for pulmonary embolism  Result Date: 7/6/2025  1. Complete left lower lobe collapse due to debris occluding all of the left lower lobe segmental and subsegmental bronchi and the lower lobar bronchus. There is also debris in the left upper lobe and lingular bronchi and right lower lobe segmental subsegmental bronchi associated with aspiration pneumonitis in the right lower lobe.   2. No acute pulmonary embolism.   3. Concentric left ventricular hypertrophy.   MACRO: None.   Signed by: Shaggy Mcgovern 7/6/2025 4:10 PM Dictation workstation:   [id number]    XR chest 1 view  Result Date: 7/6/2025  1.  Improving left lower lung consolidative infiltrate.   Signed by: Lorenzo Monahan 7/6/2025 12:15 PM Dictation workstation:   [id number]    XR chest 1 view  Result Date: 7/6/2025  Consolidation at the left base which is worse when compared to the previous study.   MACRO: None.   Signed by: Andrea Carter 7/6/2025 11:55 AM Dictation workstation:   [id number]    MRCP pancreas w and wo IV contrast  Result Date: 7/3/2025  1.  Marked diffuse pancreatic parenchymal atrophy associated with marked diffuse main pancreatic ductal dilation and dilation of numerous contiguous pancreatic duct side branches. Few small intraductal filling defects, likely correlating  with associated coarse calcifications seen on CT. This constellation of findings favors sequela of chronic pancreatitis. Superimposed mixed (main and branch duct) type IPMN is in the differential, but considered less likely given that diffuse pancreatic ductal dilation has been present and not significantly changed since December 2023. 2. Subtle diffuse scattered irregularity/narrowing throughout intrahepatic bile ducts without dilation. There is also dilation of the common bile duct with a short-segment area of smooth narrowing in the midportion. These findings are nonspecific, but may be seen with primary sclerosing cholangitis versus secondary or other cholangitis. No intraductal filling defect to suggest choledocholithiasis. Advise correlation with LFTs. 3. Hepatomegaly with findings suggestive of hepatic and splenic iron deposition. 4. Few small arterially enhancing liver lesions described above likely representing a combination of flash filling hemangiomas and THID. 5. Simple bilateral renal cysts. Stable nonenhancing chronic area of scarring in the left posterior perinephric region. 6. Moderate stool burden throughout the colon, which may be correlated for symptoms of constipation. 7. Opacities in the left-greater-than-right lower lobes, which may represent atelectasis or pneumonia. 8. Additional chronic findings, as detailed     Signed by: Moriah Seals 7/3/2025 6:15 PM Dictation workstation:   [id number]    CT angio chest for pulmonary embolism  Result Date: 7/2/2025  CHEST: 1.  No pulmonary embolism. 2. Bibasilar pneumonia with dense consolidation particularly in the left lower lobe.   ABDOMEN AND PELVIS: 1.  Extensive pancreatic calcifications with pancreatic ductal dilatation and beading consistent with chronic pancreatitis. 2. Multiple small cystic lesions in the pancreas which could represent small pseudocysts, small cystic neoplasms or intraductal mucinous papillary neoplasms. 3. Bilateral  nonobstructing intrarenal calculi. 4. 1.4 cm Bosniak 2 left renal cyst and simple left renal cyst. 5. Distended urinary bladder with a volume of 501 cc. 6. Marked constipation and possible fecal impaction.   MACRO: None   Signed by: Erin Moreira 7/2/2025 12:29 PM Dictation workstation:   [id number]    CT abdomen pelvis w IV contrast  Result Date: 7/2/2025  CHEST: 1.  No pulmonary embolism. 2. Bibasilar pneumonia with dense consolidation particularly in the left lower lobe.   ABDOMEN AND PELVIS: 1.  Extensive pancreatic calcifications with pancreatic ductal dilatation and beading consistent with chronic pancreatitis. 2. Multiple small cystic lesions in the pancreas which could represent small pseudocysts, small cystic neoplasms or intraductal mucinous papillary neoplasms. 3. Bilateral nonobstructing intrarenal calculi. 4. 1.4 cm Bosniak 2 left renal cyst and simple left renal cyst. 5. Distended urinary bladder with a volume of 501 cc. 6. Marked constipation and possible fecal impaction.   MACRO: None   Signed by: Erin Moreira 7/2/2025 12:29 PM Dictation workstation:   [id number]    XR chest 1 view  Result Date: 7/2/2025  Scant linear atelectasis versus scarring at the left lung base.   Arthritic changes in the thoracic spine as described.     MACRO: None   Signed by: Shaggy Morris 7/2/2025 11:01 AM Dictation workstation:   [id number]      Cardiology, Vascular, and Other Imaging  ECG 12 lead  Result Date: 7/8/2025  Marked sinus bradycardia Septal infarct (cited on or before 06-JUL-2025) When compared with ECG of 06-JUL-2025 11:04, (unconfirmed) Vent. rate has decreased BY  54 BPM QRS axis Shifted right Reconfirmed by Fernando Newell ([id number]) on 7/8/2025 2:12:27 PM    Transthoracic Echo Complete  Result Date: 7/7/2025            [facility] [id number] [facility] [id number]    Tel [phone] Fax [phone] TRANSTHORACIC ECHOCARDIOGRAM REPORT Patient Name:       ABBY BYNUM SYD Tejeda  Physician:    [id number] Socrates Yu MD Study Date:         7/7/2025             Ordering Provider:    [id number] AYLIN SCHULER MRN/PID:            [id number]             Fellow: Accession#:         [id number]         Nurse: Date of Birth/Age:  1950 / 75 years Sonographer:          Anette Phoenix RDCS Gender Assigned at  M                    Additional Staff: Birth: Height:             177.80 cm            Admit Date: Weight:             49.44 kg             Admission Status:     Inpatient -                                                                Routine BSA / BMI:          1.61 m2 / 15.64      Department Location:  [facility] ICU Back                     kg/m2                                      (27-34) Blood Pressure: 101 /69 mmHg Study Type:    TRANSTHORACIC ECHO (TTE) COMPLETE Diagnosis/ICD: Acute respiratory failure with hypoxia-J96.01 Indication:    acute respiratory failure with hypoxia and hypercapnia CPT Codes:     Echo Complete w Full [id number] Patient History: Pertinent History: HTN. Study Detail: The following Echo studies were performed: 2D, M-Mode, Doppler and               color flow.  PHYSICIAN INTERPRETATION: Left Ventricle: The left ventricular systolic function is mildly decreased with a Lezama's biplane calculated ejection fraction of 45%. There is global hypokinesis of the left ventricle with minor regional variations. The left ventricular cavity size is normal. There is mild increased septal and mildly increased posterior left ventricular wall thickness. There is left ventricular concentric remodeling. Spectral Doppler shows a Grade I (impaired relaxation pattern) of left ventricular diastolic filling with normal left atrial filling pressure. Left Atrium: The left atrial size is normal. Right  Ventricle: The right ventricle is normal in size. Unable to determine right ventricular systolic function. Right Atrium: The right atrial size was not well visualized. Aortic Valve: The aortic valve is trileaflet. There is no evidence of aortic valve regurgitation. Mitral Valve: The mitral valve is normal in structure. There is trace mitral valve regurgitation. The E Vmax is 0.33 m/s. Tricuspid Valve: The tricuspid valve is structurally normal. There is trace to mild tricuspid regurgitation. The Doppler estimated right ventricular systolic pressure (RVSP) is within normal limits at 22 mmHg. Pulmonic Valve: The pulmonic valve is not well visualized. The pulmonic valve regurgitation was not assessed. Pericardium: No pericardial effusion noted. Aorta: The aortic root is abnormal. There is mild dilatation of the aortic root. Systemic Veins: The inferior vena cava appears normal in size, IVC inspiratory collapse not assessed due to mechanical ventilation. In comparison to the previous echocardiogram(s): There are no prior studies on this patient for comparison purposes.  CONCLUSIONS:  1. The left ventricular systolic function is mildly decreased with a Lezama's biplane calculated ejection fraction of 45%.  2. There is global hypokinesis of the left ventricle with minor regional variations.  3. Spectral Doppler shows a Grade I (impaired relaxation pattern) of left ventricular diastolic filling with normal left atrial filling pressure.  4. The Doppler estimated RVSP is within normal limits at 22 mmHg. QUANTITATIVE DATA SUMMARY:  2D MEASUREMENTS:             Normal Ranges: LAs:             2.92 cm     (2.7-4.0cm) IVSd:            1.10 cm     (0.6-1.1cm) LVPWd:           1.13 cm     (0.6-1.1cm) LVIDd:           3.90 cm     (3.9-5.9cm) LVIDs:           2.99 cm LV Mass Index:   88.7 g/m2 LVEDV Index:     35.92 ml/m2 LV % FS          23.3 %  LEFT ATRIUM:                 Normal Ranges: LA Area A4C:      10.0 cm2 LA Area A2C:       9.0 cm2 LA Volume Index:  14.0 ml/m2 LA Vol A4C:       19.7 ml LA Vol A2C:       16.9 ml LA Vol Index BSA: 11.3 ml/m2  M-MODE MEASUREMENTS:         Normal Ranges: AoV Exc:             2.23 cm (1.5-2.5cm)  AORTA MEASUREMENTS:         Normal Ranges: AoV Exc:            2.23 cm (1.5-2.5cm) Ao Sinus, d:        4.10 cm (2.1-3.5cm)  LV SYSTOLIC FUNCTION:                      Normal Ranges: EF-A4C View:    46 % (>=55%) EF-A2C View:    45 % EF-Biplane:     45 % LV EF Reported: 45 %  LV DIASTOLIC FUNCTION:           Normal Ranges: MV Peak E:             0.33 m/s  (0.7-1.2 m/s) MV Peak A:             0.42 m/s  (0.42-0.7 m/s) E/A Ratio:             0.78      (1.0-2.2) MV e'                  0.045 m/s (>8.0) MV lateral e'          0.02 m/s MV medial e'           0.07 m/s E/e' Ratio:            7.37      (<8.0)  MITRAL VALVE:          Normal Ranges: MV DT:        283 msec (150-240msec)  AORTIC VALVE:           Normal Ranges: AoV Vmax:      0.65 m/s (<=1.7m/s) AoV Peak P.7 mmHg (<20mmHg) LVOT Max Azar:  0.40 m/s (<=1.1m/s) LVOT Diameter: 2.33 cm  (1.8-2.4cm) AoV Area,Vmax: 2.62 cm2 (2.5-4.5cm2)  RIGHT VENTRICLE: RV Basal 3.00 cm RV Mid   2.10 cm RV Major 5.4 cm TAPSE:   17.0 mm RV s'    0.08 m/s  TRICUSPID VALVE/RVSP:          Normal Ranges: Peak TR Velocity:     2.16 m/s Est. RA Pressure:     3 mmHg RV Syst Pressure:     22 mmHg  (< 30mmHg) IVC Diam:             2.04 cm  AORTA: Asc Ao Diam 4.37 cm  [id number] Socrates Yu MD Electronically signed on 2025 at 8:08:13 PM  ** Final **     ECG 12 lead  Result Date: 2025  Normal sinus rhythm Left axis deviation Left ventricular hypertrophy with repolarization abnormality Cannot rule out Septal infarct , age undetermined Abnormal ECG No previous ECGs available         Assessment/Plan   Patient is a 75 y.o. male with past medical history of pancreatitis, T2DM insulin dependent and HTN, HLD treated in the ICU for acute hypoxic respiratory failure secondary to pneumonia.    -  At the end of shift on 7/8, patient became hypoxic, due to lying flat during patient care.  Patient was placed first on max nasal cannula, then nonrebreather, then BiPAP due to shortness of breath, hypoxia.  Patient needed Precedex for further toleration of BiPAP overnight.  Patient then transitioned to high flow nasal cannula.    Lab workup today showed potassium 2.8 from 3.4.  Repleted.  BUN 35 from 39.  Creatinine 1.37 from 1.73.  White count 13.7 from 14.3.    Chest x-ray PM 7/8 showed left lower lobe opacity/atelectasis.  Neuro: #Acute delirium  - No acute issues  - Sedation: Precedex, weaning down  - Pain management: N/A  - Titrate to RASS 0 to -2  - CAM ICU  - 25 mg Seroquel given today, will start 25 mg Seroquel nightly.     Cardiac:  - Hx hypertension: holding home losartan, amlodipine while on sedation. Resume home metoprolol  - Echocardiogram 7/7: EF 45%, global hypokinesis of the left ventricle, impaired relaxation of left ventricular diastolic filling  - Stable off pressors this morning  - Continuous cardiac monitoring  - Repeat CXR today  - Maintain MAP > 65 mmHg    Pulmonary:  #Acute hypoxic hypercapnic respiratory failure  - Intubated  7/6  - Maintain spO2 > 92%  - Titrate vent settings as tolerated  - CT angio PE demonstrated concern for aspiration and collapse of LLL  - Bronchoscopy 7/7 with minimal secretions  - Continuous pulse ox  -Patient extubated successfully yesterday morning.  Patient towards the end of shift, required further oxygen supplementation, nonrebreather, then BiPAP after becoming hypoxic during lying flat during patient care.  Patient coming down on high flow nasal cannula needs.    Gastrointestinal:  - Diet: TF  - CT AP 7/6 demonstrated large amount of impacted stool and possible colitis with concern for developing stercoral colitis. However, patient has had multiple large BM since starting bowel regimen 7/6.  - Ppx: Protonix   - Bowel regimen: miralax, docusate  - Last BM: Last  BM Date: 25  - Patient pulled out NG tube.    Renal:  #JULIO  - BUN 35 from 39, creatinine 1.37 from 1.73.  JULIO improving.  - Daily BMP, Mg, phos  - Replete electrolytes as indicated  - Strict I's & O's  - Friedman to be placed  Net IO Since Admission: 1,564.7 mL [25 0742]  - Potassium 2.8 on a.m. labs, potassium repleted.  Repeat BMP in the afternoon.    Hematology:  - Daily CBC  - DVT Prophylaxis: Lovenox, SCDs    Infectious Disease:  #Left lower lobe pneumonia  - MRSA negative, stopped vanc,  - Continue Unasyn   - Strep ag positive  - Repeat blood cultures NGTD  - Lactate normal  - UA without evidence of UTI  Temp (24hrs), Av.8 °C (98.2 °F), Min:36.7 °C (98.1 °F), Max:36.8 °C (98.2 °F)     Endocrine:  - HX DM2  - SSI, Lantus, POC BG  - BG < 180 goal    CODE STATUS: DNR    Disposition: ICU         [1] acetylcysteine, 3 mL, nebulization, TID  ampicillin-sulbactam, 3 g, intravenous, q12h  cyanocobalamin, 100 mcg, oral, Daily  docusate sodium, 100 mg, oral, BID  enoxaparin, 30 mg, subcutaneous, q24h  insulin glargine, 10 Units, subcutaneous, q24h  insulin lispro, 0-10 Units, subcutaneous, q4h  lidocaine, 1 patch, transdermal, Daily  lidocaine, 1 patch, transdermal, Daily  [Held by provider] metoprolol tartrate, 25 mg, oral, BID  multivitamin with minerals, 1 tablet, nasoduodenal tube, Daily  pantoprazole, 40 mg, intravenous, Daily before breakfast  perflutren lipid microspheres, 0.5-10 mL of dilution, intravenous, Once in imaging  perflutren protein A microsphere, 0.5 mL, intravenous, Once in imaging  polyethylene glycol, 17 g, oral, Daily  potassium chloride, 40 mEq, oral, Once  potassium chloride, 20 mEq, intravenous, q2h  potassium chloride, 20 mEq, intravenous, Once  sulfur hexafluoride microsphr, 2 mL, intravenous, Once in imaging  thiamine, 100 mg, intravenous, Daily     [2] dexmedeTOMIDine, 0-1.5 mcg/kg/hr, Last Rate: 0.25 mcg/kg/hr (25 3948)  lactated Ringer's, 75 mL/hr, Last Rate: Stopped  (07/08/25 4896)  lactated Ringer's, 75 mL/hr, Last Rate: 75 mL/hr (07/09/25 3171)     [3] PRN medications: bisacodyl, dextrose, dextrose, glucagon, glucagon, ipratropium-albuteroL, oxygen, oxygen, oxygen

## 2025-07-09 NOTE — PROGRESS NOTES
Referral placed to the [facility] diagnostic clinic by ADONIS Grady, [facility] ED, for pancreas cysts, w/ history of chronic pancreatitis. MRCP completed inpatient. We reached out to ADONIS Rodas, in surgical oncology - pancreas cyst clinic - who is agreeable to evaluating Mr. Madden in her clinic. He is currently admitted in the medical ICU. She will follow along & her team will reach out to offer appt following his hospital discharge. Diagnostic clinic referral closed.  LOUISA Baron.CNP  [facility]

## 2025-07-09 NOTE — CARE PLAN
Problem: Pain - Adult  Goal: Verbalizes/displays adequate comfort level or baseline comfort level  Outcome: Progressing     Problem: Safety - Adult  Goal: Free from fall injury  Outcome: Progressing     Problem: Discharge Planning  Goal: Discharge to home or other facility with appropriate resources  Outcome: Progressing     Problem: Chronic Conditions and Co-morbidities  Goal: Patient's chronic conditions and co-morbidity symptoms are monitored and maintained or improved  Outcome: Progressing     Problem: Nutrition  Goal: Nutrient intake appropriate for maintaining nutritional needs  Outcome: Progressing     Problem: Pain  Goal: Takes deep breaths with improved pain control throughout the shift  Outcome: Progressing  Goal: Turns in bed with improved pain control throughout the shift  Outcome: Progressing  Goal: Walks with improved pain control throughout the shift  Outcome: Progressing     Problem: Fall/Injury  Goal: Not fall by end of shift  Outcome: Progressing  Goal: Be free from injury by end of the shift  Outcome: Progressing  Goal: Use assistive devices by end of the shift  Outcome: Progressing  Goal: Pace activities to prevent fatigue by end of the shift  Outcome: Progressing     Problem: Skin  Goal: Prevent/minimize sheer/friction injuries  Outcome: Progressing  Goal: Promote/optimize nutrition  Outcome: Progressing  Goal: Promote skin healing  Outcome: Progressing     Problem: Diabetes  Goal: Achieve decreasing blood glucose levels by end of shift  Outcome: Progressing  Goal: Increase stability of blood glucose readings by end of shift  Outcome: Progressing  Goal: Maintain electrolyte levels within acceptable range throughout shift  Outcome: Progressing  Goal: Maintain glucose levels >70mg/dl to <250mg/dl throughout shift  Outcome: Progressing  Goal: No changes in neurological exam by end of shift  Outcome: Progressing  Goal: Learn about and adhere to nutrition recommendations by end of shift  Outcome:  Progressing  Goal: Vital signs within normal range for age by end of shift  Outcome: Progressing  Goal: Increase self care and/or family involovement by end of shift  Outcome: Progressing  Goal: Receive DSME education by end of shift  Outcome: Progressing   The patient's goals for the shift include  Patient to be mitts free by end of the shift. Pt will remain HDS stable throughout the shift.     The clinical goals for the shift include Pt will remain Hemodynamically stable throughout shift Patient was started home antihypertensive medication.     Over the shift, the patient did not make progress toward the following goals. Barriers to progression include intermittent confusion .

## 2025-07-10 LAB
ALBUMIN SERPL BCP-MCNC: 3.3 G/DL (ref 3.4–5)
ALBUMIN SERPL BCP-MCNC: 3.4 G/DL (ref 3.4–5)
ALP SERPL-CCNC: 64 U/L (ref 33–136)
ALT SERPL W P-5'-P-CCNC: 11 U/L (ref 10–52)
ANION GAP BLDA CALCULATED.4IONS-SCNC: 13 MMO/L (ref 10–25)
ANION GAP SERPL CALC-SCNC: 14 MMOL/L
ANION GAP SERPL CALC-SCNC: 16 MMOL/L (ref 10–20)
ARTERIAL PATENCY WRIST A: ABNORMAL
AST SERPL W P-5'-P-CCNC: 14 U/L (ref 9–39)
BASE EXCESS BLDA CALC-SCNC: -1.5 MMOL/L (ref -2–3)
BASOPHILS # BLD AUTO: 0.02 X10*3/UL (ref 0–0.1)
BASOPHILS NFR BLD AUTO: 0.2 %
BILIRUB SERPL-MCNC: 0.7 MG/DL (ref 0–1.2)
BODY TEMPERATURE: ABNORMAL
BUN SERPL-MCNC: 23 MG/DL (ref 6–23)
BUN SERPL-MCNC: 24 MG/DL (ref 6–23)
CA-I BLDA-SCNC: 1.3 MMOL/L (ref 1.1–1.33)
CALCIUM SERPL-MCNC: 9 MG/DL (ref 8.6–10.3)
CALCIUM SERPL-MCNC: 9.1 MG/DL (ref 8.6–10.3)
CHLORIDE BLDA-SCNC: 101 MMOL/L (ref 98–107)
CHLORIDE SERPL-SCNC: 100 MMOL/L (ref 98–107)
CHLORIDE SERPL-SCNC: 102 MMOL/L (ref 98–107)
CO2 SERPL-SCNC: 24 MMOL/L (ref 21–32)
CO2 SERPL-SCNC: 26 MMOL/L (ref 21–32)
CREAT SERPL-MCNC: 1.02 MG/DL (ref 0.5–1.3)
CREAT SERPL-MCNC: 1.07 MG/DL (ref 0.5–1.3)
EGFRCR SERPLBLD CKD-EPI 2021: 73 ML/MIN/1.73M*2
EGFRCR SERPLBLD CKD-EPI 2021: 78 ML/MIN/1.73M*2
EOSINOPHIL # BLD AUTO: 0.01 X10*3/UL (ref 0–0.4)
EOSINOPHIL NFR BLD AUTO: 0.1 %
ERYTHROCYTE [DISTWIDTH] IN BLOOD BY AUTOMATED COUNT: 12.8 % (ref 11.5–14.5)
GLUCOSE BLD MANUAL STRIP-MCNC: 105 MG/DL (ref 74–99)
GLUCOSE BLD MANUAL STRIP-MCNC: 199 MG/DL (ref 74–99)
GLUCOSE BLD MANUAL STRIP-MCNC: 238 MG/DL (ref 74–99)
GLUCOSE BLD MANUAL STRIP-MCNC: 239 MG/DL (ref 74–99)
GLUCOSE BLD MANUAL STRIP-MCNC: 63 MG/DL (ref 74–99)
GLUCOSE BLD MANUAL STRIP-MCNC: 85 MG/DL (ref 74–99)
GLUCOSE BLDA-MCNC: 177 MG/DL (ref 74–99)
GLUCOSE SERPL-MCNC: 114 MG/DL (ref 74–99)
GLUCOSE SERPL-MCNC: 193 MG/DL (ref 74–99)
HCO3 BLDA-SCNC: 23.7 MMOL/L (ref 22–26)
HCT VFR BLD AUTO: 35.2 % (ref 41–52)
HCT VFR BLD EST: 41 % (ref 41–52)
HGB BLD-MCNC: 11.8 G/DL (ref 13.5–17.5)
HGB BLDA-MCNC: 13.5 G/DL (ref 13.5–17.5)
IMM GRANULOCYTES # BLD AUTO: 0.08 X10*3/UL (ref 0–0.5)
IMM GRANULOCYTES NFR BLD AUTO: 0.7 % (ref 0–0.9)
LACTATE BLDA-SCNC: 0.7 MMOL/L (ref 0.4–2)
LYMPHOCYTES # BLD AUTO: 0.71 X10*3/UL (ref 0.8–3)
LYMPHOCYTES NFR BLD AUTO: 6.3 %
MAGNESIUM SERPL-MCNC: 1.97 MG/DL (ref 1.6–2.4)
MCH RBC QN AUTO: 34.5 PG (ref 26–34)
MCHC RBC AUTO-ENTMCNC: 33.5 G/DL (ref 32–36)
MCV RBC AUTO: 103 FL (ref 80–100)
MONOCYTES # BLD AUTO: 0.79 X10*3/UL (ref 0.05–0.8)
MONOCYTES NFR BLD AUTO: 7 %
NEUTROPHILS # BLD AUTO: 9.69 X10*3/UL (ref 1.6–5.5)
NEUTROPHILS NFR BLD AUTO: 85.7 %
NRBC BLD-RTO: 0 /100 WBCS (ref 0–0)
OXYHGB MFR BLDA: 71.2 % (ref 94–98)
PCO2 BLDA: 41 MM HG (ref 38–42)
PH BLDA: 7.37 PH (ref 7.38–7.42)
PHOSPHATE SERPL-MCNC: 2 MG/DL (ref 2.5–4.9)
PHOSPHATE SERPL-MCNC: 2.5 MG/DL (ref 2.5–4.9)
PLATELET # BLD AUTO: 187 X10*3/UL (ref 150–450)
PO2 BLDA: 48 MM HG (ref 85–95)
POC FINGERSTICK BLOOD GLUCOSE: NORMAL
POTASSIUM BLDA-SCNC: 3.6 MMOL/L (ref 3.5–5.3)
POTASSIUM SERPL-SCNC: 2.8 MMOL/L (ref 3.5–5.3)
POTASSIUM SERPL-SCNC: 3.3 MMOL/L (ref 3.5–5.3)
PROT SERPL-MCNC: 6.4 G/DL (ref 6.4–8.2)
RBC # BLD AUTO: 3.42 X10*6/UL (ref 4.5–5.9)
SAO2 % BLDA: 77 % (ref 94–100)
SITE OF ARTERIAL PUNCTURE: ABNORMAL
SODIUM BLDA-SCNC: 134 MMOL/L (ref 136–145)
SODIUM SERPL-SCNC: 137 MMOL/L (ref 136–145)
SODIUM SERPL-SCNC: 139 MMOL/L (ref 136–145)
WBC # BLD AUTO: 11.3 X10*3/UL (ref 4.4–11.3)

## 2025-07-10 PROCEDURE — 2500000002 HC RX 250 W HCPCS SELF ADMINISTERED DRUGS (ALT 637 FOR MEDICARE OP, ALT 636 FOR OP/ED)

## 2025-07-10 PROCEDURE — 2020000001 HC ICU ROOM DAILY

## 2025-07-10 PROCEDURE — 2500000001 HC RX 250 WO HCPCS SELF ADMINISTERED DRUGS (ALT 637 FOR MEDICARE OP)

## 2025-07-10 PROCEDURE — 2500000004 HC RX 250 GENERAL PHARMACY W/ HCPCS (ALT 636 FOR OP/ED)

## 2025-07-10 PROCEDURE — 2500000005 HC RX 250 GENERAL PHARMACY W/O HCPCS

## 2025-07-10 PROCEDURE — 85025 COMPLETE CBC W/AUTO DIFF WBC: CPT

## 2025-07-10 PROCEDURE — 71045 X-RAY EXAM CHEST 1 VIEW: CPT | Performed by: INTERNAL MEDICINE

## 2025-07-10 PROCEDURE — 82947 ASSAY GLUCOSE BLOOD QUANT: CPT

## 2025-07-10 PROCEDURE — 80069 RENAL FUNCTION PANEL: CPT | Mod: CCI

## 2025-07-10 PROCEDURE — 84075 ASSAY ALKALINE PHOSPHATASE: CPT

## 2025-07-10 PROCEDURE — 99291 CRITICAL CARE FIRST HOUR: CPT | Performed by: INTERNAL MEDICINE

## 2025-07-10 PROCEDURE — 84100 ASSAY OF PHOSPHORUS: CPT

## 2025-07-10 PROCEDURE — 36415 COLL VENOUS BLD VENIPUNCTURE: CPT

## 2025-07-10 PROCEDURE — 83735 ASSAY OF MAGNESIUM: CPT

## 2025-07-10 PROCEDURE — 94640 AIRWAY INHALATION TREATMENT: CPT

## 2025-07-10 RX ORDER — POTASSIUM CHLORIDE 1.5 G/1.58G
40 POWDER, FOR SOLUTION ORAL ONCE
Status: COMPLETED | OUTPATIENT
Start: 2025-07-10 | End: 2025-07-10

## 2025-07-10 RX ORDER — METOPROLOL TARTRATE 50 MG/1
50 TABLET ORAL 2 TIMES DAILY
Status: DISCONTINUED | OUTPATIENT
Start: 2025-07-10 | End: 2025-07-15 | Stop reason: HOSPADM

## 2025-07-10 RX ORDER — INSULIN LISPRO 100 [IU]/ML
0-10 INJECTION, SOLUTION INTRAVENOUS; SUBCUTANEOUS
Status: DISCONTINUED | OUTPATIENT
Start: 2025-07-10 | End: 2025-07-15 | Stop reason: HOSPADM

## 2025-07-10 RX ORDER — LOSARTAN POTASSIUM 100 MG/1
100 TABLET ORAL DAILY
Status: DISCONTINUED | OUTPATIENT
Start: 2025-07-10 | End: 2025-07-10

## 2025-07-10 RX ORDER — HYDRALAZINE HYDROCHLORIDE 20 MG/ML
10 INJECTION INTRAMUSCULAR; INTRAVENOUS ONCE
Status: COMPLETED | OUTPATIENT
Start: 2025-07-10 | End: 2025-07-10

## 2025-07-10 RX ORDER — METOPROLOL TARTRATE 25 MG/1
25 TABLET, FILM COATED ORAL ONCE
Status: DISCONTINUED | OUTPATIENT
Start: 2025-07-10 | End: 2025-07-10

## 2025-07-10 RX ORDER — LOSARTAN POTASSIUM 100 MG/1
100 TABLET ORAL DAILY
Status: DISCONTINUED | OUTPATIENT
Start: 2025-07-10 | End: 2025-07-15 | Stop reason: HOSPADM

## 2025-07-10 RX ADMIN — Medication 7 L/MIN: at 09:30

## 2025-07-10 RX ADMIN — INSULIN LISPRO 2 UNITS: 100 INJECTION, SOLUTION INTRAVENOUS; SUBCUTANEOUS at 12:29

## 2025-07-10 RX ADMIN — AMPICILLIN SODIUM AND SULBACTAM SODIUM 3 G: 2; 1 INJECTION, POWDER, FOR SOLUTION INTRAMUSCULAR; INTRAVENOUS at 12:30

## 2025-07-10 RX ADMIN — VITAM B12 100 MCG: 100 TAB at 08:17

## 2025-07-10 RX ADMIN — METOPROLOL TARTRATE 50 MG: 50 TABLET, FILM COATED ORAL at 20:22

## 2025-07-10 RX ADMIN — ACETYLCYSTEINE 600 MG: 200 SOLUTION ORAL; RESPIRATORY (INHALATION) at 15:17

## 2025-07-10 RX ADMIN — Medication 65 PERCENT: at 17:34

## 2025-07-10 RX ADMIN — DOCUSATE SODIUM LIQUID 100 MG: 100 LIQUID ORAL at 20:23

## 2025-07-10 RX ADMIN — ENOXAPARIN SODIUM 40 MG: 100 INJECTION SUBCUTANEOUS at 16:19

## 2025-07-10 RX ADMIN — IPRATROPIUM BROMIDE AND ALBUTEROL SULFATE 3 ML: 2.5; .5 SOLUTION RESPIRATORY (INHALATION) at 15:17

## 2025-07-10 RX ADMIN — ACETYLCYSTEINE 600 MG: 200 SOLUTION ORAL; RESPIRATORY (INHALATION) at 09:30

## 2025-07-10 RX ADMIN — DOCUSATE SODIUM LIQUID 100 MG: 100 LIQUID ORAL at 08:16

## 2025-07-10 RX ADMIN — QUETIAPINE FUMARATE 25 MG: 25 TABLET ORAL at 20:23

## 2025-07-10 RX ADMIN — POLYETHYLENE GLYCOL 3350 17 G: 17 POWDER, FOR SOLUTION ORAL at 08:16

## 2025-07-10 RX ADMIN — HYDRALAZINE HYDROCHLORIDE 10 MG: 20 INJECTION INTRAMUSCULAR; INTRAVENOUS at 10:22

## 2025-07-10 RX ADMIN — POTASSIUM PHOSPHATE, MONOBASIC AND POTASSIUM PHOSPHATE, DIBASIC 14.99 MMOL: 224; 236 INJECTION, SOLUTION, CONCENTRATE INTRAVENOUS at 06:19

## 2025-07-10 RX ADMIN — POTASSIUM CHLORIDE 40 MEQ: 1.5 POWDER, FOR SOLUTION ORAL at 06:07

## 2025-07-10 RX ADMIN — Medication 1 TABLET: at 08:17

## 2025-07-10 RX ADMIN — METOPROLOL TARTRATE 25 MG: 25 TABLET, FILM COATED ORAL at 08:17

## 2025-07-10 RX ADMIN — LIDOCAINE 4% 1 PATCH: 40 PATCH TOPICAL at 08:17

## 2025-07-10 RX ADMIN — DEXTROSE MONOHYDRATE 12.5 G: 25 INJECTION, SOLUTION INTRAVENOUS at 03:57

## 2025-07-10 RX ADMIN — ACETYLCYSTEINE 600 MG: 200 SOLUTION ORAL; RESPIRATORY (INHALATION) at 21:23

## 2025-07-10 RX ADMIN — AMLODIPINE BESYLATE 10 MG: 10 TABLET ORAL at 08:17

## 2025-07-10 RX ADMIN — IPRATROPIUM BROMIDE AND ALBUTEROL SULFATE 3 ML: 2.5; .5 SOLUTION RESPIRATORY (INHALATION) at 21:24

## 2025-07-10 RX ADMIN — IPRATROPIUM BROMIDE AND ALBUTEROL SULFATE 3 ML: 2.5; .5 SOLUTION RESPIRATORY (INHALATION) at 09:30

## 2025-07-10 RX ADMIN — THIAMINE HYDROCHLORIDE 100 MG: 100 INJECTION, SOLUTION INTRAMUSCULAR; INTRAVENOUS at 08:16

## 2025-07-10 RX ADMIN — Medication 55 L/MIN: at 21:23

## 2025-07-10 RX ADMIN — INSULIN GLARGINE 10 UNITS: 100 INJECTION, SOLUTION SUBCUTANEOUS at 20:23

## 2025-07-10 RX ADMIN — INSULIN LISPRO 4 UNITS: 100 INJECTION, SOLUTION INTRAVENOUS; SUBCUTANEOUS at 20:23

## 2025-07-10 RX ADMIN — INSULIN LISPRO 4 UNITS: 100 INJECTION, SOLUTION INTRAVENOUS; SUBCUTANEOUS at 16:19

## 2025-07-10 RX ADMIN — LIDOCAINE 4% 1 PATCH: 40 PATCH TOPICAL at 16:19

## 2025-07-10 RX ADMIN — LOSARTAN POTASSIUM 100 MG: 100 TABLET, FILM COATED ORAL at 06:18

## 2025-07-10 ASSESSMENT — PAIN - FUNCTIONAL ASSESSMENT
PAIN_FUNCTIONAL_ASSESSMENT: 0-10

## 2025-07-10 ASSESSMENT — COGNITIVE AND FUNCTIONAL STATUS - GENERAL
DAILY ACTIVITIY SCORE: 12
CLIMB 3 TO 5 STEPS WITH RAILING: A LOT
TOILETING: A LOT
HELP NEEDED FOR BATHING: A LOT
WALKING IN HOSPITAL ROOM: A LOT
DRESSING REGULAR LOWER BODY CLOTHING: A LOT
TURNING FROM BACK TO SIDE WHILE IN FLAT BAD: A LITTLE
MOVING TO AND FROM BED TO CHAIR: A LITTLE
EATING MEALS: A LOT
DRESSING REGULAR UPPER BODY CLOTHING: A LOT
STANDING UP FROM CHAIR USING ARMS: A LITTLE
PERSONAL GROOMING: A LOT
MOBILITY SCORE: 17

## 2025-07-10 ASSESSMENT — PAIN SCALES - GENERAL
PAINLEVEL_OUTOF10: 0 - NO PAIN

## 2025-07-10 NOTE — PROGRESS NOTES
Physical Therapy                 Therapy Communication Note    Patient Name: Jorge A Madden  MRN: [id number]  Department: [facility] ICU  Room: 2127/2127-A  Today's Date: 7/10/2025     Discipline: Physical Therapy    Missed Visit:   Pt not able to be seen today due to increased respiratory needs, anxiety. RN deterring treatment today.

## 2025-07-10 NOTE — NURSING NOTE
0715:  Report received from previous shift RN.,  See shift assessment, vital signs, care plan and education.  Chart check done.    0805:  Pulse ox 98%.  Decreased oxygen to 6 L NC.  Continue to monitor.    0952:  Pulse ox 90%.  Increased oxygen to Hi flow.  Continue to monitor.    1010:  Bp 212/102.  Notified Dr. Gomez.  See orders.  Administered 10mg Hydralazine IV.  Continue to monitor.    1015:  Pt's pulse ox 90%.  Pt SOB.  Bipap mask applied.  Continue to monitor.    1035:  Dr. Belcher and critical care team in to see pt. Updated team on pt's condition.  See orders.  Continue to monitor.    1045: B/P 154/82.    1055:  Pt's pulse ox 96%.  Decreased oxygen to Hi Flow.  Continue to monitor.

## 2025-07-10 NOTE — CARE PLAN
Pt. Will remain safe this shift.    Pt. Will tolerate q2hr turns to prevent further skin breakdown this shift.    Pt. Will remain hemodynamically stable this shift.

## 2025-07-10 NOTE — PROGRESS NOTES
"Nutrition Follow Up Assessment:   Nutrition Assessment         Patient is a 75 y.o. male presenting with from home with grandson with weakness, generalized malaise, poor oral intake and SOB over the last few days, additionally patient admits to weight loss over the last several months. CT- pneumonia left lower lobe and pancreatic calcifications. GI was consulted, had MRCP. Chronic pancreatitis, no malignancy, no need for pancreatic enzymes at this time. Pt to follow up with GI outpatient. Plans to discharge to SNF for rehab.    7/7/2025 Follow up/consult for tube feeds: Chart reviewed and events noted. Length of stay complicated by acute hypoxia with RRT on 7/6; pt transitioned to ICU care and subsequently intubated. CXR revealing LLL collapse with concerns for aspiration. Pt remains intubated and currently sedated with precedex and fentanyl; NGT in place and to start tube feeds today.    7/10/2025: Follow up: Chart reviewed and events noted. Pt successfully extubated 7/8; passed ST 7/9 for regular/easy to chew and thin liquids with  feeding strategies. Pt unable to eat breakfast this AM due to need for continuous BIPAP; during CCM rounds, pt was able to transition to HFNC.  Stay complicated by hypertension.   Nutrition History:  Food and Nutrient History: 7/5: Reports very good appetite at home. states he eats a lot of food but has not been able to gain any weight. Eats 3 meals/day with some snacks between. Lives with his wife and grandchildren. [facility] services and diet order reviewed. No further questions. Will recc. to send Glucerna and Ensure HP for pt to try.  Vitamin/Herbal Supplement Use: B12  Food Allergy:  (NKFA)  Food Intolerance:  (No intolerances reported)       Anthropometrics:  Height: 177.8 cm (5' 10\")   Weight: 52.4 kg (115 lb 8.3 oz)   BMI (Calculated): 16.58  IBW/kg (Dietitian Calculated): 75.3 kg  Percent of IBW: 65.66 %                  0-10 (Numeric) Pain Score: 0 - No pain     Weight History:   Wt " Readings from Last 10 Encounters:   07/10/25 52.4 kg (115 lb 8.3 oz)   06/17/25 53.1 kg (117 lb)   01/28/25 54 kg (119 lb)   09/27/24 54.9 kg (121 lb)   06/20/24 53.5 kg (118 lb)   04/15/24 54.4 kg (120 lb)   02/15/24 54 kg (119 lb)   01/25/24 55.2 kg (121 lb 9.6 oz)   11/28/23 55.3 kg (122 lb)   11/09/23 58.5 kg (129 lb)       Weight Change %:  Weight History / % Weight Change: 7% weight loss over 30 days. UBW is around 120#  Significant Weight Loss: Yes  Interpretation of Weight Loss: >5% in 1 month    Nutrition Focused Physical Exam Findings:    Subcutaneous Fat Loss:   Orbital Fat Pads: Severe (dark circles, hollowing and loose skin)  Buccal Fat Pads: Severe (hollow, sunken and narrow face)  Triceps: Severe (negligible fat tissue)  Ribs: Defer  Muscle Wasting:  Temporalis: Severe (hollowed scooping depression)  Pectoralis (Clavicular Region): Severe (protruding prominent clavicle)  Deltoid/Trapezius: Severe (squared shoulders, acromion process prominent)  Interosseous: Severe (depressed area between thumb and forefinger)  Trapezius/Infraspinatus/Supraspinatus (Scapular Region): Defer  Quadriceps: Severe (depressions on inner and outer thigh)  Gastrocnemius: Severe (minimal muscle definition)  Edema:  Edema Location: nonpitting BLE edema  Physical Findings:  Hair:  (sparce)  Nails: Negative  Skin: Negative  Respiratory : Positive (BIPAP vs HFNC)  Digestive System Findings: Constipation (last BM 7/8)  Teeth Findings: Impaired dentition    Nutrition Significant Labs:  CBC Trend:   Results from last 7 days   Lab Units 07/10/25  0420 07/09/25  0354 07/08/25  0501 07/07/25  0452   WBC AUTO x10*3/uL 11.3 13.7* 14.3* 15.6*   RBC AUTO x10*6/uL 3.42* 3.30* 2.94* 3.39*   HEMOGLOBIN g/dL 11.8* 11.4* 10.3* 11.6*   HEMATOCRIT % 35.2* 33.6* 29.7* 34.4*   MCV fL 103* 102* 101* 102*   PLATELETS AUTO x10*3/uL 187 199 205 209    , BMP Trend:   Results from last 7 days   Lab Units 07/10/25  0420 07/09/25  1810 07/09/25  1410  07/09/25  1251   GLUCOSE mg/dL 114* 111* 111* 92   CALCIUM mg/dL 9.0 9.0 8.8 9.0   SODIUM mmol/L 139 138 138 140   POTASSIUM mmol/L 2.8* 3.6 2.9* 2.9*   CO2 mmol/L 26 25 25 25   CHLORIDE mmol/L 102 102 102 104   BUN mg/dL 23 27* 29* 30*   CREATININE mg/dL 1.02 1.13 1.17 1.23    , A1C:  Lab Results   Component Value Date    HGBA1C 6.1 06/17/2025   , BG POCT trend:   Results from last 7 days   Lab Units 07/10/25  1156 07/10/25  0642 07/10/25  0355 07/10/25  0013 07/09/25 2008   POCT GLUCOSE mg/dL 199* 105* 63* 85 162*    , Liver Function Trend:   Results from last 7 days   Lab Units 07/10/25  0420 07/09/25  1810 07/09/25  0354 07/08/25  1912   ALK PHOS U/L 64 59 56 64   AST U/L 14 15 15 15   ALT U/L 11 13 12 13   BILIRUBIN TOTAL mg/dL 0.7 0.7 0.5 0.5        Nutrition Specific Medications:  Scheduled medications  Scheduled Medications[1]  Continuous medications  Continuous Medications[2]  PRN medications  PRN Medications[3]      I/O:   Last BM Date: 07/08/25; Stool Appearance: Soft (07/08/25 1830)    Dietary Orders (From admission, onward)       Start     Ordered    07/09/25 1246  Adult diet Regular; Easy to chew; Thin 0  Diet effective now        Comments: Safe swallowing strategies:  - Upright positioning for all PO intake  - Remain upright for >30 min after meals  - Slow rate of intake  - Small bites  - Small sips  - One bite/sip at a time  - Alternate bites and sips  - Supervision recommended during meals; pt may require assistance to ensure he is eating at a slow rate   Question Answer Comment   Diet type Regular    Texture Easy to chew    Fluid consistency Thin 0        07/09/25 1256    07/06/25 1309  May Participate in Room Service  ( ROOM SERVICE MAY PARTICIPATE)  Once        Question:  .  Answer:  Yes    07/06/25 1308                     Estimated Needs:   Total Energy Estimated Needs in 24 hours (kCal):  (1485-1733kcal)  Method for Estimating Needs: 1500-1750kcal or 30-35kcal/kg of 50kg  Total Protein  Estimated Needs in 24 Hours (g):  (59-74g)  Method for Estimating 24 Hour Protein Needs: 65-75g pro or 1.3-1.6g/kg of 50kg     Method for Estimating 24 Hour Fluid Needs: 1mL/kcal/d or as per physician  Patient on Order Fluid Restriction: No        Nutrition Diagnosis   Malnutrition Diagnosis  Patient has Malnutrition Diagnosis: Yes  Diagnosis Status: Active  Malnutrition Diagnosis: Severe malnutrition related to chronic disease or condition  As Evidenced by: >5% weight loss over 30 days, Severe subcutaneous fat loss and muscle wastinging per NFPE  Additional Assessment Information: 7/10: Case discussed during CCM rounds. Hopeful to decrease HFNC needs so pt can eat today. Pt intermittently pulling off HFNC and/or BIPAP.            Nutrition Interventions/Recommendations   Nutrition prescription for oral nutrition, Nutrition prescription for enteral nutrition    Nutrition Recommendations:       Nutrition Interventions/Goals:   Meals and Snacks: General healthful diet, Texture-modified diet  Goal: As decrease O2 demands, resume regular/easy to chew and  thin liquids with feeding strategies as per ST.  Enteral Intake: Insert enteral feeding tube  Goal: pending goals of care, may need to place dobhoff and resume tube feeds  with Osmolite 1.5 at 20mL/hr x 24 hours; suggest water flush of 75mL 6x/day. With risk for refeeding, please obtain renal BMP and serum magnesium 2x/day as likely will need to replete electrolytes.  Please continue daily MVI and 100mg oral thiamine (via NGT) x7 days. (As tolerated, increase EN to  initial goal of 35mL/hr for 1260kcal, 53g pro, and 640mL formula free water or ~75% kcal needs and 1.1g pro/kg. Suggest 100mL water flush 6x/day for total water of 1240mL water (formula + flush).)  Medical Food Supplement: Commercial beverage medical food supplement therapy  Goal: Suggest supplement with Ensure Plus HP 3x/day for additional 1050kcal and 60g pro/day.  Coordination of Care with Providers:  Nursing, Provider (GENO Pearson and CCM team during pt care rounds.)      Education Documentation  No documentation found.     7/10: Pt currently not appropriate for education.        Nutrition Monitoring and Evaluation   Intake / Amount of food: Consumes at least 50% or more of meals/snacks/supplements  Enteral and Parenteral Nutrition Intake Determination: Enteral nutrition intake - Tolerate TF at goal rate, Enteral nutrition intake - Prevent refeeding, Enteral nutrition intake - To meet > 75% estimated energy needs    Body Weight: Body weight - Promote weight restoration    Electrolyte and Renal Panel: Electrolytes within normal limits  Glucose/Endocrine Profile: Glucose within normal limits - ICU (140-180 mg/dL)         Goal Status: Some digression away from goal(s)    Time Spent (min): 45 minutes                  [1] acetylcysteine, 3 mL, nebulization, TID  amLODIPine, 10 mg, oral, Daily  ampicillin-sulbactam, 3 g, intravenous, q12h  cyanocobalamin, 100 mcg, oral, Daily  docusate sodium, 100 mg, oral, BID  enoxaparin, 40 mg, subcutaneous, q24h  insulin glargine, 10 Units, subcutaneous, q24h  insulin lispro, 0-10 Units, subcutaneous, Before meals & nightly  lidocaine, 1 patch, transdermal, Daily  lidocaine, 1 patch, transdermal, Daily  losartan, 100 mg, oral, Daily  metoprolol tartrate, 50 mg, oral, BID  multivitamin with minerals, 1 tablet, nasoduodenal tube, Daily  perflutren lipid microspheres, 0.5-10 mL of dilution, intravenous, Once in imaging  perflutren protein A microsphere, 0.5 mL, intravenous, Once in imaging  polyethylene glycol, 17 g, oral, Daily  QUEtiapine, 25 mg, oral, Nightly  sulfur hexafluoride microsphr, 2 mL, intravenous, Once in imaging  thiamine, 100 mg, intravenous, Daily     [2] dexmedeTOMIDine, 0-1.5 mcg/kg/hr, Last Rate: Stopped (07/09/25 2130)     [3] PRN medications: bisacodyl, dextrose, dextrose, glucagon, glucagon, ipratropium-albuteroL, oxygen, oxygen, oxygen

## 2025-07-10 NOTE — CARE PLAN
Problem: Pain - Adult  Goal: Verbalizes/displays adequate comfort level or baseline comfort level  Outcome: Progressing  Flowsheets (Taken 7/8/2025 0236)  Verbalizes/displays adequate comfort level or baseline comfort level: Assess pain using appropriate pain scale     Problem: Safety - Adult  Goal: Free from fall injury  Outcome: Progressing  Flowsheets (Taken 7/9/2025 2146)  Free from fall injury: Instruct family/caregiver on patient safety     Problem: Chronic Conditions and Co-morbidities  Goal: Patient's chronic conditions and co-morbidity symptoms are monitored and maintained or improved  Outcome: Progressing  Flowsheets (Taken 7/8/2025 0236)  Care Plan - Patient's Chronic Conditions and Co-Morbidity Symptoms are Monitored and Maintained or Improved: Monitor and assess patient's chronic conditions and comorbid symptoms for stability, deterioration, or improvement     Problem: Nutrition  Goal: Nutrient intake appropriate for maintaining nutritional needs  Outcome: Progressing     Problem: Fall/Injury  Goal: Not fall by end of shift  Outcome: Progressing     Problem: Skin  Goal: Promote/optimize nutrition  Outcome: Progressing  Flowsheets (Taken 7/9/2025 2146)  Promote/optimize nutrition:   Assist with feeding   Offer water/supplements/favorite foods     Problem: Diabetes  Goal: Increase stability of blood glucose readings by end of shift  Outcome: Progressing  Goal: Vital signs within normal range for age by end of shift  Outcome: Progressing  Flowsheets (Taken 7/9/2025 2146)  Vital signs within normal range for age by end of shift: Med administration/monitoring of effect

## 2025-07-10 NOTE — PROGRESS NOTES
Occupational Therapy                 Therapy Communication Note    Patient Name: Jorge A Madden  MRN: [id number]  Department: [facility] ICU  Room: 2127/2127-A  Today's Date: 7/10/2025     Discipline: Occupational Therapy    Missed Visit:   Yes    Missed Visit Reason:  Pt. Needs re-evaluation. Per nursing has increased oxygen needs and increased BP. Will hold and attempt again as medically appropriate.     Missed Time: Attempt    Comment:

## 2025-07-10 NOTE — PROGRESS NOTES
Subjective   Patient is a 75 y.o. male admitted on 7/2/2025 10:01 AM    Overnight Events:   - During initial evaluation in the morning of 7/10/2025 patient was talking and just on nasal cannula.  During the morning patient became hypoxic requiring BiPAP.  Patient was hypertensive and given 10 mg of hydralazine increased dose of metoprolol to tartrate to 50 mg twice daily and chest x-ray was obtained.  Patient was subsequently weaned off of BiPAP and was resting comfortably later in the afternoon on high flow nasal cannula.    Scheduled Medications:   Scheduled Medications[1]     Continuous Medications:   Continuous Medications[2]     PRN Medications:   PRN Medications[3]    Objective   Vitals:  Temp  Min: 35.6 °C (96.1 °F)  Max: 36.8 °C (98.2 °F)  Pulse  Min: 62  Max: 90  BP  Min: 124/80  Max: 212/102  Resp  Min: 18  Max: 36  SpO2  Min: 89 %  Max: 100 %    Physical Exam:   General: ill-appearing, muscle wasting, frail  Skin: Clammy, dry, intact  HEENT: NC/AT, trachea midline  CV: RRR, normal heart sounds, no lower extremity edema  Pulm: Diminished in the LLL  GI: Soft, non-tender, non-distended  MSK: No clear deformities or injuries  Neurology: A and O x 3.    Labs:     Results for orders placed or performed during the hospital encounter of 07/02/25 (from the past 24 hours)   Comprehensive metabolic panel   Result Value Ref Range    Glucose 111 (H) 74 - 99 mg/dL    Sodium 138 136 - 145 mmol/L    Potassium 3.6 3.5 - 5.3 mmol/L    Chloride 102 98 - 107 mmol/L    Bicarbonate 25 21 - 32 mmol/L    Anion Gap 15 10 - 20 mmol/L    Urea Nitrogen 27 (H) 6 - 23 mg/dL    Creatinine 1.13 0.50 - 1.30 mg/dL    eGFR 69 >60 mL/min/1.73m*2    Calcium 9.0 8.6 - 10.3 mg/dL    Albumin 3.4 3.4 - 5.0 g/dL    Alkaline Phosphatase 59 33 - 136 U/L    Total Protein 6.5 6.4 - 8.2 g/dL    AST 15 9 - 39 U/L    Bilirubin, Total 0.7 0.0 - 1.2 mg/dL    ALT 13 10 - 52 U/L   Magnesium   Result Value Ref Range    Magnesium 1.92 1.60 - 2.40 mg/dL    Phosphorus   Result Value Ref Range    Phosphorus 2.9 2.5 - 4.9 mg/dL   POCT GLUCOSE   Result Value Ref Range    POCT Glucose 162 (H) 74 - 99 mg/dL   POCT GLUCOSE   Result Value Ref Range    POCT Glucose 85 74 - 99 mg/dL   POCT GLUCOSE   Result Value Ref Range    POCT Glucose 63 (L) 74 - 99 mg/dL   Comprehensive metabolic panel   Result Value Ref Range    Glucose 114 (H) 74 - 99 mg/dL    Sodium 139 136 - 145 mmol/L    Potassium 2.8 (LL) 3.5 - 5.3 mmol/L    Chloride 102 98 - 107 mmol/L    Bicarbonate 26 21 - 32 mmol/L    Anion Gap 14 mmol/L    Urea Nitrogen 23 6 - 23 mg/dL    Creatinine 1.02 0.50 - 1.30 mg/dL    eGFR 78 >60 mL/min/1.73m*2    Calcium 9.0 8.6 - 10.3 mg/dL    Albumin 3.3 (L) 3.4 - 5.0 g/dL    Alkaline Phosphatase 64 33 - 136 U/L    Total Protein 6.4 6.4 - 8.2 g/dL    AST 14 9 - 39 U/L    Bilirubin, Total 0.7 0.0 - 1.2 mg/dL    ALT 11 10 - 52 U/L   Magnesium   Result Value Ref Range    Magnesium 1.97 1.60 - 2.40 mg/dL   Phosphorus   Result Value Ref Range    Phosphorus 2.0 (L) 2.5 - 4.9 mg/dL   CBC and Auto Differential   Result Value Ref Range    WBC 11.3 4.4 - 11.3 x10*3/uL    nRBC 0.0 0.0 - 0.0 /100 WBCs    RBC 3.42 (L) 4.50 - 5.90 x10*6/uL    Hemoglobin 11.8 (L) 13.5 - 17.5 g/dL    Hematocrit 35.2 (L) 41.0 - 52.0 %     (H) 80 - 100 fL    MCH 34.5 (H) 26.0 - 34.0 pg    MCHC 33.5 32.0 - 36.0 g/dL    RDW 12.8 11.5 - 14.5 %    Platelets 187 150 - 450 x10*3/uL    Neutrophils % 85.7 40.0 - 80.0 %    Immature Granulocytes %, Automated 0.7 0.0 - 0.9 %    Lymphocytes % 6.3 13.0 - 44.0 %    Monocytes % 7.0 2.0 - 10.0 %    Eosinophils % 0.1 0.0 - 6.0 %    Basophils % 0.2 0.0 - 2.0 %    Neutrophils Absolute 9.69 (H) 1.60 - 5.50 x10*3/uL    Immature Granulocytes Absolute, Automated 0.08 0.00 - 0.50 x10*3/uL    Lymphocytes Absolute 0.71 (L) 0.80 - 3.00 x10*3/uL    Monocytes Absolute 0.79 0.05 - 0.80 x10*3/uL    Eosinophils Absolute 0.01 0.00 - 0.40 x10*3/uL    Basophils Absolute 0.02 0.00 - 0.10 x10*3/uL    POCT GLUCOSE   Result Value Ref Range    POCT Glucose 105 (H) 74 - 99 mg/dL   POCT fingerstick glucose manually resulted   Result Value Ref Range    POC Fingerstick Blood Glucose     POCT GLUCOSE   Result Value Ref Range    POCT Glucose 199 (H) 74 - 99 mg/dL   POCT GLUCOSE   Result Value Ref Range    POCT Glucose 239 (H) 74 - 99 mg/dL        Imaging:   Imaging  XR chest 1 view  Result Date: 7/10/2025  Similar to mildly increased right lower lobe patchy airspace opacities. Unchanged retrocardiac opacification. Given the debris/mucous plugging noted on prior CT, findings are suspicious for aspiration pneumonia. Continued collapse of the left lower lobe is suspected; no new lobar collapse is evident.   Signed by: Sara Torres 7/10/2025 11:15 AM Dictation workstation:   [id number]    XR chest 1 view  Result Date: 7/8/2025  Persistent retrocardiac opacity corresponding to left lower lobe atelectasis with superimposed infection not excluded. Clinical correlation and continued follow-up to ensure complete resolution is advised.   Support hardware as described above.   MACRO: None   Signed by: Rohit Lynch 7/8/2025 7:14 PM Dictation workstation:   [id number]    XR chest 1 view  Result Date: 7/8/2025  1.  Stable retrocardiac opacity.     Signed by: Chuck Ivan 7/8/2025 8:11 AM Dictation workstation:   [id number]    XR chest 1 view  Result Date: 7/7/2025  The enteric tube distal tip projects over the gastric body, in the side-hole projects near the GE junction. Advancement by 5 cm and confirmation of repositioning with upper abdominal radiograph can be obtained as per clinical need. Unchanged left basilar opacification.   Signed by: Sara Torres 7/7/2025 7:49 AM Dictation workstation:   [id number]    XR abdomen 1 view  Result Date: 7/6/2025  1.  See discussion above   MACRO: None   Signed by: Joseph Schoenberger 7/6/2025 5:43 PM Dictation workstation:   [id number]    CT abdomen pelvis w IV contrast  Result  Date: 7/6/2025  1. Massive amount of stool impacted in the rectum which is dilated up to 8.7 cm x 8.4 cm with also large amount of stool in the sigmoid colon and distal descending colon. Recommend intervention to avoid stercoral colitis (pressure ischemia of the colonic wall). This is on a back diffuse infectious/inflammatory colitis of the ascending through descending colon.   2. Extensive pancreatic parenchymal calcifications. There is irregular ductal dilatation from the uncinate process to the tail. The duct measures up to 1.5 cm. Superimposed upon main ductal dilatation are dilatation of numerous side branch ducts resulting in parenchymal simple cysts. No solid parenchymal mass. The differential diagnosis includes both or either of the following: Sequela of chronic pancreatitis and/or combined side-branch/main duct intraductal papillary mucinous neoplasm. A follow-up MRI pancreas protocol with contrast/MRCP in 6 months is recommended to ensure stability and further characterize as necessary.   3. Mild bilateral nonobstructing renal calculi and cortical scarring.   MACRO: None.   Signed by: Shaggy Mcgovern 7/6/2025 4:24 PM Dictation workstation:   [id number]    CT angio chest for pulmonary embolism  Result Date: 7/6/2025  1. Complete left lower lobe collapse due to debris occluding all of the left lower lobe segmental and subsegmental bronchi and the lower lobar bronchus. There is also debris in the left upper lobe and lingular bronchi and right lower lobe segmental subsegmental bronchi associated with aspiration pneumonitis in the right lower lobe.   2. No acute pulmonary embolism.   3. Concentric left ventricular hypertrophy.   MACRO: None.   Signed by: Shaggy Mcgovern 7/6/2025 4:10 PM Dictation workstation:   [id number]    XR chest 1 view  Result Date: 7/6/2025  1.  Improving left lower lung consolidative infiltrate.   Signed by: Lorenzo Monahan 7/6/2025 12:15 PM Dictation workstation:   [id number]    XR  chest 1 view  Result Date: 7/6/2025  Consolidation at the left base which is worse when compared to the previous study.   MACRO: None.   Signed by: Andrea Carter 7/6/2025 11:55 AM Dictation workstation:   [id number]      Cardiology, Vascular, and Other Imaging  ECG 12 lead  Result Date: 7/8/2025  Marked sinus bradycardia Septal infarct (cited on or before 06-JUL-2025) When compared with ECG of 06-JUL-2025 11:04, (unconfirmed) Vent. rate has decreased BY  54 BPM QRS axis Shifted right Reconfirmed by Fernando Newell ([id number]) on 7/8/2025 2:12:27 PM    Transthoracic Echo Complete  Result Date: 7/7/2025            [facility] [address] [facility] [address]    Tel [phone] Fax [phone] TRANSTHORACIC ECHOCARDIOGRAM REPORT Patient Name:       ABBY BYNUM SYD     Reading Physician:    [id number] Socrates Yu MD Study Date:         7/7/2025             Ordering Provider:    [id number] AYLIN SCHULER MRN/PID:            [id number]             Fellow: Accession#:         [id number]         Nurse: Date of Birth/Age:  1950 / 75 years Sonographer:          Anette Phoenix RD Gender Assigned at  M                    Additional Staff: Birth: Height:             177.80 cm            Admit Date: Weight:             49.44 kg             Admission Status:     Inpatient -                                                                Routine BSA / BMI:          1.61 m2 / 15.64      Department Location:  [facility] ICU Back                     kg/m2                                      (27-34) Blood Pressure: 101 /69 mmHg Study Type:    TRANSTHORACIC ECHO (TTE) COMPLETE Diagnosis/ICD: Acute respiratory failure with hypoxia-J96.01 Indication:    acute respiratory failure with hypoxia and hypercapnia CPT Codes:     Echo Complete w  Full [id number] Patient History: Pertinent History: HTN. Study Detail: The following Echo studies were performed: 2D, M-Mode, Doppler and               color flow.  PHYSICIAN INTERPRETATION: Left Ventricle: The left ventricular systolic function is mildly decreased with a Lezama's biplane calculated ejection fraction of 45%. There is global hypokinesis of the left ventricle with minor regional variations. The left ventricular cavity size is normal. There is mild increased septal and mildly increased posterior left ventricular wall thickness. There is left ventricular concentric remodeling. Spectral Doppler shows a Grade I (impaired relaxation pattern) of left ventricular diastolic filling with normal left atrial filling pressure. Left Atrium: The left atrial size is normal. Right Ventricle: The right ventricle is normal in size. Unable to determine right ventricular systolic function. Right Atrium: The right atrial size was not well visualized. Aortic Valve: The aortic valve is trileaflet. There is no evidence of aortic valve regurgitation. Mitral Valve: The mitral valve is normal in structure. There is trace mitral valve regurgitation. The E Vmax is 0.33 m/s. Tricuspid Valve: The tricuspid valve is structurally normal. There is trace to mild tricuspid regurgitation. The Doppler estimated right ventricular systolic pressure (RVSP) is within normal limits at 22 mmHg. Pulmonic Valve: The pulmonic valve is not well visualized. The pulmonic valve regurgitation was not assessed. Pericardium: No pericardial effusion noted. Aorta: The aortic root is abnormal. There is mild dilatation of the aortic root. Systemic Veins: The inferior vena cava appears normal in size, IVC inspiratory collapse not assessed due to mechanical ventilation. In comparison to the previous echocardiogram(s): There are no prior studies on this patient for comparison purposes.  CONCLUSIONS:  1. The left ventricular systolic function is mildly  decreased with a Lezama's biplane calculated ejection fraction of 45%.  2. There is global hypokinesis of the left ventricle with minor regional variations.  3. Spectral Doppler shows a Grade I (impaired relaxation pattern) of left ventricular diastolic filling with normal left atrial filling pressure.  4. The Doppler estimated RVSP is within normal limits at 22 mmHg. QUANTITATIVE DATA SUMMARY:  2D MEASUREMENTS:             Normal Ranges: LAs:             2.92 cm     (2.7-4.0cm) IVSd:            1.10 cm     (0.6-1.1cm) LVPWd:           1.13 cm     (0.6-1.1cm) LVIDd:           3.90 cm     (3.9-5.9cm) LVIDs:           2.99 cm LV Mass Index:   88.7 g/m2 LVEDV Index:     35.92 ml/m2 LV % FS          23.3 %  LEFT ATRIUM:                 Normal Ranges: LA Area A4C:      10.0 cm2 LA Area A2C:      9.0 cm2 LA Volume Index:  14.0 ml/m2 LA Vol A4C:       19.7 ml LA Vol A2C:       16.9 ml LA Vol Index BSA: 11.3 ml/m2  M-MODE MEASUREMENTS:         Normal Ranges: AoV Exc:             2.23 cm (1.5-2.5cm)  AORTA MEASUREMENTS:         Normal Ranges: AoV Exc:            2.23 cm (1.5-2.5cm) Ao Sinus, d:        4.10 cm (2.1-3.5cm)  LV SYSTOLIC FUNCTION:                      Normal Ranges: EF-A4C View:    46 % (>=55%) EF-A2C View:    45 % EF-Biplane:     45 % LV EF Reported: 45 %  LV DIASTOLIC FUNCTION:           Normal Ranges: MV Peak E:             0.33 m/s  (0.7-1.2 m/s) MV Peak A:             0.42 m/s  (0.42-0.7 m/s) E/A Ratio:             0.78      (1.0-2.2) MV e'                  0.045 m/s (>8.0) MV lateral e'          0.02 m/s MV medial e'           0.07 m/s E/e' Ratio:            7.37      (<8.0)  MITRAL VALVE:          Normal Ranges: MV DT:        283 msec (150-240msec)  AORTIC VALVE:           Normal Ranges: AoV Vmax:      0.65 m/s (<=1.7m/s) AoV Peak P.7 mmHg (<20mmHg) LVOT Max Azar:  0.40 m/s (<=1.1m/s) LVOT Diameter: 2.33 cm  (1.8-2.4cm) AoV Area,Vmax: 2.62 cm2 (2.5-4.5cm2)  RIGHT VENTRICLE: RV Basal 3.00 cm RV Mid    2.10 cm RV Major 5.4 cm TAPSE:   17.0 mm RV s'    0.08 m/s  TRICUSPID VALVE/RVSP:          Normal Ranges: Peak TR Velocity:     2.16 m/s Est. RA Pressure:     3 mmHg RV Syst Pressure:     22 mmHg  (< 30mmHg) IVC Diam:             2.04 cm  AORTA: Asc Ao Diam 4.37 cm  [id number] Scorates Yu MD Electronically signed on 7/7/2025 at 8:08:13 PM  ** Final **     ECG 12 lead  Result Date: 7/6/2025  Normal sinus rhythm Left axis deviation Left ventricular hypertrophy with repolarization abnormality Cannot rule out Septal infarct , age undetermined Abnormal ECG No previous ECGs available         Assessment/Plan   Patient is a 75 y.o. male with past medical history of pancreatitis, T2DM insulin dependent and HTN, HLD treated in the ICU for acute hypoxic respiratory failure secondary to pneumonia.    - At the end of shift on 7/8, patient became hypoxic, due to lying flat during patient care.  Patient was placed first on max nasal cannula, then nonrebreather, then BiPAP due to shortness of breath, hypoxia.  Patient needed Precedex for further toleration of BiPAP overnight.  Patient then transitioned to high flow nasal cannula.    Lab workup today showed potassium 2.8 from 3.6.  Repleted.  BUN 23 from 27.  Creatinine 1.02 from 1.13.  White count 11.3 from 13.7.    Chest x-ray PM 7/8 showed left lower lobe opacity/atelectasis.  Neuro: #Acute delirium  - No acute issues  - Sedation: Precedex, weaning down  - Pain management: N/A  - Titrate to RASS 0 to -2  - CAM ICU  - 25 mg Seroquel nightly, EKG 7/10/2025 QTc 430     Cardiac:  - Hx hypertension: Restarted home losartan, amlodipine now off of sedation. Resume home metoprolol increased dose 50 mg twice daily  - Echocardiogram 7/7: EF 45%, global hypokinesis of the left ventricle, impaired relaxation of left ventricular diastolic filling  - Stable off pressors this morning  - Continuous cardiac monitoring  - Repeat CXR today  - Maintain MAP > 65 mmHg    Pulmonary:  #Acute hypoxic  hypercapnic respiratory failure  - Intubated    - Maintain spO2 > 92%  - Titrate vent settings as tolerated  - CT angio PE demonstrated concern for aspiration and collapse of LLL  - Bronchoscopy  with minimal secretions  - Continuous pulse ox  -Patient extubated successfully .  Patient towards the end of shift, required further oxygen supplementation, nonrebreather, then BiPAP after becoming hypoxic during lying flat during patient care.  Patient coming down on high flow nasal cannula needs.    Gastrointestinal:  - Diet: Easy to chew, thin 0  - CT AP  demonstrated large amount of impacted stool and possible colitis with concern for developing stercoral colitis. However, patient has had multiple large BM since starting bowel regimen .  - Ppx: Protonix   - Bowel regimen: miralax, docusate  - Last BM: Last BM Date: 25  - Patient pulled out NG tube.    Renal:  #JULIO (Resolved)  #Azotemia (Resolved)  - BUN 23, creatinine 1.02.  - Daily BMP, Mg, phos  - Replete electrolytes as indicated  - Strict I's & O's  - Friedman to be placed  Net IO Since Admission: 1,336.17 mL [07/10/25 1748]  - Potassium 2.8 on a.m. labs, potassium repleted.  Repeat BMP in the afternoon.    Hematology:  - Daily CBC  - DVT Prophylaxis: Lovenox, SCDs    Infectious Disease:  #Left lower lobe pneumonia  - MRSA negative, stopped vanc,  - Continue Unasyn   - Strep ag positive  - Repeat blood cultures NGTD  - Lactate normal  - UA without evidence of UTI  Temp (24hrs), Av.4 °C (97.5 °F), Min:35.6 °C (96.1 °F), Max:36.8 °C (98.2 °F)     Endocrine:  - HX DM2  - SSI, Lantus, POC BG  - BG < 180 goal    CODE STATUS: DNR    Disposition: ICU    Gamal Gomez, DO PGY-2, internal medicine [facility]       [1] acetylcysteine, 3 mL, nebulization, TID  amLODIPine, 10 mg, oral, Daily  ampicillin-sulbactam, 3 g, intravenous, q12h  cyanocobalamin, 100 mcg, oral, Daily  docusate sodium, 100 mg, oral, BID  enoxaparin, 40 mg,  subcutaneous, q24h  insulin glargine, 10 Units, subcutaneous, q24h  insulin lispro, 0-10 Units, subcutaneous, Before meals & nightly  lidocaine, 1 patch, transdermal, Daily  lidocaine, 1 patch, transdermal, Daily  losartan, 100 mg, oral, Daily  metoprolol tartrate, 50 mg, oral, BID  multivitamin with minerals, 1 tablet, nasoduodenal tube, Daily  perflutren lipid microspheres, 0.5-10 mL of dilution, intravenous, Once in imaging  perflutren protein A microsphere, 0.5 mL, intravenous, Once in imaging  polyethylene glycol, 17 g, oral, Daily  QUEtiapine, 25 mg, oral, Nightly  sulfur hexafluoride microsphr, 2 mL, intravenous, Once in imaging  thiamine, 100 mg, intravenous, Daily     [2] dexmedeTOMIDine, 0-1.5 mcg/kg/hr, Last Rate: Stopped (07/09/25 2130)     [3] PRN medications: bisacodyl, dextrose, dextrose, glucagon, glucagon, ipratropium-albuteroL, oxygen, oxygen, oxygen

## 2025-07-10 NOTE — PROGRESS NOTES
07/10/25 1522   Discharge Planning   Living Arrangements Family members   Support Systems Family members     Reviewed chart. Patient extubated yesterday, was on [place] oxygen. Not medically ready for discharge. Plan is [facility] when discharged.

## 2025-07-11 LAB
ALBUMIN SERPL BCP-MCNC: 3.2 G/DL (ref 3.4–5)
ALBUMIN SERPL BCP-MCNC: 3.2 G/DL (ref 3.4–5)
ALP SERPL-CCNC: 61 U/L (ref 33–136)
ALP SERPL-CCNC: 66 U/L (ref 33–136)
ALT SERPL W P-5'-P-CCNC: 10 U/L (ref 10–52)
ALT SERPL W P-5'-P-CCNC: 11 U/L (ref 10–52)
ANION GAP BLDA CALCULATED.4IONS-SCNC: 9 MMO/L (ref 10–25)
ANION GAP SERPL CALC-SCNC: 12 MMOL/L (ref 10–20)
ANION GAP SERPL CALC-SCNC: 14 MMOL/L (ref 10–20)
ARTERIAL PATENCY WRIST A: ABNORMAL
AST SERPL W P-5'-P-CCNC: 10 U/L (ref 9–39)
AST SERPL W P-5'-P-CCNC: 12 U/L (ref 9–39)
ATRIAL RATE: 68 BPM
BACTERIA BLD CULT: NORMAL
BACTERIA BLD CULT: NORMAL
BASE EXCESS BLDA CALC-SCNC: 2.1 MMOL/L (ref -2–3)
BASOPHILS # BLD AUTO: 0.01 X10*3/UL (ref 0–0.1)
BASOPHILS NFR BLD AUTO: 0.1 %
BILIRUB SERPL-MCNC: 0.5 MG/DL (ref 0–1.2)
BILIRUB SERPL-MCNC: 0.6 MG/DL (ref 0–1.2)
BODY TEMPERATURE: ABNORMAL
BUN SERPL-MCNC: 26 MG/DL (ref 6–23)
BUN SERPL-MCNC: 27 MG/DL (ref 6–23)
CA-I BLDA-SCNC: 1.28 MMOL/L (ref 1.1–1.33)
CALCIUM SERPL-MCNC: 9.3 MG/DL (ref 8.6–10.3)
CALCIUM SERPL-MCNC: 9.3 MG/DL (ref 8.6–10.3)
CHLORIDE BLDA-SCNC: 102 MMOL/L (ref 98–107)
CHLORIDE SERPL-SCNC: 101 MMOL/L (ref 98–107)
CHLORIDE SERPL-SCNC: 102 MMOL/L (ref 98–107)
CO2 SERPL-SCNC: 28 MMOL/L (ref 21–32)
CO2 SERPL-SCNC: 28 MMOL/L (ref 21–32)
CREAT SERPL-MCNC: 1.02 MG/DL (ref 0.5–1.3)
CREAT SERPL-MCNC: 1.04 MG/DL (ref 0.5–1.3)
EGFRCR SERPLBLD CKD-EPI 2021: 76 ML/MIN/1.73M*2
EGFRCR SERPLBLD CKD-EPI 2021: 78 ML/MIN/1.73M*2
EOSINOPHIL # BLD AUTO: 0.03 X10*3/UL (ref 0–0.4)
EOSINOPHIL NFR BLD AUTO: 0.3 %
ERYTHROCYTE [DISTWIDTH] IN BLOOD BY AUTOMATED COUNT: 12.7 % (ref 11.5–14.5)
GLUCOSE BLD MANUAL STRIP-MCNC: 103 MG/DL (ref 74–99)
GLUCOSE BLD MANUAL STRIP-MCNC: 124 MG/DL (ref 74–99)
GLUCOSE BLD MANUAL STRIP-MCNC: 148 MG/DL (ref 74–99)
GLUCOSE BLD MANUAL STRIP-MCNC: 17 MG/DL (ref 74–99)
GLUCOSE BLD MANUAL STRIP-MCNC: 170 MG/DL (ref 74–99)
GLUCOSE BLD MANUAL STRIP-MCNC: 204 MG/DL (ref 74–99)
GLUCOSE BLD MANUAL STRIP-MCNC: 77 MG/DL (ref 74–99)
GLUCOSE BLD MANUAL STRIP-MCNC: 77 MG/DL (ref 74–99)
GLUCOSE BLD MANUAL STRIP-MCNC: 81 MG/DL (ref 74–99)
GLUCOSE BLD MANUAL STRIP-MCNC: 93 MG/DL (ref 74–99)
GLUCOSE BLD MANUAL STRIP-MCNC: 97 MG/DL (ref 74–99)
GLUCOSE BLDA-MCNC: 85 MG/DL (ref 74–99)
GLUCOSE SERPL-MCNC: 18 MG/DL (ref 74–99)
GLUCOSE SERPL-MCNC: 81 MG/DL (ref 74–99)
HCO3 BLDA-SCNC: 28.7 MMOL/L (ref 22–26)
HCT VFR BLD AUTO: 33.5 % (ref 41–52)
HCT VFR BLD EST: 38 % (ref 41–52)
HGB BLD-MCNC: 11.5 G/DL (ref 13.5–17.5)
HGB BLDA-MCNC: 12.8 G/DL (ref 13.5–17.5)
IMM GRANULOCYTES # BLD AUTO: 0.06 X10*3/UL (ref 0–0.5)
IMM GRANULOCYTES NFR BLD AUTO: 0.6 % (ref 0–0.9)
INHALED O2 CONCENTRATION: 70 %
LACTATE BLDA-SCNC: 0.5 MMOL/L (ref 0.4–2)
LYMPHOCYTES # BLD AUTO: 1.31 X10*3/UL (ref 0.8–3)
LYMPHOCYTES NFR BLD AUTO: 13.5 %
MAGNESIUM SERPL-MCNC: 2.15 MG/DL (ref 1.6–2.4)
MAGNESIUM SERPL-MCNC: 2.21 MG/DL (ref 1.6–2.4)
MCH RBC QN AUTO: 34.6 PG (ref 26–34)
MCHC RBC AUTO-ENTMCNC: 34.3 G/DL (ref 32–36)
MCV RBC AUTO: 101 FL (ref 80–100)
MONOCYTES # BLD AUTO: 0.81 X10*3/UL (ref 0.05–0.8)
MONOCYTES NFR BLD AUTO: 8.4 %
NEUTROPHILS # BLD AUTO: 7.45 X10*3/UL (ref 1.6–5.5)
NEUTROPHILS NFR BLD AUTO: 77.1 %
NRBC BLD-RTO: 0 /100 WBCS (ref 0–0)
OXYHGB MFR BLDA: 89.7 % (ref 94–98)
P AXIS: 52 DEGREES
P OFFSET: 185 MS
P ONSET: 132 MS
PCO2 BLDA: 52 MM HG (ref 38–42)
PH BLDA: 7.35 PH (ref 7.38–7.42)
PHOSPHATE SERPL-MCNC: 2.6 MG/DL (ref 2.5–4.9)
PLATELET # BLD AUTO: 238 X10*3/UL (ref 150–450)
PO2 BLDA: 63 MM HG (ref 85–95)
POTASSIUM BLDA-SCNC: 4.7 MMOL/L (ref 3.5–5.3)
POTASSIUM SERPL-SCNC: 3.1 MMOL/L (ref 3.5–5.3)
POTASSIUM SERPL-SCNC: 4.2 MMOL/L (ref 3.5–5.3)
PR INTERVAL: 166 MS
PROT SERPL-MCNC: 6.5 G/DL (ref 6.4–8.2)
PROT SERPL-MCNC: 6.6 G/DL (ref 6.4–8.2)
Q ONSET: 215 MS
QRS COUNT: 11 BEATS
QRS DURATION: 84 MS
QT INTERVAL: 430 MS
QTC CALCULATION(BAZETT): 457 MS
QTC FREDERICIA: 448 MS
R AXIS: -31 DEGREES
RBC # BLD AUTO: 3.32 X10*6/UL (ref 4.5–5.9)
SAO2 % BLDA: 92 % (ref 94–100)
SITE OF ARTERIAL PUNCTURE: ABNORMAL
SODIUM BLDA-SCNC: 135 MMOL/L (ref 136–145)
SODIUM SERPL-SCNC: 138 MMOL/L (ref 136–145)
SODIUM SERPL-SCNC: 140 MMOL/L (ref 136–145)
T AXIS: 26 DEGREES
T OFFSET: 430 MS
VENTRICULAR RATE: 68 BPM
WBC # BLD AUTO: 9.7 X10*3/UL (ref 4.4–11.3)

## 2025-07-11 PROCEDURE — 2550000001 HC RX 255 CONTRASTS: Performed by: INTERNAL MEDICINE

## 2025-07-11 PROCEDURE — 84132 ASSAY OF SERUM POTASSIUM: CPT | Performed by: INTERNAL MEDICINE

## 2025-07-11 PROCEDURE — 36600 WITHDRAWAL OF ARTERIAL BLOOD: CPT

## 2025-07-11 PROCEDURE — 2500000004 HC RX 250 GENERAL PHARMACY W/ HCPCS (ALT 636 FOR OP/ED)

## 2025-07-11 PROCEDURE — 2020000001 HC ICU ROOM DAILY

## 2025-07-11 PROCEDURE — 2500000005 HC RX 250 GENERAL PHARMACY W/O HCPCS: Performed by: INTERNAL MEDICINE

## 2025-07-11 PROCEDURE — 71275 CT ANGIOGRAPHY CHEST: CPT | Performed by: RADIOLOGY

## 2025-07-11 PROCEDURE — 83735 ASSAY OF MAGNESIUM: CPT

## 2025-07-11 PROCEDURE — 2500000001 HC RX 250 WO HCPCS SELF ADMINISTERED DRUGS (ALT 637 FOR MEDICARE OP)

## 2025-07-11 PROCEDURE — 2500000002 HC RX 250 W HCPCS SELF ADMINISTERED DRUGS (ALT 637 FOR MEDICARE OP, ALT 636 FOR OP/ED)

## 2025-07-11 PROCEDURE — 85025 COMPLETE CBC W/AUTO DIFF WBC: CPT

## 2025-07-11 PROCEDURE — 71045 X-RAY EXAM CHEST 1 VIEW: CPT | Performed by: RADIOLOGY

## 2025-07-11 PROCEDURE — 2500000002 HC RX 250 W HCPCS SELF ADMINISTERED DRUGS (ALT 637 FOR MEDICARE OP, ALT 636 FOR OP/ED): Performed by: INTERNAL MEDICINE

## 2025-07-11 PROCEDURE — 84132 ASSAY OF SERUM POTASSIUM: CPT

## 2025-07-11 PROCEDURE — 36415 COLL VENOUS BLD VENIPUNCTURE: CPT

## 2025-07-11 PROCEDURE — 2500000005 HC RX 250 GENERAL PHARMACY W/O HCPCS

## 2025-07-11 PROCEDURE — 94640 AIRWAY INHALATION TREATMENT: CPT

## 2025-07-11 PROCEDURE — 84075 ASSAY ALKALINE PHOSPHATASE: CPT

## 2025-07-11 PROCEDURE — 82947 ASSAY GLUCOSE BLOOD QUANT: CPT

## 2025-07-11 PROCEDURE — 74018 RADEX ABDOMEN 1 VIEW: CPT | Performed by: RADIOLOGY

## 2025-07-11 PROCEDURE — 99291 CRITICAL CARE FIRST HOUR: CPT | Performed by: INTERNAL MEDICINE

## 2025-07-11 PROCEDURE — 84100 ASSAY OF PHOSPHORUS: CPT

## 2025-07-11 RX ORDER — INSULIN GLARGINE 100 [IU]/ML
5 INJECTION, SOLUTION SUBCUTANEOUS EVERY 24 HOURS
Status: DISCONTINUED | OUTPATIENT
Start: 2025-07-11 | End: 2025-07-15 | Stop reason: HOSPADM

## 2025-07-11 RX ORDER — MIRTAZAPINE 15 MG/1
7.5 TABLET, FILM COATED ORAL NIGHTLY
Status: DISCONTINUED | OUTPATIENT
Start: 2025-07-11 | End: 2025-07-15 | Stop reason: HOSPADM

## 2025-07-11 RX ORDER — IPRATROPIUM BROMIDE AND ALBUTEROL SULFATE 2.5; .5 MG/3ML; MG/3ML
3 SOLUTION RESPIRATORY (INHALATION)
Status: DISCONTINUED | OUTPATIENT
Start: 2025-07-11 | End: 2025-07-12

## 2025-07-11 RX ORDER — POTASSIUM CHLORIDE 14.9 MG/ML
20 INJECTION INTRAVENOUS
Status: COMPLETED | OUTPATIENT
Start: 2025-07-11 | End: 2025-07-11

## 2025-07-11 RX ORDER — IPRATROPIUM BROMIDE AND ALBUTEROL SULFATE 2.5; .5 MG/3ML; MG/3ML
3 SOLUTION RESPIRATORY (INHALATION)
Status: DISCONTINUED | OUTPATIENT
Start: 2025-07-11 | End: 2025-07-11

## 2025-07-11 RX ORDER — DEXTROSE MONOHYDRATE 50 MG/ML
75 INJECTION, SOLUTION INTRAVENOUS CONTINUOUS
Status: ACTIVE | OUTPATIENT
Start: 2025-07-11 | End: 2025-07-12

## 2025-07-11 RX ORDER — HYDRALAZINE HYDROCHLORIDE 50 MG/1
25 TABLET, FILM COATED ORAL 3 TIMES DAILY
Status: DISCONTINUED | OUTPATIENT
Start: 2025-07-11 | End: 2025-07-15 | Stop reason: HOSPADM

## 2025-07-11 RX ORDER — POTASSIUM CHLORIDE 20 MEQ/1
40 TABLET, EXTENDED RELEASE ORAL ONCE
Status: COMPLETED | OUTPATIENT
Start: 2025-07-11 | End: 2025-07-11

## 2025-07-11 RX ORDER — IPRATROPIUM BROMIDE AND ALBUTEROL SULFATE 2.5; .5 MG/3ML; MG/3ML
3 SOLUTION RESPIRATORY (INHALATION) EVERY 2 HOUR PRN
Status: DISCONTINUED | OUTPATIENT
Start: 2025-07-11 | End: 2025-07-15 | Stop reason: HOSPADM

## 2025-07-11 RX ORDER — HYDROXYZINE HYDROCHLORIDE 25 MG/1
25 TABLET, FILM COATED ORAL ONCE
Status: COMPLETED | OUTPATIENT
Start: 2025-07-11 | End: 2025-07-11

## 2025-07-11 RX ADMIN — MIRTAZAPINE 7.5 MG: 15 TABLET, FILM COATED ORAL at 21:00

## 2025-07-11 RX ADMIN — AMPICILLIN SODIUM AND SULBACTAM SODIUM 3 G: 2; 1 INJECTION, POWDER, FOR SOLUTION INTRAMUSCULAR; INTRAVENOUS at 00:10

## 2025-07-11 RX ADMIN — LIDOCAINE 4% 1 PATCH: 40 PATCH TOPICAL at 08:46

## 2025-07-11 RX ADMIN — POTASSIUM CHLORIDE EXTENDED-RELEASE 40 MEQ: 1500 TABLET ORAL at 08:16

## 2025-07-11 RX ADMIN — AMPICILLIN SODIUM AND SULBACTAM SODIUM 3 G: 2; 1 INJECTION, POWDER, FOR SOLUTION INTRAMUSCULAR; INTRAVENOUS at 12:20

## 2025-07-11 RX ADMIN — QUETIAPINE FUMARATE 25 MG: 25 TABLET ORAL at 21:00

## 2025-07-11 RX ADMIN — POLYETHYLENE GLYCOL 3350 17 G: 17 POWDER, FOR SOLUTION ORAL at 08:46

## 2025-07-11 RX ADMIN — POTASSIUM CHLORIDE 20 MEQ: 14.9 INJECTION, SOLUTION INTRAVENOUS at 08:11

## 2025-07-11 RX ADMIN — IOHEXOL 60 ML: 350 INJECTION, SOLUTION INTRAVENOUS at 20:26

## 2025-07-11 RX ADMIN — HYDRALAZINE HYDROCHLORIDE 25 MG: 50 TABLET ORAL at 12:20

## 2025-07-11 RX ADMIN — Medication 35 L/MIN: at 11:25

## 2025-07-11 RX ADMIN — Medication 70 PERCENT: at 13:41

## 2025-07-11 RX ADMIN — POTASSIUM CHLORIDE 20 MEQ: 14.9 INJECTION, SOLUTION INTRAVENOUS at 10:55

## 2025-07-11 RX ADMIN — Medication 1 TABLET: at 08:45

## 2025-07-11 RX ADMIN — IPRATROPIUM BROMIDE AND ALBUTEROL SULFATE 3 ML: 2.5; .5 SOLUTION RESPIRATORY (INHALATION) at 13:57

## 2025-07-11 RX ADMIN — Medication 70 PERCENT: at 13:59

## 2025-07-11 RX ADMIN — AMLODIPINE BESYLATE 10 MG: 10 TABLET ORAL at 08:45

## 2025-07-11 RX ADMIN — ACETYLCYSTEINE 600 MG: 200 SOLUTION ORAL; RESPIRATORY (INHALATION) at 07:45

## 2025-07-11 RX ADMIN — ENOXAPARIN SODIUM 40 MG: 100 INJECTION SUBCUTANEOUS at 16:14

## 2025-07-11 RX ADMIN — IPRATROPIUM BROMIDE AND ALBUTEROL SULFATE 3 ML: 2.5; .5 SOLUTION RESPIRATORY (INHALATION) at 07:45

## 2025-07-11 RX ADMIN — LOSARTAN POTASSIUM 100 MG: 100 TABLET, FILM COATED ORAL at 08:45

## 2025-07-11 RX ADMIN — DEXTROSE MONOHYDRATE 25 G: 25 INJECTION, SOLUTION INTRAVENOUS at 04:18

## 2025-07-11 RX ADMIN — VITAM B12 100 MCG: 100 TAB at 08:45

## 2025-07-11 RX ADMIN — LIDOCAINE 4% 1 PATCH: 40 PATCH TOPICAL at 16:14

## 2025-07-11 RX ADMIN — Medication 55 L/MIN: at 07:48

## 2025-07-11 RX ADMIN — Medication 55 L/MIN: at 02:00

## 2025-07-11 RX ADMIN — IPRATROPIUM BROMIDE AND ALBUTEROL SULFATE 3 ML: 2.5; .5 SOLUTION RESPIRATORY (INHALATION) at 21:29

## 2025-07-11 RX ADMIN — DEXTROSE 75 ML/HR: 5 SOLUTION INTRAVENOUS at 18:52

## 2025-07-11 RX ADMIN — HYDROXYZINE HYDROCHLORIDE 25 MG: 25 TABLET, FILM COATED ORAL at 18:08

## 2025-07-11 RX ADMIN — THIAMINE HYDROCHLORIDE 100 MG: 100 INJECTION, SOLUTION INTRAMUSCULAR; INTRAVENOUS at 08:45

## 2025-07-11 RX ADMIN — DOCUSATE SODIUM LIQUID 100 MG: 100 LIQUID ORAL at 08:45

## 2025-07-11 RX ADMIN — METOPROLOL TARTRATE 50 MG: 50 TABLET, FILM COATED ORAL at 08:45

## 2025-07-11 RX ADMIN — DEXTROSE 75 ML/HR: 5 SOLUTION INTRAVENOUS at 05:59

## 2025-07-11 RX ADMIN — HYDRALAZINE HYDROCHLORIDE 25 MG: 50 TABLET ORAL at 16:14

## 2025-07-11 RX ADMIN — AMPICILLIN SODIUM AND SULBACTAM SODIUM 3 G: 2; 1 INJECTION, POWDER, FOR SOLUTION INTRAMUSCULAR; INTRAVENOUS at 18:08

## 2025-07-11 ASSESSMENT — PAIN SCALES - GENERAL
PAINLEVEL_OUTOF10: 0 - NO PAIN

## 2025-07-11 ASSESSMENT — PAIN - FUNCTIONAL ASSESSMENT
PAIN_FUNCTIONAL_ASSESSMENT: 0-10

## 2025-07-11 NOTE — CARE PLAN
The patient's goals for the shift include      The clinical goals for the shift include pt will remain HDS throughout shift    Over the shift, the patient did not make progress toward the following goals. Barriers to progression include pt was bradycardic and abnormal labs. Recommendations to address these barriers include continue current plan of care.  Problem: Pain - Adult  Goal: Verbalizes/displays adequate comfort level or baseline comfort level  Outcome: Progressing     Problem: Safety - Adult  Goal: Free from fall injury  Outcome: Progressing     Problem: Pain  Goal: Takes deep breaths with improved pain control throughout the shift  Outcome: Progressing  Goal: Turns in bed with improved pain control throughout the shift  Outcome: Progressing     Problem: Fall/Injury  Goal: Not fall by end of shift  Outcome: Progressing  Goal: Be free from injury by end of the shift  Outcome: Progressing     Problem: Skin  Goal: Prevent/minimize sheer/friction injuries  Flowsheets (Taken 7/8/2025 1245 by Neda Sam RN)  Prevent/minimize sheer/friction injuries:   Turn/reposition every 2 hours/use positioning/transfer devices   Utilize specialty bed per algorithm  Goal: Promote/optimize nutrition  Outcome: Progressing  Flowsheets (Taken 7/11/2025 0500)  Promote/optimize nutrition:   Assist with feeding   Monitor/record intake including meals   Consume > 50% meals/supplements   Offer water/supplements/favorite foods   Discuss with provider if NPO > 2 days  Goal: Promote skin healing  Outcome: Progressing  Flowsheets (Taken 7/11/2025 0500)  Promote skin healing:   Protective dressings over bony prominences   Turn/reposition every 2 hours/use positioning/transfer devices   Rotate device position/do not position patient on device   Assess skin/pad under line(s)/device(s)

## 2025-07-11 NOTE — PROGRESS NOTES
Subjective   Patient is a 75 y.o. male admitted on 7/2/2025 10:01 AM    Interval events:   - Patient is status post intubation and has been on and off of BiPAP and high flow nasal cannula throughout the day.  Patient subjectively feels short of breath while saturating greater than 95 SpO2.  CXR was obtained no significant changes compared to yesterday's CXR slight hazy bibasilar opacities persisting.  ABG showing pH 7.35/pCO2 52/PO2 63/HCO3 28.7    Scheduled Medications:   Scheduled Medications[1]     Continuous Medications:   Continuous Medications[2]     PRN Medications:   PRN Medications[3]    Objective   Vitals:  Temp  Min: 35.6 °C (96.1 °F)  Max: 36.6 °C (97.9 °F)  Pulse  Min: 50  Max: 82  BP  Min: 117/71  Max: 188/103  Resp  Min: 17  Max: 34  SpO2  Min: 91 %  Max: 98 %    Physical Exam:   General: ill-appearing, muscle wasting, frail  Skin: Clammy, dry, intact  HEENT: NC/AT, trachea midline  CV: RRR, normal heart sounds, no lower extremity edema  Pulm: Diminished in the Bilat lower lobes  GI: Soft, non-tender, non-distended  MSK: No clear deformities or injuries  Neurology: A and O x 3.    Labs:     Results for orders placed or performed during the hospital encounter of 07/02/25 (from the past 24 hours)   POCT GLUCOSE   Result Value Ref Range    POCT Glucose 239 (H) 74 - 99 mg/dL   Renal function panel   Result Value Ref Range    Glucose 193 (H) 74 - 99 mg/dL    Sodium 137 136 - 145 mmol/L    Potassium 3.3 (L) 3.5 - 5.3 mmol/L    Chloride 100 98 - 107 mmol/L    Bicarbonate 24 21 - 32 mmol/L    Anion Gap 16 10 - 20 mmol/L    Urea Nitrogen 24 (H) 6 - 23 mg/dL    Creatinine 1.07 0.50 - 1.30 mg/dL    eGFR 73 >60 mL/min/1.73m*2    Calcium 9.1 8.6 - 10.3 mg/dL    Phosphorus 2.5 2.5 - 4.9 mg/dL    Albumin 3.4 3.4 - 5.0 g/dL   POCT GLUCOSE   Result Value Ref Range    POCT Glucose 238 (H) 74 - 99 mg/dL   POCT GLUCOSE   Result Value Ref Range    POCT Glucose 148 (H) 74 - 99 mg/dL   Comprehensive metabolic panel   Result  Value Ref Range    Glucose 18 (LL) 74 - 99 mg/dL    Sodium 140 136 - 145 mmol/L    Potassium 3.1 (L) 3.5 - 5.3 mmol/L    Chloride 101 98 - 107 mmol/L    Bicarbonate 28 21 - 32 mmol/L    Anion Gap 14 10 - 20 mmol/L    Urea Nitrogen 26 (H) 6 - 23 mg/dL    Creatinine 1.04 0.50 - 1.30 mg/dL    eGFR 76 >60 mL/min/1.73m*2    Calcium 9.3 8.6 - 10.3 mg/dL    Albumin 3.2 (L) 3.4 - 5.0 g/dL    Alkaline Phosphatase 66 33 - 136 U/L    Total Protein 6.5 6.4 - 8.2 g/dL    AST 10 9 - 39 U/L    Bilirubin, Total 0.6 0.0 - 1.2 mg/dL    ALT 10 10 - 52 U/L   Magnesium   Result Value Ref Range    Magnesium 2.21 1.60 - 2.40 mg/dL   Phosphorus   Result Value Ref Range    Phosphorus 2.6 2.5 - 4.9 mg/dL   CBC and Auto Differential   Result Value Ref Range    WBC 9.7 4.4 - 11.3 x10*3/uL    nRBC 0.0 0.0 - 0.0 /100 WBCs    RBC 3.32 (L) 4.50 - 5.90 x10*6/uL    Hemoglobin 11.5 (L) 13.5 - 17.5 g/dL    Hematocrit 33.5 (L) 41.0 - 52.0 %     (H) 80 - 100 fL    MCH 34.6 (H) 26.0 - 34.0 pg    MCHC 34.3 32.0 - 36.0 g/dL    RDW 12.7 11.5 - 14.5 %    Platelets 238 150 - 450 x10*3/uL    Neutrophils % 77.1 40.0 - 80.0 %    Immature Granulocytes %, Automated 0.6 0.0 - 0.9 %    Lymphocytes % 13.5 13.0 - 44.0 %    Monocytes % 8.4 2.0 - 10.0 %    Eosinophils % 0.3 0.0 - 6.0 %    Basophils % 0.1 0.0 - 2.0 %    Neutrophils Absolute 7.45 (H) 1.60 - 5.50 x10*3/uL    Immature Granulocytes Absolute, Automated 0.06 0.00 - 0.50 x10*3/uL    Lymphocytes Absolute 1.31 0.80 - 3.00 x10*3/uL    Monocytes Absolute 0.81 (H) 0.05 - 0.80 x10*3/uL    Eosinophils Absolute 0.03 0.00 - 0.40 x10*3/uL    Basophils Absolute 0.01 0.00 - 0.10 x10*3/uL   POCT GLUCOSE   Result Value Ref Range    POCT Glucose 17 (L) 74 - 99 mg/dL   POCT GLUCOSE   Result Value Ref Range    POCT Glucose 204 (H) 74 - 99 mg/dL   POCT GLUCOSE   Result Value Ref Range    POCT Glucose 170 (H) 74 - 99 mg/dL   POCT GLUCOSE   Result Value Ref Range    POCT Glucose 93 74 - 99 mg/dL   POCT GLUCOSE   Result  Value Ref Range    POCT Glucose 81 74 - 99 mg/dL   POCT GLUCOSE   Result Value Ref Range    POCT Glucose 77 74 - 99 mg/dL   POCT GLUCOSE   Result Value Ref Range    POCT Glucose 77 74 - 99 mg/dL   Blood Gas Arterial Full Panel   Result Value Ref Range    POCT pH, Arterial 7.35 (L) 7.38 - 7.42 pH    POCT pCO2, Arterial 52 (H) 38 - 42 mm Hg    POCT pO2, Arterial 63 (L) 85 - 95 mm Hg    POCT SO2, Arterial 92 (L) 94 - 100 %    POCT Oxy Hemoglobin, Arterial 89.7 (L) 94.0 - 98.0 %    POCT Hematocrit Calculated, Arterial 38.0 (L) 41.0 - 52.0 %    POCT Sodium, Arterial 135 (L) 136 - 145 mmol/L    POCT Potassium, Arterial 4.7 3.5 - 5.3 mmol/L    POCT Chloride, Arterial 102 98 - 107 mmol/L    POCT Ionized Calcium, Arterial 1.28 1.10 - 1.33 mmol/L    POCT Glucose, Arterial 85 74 - 99 mg/dL    POCT Lactate, Arterial 0.5 0.4 - 2.0 mmol/L    POCT Base Excess, Arterial 2.1 -2.0 - 3.0 mmol/L    POCT HCO3 Calculated, Arterial 28.7 (H) 22.0 - 26.0 mmol/L    POCT Hemoglobin, Arterial 12.8 (L) 13.5 - 17.5 g/dL    POCT Anion Gap, Arterial 9 (L) 10 - 25 mmo/L    Patient Temperature      FiO2 70 %    Site of Arterial Puncture LR     Kuldip's Test LR         Imaging:   Imaging  XR abdomen 1 view  Result Date: 7/11/2025  Feeding tube terminates in the distal stomach.   MACRO: None   Signed by: Charlie Shin 7/11/2025 2:20 PM Dictation workstation:   [id number]    XR chest 1 view  Result Date: 7/11/2025  No significant change compared to yesterday. Hazy bibasilar opacities persist.   MACRO: None   Signed by: Charlie Shin 7/11/2025 2:19 PM Dictation workstation:   [id number]    XR abdomen 1 view  Result Date: 7/11/2025  Feeding tube tip in the mid stomach.   MACRO: None   Signed by: Charlie Shin 7/11/2025 12:45 PM Dictation workstation:   [id number]    XR chest 1 view  Result Date: 7/10/2025  Similar to mildly increased right lower lobe patchy airspace opacities. Unchanged retrocardiac opacification. Given the debris/mucous plugging noted on  prior CT, findings are suspicious for aspiration pneumonia. Continued collapse of the left lower lobe is suspected; no new lobar collapse is evident.   Signed by: Sara Torres 7/10/2025 11:15 AM Dictation workstation:   [id number]    XR chest 1 view  Result Date: 7/8/2025  Persistent retrocardiac opacity corresponding to left lower lobe atelectasis with superimposed infection not excluded. Clinical correlation and continued follow-up to ensure complete resolution is advised.   Support hardware as described above.   MACRO: None   Signed by: Rohit Lynch 7/8/2025 7:14 PM Dictation workstation:   [id number]    XR chest 1 view  Result Date: 7/8/2025  1.  Stable retrocardiac opacity.     Signed by: Chuck Ivan 7/8/2025 8:11 AM Dictation workstation:   [id number]    XR chest 1 view  Result Date: 7/7/2025  The enteric tube distal tip projects over the gastric body, in the side-hole projects near the GE junction. Advancement by 5 cm and confirmation of repositioning with upper abdominal radiograph can be obtained as per clinical need. Unchanged left basilar opacification.   Signed by: Sara Torres 7/7/2025 7:49 AM Dictation workstation:   [id number]    XR abdomen 1 view  Result Date: 7/6/2025  1.  See discussion above   MACRO: None   Signed by: Joseph Schoenberger 7/6/2025 5:43 PM Dictation workstation:   [id number]    CT abdomen pelvis w IV contrast  Result Date: 7/6/2025  1. Massive amount of stool impacted in the rectum which is dilated up to 8.7 cm x 8.4 cm with also large amount of stool in the sigmoid colon and distal descending colon. Recommend intervention to avoid stercoral colitis (pressure ischemia of the colonic wall). This is on a back diffuse infectious/inflammatory colitis of the ascending through descending colon.   2. Extensive pancreatic parenchymal calcifications. There is irregular ductal dilatation from the uncinate process to the tail. The duct measures up to 1.5 cm. Superimposed upon  main ductal dilatation are dilatation of numerous side branch ducts resulting in parenchymal simple cysts. No solid parenchymal mass. The differential diagnosis includes both or either of the following: Sequela of chronic pancreatitis and/or combined side-branch/main duct intraductal papillary mucinous neoplasm. A follow-up MRI pancreas protocol with contrast/MRCP in 6 months is recommended to ensure stability and further characterize as necessary.   3. Mild bilateral nonobstructing renal calculi and cortical scarring.   MACRO: None.   Signed by: Shaggy Mcgovern 7/6/2025 4:24 PM Dictation workstation:   [id number]    CT angio chest for pulmonary embolism  Result Date: 7/6/2025  1. Complete left lower lobe collapse due to debris occluding all of the left lower lobe segmental and subsegmental bronchi and the lower lobar bronchus. There is also debris in the left upper lobe and lingular bronchi and right lower lobe segmental subsegmental bronchi associated with aspiration pneumonitis in the right lower lobe.   2. No acute pulmonary embolism.   3. Concentric left ventricular hypertrophy.   MACRO: None.   Signed by: Shaggy Mcgovern 7/6/2025 4:10 PM Dictation workstation:   [id number]    XR chest 1 view  Result Date: 7/6/2025  1.  Improving left lower lung consolidative infiltrate.   Signed by: Lorenzo Monahan 7/6/2025 12:15 PM Dictation workstation:   [id number]    XR chest 1 view  Result Date: 7/6/2025  Consolidation at the left base which is worse when compared to the previous study.   MACRO: None.   Signed by: Andrea Carter 7/6/2025 11:55 AM Dictation workstation:   [id number]      Cardiology, Vascular, and Other Imaging  ECG 12 lead  Result Date: 7/8/2025  Marked sinus bradycardia Septal infarct (cited on or before 06-JUL-2025) When compared with ECG of 06-JUL-2025 11:04, (unconfirmed) Vent. rate has decreased BY  54 BPM QRS axis Shifted right Reconfirmed by Fernando Newell ([id number]) on 7/8/2025 2:12:27  PM    Transthoracic Echo Complete  Result Date: 7/7/2025            [facility] [id number] [facility] [address]    Tel [phone] Fax [phone] TRANSTHORACIC ECHOCARDIOGRAM REPORT Patient Name:       ABBY BYNUM SYD     Reading Physician:    [id number] Socrates Yu MD Study Date:         7/7/2025             Ordering Provider:    [id number] UTION DUNCANPRATEEKMISHA MRN/PID:            [id number]             Fellow: Accession#:         [id number]         Nurse: Date of Birth/Age:  1950 / 75 years Sonographer:          Anette Phoenix RDCS Gender Assigned at                      Additional Staff: Birth: Height:             177.80 cm            Admit Date: Weight:             49.44 kg             Admission Status:     Inpatient -                                                                Routine BSA / BMI:          1.61 m2 / 15.64      Department Location:  [facility] ICU Back                     kg/m2                                      (27-34) Blood Pressure: 101 /69 mmHg Study Type:    TRANSTHORACIC ECHO (TTE) COMPLETE Diagnosis/ICD: Acute respiratory failure with hypoxia-J96.01 Indication:    acute respiratory failure with hypoxia and hypercapnia CPT Codes:     Echo Complete w Full [id number] Patient History: Pertinent History: HTN. Study Detail: The following Echo studies were performed: 2D, M-Mode, Doppler and               color flow.  PHYSICIAN INTERPRETATION: Left Ventricle: The left ventricular systolic function is mildly decreased with a Lezama's biplane calculated ejection fraction of 45%. There is global hypokinesis of the left ventricle with minor regional variations. The left ventricular cavity size is normal. There is mild increased septal and mildly increased posterior left ventricular wall  thickness. There is left ventricular concentric remodeling. Spectral Doppler shows a Grade I (impaired relaxation pattern) of left ventricular diastolic filling with normal left atrial filling pressure. Left Atrium: The left atrial size is normal. Right Ventricle: The right ventricle is normal in size. Unable to determine right ventricular systolic function. Right Atrium: The right atrial size was not well visualized. Aortic Valve: The aortic valve is trileaflet. There is no evidence of aortic valve regurgitation. Mitral Valve: The mitral valve is normal in structure. There is trace mitral valve regurgitation. The E Vmax is 0.33 m/s. Tricuspid Valve: The tricuspid valve is structurally normal. There is trace to mild tricuspid regurgitation. The Doppler estimated right ventricular systolic pressure (RVSP) is within normal limits at 22 mmHg. Pulmonic Valve: The pulmonic valve is not well visualized. The pulmonic valve regurgitation was not assessed. Pericardium: No pericardial effusion noted. Aorta: The aortic root is abnormal. There is mild dilatation of the aortic root. Systemic Veins: The inferior vena cava appears normal in size, IVC inspiratory collapse not assessed due to mechanical ventilation. In comparison to the previous echocardiogram(s): There are no prior studies on this patient for comparison purposes.  CONCLUSIONS:  1. The left ventricular systolic function is mildly decreased with a Lezama's biplane calculated ejection fraction of 45%.  2. There is global hypokinesis of the left ventricle with minor regional variations.  3. Spectral Doppler shows a Grade I (impaired relaxation pattern) of left ventricular diastolic filling with normal left atrial filling pressure.  4. The Doppler estimated RVSP is within normal limits at 22 mmHg. QUANTITATIVE DATA SUMMARY:  2D MEASUREMENTS:             Normal Ranges: LAs:             2.92 cm     (2.7-4.0cm) IVSd:            1.10 cm     (0.6-1.1cm) LVPWd:            1.13 cm     (0.6-1.1cm) LVIDd:           3.90 cm     (3.9-5.9cm) LVIDs:           2.99 cm LV Mass Index:   88.7 g/m2 LVEDV Index:     35.92 ml/m2 LV % FS          23.3 %  LEFT ATRIUM:                 Normal Ranges: LA Area A4C:      10.0 cm2 LA Area A2C:      9.0 cm2 LA Volume Index:  14.0 ml/m2 LA Vol A4C:       19.7 ml LA Vol A2C:       16.9 ml LA Vol Index BSA: 11.3 ml/m2  M-MODE MEASUREMENTS:         Normal Ranges: AoV Exc:             2.23 cm (1.5-2.5cm)  AORTA MEASUREMENTS:         Normal Ranges: AoV Exc:            2.23 cm (1.5-2.5cm) Ao Sinus, d:        4.10 cm (2.1-3.5cm)  LV SYSTOLIC FUNCTION:                      Normal Ranges: EF-A4C View:    46 % (>=55%) EF-A2C View:    45 % EF-Biplane:     45 % LV EF Reported: 45 %  LV DIASTOLIC FUNCTION:           Normal Ranges: MV Peak E:             0.33 m/s  (0.7-1.2 m/s) MV Peak A:             0.42 m/s  (0.42-0.7 m/s) E/A Ratio:             0.78      (1.0-2.2) MV e'                  0.045 m/s (>8.0) MV lateral e'          0.02 m/s MV medial e'           0.07 m/s E/e' Ratio:            7.37      (<8.0)  MITRAL VALVE:          Normal Ranges: MV DT:        283 msec (150-240msec)  AORTIC VALVE:           Normal Ranges: AoV Vmax:      0.65 m/s (<=1.7m/s) AoV Peak P.7 mmHg (<20mmHg) LVOT Max Azar:  0.40 m/s (<=1.1m/s) LVOT Diameter: 2.33 cm  (1.8-2.4cm) AoV Area,Vmax: 2.62 cm2 (2.5-4.5cm2)  RIGHT VENTRICLE: RV Basal 3.00 cm RV Mid   2.10 cm RV Major 5.4 cm TAPSE:   17.0 mm RV s'    0.08 m/s  TRICUSPID VALVE/RVSP:          Normal Ranges: Peak TR Velocity:     2.16 m/s Est. RA Pressure:     3 mmHg RV Syst Pressure:     22 mmHg  (< 30mmHg) IVC Diam:             2.04 cm  AORTA: Asc Ao Diam 4.37 cm  [id number] Socrates Yu MD Electronically signed on 2025 at 8:08:13 PM  ** Final **     ECG 12 lead  Result Date: 2025  Normal sinus rhythm Left axis deviation Left ventricular hypertrophy with repolarization abnormality Cannot rule out Septal infarct , age  undetermined Abnormal ECG No previous ECGs available         Assessment/Plan   Patient is a 75 y.o. male with past medical history of pancreatitis, T2DM insulin dependent and HTN, HLD treated in the ICU for acute hypoxic respiratory failure secondary to pneumonia.    Lab workup today showed potassium 3.1.  Repleted.  BUN 26.  Creatinine 1.04.  White count 9.7.    Chest x-ray PM 7/8 showed left lower lobe opacity/atelectasis.    Neuro: #Acute delirium  -Mirtazapine 7.5 mg nightly  - No acute issues  - Sedation: Precedex, off  - Pain management: N/A  - Titrate to RASS 0 to -2  - CAM ICU  - 25 mg Seroquel nightly, EKG 7/10/2025 QTc 430     Cardiac:  - Hx hypertension: Restarted home losartan, amlodipine now off of sedation.  Holding home metoprolol due to bradycardia.  Started hydralazine 25 mg 3 times daily  - Echocardiogram 7/7: EF 45%, global hypokinesis of the left ventricle, impaired relaxation of left ventricular diastolic filling  - Stable off pressors  - Continuous cardiac monitoring  - Repeat CXR today  - Maintain MAP > 65 mmHg    Pulmonary:  #Acute hypoxic hypercapnic respiratory failure  Patient is requiring BiPAP intermittently from high flow nasal cannula.  - pH 7.35/pCO2 52/PO2 63/HCO3 28.7  - Chest physiotherapy  - Intubated  7/6, Patient extubated successfully 7/8.   - Maintain spO2 > 92%  - Titrate vent settings as tolerated  - CT angio PE demonstrated concern for aspiration and collapse of LLL  - Bronchoscopy 7/7 with minimal secretions  - Continuous pulse ox    Gastrointestinal:  - Diet: Easy to chew, thin 0  - CT AP 7/6 demonstrated large amount of impacted stool and possible colitis with concern for developing stercoral colitis. However, patient has had multiple large BM since starting bowel regimen 7/6.  - Ppx: None indicated currently  - Bowel regimen: miralax, docusate  - Last BM: Last BM Date: 07/11/25  - Patient pulled out NG tube.    Renal:  #JULIO (Resolved)  #Azotemia (Resolved)  -BUN 26,  creatinine 1.04.  - Daily BMP, Mg, phos  - Replete electrolytes as indicated  - Strict I's & O's  - Friedman to be placed  Net IO Since Admission: 1,777.42 mL [25 1434]  - Potassium 2.8 on a.m. labs, potassium repleted.  Repeat BMP in the afternoon.    Hematology:  - Daily CBC  - DVT Prophylaxis: Lovenox, SCDs    Infectious Disease:  #Left lower lobe pneumonia  - MRSA negative, stopped vanc,  - Antibiotic regimen this admission: Unasyn 3 g (start date 2025-ongoing), azithromycin 500 mg (1 dose, 25), ceftriaxone 1 g (start date 25-25), Zosyn 4.5 g (start date 2025-2025)  - Strep ag positive  - Repeat blood cultures NGTD x 4 days  - Lactate normal  - UA without evidence of UTI  Temp (24hrs), Av °C (96.8 °F), Min:35.6 °C (96.1 °F), Max:36.6 °C (97.9 °F)     Endocrine:  - HX DM2  - SSI, Lantus, POC BG  - BG < 180 goal    CODE STATUS: DNR    Disposition: ICU    Gamal Gomez DO PGY-2, internal medicine [facility]         [1] amLODIPine, 10 mg, oral, Daily  ampicillin-sulbactam, 3 g, intravenous, q12h  cyanocobalamin, 100 mcg, oral, Daily  docusate sodium, 100 mg, oral, BID  enoxaparin, 40 mg, subcutaneous, q24h  hydrALAZINE, 25 mg, oral, TID  [Held by provider] insulin glargine, 5 Units, subcutaneous, q24h  [Held by provider] insulin lispro, 0-10 Units, subcutaneous, Before meals & nightly  ipratropium-albuteroL, 3 mL, nebulization, q6h  lidocaine, 1 patch, transdermal, Daily  lidocaine, 1 patch, transdermal, Daily  losartan, 100 mg, oral, Daily  [Held by provider] metoprolol tartrate, 50 mg, oral, BID  mirtazapine, 7.5 mg, oral, Nightly  multivitamin with minerals, 1 tablet, nasoduodenal tube, Daily  perflutren lipid microspheres, 0.5-10 mL of dilution, intravenous, Once in imaging  perflutren protein A microsphere, 0.5 mL, intravenous, Once in imaging  polyethylene glycol, 17 g, oral, Daily  QUEtiapine, 25 mg, oral, Nightly  sulfur hexafluoride microsphr, 2 mL,  intravenous, Once in imaging  thiamine, 100 mg, intravenous, Daily     [2] dextrose 5%, 75 mL/hr, Last Rate: 75 mL/hr (07/11/25 1000)     [3] PRN medications: bisacodyl, dextromethorphan-guaifenesin, dextrose, dextrose, glucagon, glucagon, ipratropium-albuteroL, oxygen, oxygen, oxygen

## 2025-07-11 NOTE — PROGRESS NOTES
Physical Therapy                 Therapy Communication Note    Patient Name: Jorge A Madden  MRN: [id number]  Department: [facility] ICU  Room: 2127/2127-A  Today's Date: 7/11/2025     Discipline: Physical Therapy    Missed Visit: PT Missed Visit: Yes     Missed Visit Reason: Missed Visit Reason: Other (Comment) (Orders received and chart review completed. Pt needs a re-evaluation. Pt not appropriate for re-eval this date 2/2 increased supplemental oxygen needs, low glucose levels and has been refusing meals and Corpak was re-inserted today. Will continue to follow up with pt as appropriate.     Missed Time: Attempt    Comment:

## 2025-07-11 NOTE — PROGRESS NOTES
Speech-Language Pathology                 Therapy Communication Note    Patient Name: Jorge A Madden  MRN: [id number]  Department: [facility] ICU  Room: 2127/2127-A  Today's Date: 7/11/2025     Discipline: Speech Language Pathology    SLP Missed Visit: Yes     Missed Visit Reason: Patient ill (on BiPAP)    Missed Time: Attempt    Comment: Patient on BiPAP with RT at bedside at time of attempted visit. Per chart, patient has been refusing meals and Corpak was re-inserted today. Will plan to re-attempt at next availability.

## 2025-07-11 NOTE — PROGRESS NOTES
PHARMACIST CONSULT  RENAL DOSING ADJUSTMENT    Patient Name: Jorge A Madden  MRN: [id number]  Admission Date: 7/2/2025 10:01 AM  Date/Time of Consult: 07/11/25 at 2:48 PM    In accordance with the inpatient pharmacy renal dose adjustment consult agreement the following medication changes have been made:  Enoxaparin: currently ordered 30mg q24hr, will be adjusted to 40mg q24hr  Antimicrobial: currently ordered Amp/sulb 3g q12hr, will be adjusted to 3g q6hr    Indication if pertinent for dosing: VTE ppx & PNA    Results from last 72 hours   Lab Units 07/11/25  0328 07/10/25  1818 07/10/25  0420   CREATININE mg/dL 1.04 1.07 1.02   BUN mg/dL 26* 24* 23       Serum creatinine: 1.04 mg/dL 07/11/25 0328  Estimated creatinine clearance: 46.8 mL/min      Per consult agreement, pharmacy will order related labs, evaluate results, and adjust dosing as it pertains to the drug therapy being managed.    Thank you for allowing me to participate in the care for this patient.    Signature: Oma Britton, KeeganD, [facility]  7/11/2025 2:48 PM

## 2025-07-11 NOTE — PROGRESS NOTES
Nutrition Note    Dietitian following and re-consulted for enteral nutrition recommendations. Please see previous progress notes for more detailed assessment.    Pt able to eat oral diet, but not interested. Nursing ordered a delicious breakfast, but patient does not want any food. He did accept a few sips of the ENSURE PLUS HP, but not enough to provide any substantial nutrition. He was not opposed to feeding tube re-inserted, so nursing to place Corpak and start enteral nutrition again to supplement oral intakes.    - Once Corpak placement confirmed, start OSMOLITE 1.5  at 20mL/hr x 24 hours.  - Suggest water flush of 75mL 6x/day.   - With risk for refeeding, please obtain renal BMP and serum magnesium 2x/day as likely will need to replete electrolytes.   - Please continue daily MVI and 100mg oral thiamine (via NGT) x7 days.   - As tolerated, increase EN to initial goal of 35mL/hr for 1260kcal, 53g pro, and 640mL formula free water or ~75% kcal needs and 1.1g pro/kg. Suggest 100mL water flush 6x/day for total water of 1240mL water (formula + flush).    RD to continue to follow.

## 2025-07-11 NOTE — SIGNIFICANT EVENT
Corpak noted to be misplaced from initial insertion. Repositioned corpak and re-secured to center or nose. Repeat KUB pending.

## 2025-07-12 LAB
ALBUMIN SERPL BCP-MCNC: 3 G/DL (ref 3.4–5)
ALBUMIN SERPL BCP-MCNC: 3 G/DL (ref 3.4–5)
ALP SERPL-CCNC: 56 U/L (ref 33–136)
ALP SERPL-CCNC: 58 U/L (ref 33–136)
ALT SERPL W P-5'-P-CCNC: 8 U/L (ref 10–52)
ALT SERPL W P-5'-P-CCNC: 9 U/L (ref 10–52)
ANION GAP SERPL CALC-SCNC: 12 MMOL/L (ref 10–20)
ANION GAP SERPL CALC-SCNC: 12 MMOL/L (ref 10–20)
ARTERIAL PATENCY WRIST A: ABNORMAL
AST SERPL W P-5'-P-CCNC: 11 U/L (ref 9–39)
AST SERPL W P-5'-P-CCNC: 9 U/L (ref 9–39)
ATRIAL RATE: 99 BPM
BASE EXCESS BLDA CALC-SCNC: 2.2 MMOL/L (ref -2–3)
BASOPHILS # BLD AUTO: 0.01 X10*3/UL (ref 0–0.1)
BASOPHILS NFR BLD AUTO: 0.1 %
BILIRUB SERPL-MCNC: 0.4 MG/DL (ref 0–1.2)
BILIRUB SERPL-MCNC: 0.4 MG/DL (ref 0–1.2)
BODY TEMPERATURE: ABNORMAL
BUN SERPL-MCNC: 27 MG/DL (ref 6–23)
BUN SERPL-MCNC: 27 MG/DL (ref 6–23)
CALCIUM SERPL-MCNC: 9 MG/DL (ref 8.6–10.3)
CALCIUM SERPL-MCNC: 9.2 MG/DL (ref 8.6–10.3)
CHLORIDE SERPL-SCNC: 100 MMOL/L (ref 98–107)
CHLORIDE SERPL-SCNC: 102 MMOL/L (ref 98–107)
CO2 SERPL-SCNC: 28 MMOL/L (ref 21–32)
CO2 SERPL-SCNC: 28 MMOL/L (ref 21–32)
CREAT SERPL-MCNC: 0.98 MG/DL (ref 0.5–1.3)
CREAT SERPL-MCNC: 1.02 MG/DL (ref 0.5–1.3)
EGFRCR SERPLBLD CKD-EPI 2021: 78 ML/MIN/1.73M*2
EGFRCR SERPLBLD CKD-EPI 2021: 81 ML/MIN/1.73M*2
EOSINOPHIL # BLD AUTO: 0 X10*3/UL (ref 0–0.4)
EOSINOPHIL NFR BLD AUTO: 0 %
ERYTHROCYTE [DISTWIDTH] IN BLOOD BY AUTOMATED COUNT: 12.9 % (ref 11.5–14.5)
GLUCOSE BLD MANUAL STRIP-MCNC: 110 MG/DL (ref 74–99)
GLUCOSE BLD MANUAL STRIP-MCNC: 117 MG/DL (ref 74–99)
GLUCOSE BLD MANUAL STRIP-MCNC: 125 MG/DL (ref 74–99)
GLUCOSE BLD MANUAL STRIP-MCNC: 127 MG/DL (ref 74–99)
GLUCOSE BLD MANUAL STRIP-MCNC: 28 MG/DL (ref 74–99)
GLUCOSE BLD MANUAL STRIP-MCNC: 67 MG/DL (ref 74–99)
GLUCOSE BLD MANUAL STRIP-MCNC: 93 MG/DL (ref 74–99)
GLUCOSE SERPL-MCNC: 69 MG/DL (ref 74–99)
GLUCOSE SERPL-MCNC: 89 MG/DL (ref 74–99)
HCO3 BLDA-SCNC: 27.8 MMOL/L (ref 22–26)
HCT VFR BLD AUTO: 33.6 % (ref 41–52)
HGB BLD-MCNC: 10.8 G/DL (ref 13.5–17.5)
IMM GRANULOCYTES # BLD AUTO: 0.03 X10*3/UL (ref 0–0.5)
IMM GRANULOCYTES NFR BLD AUTO: 0.3 % (ref 0–0.9)
INHALED O2 CONCENTRATION: 50 %
LYMPHOCYTES # BLD AUTO: 0.77 X10*3/UL (ref 0.8–3)
LYMPHOCYTES NFR BLD AUTO: 8 %
MAGNESIUM SERPL-MCNC: 2.19 MG/DL (ref 1.6–2.4)
MAGNESIUM SERPL-MCNC: 2.25 MG/DL (ref 1.6–2.4)
MCH RBC QN AUTO: 34.5 PG (ref 26–34)
MCHC RBC AUTO-ENTMCNC: 32.1 G/DL (ref 32–36)
MCV RBC AUTO: 107 FL (ref 80–100)
MONOCYTES # BLD AUTO: 0.6 X10*3/UL (ref 0.05–0.8)
MONOCYTES NFR BLD AUTO: 6.2 %
NEUTROPHILS # BLD AUTO: 8.24 X10*3/UL (ref 1.6–5.5)
NEUTROPHILS NFR BLD AUTO: 85.4 %
NRBC BLD-RTO: 0 /100 WBCS (ref 0–0)
OXYHGB MFR BLDA: 91.5 % (ref 94–98)
P AXIS: 77 DEGREES
P OFFSET: 192 MS
P ONSET: 104 MS
PCO2 BLDA: 46 MM HG (ref 38–42)
PH BLDA: 7.39 PH (ref 7.38–7.42)
PHOSPHATE SERPL-MCNC: 3.6 MG/DL (ref 2.5–4.9)
PLATELET # BLD AUTO: 186 X10*3/UL (ref 150–450)
PO2 BLDA: 61 MM HG (ref 85–95)
POTASSIUM SERPL-SCNC: 4 MMOL/L (ref 3.5–5.3)
POTASSIUM SERPL-SCNC: 4.2 MMOL/L (ref 3.5–5.3)
PR INTERVAL: 176 MS
PROT SERPL-MCNC: 6.1 G/DL (ref 6.4–8.2)
PROT SERPL-MCNC: 6.3 G/DL (ref 6.4–8.2)
Q ONSET: 218 MS
QRS COUNT: 16 BEATS
QRS DURATION: 82 MS
QT INTERVAL: 350 MS
QTC CALCULATION(BAZETT): 449 MS
QTC FREDERICIA: 413 MS
R AXIS: -48 DEGREES
RBC # BLD AUTO: 3.13 X10*6/UL (ref 4.5–5.9)
SAO2 % BLDA: 94 % (ref 94–100)
SITE OF ARTERIAL PUNCTURE: ABNORMAL
SODIUM SERPL-SCNC: 136 MMOL/L (ref 136–145)
SODIUM SERPL-SCNC: 138 MMOL/L (ref 136–145)
T AXIS: 109 DEGREES
T OFFSET: 393 MS
VENTILATOR MODE: ABNORMAL
VENTRICULAR RATE: 99 BPM
WBC # BLD AUTO: 9.7 X10*3/UL (ref 4.4–11.3)

## 2025-07-12 PROCEDURE — 2500000005 HC RX 250 GENERAL PHARMACY W/O HCPCS

## 2025-07-12 PROCEDURE — 2500000004 HC RX 250 GENERAL PHARMACY W/ HCPCS (ALT 636 FOR OP/ED)

## 2025-07-12 PROCEDURE — 2500000001 HC RX 250 WO HCPCS SELF ADMINISTERED DRUGS (ALT 637 FOR MEDICARE OP)

## 2025-07-12 PROCEDURE — 71045 X-RAY EXAM CHEST 1 VIEW: CPT

## 2025-07-12 PROCEDURE — 84075 ASSAY ALKALINE PHOSPHATASE: CPT

## 2025-07-12 PROCEDURE — 31500 INSERT EMERGENCY AIRWAY: CPT

## 2025-07-12 PROCEDURE — 2500000002 HC RX 250 W HCPCS SELF ADMINISTERED DRUGS (ALT 637 FOR MEDICARE OP, ALT 636 FOR OP/ED)

## 2025-07-12 PROCEDURE — 87077 CULTURE AEROBIC IDENTIFY: CPT | Mod: STJLAB

## 2025-07-12 PROCEDURE — 94640 AIRWAY INHALATION TREATMENT: CPT

## 2025-07-12 PROCEDURE — 84145 PROCALCITONIN (PCT): CPT | Mod: STJLAB

## 2025-07-12 PROCEDURE — 94660 CPAP INITIATION&MGMT: CPT

## 2025-07-12 PROCEDURE — 36600 WITHDRAWAL OF ARTERIAL BLOOD: CPT

## 2025-07-12 PROCEDURE — 94002 VENT MGMT INPAT INIT DAY: CPT

## 2025-07-12 PROCEDURE — 99291 CRITICAL CARE FIRST HOUR: CPT | Performed by: INTERNAL MEDICINE

## 2025-07-12 PROCEDURE — 0BDM8ZX EXTRACTION OF BILATERAL LUNGS, VIA NATURAL OR ARTIFICIAL OPENING ENDOSCOPIC, DIAGNOSTIC: ICD-10-PCS | Performed by: INTERNAL MEDICINE

## 2025-07-12 PROCEDURE — 2500000005 HC RX 250 GENERAL PHARMACY W/O HCPCS: Performed by: INTERNAL MEDICINE

## 2025-07-12 PROCEDURE — 2020000001 HC ICU ROOM DAILY

## 2025-07-12 PROCEDURE — 2500000004 HC RX 250 GENERAL PHARMACY W/ HCPCS (ALT 636 FOR OP/ED): Performed by: INTERNAL MEDICINE

## 2025-07-12 PROCEDURE — 82947 ASSAY GLUCOSE BLOOD QUANT: CPT

## 2025-07-12 PROCEDURE — 31622 DX BRONCHOSCOPE/WASH: CPT

## 2025-07-12 PROCEDURE — 0BH17EZ INSERTION OF ENDOTRACHEAL AIRWAY INTO TRACHEA, VIA NATURAL OR ARTIFICIAL OPENING: ICD-10-PCS | Performed by: INTERNAL MEDICINE

## 2025-07-12 PROCEDURE — 36415 COLL VENOUS BLD VENIPUNCTURE: CPT

## 2025-07-12 PROCEDURE — 85025 COMPLETE CBC W/AUTO DIFF WBC: CPT

## 2025-07-12 PROCEDURE — 84100 ASSAY OF PHOSPHORUS: CPT

## 2025-07-12 PROCEDURE — 82805 BLOOD GASES W/O2 SATURATION: CPT

## 2025-07-12 PROCEDURE — 94669 MECHANICAL CHEST WALL OSCILL: CPT

## 2025-07-12 PROCEDURE — 5A1945Z RESPIRATORY VENTILATION, 24-96 CONSECUTIVE HOURS: ICD-10-PCS | Performed by: INTERNAL MEDICINE

## 2025-07-12 PROCEDURE — 2500000002 HC RX 250 W HCPCS SELF ADMINISTERED DRUGS (ALT 637 FOR MEDICARE OP, ALT 636 FOR OP/ED): Performed by: INTERNAL MEDICINE

## 2025-07-12 PROCEDURE — 94667 MNPJ CHEST WALL 1ST: CPT

## 2025-07-12 PROCEDURE — 83735 ASSAY OF MAGNESIUM: CPT

## 2025-07-12 RX ORDER — DIAZEPAM 5 MG/ML
2 INJECTION, SOLUTION INTRAMUSCULAR; INTRAVENOUS ONCE
Status: COMPLETED | OUTPATIENT
Start: 2025-07-12 | End: 2025-07-12

## 2025-07-12 RX ORDER — ETOMIDATE 2 MG/ML
INJECTION INTRAVENOUS CODE/TRAUMA/SEDATION MEDICATION
Status: COMPLETED | OUTPATIENT
Start: 2025-07-12 | End: 2025-07-12

## 2025-07-12 RX ORDER — PHENYLEPHRINE HCL IN 0.9% NACL 1 MG/10 ML
SYRINGE (ML) INTRAVENOUS
Status: COMPLETED
Start: 2025-07-12 | End: 2025-07-12

## 2025-07-12 RX ORDER — IPRATROPIUM BROMIDE AND ALBUTEROL SULFATE 2.5; .5 MG/3ML; MG/3ML
3 SOLUTION RESPIRATORY (INHALATION)
Status: DISCONTINUED | OUTPATIENT
Start: 2025-07-13 | End: 2025-07-15 | Stop reason: HOSPADM

## 2025-07-12 RX ORDER — FENTANYL CITRATE-0.9 % NACL/PF 10 MCG/ML
0-200 PLASTIC BAG, INJECTION (ML) INTRAVENOUS CONTINUOUS
Status: DISCONTINUED | OUTPATIENT
Start: 2025-07-12 | End: 2025-07-15 | Stop reason: HOSPADM

## 2025-07-12 RX ORDER — NOREPINEPHRINE BITARTRATE/D5W 8 MG/250ML
0-.5 PLASTIC BAG, INJECTION (ML) INTRAVENOUS CONTINUOUS
Status: DISCONTINUED | OUTPATIENT
Start: 2025-07-12 | End: 2025-07-15 | Stop reason: HOSPADM

## 2025-07-12 RX ORDER — PHENYLEPHRINE HCL IN 0.9% NACL 1 MG/10 ML
100 SYRINGE (ML) INTRAVENOUS ONCE
Status: COMPLETED | OUTPATIENT
Start: 2025-07-12 | End: 2025-07-12

## 2025-07-12 RX ORDER — DEXMEDETOMIDINE HYDROCHLORIDE 4 UG/ML
INJECTION, SOLUTION INTRAVENOUS
Status: COMPLETED
Start: 2025-07-12 | End: 2025-07-12

## 2025-07-12 RX ORDER — DIAZEPAM 5 MG/ML
INJECTION, SOLUTION INTRAMUSCULAR; INTRAVENOUS
Status: COMPLETED
Start: 2025-07-12 | End: 2025-07-12

## 2025-07-12 RX ORDER — SODIUM CHLORIDE FOR INHALATION 3 %
3 VIAL, NEBULIZER (ML) INHALATION EVERY 6 HOURS SCHEDULED
Status: DISCONTINUED | OUTPATIENT
Start: 2025-07-12 | End: 2025-07-15 | Stop reason: HOSPADM

## 2025-07-12 RX ORDER — ROCURONIUM BROMIDE 10 MG/ML
INJECTION, SOLUTION INTRAVENOUS CODE/TRAUMA/SEDATION MEDICATION
Status: COMPLETED | OUTPATIENT
Start: 2025-07-12 | End: 2025-07-12

## 2025-07-12 RX ORDER — NOREPINEPHRINE BITARTRATE/D5W 8 MG/250ML
PLASTIC BAG, INJECTION (ML) INTRAVENOUS
Status: COMPLETED
Start: 2025-07-12 | End: 2025-07-12

## 2025-07-12 RX ORDER — PHENYLEPHRINE HCL IN 0.9% NACL 1 MG/10 ML
SYRINGE (ML) INTRAVENOUS
Status: DISPENSED
Start: 2025-07-12 | End: 2025-07-12

## 2025-07-12 RX ORDER — DEXMEDETOMIDINE HYDROCHLORIDE 4 UG/ML
0-1.5 INJECTION, SOLUTION INTRAVENOUS CONTINUOUS
Status: DISCONTINUED | OUTPATIENT
Start: 2025-07-12 | End: 2025-07-12

## 2025-07-12 RX ORDER — ETOMIDATE 2 MG/ML
10 INJECTION INTRAVENOUS ONCE
Status: DISCONTINUED | OUTPATIENT
Start: 2025-07-12 | End: 2025-07-14 | Stop reason: SDUPTHER

## 2025-07-12 RX ADMIN — ENOXAPARIN SODIUM 40 MG: 100 INJECTION SUBCUTANEOUS at 17:32

## 2025-07-12 RX ADMIN — Medication 100 MCG: at 12:45

## 2025-07-12 RX ADMIN — SODIUM CHLORIDE, SODIUM LACTATE, POTASSIUM CHLORIDE, AND CALCIUM CHLORIDE 1000 ML: .6; .31; .03; .02 INJECTION, SOLUTION INTRAVENOUS at 15:00

## 2025-07-12 RX ADMIN — AMPICILLIN SODIUM AND SULBACTAM SODIUM 3 G: 2; 1 INJECTION, POWDER, FOR SOLUTION INTRAMUSCULAR; INTRAVENOUS at 05:47

## 2025-07-12 RX ADMIN — SODIUM CHLORIDE SOLN NEBU 3% 3 ML: 3 NEBU SOLN at 20:01

## 2025-07-12 RX ADMIN — Medication 60 PERCENT: at 11:30

## 2025-07-12 RX ADMIN — Medication 25 MCG/HR: at 13:54

## 2025-07-12 RX ADMIN — Medication 60 PERCENT: at 12:20

## 2025-07-12 RX ADMIN — IPRATROPIUM BROMIDE AND ALBUTEROL SULFATE 3 ML: 2.5; .5 SOLUTION RESPIRATORY (INHALATION) at 16:23

## 2025-07-12 RX ADMIN — DEXTROSE MONOHYDRATE 12.5 G: 25 INJECTION, SOLUTION INTRAVENOUS at 06:57

## 2025-07-12 RX ADMIN — POLYETHYLENE GLYCOL 3350 17 G: 17 POWDER, FOR SOLUTION ORAL at 14:00

## 2025-07-12 RX ADMIN — Medication 45 PERCENT: at 16:00

## 2025-07-12 RX ADMIN — Medication 0.01 MCG/KG/MIN: at 11:51

## 2025-07-12 RX ADMIN — AMPICILLIN SODIUM AND SULBACTAM SODIUM 3 G: 2; 1 INJECTION, POWDER, FOR SOLUTION INTRAMUSCULAR; INTRAVENOUS at 21:41

## 2025-07-12 RX ADMIN — VITAM B12 100 MCG: 100 TAB at 14:00

## 2025-07-12 RX ADMIN — Medication 45 PERCENT: at 20:02

## 2025-07-12 RX ADMIN — DIAZEPAM 2 MG: 10 INJECTION, SOLUTION INTRAMUSCULAR; INTRAVENOUS at 03:05

## 2025-07-12 RX ADMIN — DEXMEDETOMIDINE HYDROCHLORIDE 0.2 MCG/KG/HR: 400 INJECTION INTRAVENOUS at 11:32

## 2025-07-12 RX ADMIN — ROCURONIUM BROMIDE 50 MG: 10 INJECTION INTRAVENOUS at 11:24

## 2025-07-12 RX ADMIN — THIAMINE HYDROCHLORIDE 100 MG: 100 INJECTION, SOLUTION INTRAMUSCULAR; INTRAVENOUS at 14:00

## 2025-07-12 RX ADMIN — IPRATROPIUM BROMIDE AND ALBUTEROL SULFATE 3 ML: 2.5; .5 SOLUTION RESPIRATORY (INHALATION) at 06:10

## 2025-07-12 RX ADMIN — LIDOCAINE 4% 1 PATCH: 40 PATCH TOPICAL at 14:06

## 2025-07-12 RX ADMIN — DIAZEPAM 2 MG: 10 INJECTION, SOLUTION INTRAMUSCULAR; INTRAVENOUS at 00:30

## 2025-07-12 RX ADMIN — ETOMIDATE 10 MG: 20 INJECTION, SOLUTION INTRAVENOUS at 11:20

## 2025-07-12 RX ADMIN — DEXTROSE MONOHYDRATE 25 G: 25 INJECTION, SOLUTION INTRAVENOUS at 14:34

## 2025-07-12 RX ADMIN — AMPICILLIN SODIUM AND SULBACTAM SODIUM 3 G: 2; 1 INJECTION, POWDER, FOR SOLUTION INTRAMUSCULAR; INTRAVENOUS at 00:01

## 2025-07-12 RX ADMIN — Medication 1 TABLET: at 14:04

## 2025-07-12 RX ADMIN — DIAZEPAM 2 MG: 5 INJECTION, SOLUTION INTRAMUSCULAR; INTRAVENOUS at 03:05

## 2025-07-12 RX ADMIN — IPRATROPIUM BROMIDE AND ALBUTEROL SULFATE 3 ML: 2.5; .5 SOLUTION RESPIRATORY (INHALATION) at 12:18

## 2025-07-12 RX ADMIN — IPRATROPIUM BROMIDE AND ALBUTEROL SULFATE 3 ML: 2.5; .5 SOLUTION RESPIRATORY (INHALATION) at 08:51

## 2025-07-12 RX ADMIN — AMPICILLIN SODIUM AND SULBACTAM SODIUM 3 G: 2; 1 INJECTION, POWDER, FOR SOLUTION INTRAMUSCULAR; INTRAVENOUS at 14:01

## 2025-07-12 RX ADMIN — SODIUM CHLORIDE SOLN NEBU 3% 3 ML: 3 NEBU SOLN at 12:18

## 2025-07-12 RX ADMIN — IPRATROPIUM BROMIDE AND ALBUTEROL SULFATE 3 ML: 2.5; .5 SOLUTION RESPIRATORY (INHALATION) at 02:45

## 2025-07-12 RX ADMIN — DOCUSATE SODIUM LIQUID 100 MG: 100 LIQUID ORAL at 14:00

## 2025-07-12 RX ADMIN — NOREPINEPHRINE BITARTRATE 0.01 MCG/KG/MIN: 8 INJECTION, SOLUTION INTRAVENOUS at 11:51

## 2025-07-12 RX ADMIN — Medication 50 PERCENT: at 08:52

## 2025-07-12 RX ADMIN — LIDOCAINE 4% 1 PATCH: 40 PATCH TOPICAL at 17:33

## 2025-07-12 RX ADMIN — DEXMEDETOMIDINE HYDROCHLORIDE 0.2 MCG/KG/HR: 4 INJECTION, SOLUTION INTRAVENOUS at 11:32

## 2025-07-12 RX ADMIN — IPRATROPIUM BROMIDE AND ALBUTEROL SULFATE 3 ML: 2.5; .5 SOLUTION RESPIRATORY (INHALATION) at 20:01

## 2025-07-12 ASSESSMENT — COGNITIVE AND FUNCTIONAL STATUS - GENERAL
PERSONAL GROOMING: A LOT
MOVING TO AND FROM BED TO CHAIR: A LITTLE
MOVING FROM LYING ON BACK TO SITTING ON SIDE OF FLAT BED WITH BEDRAILS: A LITTLE
MOBILITY SCORE: 16
EATING MEALS: A LOT
DRESSING REGULAR UPPER BODY CLOTHING: A LOT
DAILY ACTIVITIY SCORE: 12
STANDING UP FROM CHAIR USING ARMS: A LITTLE
TURNING FROM BACK TO SIDE WHILE IN FLAT BAD: A LITTLE
HELP NEEDED FOR BATHING: A LOT
WALKING IN HOSPITAL ROOM: A LOT
DRESSING REGULAR LOWER BODY CLOTHING: A LOT
TOILETING: A LOT
CLIMB 3 TO 5 STEPS WITH RAILING: A LOT

## 2025-07-12 ASSESSMENT — PAIN - FUNCTIONAL ASSESSMENT
PAIN_FUNCTIONAL_ASSESSMENT: CPOT (CRITICAL CARE PAIN OBSERVATION TOOL)
PAIN_FUNCTIONAL_ASSESSMENT: 0-10
PAIN_FUNCTIONAL_ASSESSMENT: CPOT (CRITICAL CARE PAIN OBSERVATION TOOL)
PAIN_FUNCTIONAL_ASSESSMENT: 0-10
PAIN_FUNCTIONAL_ASSESSMENT: CPOT (CRITICAL CARE PAIN OBSERVATION TOOL)
PAIN_FUNCTIONAL_ASSESSMENT: 0-10

## 2025-07-12 ASSESSMENT — PAIN SCALES - GENERAL
PAINLEVEL_OUTOF10: 0 - NO PAIN

## 2025-07-12 NOTE — CARE PLAN
Problem: Pain - Adult  Goal: Verbalizes/displays adequate comfort level or baseline comfort level  Outcome: Progressing     Problem: Safety - Adult  Goal: Free from fall injury  Outcome: Progressing     Problem: Discharge Planning  Goal: Discharge to home or other facility with appropriate resources  Outcome: Progressing     Problem: Chronic Conditions and Co-morbidities  Goal: Patient's chronic conditions and co-morbidity symptoms are monitored and maintained or improved  Outcome: Progressing     Problem: Nutrition  Goal: Nutrient intake appropriate for maintaining nutritional needs  Outcome: Progressing     Problem: Pain  Goal: Takes deep breaths with improved pain control throughout the shift  Outcome: Progressing  Goal: Turns in bed with improved pain control throughout the shift  Outcome: Progressing  Goal: Walks with improved pain control throughout the shift  Outcome: Progressing     Problem: Fall/Injury  Goal: Not fall by end of shift  Outcome: Progressing  Goal: Be free from injury by end of the shift  Outcome: Progressing  Goal: Use assistive devices by end of the shift  Outcome: Progressing  Goal: Pace activities to prevent fatigue by end of the shift  Outcome: Progressing     Problem: Skin  Goal: Prevent/minimize sheer/friction injuries  Outcome: Progressing  Flowsheets (Taken 7/12/2025 0113)  Prevent/minimize sheer/friction injuries:   Use pull sheet   HOB 30 degrees or less  Goal: Promote/optimize nutrition  Outcome: Progressing  Flowsheets (Taken 7/12/2025 0113)  Promote/optimize nutrition: Discuss with provider if NPO > 2 days  Goal: Promote skin healing  Outcome: Progressing  Flowsheets (Taken 7/12/2025 0113)  Promote skin healing: Ensure correct size (line/device) and apply per  instructions     Problem: Diabetes  Goal: Achieve decreasing blood glucose levels by end of shift  Outcome: Progressing  Goal: Increase stability of blood glucose readings by end of shift  Outcome:  Progressing  Goal: Maintain electrolyte levels within acceptable range throughout shift  Outcome: Progressing  Goal: Maintain glucose levels >70mg/dl to <250mg/dl throughout shift  Outcome: Progressing  Goal: No changes in neurological exam by end of shift  Outcome: Progressing  Goal: Learn about and adhere to nutrition recommendations by end of shift  Outcome: Progressing  Goal: Vital signs within normal range for age by end of shift  Outcome: Progressing  Goal: Increase self care and/or family involovement by end of shift  Outcome: Progressing  Goal: Receive DSME education by end of shift  Outcome: Progressing

## 2025-07-12 NOTE — PROGRESS NOTES
Critical Care Daily Progress        Subjective   Patient is a 75 y.o. male admitted on 7/2/2025 10:01 AM with the following indication(s) for ICU care of the patient's hypoxic respiratory failure requiring mechanical ventilation.     Overnight Events: Required additional oxygen support, having a BiPAP placed. CT scan performed of the chest showing mucus plugging, atelectasis, and lobar collapse.    Complaints: has complaints of feeling short of breath..     MEDICATIONS:  Scheduled: PRN: Continuous:   Scheduled Medications[1] PRN Medications[2] Continuous Medications[3]     Objective   Vitals:  Temp  Min: 36.1 °C (97 °F)  Max: 36.7 °C (98.1 °F)  Pulse  Min: 46  Max: 104  BP  Min: 91/65  Max: 154/83  Resp  Min: 18  Max: 34  SpO2  Min: 91 %  Max: 100 %    Physical Exam:   Physical Exam   Physical Exam  Vitals and nursing note reviewed.   Constitutional:       Appearance: He is ill-appearing.      Comments: BMI of 16.56, is cachectic   HENT:      Head: Normocephalic and atraumatic.      Right Ear: External ear normal.      Left Ear: External ear normal.      Nose: Nose normal.      Mouth/Throat:      Mouth: Mucous membranes are moist.      Pharynx: Oropharynx is clear.   Eyes:      General: No scleral icterus.        Right eye: No discharge.         Left eye: No discharge.      Extraocular Movements: Extraocular movements intact.      Conjunctiva/sclera: Conjunctivae normal.      Pupils: Pupils are equal, round, and reactive to light.   Cardiovascular:      Rate and Rhythm: Normal rate and regular rhythm.      Pulses: Normal pulses.      Heart sounds: Normal heart sounds.   Pulmonary:      Effort: Pulmonary effort is normal.      Breath sounds: Rhonchi present.   Abdominal:      General: There is no distension.      Palpations: Abdomen is soft.      Tenderness: There is no abdominal tenderness. There is no right CVA tenderness or left CVA tenderness.   Musculoskeletal:         General: No swelling, tenderness, deformity  or signs of injury. Normal range of motion.      Cervical back: Normal range of motion and neck supple.      Right lower leg: No edema.      Left lower leg: No edema.   Skin:     General: Skin is warm and dry.      Capillary Refill: Capillary refill takes less than 2 seconds.   Neurological:      General: No focal deficit present.      Mental Status: He is alert and oriented to person, place, and time. Mental status is at baseline.   Psychiatric:         Mood and Affect: Mood normal.         Behavior: Behavior normal.            Malnutrition Diagnosis Status: Active  Malnutrition Diagnosis: Severe malnutrition related to chronic disease or condition     As Evidenced by: >5% weight loss over 30 days, Severe subcutaneous fat loss and muscle wastinging per NFPE  I agree with the dietitian's malnutrition diagnosis.      Assessment/Plan   Overall Assessment:  #Acute delirium  #Acute hypoxic hypercapnic respiratory failure  #JULIO that has since been resolved  #Azotemia that is since been resolved  #Left lower lobe pneumonia    75 year old male presenting with hypoxic and hypercapneic respiratory failure to the ICU requiring previous intubation, now extubated and requiring BiPAP. On 7/11/2025 overnight a CT of the chest was performed showing mucus plugging, lobe collapse, and atelectasis.    The patient will require intubation, bronchoscope, and testing of the bronchoscope alveolar washings.    Neuro:   -Mirtazapine 7.5 mg nightly  - No acute issues  - Sedation: Precedex, off  - Pain management: N/A  - Titrate to RASS 0 to -2  - CAM ICU  - 25 mg Seroquel nightly, EKG 7/10/2025 QTc 430    Cardiovascular:   - Hx hypertension: Restarted home losartan, amlodipine now off of sedation.  Holding home metoprolol due to bradycardia.  Started hydralazine 25 mg 3 times daily  - Echocardiogram 7/7: EF 45%, global hypokinesis of the left ventricle, impaired relaxation of left ventricular diastolic filling  - Stable off pressors  -  Continuous cardiac monitoring  - Repeat CXR today  - Maintain MAP > 65 mmHg  - The 10-year ASCVD risk score (Fermín BENAVIDEZ, et al., 2019) is: 27.6%    Values used to calculate the score:      Age: 75 years      Sex: Male      Is Non- : No      Diabetic: Yes      Tobacco smoker: Yes      Systolic Blood Pressure: 100 mmHg      Is BP treated: Yes      HDL Cholesterol: 66 mg/dL      Total Cholesterol: 146 mg/dL     Pulmonary:   Vent Mode: Volume control/assist control  FiO2 (%):  [50 %-60 %] 60 %  S RR:  [12-20] 20  S VT:  [450 mL] 450 mL  PEEP/CPAP (cm H2O):  [5 cm H20] 5 cm H20  MAP (cm H2O):  [9.4] 9.4   Patient is requiring BiPAP intermittently from high flow nasal cannula.  - pH 7.35/pCO2 52/PO2 63/HCO3 28.7  - Chest physiotherapy  - Intubated  7/6, Patient extubated successfully 7/8.   - Maintain spO2 > 92%  - Titrate vent settings as tolerated  - CT angio PE demonstrated concern for aspiration and collapse of LLL  - Bronchoscopy 7/7 with minimal secretions  - Continuous pulse ox    Gastrointestinal:   Results from last 7 days   Lab Units 07/12/25  0649 07/11/25  1710 07/11/25  0328 07/10/25  0420 07/07/25  0452 07/06/25  1214   INR   --   --   --   --   --  1.0   APTT seconds  --   --   --   --   --  33   ALK PHOS U/L 58 61 66 64   < > 77   AST U/L 9 12 10 14   < > 11   ALT U/L 9* 11 10 11   < > 15    < > = values in this interval not displayed.       - Diet: Easy to chew, thin 0  - CT AP 7/6 demonstrated large amount of impacted stool and possible colitis with concern for developing stercoral colitis. However, patient has had multiple large BM since starting bowel regimen 7/6.  - Ppx: None indicated currently  - Bowel regimen: miralax, docusate  - Last BM: Last BM Date: 07/11/25  - Patient pulled out NG tube.    Renal:   Results from last 7 days   Lab Units 07/12/25  0649 07/12/25  0351 07/11/25  1710 07/11/25  0328 07/10/25  1818 07/10/25  0420   SODIUM mmol/L 138  --  138 140 137 139    POTASSIUM mmol/L 4.2  --  4.2 3.1* 3.3* 2.8*   CHLORIDE mmol/L 102  --  102 101 100 102   MAGNESIUM mg/dL 2.25  --  2.15 2.21  --  1.97   PHOSPHORUS mg/dL  --  3.6  --  2.6 2.5 2.0*   CO2 mmol/L 28  --  28 28 24 26   BUN mg/dL 27*  --  27* 26* 24* 23   CREATININE mg/dL 0.98  --  1.02 1.04 1.07 1.02   CALCIUM mg/dL 9.2  --  9.3 9.3 9.1 9.0       Net IO Since Admission: 2,584.92 mL [25 1428]  Renal:  #JULIO (Resolved)  #Azotemia (Resolved)  -BUN 26, creatinine 1.04.  - Daily BMP, Mg, phos  - Replete electrolytes as indicated  - Strict I's & O's  - Friedman to be placed  Net IO Since Admission: 1,777.42 mL [25 1434]  - Potassium 2.8 on a.m. labs, potassium repleted.  Repeat BMP in the afternoon.     Hematology:  - Daily CBC  - DVT Prophylaxis: Lovenox, SCDs     Infectious Disease:  #Left lower lobe pneumonia  - MRSA negative, stopped vanc,  - Antibiotic regimen this admission: Unasyn 3 g (start date 2025-ongoing), azithromycin 500 mg (1 dose, 25), ceftriaxone 1 g (start date 25-25), Zosyn 4.5 g (start date 2025-2025)  - Strep ag positive  - Repeat blood cultures NGTD x 4 days  - Lactate normal  - UA without evidence of UTI  Temp (24hrs), Av °C (96.8 °F), Min:35.6 °C (96.1 °F), Max:36.6 °C (97.9 °F)     Endocrine:  - HX DM2  - SSI, Lantus, POC BG  - BG < 180 goal    LDA:  NG/OG/Feeding Tube Nasoduodenal 12 Fr Right nostril (Active)   Placement Date/Time: 25 1245   Placed by: stephanie LORA  Hand Hygiene Completed: Yes  Type of Tube: Feeding Tube  Tube Length (cm): 80 cm  Tube Type: Nasoduodenal  NG/OG Tube Size: 12 Fr  Tube Location: Right nostril  Difficulty with Placement: No   Number of days: 0       External Urinary Catheter Male (Active)   Placement Date/Time: 25 1400   Placed by: Neda Sam RN  Hand Hygiene Completed: Yes  External Catheter Type: Male   Number of days: 3         CODE STATUS: Full Code    Disposition: Patient will remain in the ICU.    RESTRAINTS:  type: None    ABCDEF Checklist  Analgesia: Spontaneous awakening trial to be pursued if clinically appropriate. RASS goal reviewed  Breathing: Spontaneous breathing trial to be pursued if clinically appropriate. Mechanical power of assisted ventilation reviewed  Choice of analgesia/sedation: Analgesic and sedative agents adjusted per clinical context.   Delirium assessed by CAM, will avoid exacerbating factors  Early mobility and exercise: Physical and occupational therapy engaged  Family: Plan of care, overall trajectory of patient shared with family. Questions elicited and answered as appropriate.     Due to the high probability of life threatening clinical decompensation, the patient required critical care time evaluating and managing this patient.  Critical care time included obtaining a history, examining the patient, ordering and reviewing studies, discussing, developing, and implementing a management plan, evaluating the patient's response to treatment, and discussion with other care team providers. I saw and evaluated the patient myself.  Critical care time was performed exclusive of billable procedures.      Case discussed with attending , Dr. Ye Frost DO         [1] amLODIPine, 10 mg, oral, Daily  ampicillin-sulbactam, 3 g, intravenous, q6h  cyanocobalamin, 100 mcg, oral, Daily  docusate sodium, 100 mg, oral, BID  enoxaparin, 40 mg, subcutaneous, q24h  etomidate, 10 mg, intravenous, Once  hydrALAZINE, 25 mg, oral, TID  [Held by provider] insulin glargine, 5 Units, subcutaneous, q24h  [Held by provider] insulin lispro, 0-10 Units, subcutaneous, Before meals & nightly  ipratropium-albuteroL, 3 mL, nebulization, q4h  lactated Ringer's, 1,000 mL, intravenous, Once  lidocaine, 1 patch, transdermal, Daily  lidocaine, 1 patch, transdermal, Daily  losartan, 100 mg, oral, Daily  [Held by provider] metoprolol tartrate, 50 mg, oral, BID  mirtazapine, 7.5 mg, oral, Nightly  multivitamin with minerals, 1  tablet, nasoduodenal tube, Daily  oxygen, , inhalation, Continuous - Inhalation  perflutren lipid microspheres, 0.5-10 mL of dilution, intravenous, Once in imaging  perflutren protein A microsphere, 0.5 mL, intravenous, Once in imaging  phenylephrine in NS, , ,   polyethylene glycol, 17 g, oral, Daily  QUEtiapine, 25 mg, oral, Nightly  sodium chloride, 3 mL, nebulization, q6h FABRICIO  sulfur hexafluoride microsphr, 2 mL, intravenous, Once in imaging  thiamine, 100 mg, intravenous, Daily     [2] PRN medications: bisacodyl, dextromethorphan-guaifenesin, dextrose, dextrose, etomidate, glucagon, glucagon, ipratropium-albuteroL, oxygen, oxygen, phenylephrine in NS, rocuronium  [3] fentaNYL, 0-200 mcg/hr, Last Rate: 25 mcg/hr (07/12/25 1354)  norepinephrine, 0-0.5 mcg/kg/min, Last Rate: 0.01 mcg/kg/min (07/12/25 1151)

## 2025-07-12 NOTE — CARE PLAN
Problem: Pain - Adult  Goal: Verbalizes/displays adequate comfort level or baseline comfort level  Outcome: Progressing     Problem: Safety - Adult  Goal: Free from fall injury  Outcome: Progressing     Problem: Discharge Planning  Goal: Discharge to home or other facility with appropriate resources  Outcome: Progressing     Problem: Chronic Conditions and Co-morbidities  Goal: Patient's chronic conditions and co-morbidity symptoms are monitored and maintained or improved  Outcome: Progressing     Problem: Nutrition  Goal: Nutrient intake appropriate for maintaining nutritional needs  Outcome: Progressing     Problem: Pain  Goal: Takes deep breaths with improved pain control throughout the shift  Outcome: Progressing  Goal: Turns in bed with improved pain control throughout the shift  Outcome: Progressing  Goal: Walks with improved pain control throughout the shift  Outcome: Progressing     Problem: Fall/Injury  Goal: Not fall by end of shift  Outcome: Progressing  Goal: Be free from injury by end of the shift  Outcome: Progressing  Goal: Use assistive devices by end of the shift  Outcome: Progressing  Goal: Pace activities to prevent fatigue by end of the shift  Outcome: Progressing     Problem: Skin  Goal: Prevent/minimize sheer/friction injuries  Outcome: Progressing  Goal: Promote/optimize nutrition  Outcome: Progressing  Goal: Promote skin healing  Outcome: Progressing     Problem: Diabetes  Goal: Achieve decreasing blood glucose levels by end of shift  Outcome: Progressing  Goal: Increase stability of blood glucose readings by end of shift  Outcome: Progressing  Goal: Maintain electrolyte levels within acceptable range throughout shift  Outcome: Progressing  Goal: Maintain glucose levels >70mg/dl to <250mg/dl throughout shift  Outcome: Progressing  Goal: No changes in neurological exam by end of shift  Outcome: Progressing  Goal: Learn about and adhere to nutrition recommendations by end of shift  Outcome:  Progressing  Goal: Vital signs within normal range for age by end of shift  Outcome: Progressing  Goal: Increase self care and/or family involovement by end of shift  Outcome: Progressing  Goal: Receive DSME education by end of shift  Outcome: Progressing   The patient's goals for the shift include Remain HDS through out the shift.     The clinical goals for the shift include Patient will remain hemodynamically stable throughout the shift    Over the shift, the patient did not make progress toward the following goals. Patient had a worsening CT. Patient was intubated today. Patient had a bedside bronch completed with specimen sent to lab. Patient remains on Fentanyl gtt and Levophed gtt at this time. Barriers to progression include worsening respiratory status . Recommendations to address these barriers include bronch completion with airway clearance. .

## 2025-07-12 NOTE — PROCEDURES
Procedures        Bronchoscopy Report  [facility]    INDICATION:   Mucus plugging, lobe collapse, atelectasis    BRONCHOSCOPIST:   Bridger Frost DO  First Assistant: Dr. Yaritza Valenzuela  Second Assistant: LOUISA Sepulveda CNP      A history and physical exam has been performed and documented separately.  The patient's medications and allergies have been reviewed.  Consent: procedure was emergent due to acute hypoxic respiratory failure with bilateral mucus plugs.     ANESTHESIA:  General Anesthesia - this procedure was performed using standard monitoring procedures in [facility] ICU room 2127.    MEDICINES:  Immediately prior to the procedure the patient was intubated with 10 mg Etomidate and 50 mg Rocuronium.    FINDINGS:  After obtaining informed consent and performing a time out, the patient was anesthetized and an ET tube was placed by GUANACO Isaacs.  The flexible bronchoscope was then introduced through the advanced airway, and an airway examination was performed.    The  ET tube is in good position.  The trachea is of normal caliber. The tammi is sharp. The tracheobronchial tree of the bilateral lung(s) was examined to at least the first subsegmental level.  Bronchial anatomy is normal; there are no endobronchial lesions.    Purulent secretions noted throughout the bronchial tree mainly in lower lobes with scattered lobar and segmental plugs. Secretions were easily suctioned out. Airways were patent at the end of procedure. Bronchial washings were sent for culture.     SPECIMENS:   Bronchoscopic alveolar washings    COMPLICATIONS: None. No fluoroscopic evidence of pneumothorax.    EBL: none.     The physical status of the patient was re-assessed after the procedure.  Remains critically ill in the ICU in unchanged condition.    IMPRESSION:   Purulent mucus diffusely located throughout bilateral lungs in all lobes.    RECOMMENDATION:   The patient was  already admitted to the ICU and remained in his exam room during and after the procedure.   Continue medical treatment for the patient, attempt to extubate as soon as safely possible.    IBridger DO, was personally present throughout this procedure, including all key and non-key portions.  Date: 07/12/25 Time: 11:50 AM    (Images obtained during this procedure, including ultrasonographic images if performed, can be found in within the electronic medical record.)

## 2025-07-12 NOTE — PROCEDURES
Intubation    Date/Time: 7/12/2025 11:25 AM    Performed by: GUANACO Garza  Authorized by: GUANACO Garza    Consent:     Consent obtained:  Verbal    Risks, benefits, and alternatives were discussed: yes      Risks discussed:  Aspiration, dental trauma and laryngeal injury  Universal protocol:     Procedure explained and questions answered to patient or proxy's satisfaction: yes      Relevant documents present and verified: yes      Test results available: yes      Imaging studies available: yes      Required blood products, implants, devices, and special equipment available: yes      Site/side marked: yes      Immediately prior to procedure, a time out was called: yes      Patient identity confirmed:  Hospital-assigned identification number and arm band  Pre-procedure details:     Indications: respiratory failure      Obstruction: none      Neck mobility: normal      Pharmacologic strategy: RSI      Induction agents:  Etomidate    Paralytics:  Rocuronium  Procedure details:     Preoxygenation:  BiLevel    CPR in progress: no      Number of attempts:  1  Successful intubation attempt details:     Intubation method:  Oral    Intubation technique: video assisted      Laryngoscope blade:  Mac 4    Bougie used: no      Grade view: I      Tube size (mm):  7.5    Tube type:  Cuffed    Tube visualized through cords: yes    Placement assessment:     ETT at teeth/gumline (cm):  24    Tube secured with:  ETT han    Breath sounds:  Equal    Placement verification: chest rise, colorimetric ETCO2, direct visualization and equal breath sounds    Post-procedure details:     Procedure completion:  Tolerated

## 2025-07-13 LAB
ALBUMIN SERPL BCP-MCNC: 2.9 G/DL (ref 3.4–5)
ALBUMIN SERPL BCP-MCNC: 2.9 G/DL (ref 3.4–5)
ALP SERPL-CCNC: 57 U/L (ref 33–136)
ALP SERPL-CCNC: 60 U/L (ref 33–136)
ALT SERPL W P-5'-P-CCNC: 10 U/L (ref 10–52)
ALT SERPL W P-5'-P-CCNC: 8 U/L (ref 10–52)
ANION GAP BLDA CALCULATED.4IONS-SCNC: 8 MMO/L (ref 10–25)
ANION GAP SERPL CALC-SCNC: 13 MMOL/L (ref 10–20)
ANION GAP SERPL CALC-SCNC: 15 MMOL/L (ref 10–20)
ARTERIAL PATENCY WRIST A: ABNORMAL
ARTERIAL PATENCY WRIST A: ABNORMAL
AST SERPL W P-5'-P-CCNC: 12 U/L (ref 9–39)
AST SERPL W P-5'-P-CCNC: 12 U/L (ref 9–39)
BACTERIA SPEC RESP CULT: NORMAL
BASE EXCESS BLDA CALC-SCNC: 4.4 MMOL/L (ref -2–3)
BASE EXCESS BLDA CALC-SCNC: 4.5 MMOL/L (ref -2–3)
BASOPHILS # BLD AUTO: 0.01 X10*3/UL (ref 0–0.1)
BASOPHILS NFR BLD AUTO: 0.1 %
BILIRUB SERPL-MCNC: 0.5 MG/DL (ref 0–1.2)
BILIRUB SERPL-MCNC: 0.6 MG/DL (ref 0–1.2)
BODY TEMPERATURE: ABNORMAL
BODY TEMPERATURE: ABNORMAL
BUN SERPL-MCNC: 27 MG/DL (ref 6–23)
BUN SERPL-MCNC: 29 MG/DL (ref 6–23)
CA-I BLDA-SCNC: 1.21 MMOL/L (ref 1.1–1.33)
CALCIUM SERPL-MCNC: 8.8 MG/DL (ref 8.6–10.3)
CALCIUM SERPL-MCNC: 8.9 MG/DL (ref 8.6–10.3)
CHLORIDE BLDA-SCNC: 102 MMOL/L (ref 98–107)
CHLORIDE SERPL-SCNC: 101 MMOL/L (ref 98–107)
CHLORIDE SERPL-SCNC: 102 MMOL/L (ref 98–107)
CO2 SERPL-SCNC: 25 MMOL/L (ref 21–32)
CO2 SERPL-SCNC: 29 MMOL/L (ref 21–32)
CREAT SERPL-MCNC: 1.15 MG/DL (ref 0.5–1.3)
CREAT SERPL-MCNC: 1.16 MG/DL (ref 0.5–1.3)
CRITICAL CALL TIME: 2206
CRITICAL CALLED BY: ABNORMAL
CRITICAL CALLED TO: ABNORMAL
CRITICAL READ BACK: ABNORMAL
EGFRCR SERPLBLD CKD-EPI 2021: 67 ML/MIN/1.73M*2
EGFRCR SERPLBLD CKD-EPI 2021: 67 ML/MIN/1.73M*2
EOSINOPHIL # BLD AUTO: 0.02 X10*3/UL (ref 0–0.4)
EOSINOPHIL NFR BLD AUTO: 0.2 %
ERYTHROCYTE [DISTWIDTH] IN BLOOD BY AUTOMATED COUNT: 12.7 % (ref 11.5–14.5)
GLUCOSE BLD MANUAL STRIP-MCNC: 104 MG/DL (ref 74–99)
GLUCOSE BLD MANUAL STRIP-MCNC: 105 MG/DL (ref 74–99)
GLUCOSE BLD MANUAL STRIP-MCNC: 127 MG/DL (ref 74–99)
GLUCOSE BLD MANUAL STRIP-MCNC: 154 MG/DL (ref 74–99)
GLUCOSE BLD MANUAL STRIP-MCNC: 170 MG/DL (ref 74–99)
GLUCOSE BLDA-MCNC: 153 MG/DL (ref 74–99)
GLUCOSE SERPL-MCNC: 100 MG/DL (ref 74–99)
GLUCOSE SERPL-MCNC: 100 MG/DL (ref 74–99)
GRAM STN SPEC: NORMAL
GRAM STN SPEC: NORMAL
HCO3 BLDA-SCNC: 28.4 MMOL/L (ref 22–26)
HCO3 BLDA-SCNC: 30.3 MMOL/L (ref 22–26)
HCT VFR BLD AUTO: 30.8 % (ref 41–52)
HCT VFR BLD EST: 35 % (ref 41–52)
HGB BLD-MCNC: 10 G/DL (ref 13.5–17.5)
HGB BLDA-MCNC: 11.8 G/DL (ref 13.5–17.5)
IMM GRANULOCYTES # BLD AUTO: 0.05 X10*3/UL (ref 0–0.5)
IMM GRANULOCYTES NFR BLD AUTO: 0.5 % (ref 0–0.9)
INHALED O2 CONCENTRATION: 100 %
INHALED O2 CONCENTRATION: 40 %
LACTATE BLDA-SCNC: 0.7 MMOL/L (ref 0.4–2)
LYMPHOCYTES # BLD AUTO: 1.16 X10*3/UL (ref 0.8–3)
LYMPHOCYTES NFR BLD AUTO: 12 %
MAGNESIUM SERPL-MCNC: 2.17 MG/DL (ref 1.6–2.4)
MAGNESIUM SERPL-MCNC: 2.27 MG/DL (ref 1.6–2.4)
MCH RBC QN AUTO: 33.4 PG (ref 26–34)
MCHC RBC AUTO-ENTMCNC: 32.5 G/DL (ref 32–36)
MCV RBC AUTO: 103 FL (ref 80–100)
MONOCYTES # BLD AUTO: 0.71 X10*3/UL (ref 0.05–0.8)
MONOCYTES NFR BLD AUTO: 7.3 %
NEUTROPHILS # BLD AUTO: 7.73 X10*3/UL (ref 1.6–5.5)
NEUTROPHILS NFR BLD AUTO: 79.9 %
NRBC BLD-RTO: 0 /100 WBCS (ref 0–0)
OXYHGB MFR BLDA: 73.6 % (ref 94–98)
OXYHGB MFR BLDA: 93.5 % (ref 94–98)
PCO2 BLDA: 39 MM HG (ref 38–42)
PCO2 BLDA: 50 MM HG (ref 38–42)
PEEP CMH2O: 10 CM H2O
PEEP CMH2O: 5 CM H2O
PH BLDA: 7.39 PH (ref 7.38–7.42)
PH BLDA: 7.47 PH (ref 7.38–7.42)
PHOSPHATE SERPL-MCNC: 2.4 MG/DL (ref 2.5–4.9)
PLATELET # BLD AUTO: 233 X10*3/UL (ref 150–450)
PO2 BLDA: 41 MM HG (ref 85–95)
PO2 BLDA: 65 MM HG (ref 85–95)
POTASSIUM BLDA-SCNC: 3.7 MMOL/L (ref 3.5–5.3)
POTASSIUM SERPL-SCNC: 3.7 MMOL/L (ref 3.5–5.3)
POTASSIUM SERPL-SCNC: 3.9 MMOL/L (ref 3.5–5.3)
PROCALCITONIN SERPL-MCNC: 0.34 NG/ML
PROT SERPL-MCNC: 6 G/DL (ref 6.4–8.2)
PROT SERPL-MCNC: 6.1 G/DL (ref 6.4–8.2)
RBC # BLD AUTO: 2.99 X10*6/UL (ref 4.5–5.9)
SAO2 % BLDA: 75 % (ref 94–100)
SAO2 % BLDA: 96 % (ref 94–100)
SITE OF ARTERIAL PUNCTURE: ABNORMAL
SITE OF ARTERIAL PUNCTURE: ABNORMAL
SODIUM BLDA-SCNC: 137 MMOL/L (ref 136–145)
SODIUM SERPL-SCNC: 137 MMOL/L (ref 136–145)
SODIUM SERPL-SCNC: 140 MMOL/L (ref 136–145)
TIDAL VOLUME: 450 ML
TIDAL VOLUME: 450 ML
VENTILATOR MODE: ABNORMAL
VENTILATOR MODE: ABNORMAL
VENTILATOR RATE: 20 BPM
VENTILATOR RATE: 20 BPM
WBC # BLD AUTO: 9.7 X10*3/UL (ref 4.4–11.3)

## 2025-07-13 PROCEDURE — 2500000004 HC RX 250 GENERAL PHARMACY W/ HCPCS (ALT 636 FOR OP/ED)

## 2025-07-13 PROCEDURE — 83735 ASSAY OF MAGNESIUM: CPT

## 2025-07-13 PROCEDURE — 36415 COLL VENOUS BLD VENIPUNCTURE: CPT

## 2025-07-13 PROCEDURE — 82947 ASSAY GLUCOSE BLOOD QUANT: CPT

## 2025-07-13 PROCEDURE — 2500000001 HC RX 250 WO HCPCS SELF ADMINISTERED DRUGS (ALT 637 FOR MEDICARE OP)

## 2025-07-13 PROCEDURE — 2500000005 HC RX 250 GENERAL PHARMACY W/O HCPCS

## 2025-07-13 PROCEDURE — 31500 INSERT EMERGENCY AIRWAY: CPT | Mod: GC

## 2025-07-13 PROCEDURE — 36600 WITHDRAWAL OF ARTERIAL BLOOD: CPT

## 2025-07-13 PROCEDURE — 94640 AIRWAY INHALATION TREATMENT: CPT

## 2025-07-13 PROCEDURE — 94664 DEMO&/EVAL PT USE INHALER: CPT

## 2025-07-13 PROCEDURE — 84132 ASSAY OF SERUM POTASSIUM: CPT

## 2025-07-13 PROCEDURE — 80053 COMPREHEN METABOLIC PANEL: CPT

## 2025-07-13 PROCEDURE — 71045 X-RAY EXAM CHEST 1 VIEW: CPT | Performed by: STUDENT IN AN ORGANIZED HEALTH CARE EDUCATION/TRAINING PROGRAM

## 2025-07-13 PROCEDURE — 71045 X-RAY EXAM CHEST 1 VIEW: CPT | Performed by: RADIOLOGY

## 2025-07-13 PROCEDURE — 94003 VENT MGMT INPAT SUBQ DAY: CPT

## 2025-07-13 PROCEDURE — 2020000001 HC ICU ROOM DAILY

## 2025-07-13 PROCEDURE — 85025 COMPLETE CBC W/AUTO DIFF WBC: CPT

## 2025-07-13 PROCEDURE — 2500000002 HC RX 250 W HCPCS SELF ADMINISTERED DRUGS (ALT 637 FOR MEDICARE OP, ALT 636 FOR OP/ED)

## 2025-07-13 PROCEDURE — 94669 MECHANICAL CHEST WALL OSCILL: CPT

## 2025-07-13 PROCEDURE — 0BH17EZ INSERTION OF ENDOTRACHEAL AIRWAY INTO TRACHEA, VIA NATURAL OR ARTIFICIAL OPENING: ICD-10-PCS | Performed by: INTERNAL MEDICINE

## 2025-07-13 PROCEDURE — 84100 ASSAY OF PHOSPHORUS: CPT

## 2025-07-13 PROCEDURE — 99291 CRITICAL CARE FIRST HOUR: CPT | Performed by: INTERNAL MEDICINE

## 2025-07-13 PROCEDURE — 82810 BLOOD GASES O2 SAT ONLY: CPT

## 2025-07-13 RX ORDER — SODIUM CHLORIDE, SODIUM LACTATE, POTASSIUM CHLORIDE, CALCIUM CHLORIDE 600; 310; 30; 20 MG/100ML; MG/100ML; MG/100ML; MG/100ML
75 INJECTION, SOLUTION INTRAVENOUS CONTINUOUS
Status: ACTIVE | OUTPATIENT
Start: 2025-07-13 | End: 2025-07-14

## 2025-07-13 RX ORDER — IPRATROPIUM BROMIDE AND ALBUTEROL SULFATE 2.5; .5 MG/3ML; MG/3ML
6 SOLUTION RESPIRATORY (INHALATION) ONCE
Status: COMPLETED | OUTPATIENT
Start: 2025-07-13 | End: 2025-07-13

## 2025-07-13 RX ORDER — INSULIN LISPRO 100 [IU]/ML
0-5 INJECTION, SOLUTION INTRAVENOUS; SUBCUTANEOUS EVERY 4 HOURS
Status: DISCONTINUED | OUTPATIENT
Start: 2025-07-13 | End: 2025-07-14

## 2025-07-13 RX ORDER — PROPOFOL 10 MG/ML
0-20 INJECTION, EMULSION INTRAVENOUS CONTINUOUS
Status: DISCONTINUED | OUTPATIENT
Start: 2025-07-13 | End: 2025-07-15 | Stop reason: HOSPADM

## 2025-07-13 RX ORDER — PHENYLEPHRINE HCL IN 0.9% NACL 1 MG/10 ML
SYRINGE (ML) INTRAVENOUS
Status: DISCONTINUED
Start: 2025-07-13 | End: 2025-07-13 | Stop reason: WASHOUT

## 2025-07-13 RX ORDER — POLYETHYLENE GLYCOL 3350 17 G/17G
17 POWDER, FOR SOLUTION ORAL DAILY
Status: DISCONTINUED | OUTPATIENT
Start: 2025-07-13 | End: 2025-07-15 | Stop reason: HOSPADM

## 2025-07-13 RX ADMIN — Medication 40 PERCENT: at 08:00

## 2025-07-13 RX ADMIN — PROPOFOL 5 MCG/KG/MIN: 10 INJECTION, EMULSION INTRAVENOUS at 22:00

## 2025-07-13 RX ADMIN — ETOMIDATE 20 MG: 20 INJECTION, SOLUTION INTRAVENOUS at 21:30

## 2025-07-13 RX ADMIN — Medication 250 MG: at 12:49

## 2025-07-13 RX ADMIN — POLYETHYLENE GLYCOL 3350 17 G: 17 POWDER, FOR SOLUTION ORAL at 12:48

## 2025-07-13 RX ADMIN — Medication 250 MG: at 09:31

## 2025-07-13 RX ADMIN — ROCURONIUM BROMIDE 50 MG: 10 INJECTION INTRAVENOUS at 21:30

## 2025-07-13 RX ADMIN — SODIUM CHLORIDE SOLN NEBU 3% 3 ML: 3 NEBU SOLN at 08:27

## 2025-07-13 RX ADMIN — SODIUM CHLORIDE, SODIUM LACTATE, POTASSIUM CHLORIDE, AND CALCIUM CHLORIDE 75 ML/HR: .6; .31; .03; .02 INJECTION, SOLUTION INTRAVENOUS at 09:30

## 2025-07-13 RX ADMIN — Medication 250 MG: at 16:09

## 2025-07-13 RX ADMIN — AMPICILLIN SODIUM AND SULBACTAM SODIUM 3 G: 2; 1 INJECTION, POWDER, FOR SOLUTION INTRAMUSCULAR; INTRAVENOUS at 04:21

## 2025-07-13 RX ADMIN — IPRATROPIUM BROMIDE AND ALBUTEROL SULFATE 3 ML: 2.5; .5 SOLUTION RESPIRATORY (INHALATION) at 08:26

## 2025-07-13 RX ADMIN — Medication 50 MCG/HR: at 09:40

## 2025-07-13 RX ADMIN — SODIUM CHLORIDE, SODIUM LACTATE, POTASSIUM CHLORIDE, AND CALCIUM CHLORIDE 75 ML/HR: .6; .31; .03; .02 INJECTION, SOLUTION INTRAVENOUS at 22:26

## 2025-07-13 RX ADMIN — SODIUM CHLORIDE SOLN NEBU 3% 3 ML: 3 NEBU SOLN at 12:25

## 2025-07-13 RX ADMIN — IPRATROPIUM BROMIDE AND ALBUTEROL SULFATE 3 ML: 2.5; .5 SOLUTION RESPIRATORY (INHALATION) at 20:28

## 2025-07-13 RX ADMIN — AMPICILLIN SODIUM AND SULBACTAM SODIUM 3 G: 2; 1 INJECTION, POWDER, FOR SOLUTION INTRAMUSCULAR; INTRAVENOUS at 15:26

## 2025-07-13 RX ADMIN — AMPICILLIN SODIUM AND SULBACTAM SODIUM 3 G: 2; 1 INJECTION, POWDER, FOR SOLUTION INTRAMUSCULAR; INTRAVENOUS at 22:52

## 2025-07-13 RX ADMIN — SODIUM CHLORIDE SOLN NEBU 3% 3 ML: 3 NEBU SOLN at 20:28

## 2025-07-13 RX ADMIN — Medication 40 PERCENT: at 16:39

## 2025-07-13 RX ADMIN — IPRATROPIUM BROMIDE AND ALBUTEROL SULFATE 3 ML: 2.5; .5 SOLUTION RESPIRATORY (INHALATION) at 04:15

## 2025-07-13 RX ADMIN — THIAMINE HYDROCHLORIDE 100 MG: 100 INJECTION, SOLUTION INTRAMUSCULAR; INTRAVENOUS at 08:27

## 2025-07-13 RX ADMIN — INSULIN LISPRO 1 UNITS: 100 INJECTION, SOLUTION INTRAVENOUS; SUBCUTANEOUS at 15:38

## 2025-07-13 RX ADMIN — SODIUM CHLORIDE SOLN NEBU 3% 3 ML: 3 NEBU SOLN at 04:15

## 2025-07-13 RX ADMIN — ENOXAPARIN SODIUM 40 MG: 100 INJECTION SUBCUTANEOUS at 15:27

## 2025-07-13 RX ADMIN — Medication 40 PERCENT: at 20:10

## 2025-07-13 RX ADMIN — INSULIN LISPRO 1 UNITS: 100 INJECTION, SOLUTION INTRAVENOUS; SUBCUTANEOUS at 12:48

## 2025-07-13 RX ADMIN — AMPICILLIN SODIUM AND SULBACTAM SODIUM 3 G: 2; 1 INJECTION, POWDER, FOR SOLUTION INTRAMUSCULAR; INTRAVENOUS at 09:30

## 2025-07-13 RX ADMIN — Medication 40 PERCENT: at 12:14

## 2025-07-13 RX ADMIN — IPRATROPIUM BROMIDE AND ALBUTEROL SULFATE 3 ML: 2.5; .5 SOLUTION RESPIRATORY (INHALATION) at 12:24

## 2025-07-13 RX ADMIN — LIDOCAINE 4% 1 PATCH: 40 PATCH TOPICAL at 08:27

## 2025-07-13 RX ADMIN — IPRATROPIUM BROMIDE AND ALBUTEROL SULFATE 6 ML: 2.5; .5 SOLUTION RESPIRATORY (INHALATION) at 22:24

## 2025-07-13 ASSESSMENT — COGNITIVE AND FUNCTIONAL STATUS - GENERAL
TOILETING: TOTAL
HELP NEEDED FOR BATHING: TOTAL
DRESSING REGULAR LOWER BODY CLOTHING: TOTAL
STANDING UP FROM CHAIR USING ARMS: TOTAL
MOBILITY SCORE: 6
DAILY ACTIVITIY SCORE: 6
TURNING FROM BACK TO SIDE WHILE IN FLAT BAD: TOTAL
DRESSING REGULAR UPPER BODY CLOTHING: TOTAL
EATING MEALS: TOTAL
CLIMB 3 TO 5 STEPS WITH RAILING: TOTAL
WALKING IN HOSPITAL ROOM: TOTAL
PERSONAL GROOMING: TOTAL
MOVING FROM LYING ON BACK TO SITTING ON SIDE OF FLAT BED WITH BEDRAILS: TOTAL
MOVING TO AND FROM BED TO CHAIR: TOTAL

## 2025-07-13 ASSESSMENT — PAIN - FUNCTIONAL ASSESSMENT
PAIN_FUNCTIONAL_ASSESSMENT: CPOT (CRITICAL CARE PAIN OBSERVATION TOOL)

## 2025-07-13 ASSESSMENT — PAIN SCALES - GENERAL
PAINLEVEL_OUTOF10: 0 - NO PAIN

## 2025-07-13 NOTE — PROGRESS NOTES
Critical Care Daily Progress        Subjective   Patient is a 75 y.o. male admitted on 7/2/2025 10:01 AM with the following indication(s) for ICU care of the patient's hypoxic respiratory failure requiring mechanical ventilation.    Overnight Events: No acute events overnight except for having hypoxic event where an increased level of FiO2 was needed.    Complaints: has none..     MEDICATIONS:  Scheduled: PRN: Continuous:   Scheduled Medications[1] PRN Medications[2] Continuous Medications[3]     Objective   Vitals:  Temp  Min: 36 °C (96.8 °F)  Max: 36.7 °C (98.1 °F)  Pulse  Min: 58  Max: 104  BP  Min: 91/65  Max: 156/75  Resp  Min: 18  Max: 56  SpO2  Min: 88 %  Max: 100 %    Physical Exam:   Physical Exam   Physical Exam  Vitals and nursing note reviewed.   Constitutional:       General: He is not in acute distress.     Appearance: He is not ill-appearing.      Comments: Intubated, cachectic appearance of 16.29 BMI.  Does open his eyes and can follow commands.   HENT:      Head: Normocephalic and atraumatic.      Right Ear: External ear normal.      Left Ear: External ear normal.      Nose: Nose normal. No congestion or rhinorrhea.      Mouth/Throat:      Mouth: Mucous membranes are moist.      Pharynx: Oropharynx is clear.   Eyes:      General: No scleral icterus.        Right eye: No discharge.         Left eye: No discharge.      Extraocular Movements: Extraocular movements intact.      Conjunctiva/sclera: Conjunctivae normal.      Pupils: Pupils are equal, round, and reactive to light.   Cardiovascular:      Rate and Rhythm: Normal rate and regular rhythm.      Pulses: Normal pulses.      Heart sounds: Normal heart sounds.   Pulmonary:      Breath sounds: Rhonchi present.      Comments: Intubated  Abdominal:      General: There is no distension.      Palpations: Abdomen is soft.      Tenderness: There is no abdominal tenderness. There is no right CVA tenderness or left CVA tenderness.   Musculoskeletal:          General: No swelling, tenderness, deformity or signs of injury. Normal range of motion.      Cervical back: Normal range of motion and neck supple. No rigidity.      Right lower leg: No edema.      Left lower leg: No edema.   Skin:     General: Skin is warm and dry.      Capillary Refill: Capillary refill takes less than 2 seconds.   Neurological:      Comments: Unable to fully assess due to intubation, can follow commands   Psychiatric:      Comments: Unable to assess due to intubation                Assessment/Plan   Overall Assessment:  #Acute delirium  #Acute hypoxic hypercapnic respiratory failure  #JULIO that has since been resolved  #Azotemia that is since been resolved  #Left lower lobe pneumonia     75 year old male presenting with hypoxic and hypercapneic respiratory failure to the ICU requiring previous intubation, now extubated and requiring BiPAP. On 7/11/2025 overnight a CT of the chest was performed showing mucus plugging, lobe collapse, and atelectasis.     The patient will require intubation, bronchoscope, and testing of the bronchoscope alveolar washings.  The patient will undergo continuous respiratory physiotherapy due to an inability to clear his airway whether he is intubated or not.    Neuro:   - History: Anxiety  - Home meds: Mirtazapine, Seroquel  - Pain: Controlled 50 mcg of fentanyl per hour  - Interventions: Intubated  - CAM ICU    Cardiovascular:   - History: Hypertension, hyperlipidemia  - MAP goal greater than 65  - HR goal between 60 and 100  - Home meds: Amlodipine, atorvastatin, losartan, Lopressor  - Last echo: 7/11/2025, 45% ejection fraction, global hypokinesis of the left ventricle, impaired relaxation of left ventricular diastolic filling  - Interventions: Vasopressors  - Pressors (if shock present) : Levophed as needed while intubated, stopped overnight  - The 10-year ASCVD risk score (Fermín BENAVIDEZ, et al., 2019) is: 44.2%    Values used to calculate the score:      Age: 75 years       Sex: Male      Is Non- : No      Diabetic: Yes      Tobacco smoker: Yes      Systolic Blood Pressure: 139 mmHg      Is BP treated: Yes      HDL Cholesterol: 66 mg/dL      Total Cholesterol: 146 mg/dL     Pulmonary:   Vent Mode: Volume control/assist control  FiO2 (%):  [30 %-60 %] 40 %  S RR:  [12-20] 20  S VT:  [450 mL] 450 mL  PEEP/CPAP (cm H2O):  [5 cm H20] 5 cm H20  MAP (cm H2O):  [8.1-9.5] 9.5   - History: Acute hypoxic hypercapnic respiratory failure  - Home meds: Albuterol  Continuous pulse oximetry   O2 PRN to maintain SpO2 > 94%, wean as tolerated  - Imaging: CT angio of the chest  - Interventions: Intubated  - Required a higher level of FiO2 from 35 to 40%.    Gastrointestinal:   Results from last 7 days   Lab Units 07/13/25  0645 07/12/25  1638 07/12/25  0649 07/11/25  1710 07/07/25  0452 07/06/25  1214   INR   --   --   --   --   --  1.0   APTT seconds  --   --   --   --   --  33   ALK PHOS U/L 57 56 58 61   < > 77   AST U/L 12 11 9 12   < > 11   ALT U/L 8* 8* 9* 11   < > 15    < > = values in this interval not displayed.       -History: None  -Home meds: None  - Diet: Using tube feeds through [employer]  - Supplementation: None  - Prophylaxis: protonix [indicated if plt<50, INR>1.5, PTT>2x UNL, pressors, liver disease, TBI, ICH]  - Bowel regimen: miralax  - Last BM: Last BM Date: 07/11/25  - Additional interventions: Using [employer]    Renal:   Results from last 7 days   Lab Units 07/13/25  0645 07/13/25  0317 07/12/25  1638 07/12/25  0649 07/12/25  0351 07/11/25  1710 07/11/25  0328 07/10/25  1818   SODIUM mmol/L 137  --  136 138  --  138 140 137   POTASSIUM mmol/L 3.9  --  4.0 4.2  --  4.2 3.1* 3.3*   CHLORIDE mmol/L 101  --  100 102  --  102 101 100   MAGNESIUM mg/dL 2.27  --  2.19 2.25  --  2.15 2.21  --    PHOSPHORUS mg/dL  --  2.4*  --   --  3.6  --  2.6 2.5   CO2 mmol/L 25  --  28 28  --  28 28 24   BUN mg/dL 29*  --  27* 27*  --  27* 26* 24*   CREATININE mg/dL 1.16  --  1.02  0.98  --  1.02 1.04 1.07   CALCIUM mg/dL 8.9  --  9.0 9.2  --  9.3 9.3 9.1       Net IO Since Admission: 3,233.9 mL [25 0739]  - Daily CMP,Mg,Phos  - History: None  - Home meds: None  - Fluids: On tube feeding 20 mL/h, 75 mL flush every 4 hours  - Electrolytes: Replete per protocol, goal K>4 Phos >3 Mg >2  - Estimated Creatinine Clearance: 41.3 mL/min (by C-G formula based on SCr of 1.16 mg/dL).  -     Endocrine:   Results from last 7 days   Lab Units 25  0645 25  0349 25  2353 25  1947 25  1638 25  1559 25  1459 25  1428   POCT GLUCOSE mg/dL  --  105* 110* 93  --  125* 117* 28*   GLUCOSE mg/dL 100*  --   --   --  89  --   --   --       -History: Type 2 diabetes  -Home meds: Lantus  -sliding scale: Point-of-care glucose with corrections as needed  -BG < 180 goal    Hematology:   Results from last 7 days   Lab Units 07/13/25  0317 07/12/25  0351 07/11/25  0328 07/10/25  0420   HEMOGLOBIN g/dL 10.0* 10.8* 11.5* 11.8*   HEMATOCRIT % 30.8* 33.6* 33.5* 35.2*   MCV fL 103* 107* 101* 103*   MCHC g/dL 32.5 32.1 34.3 33.5   PLATELETS AUTO x10*3/uL 233 186 238 187     - Daily CBC  -History: Chronic anemia  -Home meds: None  - DVT Prophylaxis: Lovenox, SCDs    Infectious Disease:   Results from last 7 days   Lab Units 07/13/25  0317 07/12/25  0351 07/11/25  0328 07/10/25  0420   WBC AUTO x10*3/uL 9.7 9.7 9.7 11.3     Temp (24hrs), Av.5 °C (97.7 °F), Min:36 °C (96.8 °F), Max:36.7 °C (98.1 °F)     -History: None  -Home meds: None  -Cultures: No growth in blood cultures, strep pneumo antigen found and urine culture, multiple polymorphonuclear sites seen and alveolar washings, MRSA negative  -Abx:   Ampicillin-sulbactam      LDA:  ETT  7.5 mm (Active)   Placement Date/Time: 25 1125   Placed by External Staff?: Other (Comment)  Hand Hygiene Completed: Yes  Mask Ventilation: Vent by mask  Technique: Direct laryngoscopy  ETT Type: ETT - single  Single Lumen Tube Size:  7.5 mm  Cuffed: Yes  Laryngos...   Number of days: 0       NG/OG/Feeding Tube Nasoduodenal 12 Fr Right nostril (Active)   Placement Date/Time: 07/11/25 1245   Placed by: stephanie LORA  Hand Hygiene Completed: Yes  Type of Tube: Feeding Tube  Tube Length (cm): 80 cm  Tube Type: Nasoduodenal  NG/OG Tube Size: 12 Fr  Tube Location: Right nostril  Difficulty with Placement: No   Number of days: 1       External Urinary Catheter Male (Active)   Placement Date/Time: 07/08/25 1400   Placed by: Neda Sam RN  Hand Hygiene Completed: Yes  External Catheter Type: Male   Number of days: 4         CODE STATUS: Full Code    Disposition: Will remain in ICU    RESTRAINTS: type: I agree with nursing assessment of the patient´s need for restraints to protect the patient from injury and facilitate healing. The patient is unable to cooperate with the plan of care and at risk for disrupting critical therapy (i.e., removing medical devices, lines, tubes and/or dressings).  Please see order for specifics. Restraints can be removed when the patient is able to cooperate with plan of care and allow healing to occur, or the medical devices at risk are discontinued by the medical team.    ABCDEF Checklist  Analgesia: Spontaneous awakening trial to be pursued if clinically appropriate. RASS goal reviewed  Breathing: Spontaneous breathing trial to be pursued if clinically appropriate. Mechanical power of assisted ventilation reviewed  Choice of analgesia/sedation: Analgesic and sedative agents adjusted per clinical context.   Delirium assessed by CAM, will avoid exacerbating factors  Early mobility and exercise: Physical and occupational therapy engaged  Family: Plan of care, overall trajectory of patient shared with family. Questions elicited and answered as appropriate.     Due to the high probability of life threatening clinical decompensation, the patient required critical care time evaluating and managing this patient.  Critical care time  included obtaining a history, examining the patient, ordering and reviewing studies, discussing, developing, and implementing a management plan, evaluating the patient's response to treatment, and discussion with other care team providers. I saw and evaluated the patient myself.  Critical care time was performed exclusive of billable procedures.      Case discussed with attending , Dr. Ye Frost, DO         [1] [Held by provider] amLODIPine, 10 mg, oral, Daily  ampicillin-sulbactam, 3 g, intravenous, q6h  [Held by provider] cyanocobalamin, 100 mcg, oral, Daily  [Held by provider] docusate sodium, 100 mg, oral, BID  enoxaparin, 40 mg, subcutaneous, q24h  etomidate, 10 mg, intravenous, Once  [Held by provider] hydrALAZINE, 25 mg, oral, TID  [Held by provider] insulin glargine, 5 Units, subcutaneous, q24h  [Held by provider] insulin lispro, 0-10 Units, subcutaneous, Before meals & nightly  ipratropium-albuteroL, 3 mL, nebulization, q6h  lidocaine, 1 patch, transdermal, Daily  lidocaine, 1 patch, transdermal, Daily  [Held by provider] losartan, 100 mg, oral, Daily  [Held by provider] metoprolol tartrate, 50 mg, oral, BID  [Held by provider] mirtazapine, 7.5 mg, oral, Nightly  [Held by provider] multivitamin with minerals, 1 tablet, nasoduodenal tube, Daily  oxygen, , inhalation, Continuous - Inhalation  perflutren lipid microspheres, 0.5-10 mL of dilution, intravenous, Once in imaging  perflutren protein A microsphere, 0.5 mL, intravenous, Once in imaging  [Held by provider] polyethylene glycol, 17 g, oral, Daily  [Held by provider] QUEtiapine, 25 mg, oral, Nightly  sod phos di, mono-K phos mono, 250 mg, orogastric tube, 4x daily  sodium chloride, 3 mL, nebulization, q6h FABRICIO  sulfur hexafluoride microsphr, 2 mL, intravenous, Once in imaging  thiamine, 100 mg, intravenous, Daily  [2] PRN medications: bisacodyl, dextromethorphan-guaifenesin, dextrose, dextrose, etomidate, glucagon, glucagon,  ipratropium-albuteroL, rocuronium  [3] fentaNYL, 0-200 mcg/hr, Last Rate: 50 mcg/hr (07/12/25 2000)  norepinephrine, 0-0.5 mcg/kg/min, Last Rate: Stopped (07/12/25 1940)

## 2025-07-13 NOTE — CARE PLAN
Problem: Pain - Adult  Goal: Verbalizes/displays adequate comfort level or baseline comfort level  Outcome: Not Progressing     Problem: Discharge Planning  Goal: Discharge to home or other facility with appropriate resources  Outcome: Not Progressing     Problem: Safety - Medical Restraint  Goal: Remains free of injury from restraints (Restraint for Interference with Medical Device)  Outcome: Not Progressing  Goal: Free from restraint(s) (Restraint for Interference with Medical Device)  Outcome: Not Progressing     Problem: Chronic Conditions and Co-morbidities  Goal: Patient's chronic conditions and co-morbidity symptoms are monitored and maintained or improved  Outcome: Progressing     Problem: Nutrition  Goal: Nutrient intake appropriate for maintaining nutritional needs  Outcome: Progressing     Problem: Pain  Goal: Takes deep breaths with improved pain control throughout the shift  Outcome: Progressing  Goal: Turns in bed with improved pain control throughout the shift  Outcome: Progressing  Goal: Walks with improved pain control throughout the shift  Outcome: Progressing     Problem: Skin  Goal: Prevent/minimize sheer/friction injuries  Outcome: Progressing  Flowsheets (Taken 7/13/2025 4143)  Prevent/minimize sheer/friction injuries:   HOB 30 degrees or less   Turn/reposition every 2 hours/use positioning/transfer devices  Goal: Promote/optimize nutrition  Outcome: Progressing  Goal: Promote skin healing  Outcome: Progressing     Problem: Diabetes  Goal: Achieve decreasing blood glucose levels by end of shift  Outcome: Progressing  Goal: Increase stability of blood glucose readings by end of shift  Outcome: Progressing  Goal: Maintain electrolyte levels within acceptable range throughout shift  Outcome: Progressing  Goal: Maintain glucose levels >70mg/dl to <250mg/dl throughout shift  Outcome: Progressing  Goal: No changes in neurological exam by end of shift  Outcome: Progressing  Goal: Learn about and  adhere to nutrition recommendations by end of shift  Outcome: Progressing  Goal: Vital signs within normal range for age by end of shift  Outcome: Progressing  Goal: Increase self care and/or family involovement by end of shift  Outcome: Progressing  Goal: Receive DSME education by end of shift  Outcome: Progressing     Problem: Safety - Adult  Goal: Free from fall injury  Outcome: Met     Problem: Fall/Injury  Goal: Not fall by end of shift  Outcome: Met  Goal: Be free from injury by end of the shift  Outcome: Met  Goal: Use assistive devices by end of the shift  Outcome: Met  Goal: Pace activities to prevent fatigue by end of the shift  Outcome: Met   The patient's goals for the shift include      The clinical goals for the shift include Patient will remain hemodynamically stable throughout the shift as weaning off levophed    Problem: Pain - Adult  Goal: Verbalizes/displays adequate comfort level or baseline comfort level  Outcome: Not Progressing     Problem: Discharge Planning  Goal: Discharge to home or other facility with appropriate resources  Outcome: Not Progressing     Problem: Safety - Medical Restraint  Goal: Remains free of injury from restraints (Restraint for Interference with Medical Device)  Outcome: Not Progressing  Goal: Free from restraint(s) (Restraint for Interference with Medical Device)  Outcome: Not Progressing

## 2025-07-13 NOTE — CARE PLAN
Pt. Will remain safe this shift.    Pt. Will tolerate q2hr turns to prevent skin breakdown this shift.    Pt. Will remain hemodynamically stable this shift.    1827:  Pt. Has remained safe this shift.  Pt. Has tolerated q2hr turns to prevent skin breakdown this shift.  Pt has remained hemodynamically stable this shift.

## 2025-07-13 NOTE — NURSING NOTE
Updated family member Ekta Mccartney over the phone. Family member was also updated earlier in the morning by resident and attending.

## 2025-07-13 NOTE — NURSING NOTE
0715:  Report received from previous shift RN.  See shift assessment, vital signs, care plan and education.  Chart check done.    0710:  Dr. Frost in to see pt.  Updated doctor on pt's condition.  See orders.  Continue to monitor.    0950:  Dr. Belcher and critical care team in to see pt.  Updated team on pt's condition.  See orders.  Continue to monitor.

## 2025-07-14 LAB
ALBUMIN SERPL BCP-MCNC: 2.7 G/DL (ref 3.4–5)
ALBUMIN SERPL BCP-MCNC: 2.8 G/DL (ref 3.4–5)
ALP SERPL-CCNC: 59 U/L (ref 33–136)
ALP SERPL-CCNC: 62 U/L (ref 33–136)
ALT SERPL W P-5'-P-CCNC: 10 U/L (ref 10–52)
ALT SERPL W P-5'-P-CCNC: 10 U/L (ref 10–52)
ANION GAP SERPL CALC-SCNC: 14 MMOL/L (ref 10–20)
ANION GAP SERPL CALC-SCNC: 17 MMOL/L (ref 10–20)
AST SERPL W P-5'-P-CCNC: 11 U/L (ref 9–39)
AST SERPL W P-5'-P-CCNC: 9 U/L (ref 9–39)
BASOPHILS # BLD AUTO: 0.02 X10*3/UL (ref 0–0.1)
BASOPHILS NFR BLD AUTO: 0.2 %
BILIRUB SERPL-MCNC: 0.4 MG/DL (ref 0–1.2)
BILIRUB SERPL-MCNC: 0.5 MG/DL (ref 0–1.2)
BUN SERPL-MCNC: 31 MG/DL (ref 6–23)
BUN SERPL-MCNC: 39 MG/DL (ref 6–23)
CALCIUM SERPL-MCNC: 8.5 MG/DL (ref 8.6–10.3)
CALCIUM SERPL-MCNC: 8.5 MG/DL (ref 8.6–10.3)
CHLORIDE SERPL-SCNC: 102 MMOL/L (ref 98–107)
CHLORIDE SERPL-SCNC: 103 MMOL/L (ref 98–107)
CO2 SERPL-SCNC: 26 MMOL/L (ref 21–32)
CO2 SERPL-SCNC: 27 MMOL/L (ref 21–32)
CREAT SERPL-MCNC: 1.18 MG/DL (ref 0.5–1.3)
CREAT SERPL-MCNC: 1.3 MG/DL (ref 0.5–1.3)
EGFRCR SERPLBLD CKD-EPI 2021: 58 ML/MIN/1.73M*2
EGFRCR SERPLBLD CKD-EPI 2021: 65 ML/MIN/1.73M*2
EOSINOPHIL # BLD AUTO: 0.01 X10*3/UL (ref 0–0.4)
EOSINOPHIL NFR BLD AUTO: 0.1 %
ERYTHROCYTE [DISTWIDTH] IN BLOOD BY AUTOMATED COUNT: 12.9 % (ref 11.5–14.5)
GLUCOSE BLD MANUAL STRIP-MCNC: 158 MG/DL (ref 74–99)
GLUCOSE BLD MANUAL STRIP-MCNC: 186 MG/DL (ref 74–99)
GLUCOSE BLD MANUAL STRIP-MCNC: 294 MG/DL (ref 74–99)
GLUCOSE BLD MANUAL STRIP-MCNC: 322 MG/DL (ref 74–99)
GLUCOSE BLD MANUAL STRIP-MCNC: 344 MG/DL (ref 74–99)
GLUCOSE BLD MANUAL STRIP-MCNC: 388 MG/DL (ref 74–99)
GLUCOSE SERPL-MCNC: 227 MG/DL (ref 74–99)
GLUCOSE SERPL-MCNC: 354 MG/DL (ref 74–99)
HCT VFR BLD AUTO: 32.6 % (ref 41–52)
HGB BLD-MCNC: 10.4 G/DL (ref 13.5–17.5)
IMM GRANULOCYTES # BLD AUTO: 0.05 X10*3/UL (ref 0–0.5)
IMM GRANULOCYTES NFR BLD AUTO: 0.5 % (ref 0–0.9)
LYMPHOCYTES # BLD AUTO: 0.45 X10*3/UL (ref 0.8–3)
LYMPHOCYTES NFR BLD AUTO: 4.6 %
MAGNESIUM SERPL-MCNC: 2.18 MG/DL (ref 1.6–2.4)
MAGNESIUM SERPL-MCNC: 2.37 MG/DL (ref 1.6–2.4)
MCH RBC QN AUTO: 33.5 PG (ref 26–34)
MCHC RBC AUTO-ENTMCNC: 31.9 G/DL (ref 32–36)
MCV RBC AUTO: 105 FL (ref 80–100)
MONOCYTES # BLD AUTO: 0.83 X10*3/UL (ref 0.05–0.8)
MONOCYTES NFR BLD AUTO: 8.5 %
NEUTROPHILS # BLD AUTO: 8.41 X10*3/UL (ref 1.6–5.5)
NEUTROPHILS NFR BLD AUTO: 86.1 %
NRBC BLD-RTO: 0 /100 WBCS (ref 0–0)
PHOSPHATE SERPL-MCNC: 3.9 MG/DL (ref 2.5–4.9)
PLATELET # BLD AUTO: 198 X10*3/UL (ref 150–450)
POTASSIUM SERPL-SCNC: 4.1 MMOL/L (ref 3.5–5.3)
POTASSIUM SERPL-SCNC: 4.2 MMOL/L (ref 3.5–5.3)
PROT SERPL-MCNC: 5.8 G/DL (ref 6.4–8.2)
PROT SERPL-MCNC: 5.9 G/DL (ref 6.4–8.2)
RBC # BLD AUTO: 3.1 X10*6/UL (ref 4.5–5.9)
SODIUM SERPL-SCNC: 139 MMOL/L (ref 136–145)
SODIUM SERPL-SCNC: 142 MMOL/L (ref 136–145)
WBC # BLD AUTO: 9.8 X10*3/UL (ref 4.4–11.3)

## 2025-07-14 PROCEDURE — 2020000001 HC ICU ROOM DAILY

## 2025-07-14 PROCEDURE — 71045 X-RAY EXAM CHEST 1 VIEW: CPT | Performed by: STUDENT IN AN ORGANIZED HEALTH CARE EDUCATION/TRAINING PROGRAM

## 2025-07-14 PROCEDURE — 94003 VENT MGMT INPAT SUBQ DAY: CPT

## 2025-07-14 PROCEDURE — 2500000004 HC RX 250 GENERAL PHARMACY W/ HCPCS (ALT 636 FOR OP/ED)

## 2025-07-14 PROCEDURE — 36415 COLL VENOUS BLD VENIPUNCTURE: CPT

## 2025-07-14 PROCEDURE — 99221 1ST HOSP IP/OBS SF/LOW 40: CPT

## 2025-07-14 PROCEDURE — 71045 X-RAY EXAM CHEST 1 VIEW: CPT | Performed by: RADIOLOGY

## 2025-07-14 PROCEDURE — 2500000005 HC RX 250 GENERAL PHARMACY W/O HCPCS

## 2025-07-14 PROCEDURE — 85025 COMPLETE CBC W/AUTO DIFF WBC: CPT

## 2025-07-14 PROCEDURE — 83735 ASSAY OF MAGNESIUM: CPT

## 2025-07-14 PROCEDURE — 82947 ASSAY GLUCOSE BLOOD QUANT: CPT

## 2025-07-14 PROCEDURE — 94669 MECHANICAL CHEST WALL OSCILL: CPT

## 2025-07-14 PROCEDURE — 2500000002 HC RX 250 W HCPCS SELF ADMINISTERED DRUGS (ALT 637 FOR MEDICARE OP, ALT 636 FOR OP/ED)

## 2025-07-14 PROCEDURE — 80053 COMPREHEN METABOLIC PANEL: CPT

## 2025-07-14 PROCEDURE — 94640 AIRWAY INHALATION TREATMENT: CPT

## 2025-07-14 PROCEDURE — 70450 CT HEAD/BRAIN W/O DYE: CPT | Performed by: RADIOLOGY

## 2025-07-14 PROCEDURE — 84100 ASSAY OF PHOSPHORUS: CPT

## 2025-07-14 PROCEDURE — 99292 CRITICAL CARE ADDL 30 MIN: CPT | Performed by: INTERNAL MEDICINE

## 2025-07-14 PROCEDURE — 99291 CRITICAL CARE FIRST HOUR: CPT | Performed by: INTERNAL MEDICINE

## 2025-07-14 RX ORDER — INSULIN LISPRO 100 [IU]/ML
0-10 INJECTION, SOLUTION INTRAVENOUS; SUBCUTANEOUS
Status: DISCONTINUED | OUTPATIENT
Start: 2025-07-15 | End: 2025-07-14

## 2025-07-14 RX ORDER — ETOMIDATE 2 MG/ML
INJECTION INTRAVENOUS CODE/TRAUMA/SEDATION MEDICATION
Status: COMPLETED | OUTPATIENT
Start: 2025-07-13 | End: 2025-07-13

## 2025-07-14 RX ORDER — ROCURONIUM BROMIDE 10 MG/ML
INJECTION, SOLUTION INTRAVENOUS CODE/TRAUMA/SEDATION MEDICATION
Status: COMPLETED | OUTPATIENT
Start: 2025-07-13 | End: 2025-07-13

## 2025-07-14 RX ORDER — INSULIN LISPRO 100 [IU]/ML
0-10 INJECTION, SOLUTION INTRAVENOUS; SUBCUTANEOUS
Status: DISCONTINUED | OUTPATIENT
Start: 2025-07-14 | End: 2025-07-14

## 2025-07-14 RX ORDER — INSULIN LISPRO 100 [IU]/ML
0-10 INJECTION, SOLUTION INTRAVENOUS; SUBCUTANEOUS EVERY 4 HOURS
Status: DISCONTINUED | OUTPATIENT
Start: 2025-07-14 | End: 2025-07-15 | Stop reason: HOSPADM

## 2025-07-14 RX ORDER — DEXAMETHASONE SODIUM PHOSPHATE 10 MG/ML
6 INJECTION INTRAMUSCULAR; INTRAVENOUS EVERY 8 HOURS
Status: COMPLETED | OUTPATIENT
Start: 2025-07-14 | End: 2025-07-14

## 2025-07-14 RX ADMIN — INSULIN LISPRO 1 UNITS: 100 INJECTION, SOLUTION INTRAVENOUS; SUBCUTANEOUS at 02:18

## 2025-07-14 RX ADMIN — LIDOCAINE 4% 1 PATCH: 40 PATCH TOPICAL at 09:55

## 2025-07-14 RX ADMIN — SODIUM CHLORIDE SOLN NEBU 3% 3 ML: 3 NEBU SOLN at 03:33

## 2025-07-14 RX ADMIN — POLYETHYLENE GLYCOL 3350 17 G: 17 POWDER, FOR SOLUTION ORAL at 09:54

## 2025-07-14 RX ADMIN — SODIUM CHLORIDE SOLN NEBU 3% 3 ML: 3 NEBU SOLN at 09:48

## 2025-07-14 RX ADMIN — AMPICILLIN SODIUM AND SULBACTAM SODIUM 3 G: 2; 1 INJECTION, POWDER, FOR SOLUTION INTRAMUSCULAR; INTRAVENOUS at 16:50

## 2025-07-14 RX ADMIN — AMPICILLIN SODIUM AND SULBACTAM SODIUM 3 G: 2; 1 INJECTION, POWDER, FOR SOLUTION INTRAMUSCULAR; INTRAVENOUS at 05:00

## 2025-07-14 RX ADMIN — SODIUM CHLORIDE SOLN NEBU 3% 3 ML: 3 NEBU SOLN at 14:53

## 2025-07-14 RX ADMIN — Medication 75 MCG/HR: at 05:00

## 2025-07-14 RX ADMIN — SODIUM CHLORIDE SOLN NEBU 3% 3 ML: 3 NEBU SOLN at 20:19

## 2025-07-14 RX ADMIN — AMPICILLIN SODIUM AND SULBACTAM SODIUM 3 G: 2; 1 INJECTION, POWDER, FOR SOLUTION INTRAMUSCULAR; INTRAVENOUS at 21:04

## 2025-07-14 RX ADMIN — DEXAMETHASONE SODIUM PHOSPHATE 6 MG: 10 INJECTION INTRAMUSCULAR; INTRAVENOUS at 09:54

## 2025-07-14 RX ADMIN — Medication 45 PERCENT: at 20:20

## 2025-07-14 RX ADMIN — DEXAMETHASONE SODIUM PHOSPHATE 6 MG: 10 INJECTION INTRAMUSCULAR; INTRAVENOUS at 16:50

## 2025-07-14 RX ADMIN — INSULIN LISPRO 3 UNITS: 100 INJECTION, SOLUTION INTRAVENOUS; SUBCUTANEOUS at 09:54

## 2025-07-14 RX ADMIN — IPRATROPIUM BROMIDE AND ALBUTEROL SULFATE 3 ML: 2.5; .5 SOLUTION RESPIRATORY (INHALATION) at 09:48

## 2025-07-14 RX ADMIN — INSULIN LISPRO 10 UNITS: 100 INJECTION, SOLUTION INTRAVENOUS; SUBCUTANEOUS at 22:11

## 2025-07-14 RX ADMIN — IPRATROPIUM BROMIDE AND ALBUTEROL SULFATE 3 ML: 2.5; .5 SOLUTION RESPIRATORY (INHALATION) at 20:19

## 2025-07-14 RX ADMIN — INSULIN LISPRO 4 UNITS: 100 INJECTION, SOLUTION INTRAVENOUS; SUBCUTANEOUS at 13:03

## 2025-07-14 RX ADMIN — PROPOFOL 15 MCG/KG/MIN: 10 INJECTION, EMULSION INTRAVENOUS at 17:04

## 2025-07-14 RX ADMIN — AMPICILLIN SODIUM AND SULBACTAM SODIUM 3 G: 2; 1 INJECTION, POWDER, FOR SOLUTION INTRAMUSCULAR; INTRAVENOUS at 10:11

## 2025-07-14 RX ADMIN — IPRATROPIUM BROMIDE AND ALBUTEROL SULFATE 3 ML: 2.5; .5 SOLUTION RESPIRATORY (INHALATION) at 03:33

## 2025-07-14 RX ADMIN — DEXAMETHASONE SODIUM PHOSPHATE 6 MG: 10 INJECTION INTRAMUSCULAR; INTRAVENOUS at 02:15

## 2025-07-14 RX ADMIN — IPRATROPIUM BROMIDE AND ALBUTEROL SULFATE 3 ML: 2.5; .5 SOLUTION RESPIRATORY (INHALATION) at 14:53

## 2025-07-14 RX ADMIN — ENOXAPARIN SODIUM 40 MG: 100 INJECTION SUBCUTANEOUS at 16:49

## 2025-07-14 RX ADMIN — INSULIN LISPRO 4 UNITS: 100 INJECTION, SOLUTION INTRAVENOUS; SUBCUTANEOUS at 16:49

## 2025-07-14 RX ADMIN — Medication 75 MCG/HR: at 18:51

## 2025-07-14 RX ADMIN — Medication 80 PERCENT: at 08:00

## 2025-07-14 ASSESSMENT — COGNITIVE AND FUNCTIONAL STATUS - GENERAL
TOILETING: TOTAL
STANDING UP FROM CHAIR USING ARMS: TOTAL
MOBILITY SCORE: 6
DRESSING REGULAR UPPER BODY CLOTHING: TOTAL
HELP NEEDED FOR BATHING: TOTAL
CLIMB 3 TO 5 STEPS WITH RAILING: TOTAL
WALKING IN HOSPITAL ROOM: TOTAL
TURNING FROM BACK TO SIDE WHILE IN FLAT BAD: TOTAL
MOVING FROM LYING ON BACK TO SITTING ON SIDE OF FLAT BED WITH BEDRAILS: TOTAL
DRESSING REGULAR LOWER BODY CLOTHING: TOTAL
PERSONAL GROOMING: TOTAL
DAILY ACTIVITIY SCORE: 6
EATING MEALS: TOTAL
MOVING TO AND FROM BED TO CHAIR: TOTAL

## 2025-07-14 ASSESSMENT — PAIN SCALES - GENERAL
PAINLEVEL_OUTOF10: 0 - NO PAIN
PAINLEVEL_OUTOF10: 0 - NO PAIN

## 2025-07-14 ASSESSMENT — PAIN - FUNCTIONAL ASSESSMENT
PAIN_FUNCTIONAL_ASSESSMENT: CPOT (CRITICAL CARE PAIN OBSERVATION TOOL)

## 2025-07-14 NOTE — CONSULTS
Consults    Reason For Consult  Reason for Consult: communication / medical decision making and goals of care.      History Of Present Illness  Jorge A Madden is a 75 y.o. male with past medical history of pancreatitis is presenting with general malaise, weakness and poor oral intake with SOB.      BM in last 48 hours? yes           Personal/Social History   He reports that he has been smoking cigarettes. He has a 30 pack-year smoking history. He has never used smokeless tobacco. He reports that he does not drink alcohol and does not use drugs.        Caregiving/Caregiver Support  Does the patient require assistance in some or all components of his care, including coordination of medical care? Yes  If Yes, which person serves that role?  partner   Caregiver emotional or practical needs:      Past Medical History  He has no past medical history on file.    Surgical History  He has a past surgical history that includes Other surgical history (11/14/2019) and Intubation (7/6/2025).     Family History  Family History[1]  Allergies  Patient has no known allergies.    Review of Systems   Unable to perform ROS: Intubated        Physical Exam  Constitutional:       Appearance: He is ill-appearing and toxic-appearing.      Comments: cachectic   HENT:      Head: Normocephalic.      Right Ear: External ear normal.      Left Ear: External ear normal.      Nose: Nose normal.      Mouth/Throat:      Mouth: Mucous membranes are dry.   Eyes:      Conjunctiva/sclera: Conjunctivae normal.   Cardiovascular:      Rate and Rhythm: Normal rate and regular rhythm.      Pulses: Normal pulses.   Pulmonary:      Comments: Intubated  Abdominal:      General: Abdomen is flat.      Palpations: Abdomen is soft.   Musculoskeletal:      Comments: MAEx4 but currently sedated   Skin:     General: Skin is dry.      Capillary Refill: Capillary refill takes 2 to 3 seconds.   Neurological:      Sensory: Sensory deficit present.      Motor: Weakness  "present.         Last Recorded Vitals  Blood pressure 125/72, pulse 74, temperature 36.2 °C (97.2 °F), temperature source Temporal, resp. rate 21, height 1.778 m (5' 10\"), weight 57.3 kg (126 lb 4.8 oz), SpO2 100%.    Relevant Results  Scheduled Medications[2]   Results for orders placed or performed during the hospital encounter of 07/02/25 (from the past 24 hours)   POCT GLUCOSE   Result Value Ref Range    POCT Glucose 154 (H) 74 - 99 mg/dL   POCT GLUCOSE   Result Value Ref Range    POCT Glucose 104 (H) 74 - 99 mg/dL   Magnesium   Result Value Ref Range    Magnesium 2.17 1.60 - 2.40 mg/dL   Comprehensive metabolic panel   Result Value Ref Range    Glucose 100 (H) 74 - 99 mg/dL    Sodium 140 136 - 145 mmol/L    Potassium 3.7 3.5 - 5.3 mmol/L    Chloride 102 98 - 107 mmol/L    Bicarbonate 29 21 - 32 mmol/L    Anion Gap 13 10 - 20 mmol/L    Urea Nitrogen 27 (H) 6 - 23 mg/dL    Creatinine 1.15 0.50 - 1.30 mg/dL    eGFR 67 >60 mL/min/1.73m*2    Calcium 8.8 8.6 - 10.3 mg/dL    Albumin 2.9 (L) 3.4 - 5.0 g/dL    Alkaline Phosphatase 60 33 - 136 U/L    Total Protein 6.1 (L) 6.4 - 8.2 g/dL    AST 12 9 - 39 U/L    Bilirubin, Total 0.6 0.0 - 1.2 mg/dL    ALT 10 10 - 52 U/L   Blood Gas Arterial Full Panel Unsolicited   Result Value Ref Range    POCT pH, Arterial 7.39 7.38 - 7.42 pH    POCT pCO2, Arterial 50 (H) 38 - 42 mm Hg    POCT pO2, Arterial 41 (LL) 85 - 95 mm Hg    POCT SO2, Arterial 75 (L) 94 - 100 %    POCT Oxy Hemoglobin, Arterial 73.6 (L) 94.0 - 98.0 %    POCT Hematocrit Calculated, Arterial 35.0 (L) 41.0 - 52.0 %    POCT Sodium, Arterial 137 136 - 145 mmol/L    POCT Potassium, Arterial 3.7 3.5 - 5.3 mmol/L    POCT Chloride, Arterial 102 98 - 107 mmol/L    POCT Ionized Calcium, Arterial 1.21 1.10 - 1.33 mmol/L    POCT Glucose, Arterial 153 (H) 74 - 99 mg/dL    POCT Lactate, Arterial 0.7 0.4 - 2.0 mmol/L    POCT Base Excess, Arterial 4.4 (H) -2.0 - 3.0 mmol/L    POCT HCO3 Calculated, Arterial 30.3 (H) 22.0 - 26.0 " mmol/L    POCT Hemoglobin, Arterial 11.8 (L) 13.5 - 17.5 g/dL    POCT Anion Gap, Arterial 8 (L) 10 - 25 mmo/L    Patient Temperature      FiO2 100 %    Ventilator Mode A/C     Ventilator Rate 20 bpm    Tidal Volume 450 mL    Peep CHM2O 10.0 cm H2O    Critical Called By TONYA SOMMERS RRT     Critical Called To DR. MARTINEZ     Critical Call Time 2206     Critical Read Back Y     Site of Arterial Puncture LR     Kuldip's Test N/A    POCT GLUCOSE   Result Value Ref Range    POCT Glucose 186 (H) 74 - 99 mg/dL   POCT GLUCOSE   Result Value Ref Range    POCT Glucose 158 (H) 74 - 99 mg/dL   CBC and Auto Differential   Result Value Ref Range    WBC 9.8 4.4 - 11.3 x10*3/uL    nRBC 0.0 0.0 - 0.0 /100 WBCs    RBC 3.10 (L) 4.50 - 5.90 x10*6/uL    Hemoglobin 10.4 (L) 13.5 - 17.5 g/dL    Hematocrit 32.6 (L) 41.0 - 52.0 %     (H) 80 - 100 fL    MCH 33.5 26.0 - 34.0 pg    MCHC 31.9 (L) 32.0 - 36.0 g/dL    RDW 12.9 11.5 - 14.5 %    Platelets 198 150 - 450 x10*3/uL    Neutrophils % 86.1 40.0 - 80.0 %    Immature Granulocytes %, Automated 0.5 0.0 - 0.9 %    Lymphocytes % 4.6 13.0 - 44.0 %    Monocytes % 8.5 2.0 - 10.0 %    Eosinophils % 0.1 0.0 - 6.0 %    Basophils % 0.2 0.0 - 2.0 %    Neutrophils Absolute 8.41 (H) 1.60 - 5.50 x10*3/uL    Immature Granulocytes Absolute, Automated 0.05 0.00 - 0.50 x10*3/uL    Lymphocytes Absolute 0.45 (L) 0.80 - 3.00 x10*3/uL    Monocytes Absolute 0.83 (H) 0.05 - 0.80 x10*3/uL    Eosinophils Absolute 0.01 0.00 - 0.40 x10*3/uL    Basophils Absolute 0.02 0.00 - 0.10 x10*3/uL   Phosphorus   Result Value Ref Range    Phosphorus 3.9 2.5 - 4.9 mg/dL   Comprehensive metabolic panel   Result Value Ref Range    Glucose 227 (H) 74 - 99 mg/dL    Sodium 142 136 - 145 mmol/L    Potassium 4.1 3.5 - 5.3 mmol/L    Chloride 103 98 - 107 mmol/L    Bicarbonate 26 21 - 32 mmol/L    Anion Gap 17 10 - 20 mmol/L    Urea Nitrogen 31 (H) 6 - 23 mg/dL    Creatinine 1.18 0.50 - 1.30 mg/dL    eGFR 65 >60 mL/min/1.73m*2    Calcium  8.5 (L) 8.6 - 10.3 mg/dL    Albumin 2.8 (L) 3.4 - 5.0 g/dL    Alkaline Phosphatase 62 33 - 136 U/L    Total Protein 5.9 (L) 6.4 - 8.2 g/dL    AST 11 9 - 39 U/L    Bilirubin, Total 0.5 0.0 - 1.2 mg/dL    ALT 10 10 - 52 U/L   Magnesium   Result Value Ref Range    Magnesium 2.18 1.60 - 2.40 mg/dL   POCT GLUCOSE   Result Value Ref Range    POCT Glucose 294 (H) 74 - 99 mg/dL   POCT GLUCOSE   Result Value Ref Range    POCT Glucose 322 (H) 74 - 99 mg/dL    CT head wo IV contrast  Result Date: 7/14/2025  Interpreted By:  Martinez Santiago, STUDY: CT HEAD WO IV CONTRAST;  7/14/2025 9:24 am   INDICATION: Signs/Symptoms:Unequal pupils with sluggish reaction.     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   INCLUDED PARANASAL SINUSES: All clear   INCLUDED MASTOID AIR CELLS: All clear   SKULL:  No lytic or blastic lesion   EXTRACRANIAL SOFT TISSUES:  Scalp and occular globes grossly normal by CT       Asymmetric large hypodensity in subcortical and deep parietal lobe white matter but no evidence of acute cortical/territorial infarct and no acute intracranial hemorrhage   Old infarct/encephalomalacia in small area of upper left cerebellar hemisphere   MACRO: None   Signed by: Martinez Santiago 7/14/2025 9:43 AM Dictation workstation:   [id number]    XR chest 1 view  Result Date: 7/14/2025  Interpreted By:  Chuck Ivan, STUDY: XR CHEST 1 VIEW;  7/14/2025 8:02 am   INDICATION:  Signs/Symptoms:concern for pneumothorax.   COMPARISON: 07/14/2025   ACCESSION NUMBER(S): [id number]   ORDERING CLINICIAN: NHUNG ARREOLA   FINDINGS: AP radiograph of the chest was provided.   DEVICES: Stable endotracheal and feeding tubes.   CARDIOMEDIASTINAL SILHOUETTE: Cardiomediastinal silhouette is normal in size and configuration.No significant atherosclerotic calcification.   LUNGS: No focal consolidation. No pneumothorax. No pleural effusion.   BONES: No acute osseous changes.       1.  No evidence of a pneumothorax.     Signed by: Chuck Ivan 7/14/2025 8:23 AM Dictation workstation:   [id number]    XR chest 1 view  Result Date: 7/14/2025  Interpreted By:  Charlie Shin, STUDY: XR CHEST 1 VIEW;  7/14/2025 6:21 am   INDICATION: Signs/Symptoms:follow up left lower lobe collapse.   COMPARISON: Chest x-ray from yesterday   ACCESSION NUMBER(S): [id number]   ORDERING CLINICIAN: DONG BULLOCK   FINDINGS: Support tube still present. Left mid to lower lung opacities persist. There is linear lucency along the left mid lung. Right basilar opacities have decreased. Cardiomediastinal silhouette unchanged. No pulmonary vascular congestion.       Small linear lucency along the left mid lung questionably pneumothorax. There appear to be lung markings extending beyond this site which favors an artifact perhaps related to skin fold. Suggested repeat radiograph in the right lateral decubitus position or on expiration for further evaluation. Alternatively, consider CT. Left mid to lower lung opacities persist.   MACRO: None   Signed by: Charlie Shin 7/14/2025 7:31 AM Dictation workstation:   [id number]    XR chest 1 view  Result Date: 7/14/2025  Interpreted By:  Charlie Shin, STUDY: XR CHEST 1 VIEW;  7/13/2025 6:31 am   INDICATION: Signs/Symptoms:serial exam while intubated.   COMPARISON: 07/12/2025   ACCESSION NUMBER(S): [id number]   ORDERING CLINICIAN: NHUNG ARREOLA   FINDINGS: Support tubes still  present. Left mid lung opacities have decreased. Bibasilar opacities persist. Cardiomediastinal silhouette unchanged. No pulmonary vascular congestion.       Improved aeration of left mid lung. Bibasilar opacities otherwise persist.   MACRO: None   Signed by: Charlie Shin 7/14/2025 7:27 AM Dictation workstation:   [id number]    XR chest 1 view  Result Date: 7/13/2025  Interpreted By:  Chuck Ivan, STUDY: XR CHEST 1 VIEW;  7/13/2025 9:53 pm   INDICATION: Signs/Symptoms:post intubation.   COMPARISON: Chest radiograph 07/13/2025   ACCESSION NUMBER(S): [id number]   ORDERING CLINICIAN: NHUNG ARREOLA   FINDINGS: AP radiograph of the chest was provided.   DEVICES: Endotracheal tube 3.9 cm from the tammi. Stable Dobbhoff catheter.   CARDIOMEDIASTINAL SILHOUETTE: Cardiomediastinal silhouette is stable in size and configuration.No significant atherosclerotic calcification.   LUNGS: No focal consolidation. Bibasilar atelectasis. No pneumothorax. No pleural effusion   BONES: No acute osseous changes.       1.  No evidence of acute cardiopulmonary process.     Signed by: Chuck Ivan 7/13/2025 10:04 PM Dictation workstation:   [id number]    ECG 12 lead  Result Date: 7/12/2025  Normal sinus rhythm Left axis deviation Left ventricular hypertrophy with repolarization abnormality Cannot rule out Septal infarct , age undetermined Abnormal ECG No previous ECGs available Confirmed by Socrates Yu ([id number]) on 7/12/2025 6:02:21 PM    XR chest 1 view  Result Date: 7/12/2025  Interpreted By:  Trice Hughes, STUDY: XR CHEST 1 VIEW;  7/12/2025 12:00 pm   INDICATION: Signs/Symptoms:ET tube placement.     COMPARISON: 07/11/2025 chest x-ray and collapse   ACCESSION NUMBER(S): [id number]   ORDERING CLINICIAN: DONG BULLOCK   TECHNIQUE: Portable AP upright   FINDINGS: Endotracheal tube projects about 5 cm above the tammi. Feeding tube extends below the diaphragm and beyond the image.   Cardiac silhouette is normal in  size. Left basilar opacity is worse than the prior study and corresponds to the left lower lobe consolidation and collapse on the prior CT. Patchy infiltrate is present medial right lung base and appears similar to the prior study. No pleural effusion is noted. No acute interval osseous abnormality is identified.       Slightly worse left basilar opacity correlates with the left lower lobe consolidation and volume loss from the prior CT   Unchanged patchy infiltrate medial right lung base   MACRO: None.   Signed by: Trice Hughes 7/12/2025 1:32 PM Dictation workstation:   [id number]    CT angio chest for pulmonary embolism  Result Date: 7/11/2025  Interpreted By:  Sue Cantrell, STUDY: CT ANGIO CHEST FOR PULMONARY EMBOLISM;  7/11/2025 8:36 pm   INDICATION: Signs/Symptoms:worsening left sided rhonchi, hypoxia requiring bipap   COMPARISON: Chest x-ray 07/11/2025. CT angiogram chest and CT abdomen and pelvis 07/06/2025.   ACCESSION NUMBER(S): [id number]   ORDERING CLINICIAN: NHUNG ARREOLA   TECHNIQUE: Helical data acquisition of the chest was obtained following the uneventful administration of intravenous contrast material. Images were reformatted in axial, coronal, and sagittal planes. MIP images were created and reviewed.   FINDINGS: POTENTIAL LIMITATIONS OF THE STUDY: Motion artifact.   HEART AND VESSELS: No discrete filling defects within the main pulmonary artery or its branches.   No thoracic aortic aneurysm.  Atherosclerotic calcifications are present at the thoracic aorta.   The heart is not enlarged.  Coronary artery calcifications are present. No evidence of pericardial effusion.   MEDIASTINUM AND TALIB, LOWER NECK AND AXILLA: The visualized thyroid gland is within normal limits.   No evidence of thoracic lymphadenopathy by CT criteria.   An enteric tube courses along the esophagus.   LUNGS AND AIRWAYS: Layering mucus noted at the bilateral mainstem bronchi. There is mucoid impaction of the  bronchi at the right lower lobe. Debris within the right mainstem bronchus and right middle lobe bronchi. There is mucoid impaction of bronchi at the left lower lobe. Debris within the left upper lobe and lingular bronchi.   Persistent complete collapse of the left lower lobe. Interval increase in the tree-in-bud airspace opacities and consolidation at the right lower lobe. No pleural effusion or pneumothorax.   UPPER ABDOMEN: The enteric tube terminates at the gastric antrum. Redemonstration of pancreatic calcifications with irregular ductal dilation at the visualized pancreas, better evaluated on prior CT abdomen and pelvis 07/06/2025.   CHEST WALL AND OSSEOUS STRUCTURES: Multilevel degenerative changes at the spine.       1.  No evidence of pulmonary emboli. 2.  Persistent complete collapse of the left lower lobe with mucoid impaction of the left lower lobe bronchi. Debris within the left upper lobe and lingular bronchi. 3. Interval worsening aspiration pneumonitis at the right lower lobe with mucoid impaction at the bronchi of the right lower lobe. Debris within the right mainstem bronchus and right middle lobe bronchi.     MACRO: None.   Signed by: Sue Cantrell 7/11/2025 10:07 PM Dictation workstation:   [id number]    ECG 12 lead  Result Date: 7/11/2025  Normal sinus rhythm Left axis deviation Septal infarct (cited on or before 06-JUL-2025) Abnormal ECG When compared with ECG of 07-JUL-2025 11:26, Vent. rate has increased BY  23 BPM QRS axis Shifted left Questionable change in initial forces of Septal leads Non-specific change in ST segment in Lateral leads    XR abdomen 1 view  Result Date: 7/11/2025  Interpreted By:  Charlie Shin, STUDY: XR ABDOMEN 1 VIEW;  7/11/2025 1:34 pm   INDICATION: Signs/Symptoms:corpak placement.   COMPARISON: None.   ACCESSION NUMBER(S): [id number]   ORDERING CLINICIAN: NHUNG ARREOLA   FINDINGS: Feeding tube tip in the distal stomach. Gas scattered throughout nondilated  small bowel in the upper abdomen.       Feeding tube terminates in the distal stomach.   MACRO: None   Signed by: Charlie Shin 7/11/2025 2:20 PM Dictation workstation:   [id number]    XR chest 1 view  Result Date: 7/11/2025  Interpreted By:  Charlie Shin, STUDY: XR CHEST 1 VIEW;  7/11/2025 1:34 pm   INDICATION: Signs/Symptoms:Sob.   COMPARISON: 07/10/2025   ACCESSION NUMBER(S): [id number]   ORDERING CLINICIAN: NHUNG ARREOLA   FINDINGS: Hazy bibasilar opacities are unchanged. No apparent pleural effusion. Cardiomediastinal silhouette unchanged. No pulmonary vascular congestion. Feeding tube has been placed.       No significant change compared to yesterday. Hazy bibasilar opacities persist.   MACRO: None   Signed by: Charlie Shin 7/11/2025 2:19 PM Dictation workstation:   [id number]    XR abdomen 1 view  Result Date: 7/11/2025  Interpreted By:  Charlie Shin, STUDY: XR ABDOMEN 1 VIEW;  7/11/2025 12:24 pm   INDICATION: Signs/Symptoms:Corpak placement.   COMPARISON: None.   ACCESSION NUMBER(S): [id number]   ORDERING CLINICIAN: SHILPA ARAUZ   FINDINGS: Feeding tube is looped in the gastric fundus with the tip directed distally, terminating in the mid gastric body.       Feeding tube tip in the mid stomach.   MACRO: None   Signed by: Charlie Shin 7/11/2025 12:45 PM Dictation workstation:   [id number]    XR chest 1 view  Result Date: 7/10/2025  Interpreted By:  Sara Torres, STUDY: XR CHEST 1 VIEW;  7/10/2025 11:06 am   INDICATION: Signs/Symptoms:increasing o2 requirements.   COMPARISON: Chest radiograph 07/08/2025 CT 07/06/2025   ACCESSION NUMBER(S): [id number]   ORDERING CLINICIAN: NHUNG ARREOLA   FINDINGS: AP radiograph of the chest was provided.       CARDIOMEDIASTINAL SILHOUETTE: Cardiomediastinal silhouette is stable in size and configuration. Atherosclerotic calcifications of the aortic arch.   LUNGS: Similar to mildly increased right lower lobe patchy airspace opacities. Unchanged  retrocardiac opacification. No pneumothorax or pulmonary edema.   ABDOMEN: No remarkable upper abdominal findings.   BONES: No acute osseous changes.       Similar to mildly increased right lower lobe patchy airspace opacities. Unchanged retrocardiac opacification. Given the debris/mucous plugging noted on prior CT, findings are suspicious for aspiration pneumonia. Continued collapse of the left lower lobe is suspected; no new lobar collapse is evident.   Signed by: Sara Torres 7/10/2025 11:15 AM Dictation workstation:   [id number]    XR chest 1 view  Result Date: 7/8/2025  Interpreted By:  Rohit Lynch, STUDY: XR CHEST 1 VIEW;  7/8/2025 7:08 pm   INDICATION: Signs/Symptoms:sob.   COMPARISON: Chest x-ray 07/08/2025   ACCESSION NUMBER(S): [id number]   ORDERING CLINICIAN: NHUNG ARREOLA   FINDINGS: Hardware overlying the left lung base slightly limits evaluation. Interval removal of endotracheal tube. Enteric tube terminates in the left upper quadrant with side hole at slightly above the GE junction similar to prior exam. This can be advanced slightly. Multiple overlying leads are present.   CARDIOMEDIASTINAL SILHOUETTE: Cardiomediastinal silhouette is stable in size and configuration. Atherosclerotic calcification of the aorta.   LUNGS: Redemonstration of retrocardiac opacity which corresponds to complete left lower lobe collapse as seen on prior CT. Right lung is clear. No pneumothorax.   ABDOMEN: No remarkable upper abdominal findings.   BONES: Multilevel degenerative changes of the spine.       Persistent retrocardiac opacity corresponding to left lower lobe atelectasis with superimposed infection not excluded. Clinical correlation and continued follow-up to ensure complete resolution is advised.   Support hardware as described above.   MACRO: None   Signed by: Rohit Lynch 7/8/2025 7:14 PM Dictation workstation:   [id number]    ECG 12 lead  Result Date: 7/8/2025  Marked sinus bradycardia Septal  infarct (cited on or before 06-JUL-2025) When compared with ECG of 06-JUL-2025 11:04, (unconfirmed) Vent. rate has decreased BY  54 BPM QRS axis Shifted right Reconfirmed by Fernando Newell ([id number]) on 7/8/2025 2:12:27 PM    XR chest 1 view  Result Date: 7/8/2025  Interpreted By:  Chuck Ivan, STUDY: XR CHEST 1 VIEW;  7/8/2025 5:44 am   INDICATION: Signs/Symptoms:intubated, interval cxr.   COMPARISON: CT chest 07/06/2025 and chest radiograph 07/07/2025   ACCESSION NUMBER(S): [id number]   ORDERING CLINICIAN: NHUNG ARREOLA   FINDINGS: AP radiograph of the chest was provided.   DEVICES: Unchanged endotracheal and feeding tubes.   CARDIOMEDIASTINAL SILHOUETTE: Cardiomediastinal silhouette is normal in size and configuration.No significant atherosclerotic calcification.   LUNGS: Stable bibasilar atelectasis. Unchanged retrocardiac opacity. No pneumothorax. No pleural effusion. D   BONES: No acute osseous changes.       1.  Stable retrocardiac opacity.     Signed by: Chuck Ivan 7/8/2025 8:11 AM Dictation workstation:   [id number]    Transthoracic Echo Complete  Result Date: 7/7/2025            [facility] [id number] [id number]    Tel [phone] Fax [phone] TRANSTHORACIC ECHOCARDIOGRAM REPORT Patient Name:       ABBY VELARDE     Reading Physician:    [id number] Socrates Yu MD Study Date:         7/7/2025             Ordering Provider:    [id number] AYLIN SCHULER MRN/PID:            [id number]             Fellow: Accession#:         [id number]         Nurse: Date of Birth/Age:  1950 / 75 years Sonographer:          Anette Phoenix RD Gender Assigned at  M                    Additional Staff: Birth: Height:             177.80 cm            Admit Date: Weight:             49.44  kg             Admission Status:     Inpatient -                                                                Routine BSA / BMI:          1.61 m2 / 15.64      Department Location:  [facility] ICU Back                     kg/m2                                      (27-34) Blood Pressure: 101 /69 mmHg Study Type:    TRANSTHORACIC ECHO (TTE) COMPLETE Diagnosis/ICD: Acute respiratory failure with hypoxia-J96.01 Indication:    acute respiratory failure with hypoxia and hypercapnia CPT Codes:     Echo Complete w Full [id number] Patient History: Pertinent History: HTN. Study Detail: The following Echo studies were performed: 2D, M-Mode, Doppler and               color flow.  PHYSICIAN INTERPRETATION: Left Ventricle: The left ventricular systolic function is mildly decreased with a Lezama's biplane calculated ejection fraction of 45%. There is global hypokinesis of the left ventricle with minor regional variations. The left ventricular cavity size is normal. There is mild increased septal and mildly increased posterior left ventricular wall thickness. There is left ventricular concentric remodeling. Spectral Doppler shows a Grade I (impaired relaxation pattern) of left ventricular diastolic filling with normal left atrial filling pressure. Left Atrium: The left atrial size is normal. Right Ventricle: The right ventricle is normal in size. Unable to determine right ventricular systolic function. Right Atrium: The right atrial size was not well visualized. Aortic Valve: The aortic valve is trileaflet. There is no evidence of aortic valve regurgitation. Mitral Valve: The mitral valve is normal in structure. There is trace mitral valve regurgitation. The E Vmax is 0.33 m/s. Tricuspid Valve: The tricuspid valve is structurally normal. There is trace to mild tricuspid regurgitation. The Doppler estimated right ventricular systolic pressure (RVSP) is within normal limits at 22 mmHg. Pulmonic Valve: The pulmonic valve is not well  visualized. The pulmonic valve regurgitation was not assessed. Pericardium: No pericardial effusion noted. Aorta: The aortic root is abnormal. There is mild dilatation of the aortic root. Systemic Veins: The inferior vena cava appears normal in size, IVC inspiratory collapse not assessed due to mechanical ventilation. In comparison to the previous echocardiogram(s): There are no prior studies on this patient for comparison purposes.  CONCLUSIONS:  1. The left ventricular systolic function is mildly decreased with a Lezama's biplane calculated ejection fraction of 45%.  2. There is global hypokinesis of the left ventricle with minor regional variations.  3. Spectral Doppler shows a Grade I (impaired relaxation pattern) of left ventricular diastolic filling with normal left atrial filling pressure.  4. The Doppler estimated RVSP is within normal limits at 22 mmHg. QUANTITATIVE DATA SUMMARY:  2D MEASUREMENTS:             Normal Ranges: LAs:             2.92 cm     (2.7-4.0cm) IVSd:            1.10 cm     (0.6-1.1cm) LVPWd:           1.13 cm     (0.6-1.1cm) LVIDd:           3.90 cm     (3.9-5.9cm) LVIDs:           2.99 cm LV Mass Index:   88.7 g/m2 LVEDV Index:     35.92 ml/m2 LV % FS          23.3 %  LEFT ATRIUM:                 Normal Ranges: LA Area A4C:      10.0 cm2 LA Area A2C:      9.0 cm2 LA Volume Index:  14.0 ml/m2 LA Vol A4C:       19.7 ml LA Vol A2C:       16.9 ml LA Vol Index BSA: 11.3 ml/m2  M-MODE MEASUREMENTS:         Normal Ranges: AoV Exc:             2.23 cm (1.5-2.5cm)  AORTA MEASUREMENTS:         Normal Ranges: AoV Exc:            2.23 cm (1.5-2.5cm) Ao Sinus, d:        4.10 cm (2.1-3.5cm)  LV SYSTOLIC FUNCTION:                      Normal Ranges: EF-A4C View:    46 % (>=55%) EF-A2C View:    45 % EF-Biplane:     45 % LV EF Reported: 45 %  LV DIASTOLIC FUNCTION:           Normal Ranges: MV Peak E:             0.33 m/s  (0.7-1.2 m/s) MV Peak A:             0.42 m/s  (0.42-0.7 m/s) E/A Ratio:              0.78      (1.0-2.2) MV e'                  0.045 m/s (>8.0) MV lateral e'          0.02 m/s MV medial e'           0.07 m/s E/e' Ratio:            7.37      (<8.0)  MITRAL VALVE:          Normal Ranges: MV DT:        283 msec (150-240msec)  AORTIC VALVE:           Normal Ranges: AoV Vmax:      0.65 m/s (<=1.7m/s) AoV Peak P.7 mmHg (<20mmHg) LVOT Max Azar:  0.40 m/s (<=1.1m/s) LVOT Diameter: 2.33 cm  (1.8-2.4cm) AoV Area,Vmax: 2.62 cm2 (2.5-4.5cm2)  RIGHT VENTRICLE: RV Basal 3.00 cm RV Mid   2.10 cm RV Major 5.4 cm TAPSE:   17.0 mm RV s'    0.08 m/s  TRICUSPID VALVE/RVSP:          Normal Ranges: Peak TR Velocity:     2.16 m/s Est. RA Pressure:     3 mmHg RV Syst Pressure:     22 mmHg  (< 30mmHg) IVC Diam:             2.04 cm  AORTA: Asc Ao Diam 4.37 cm  [id number] Socrates Yu MD Electronically signed on 2025 at 8:08:13 PM  ** Final **     XR chest 1 view  Result Date: 2025  Interpreted By:  Sara Torres, STUDY: XR CHEST 1 VIEW;  2025 5:19 am   INDICATION: Signs/Symptoms:intubated.   COMPARISON: 2025   ACCESSION NUMBER(S): [id number]   ORDERING CLINICIAN: AYLIN SCHULER   FINDINGS: AP radiograph of the chest was provided.   The enteric tube distal tip projects over the gastric body, in the side-hole projects near the GE junction.   CARDIOMEDIASTINAL SILHOUETTE: Cardiomediastinal silhouette is normal in size and configuration. Atherosclerotic calcifications of the aortic arch.   LUNGS: Unchanged left basilar opacification. No pneumothorax or pulmonary edema   ABDOMEN: No remarkable upper abdominal findings.   BONES: No acute osseous changes.       The enteric tube distal tip projects over the gastric body, in the side-hole projects near the GE junction. Advancement by 5 cm and confirmation of repositioning with upper abdominal radiograph can be obtained as per clinical need. Unchanged left basilar opacification.   Signed by: Sara Torres 2025 7:49 AM Dictation workstation:    [id number]    XR abdomen 1 view  Result Date: 7/6/2025  Interpreted By:  Schoenberger, Joseph, STUDY: XR ABDOMEN 1 VIEW;  7/6/2025 4:09 pm   INDICATION: Signs/Symptoms:NG placement.     COMPARISON: CT performed 07/06/2025   ACCESSION NUMBER(S): [id number]   ORDERING CLINICIAN: AYLIN SCHULER   FINDINGS: A nasogastric tube is noted. The catheter likely terminates in the gastric body. There is posterior eventration of the left hemidiaphragm with extension of the stomach into the lower left hemithorax accounting for the apparent position of the tube on the radiograph. There is opacity in the retrocardiac region consistent with this. There is gaseous distension of small bowel loops in the visible ports of the upper abdomen caliber approximating 2.6 cm. Limited evaluation of pneumoperitoneum on supine imaging, however no gross evidence of free air is noted.     Osseous structures demonstrate no acute bony changes.       1.  See discussion above   MACRO: None   Signed by: Joseph Schoenberger 7/6/2025 5:43 PM Dictation workstation:   [id number]    CT abdomen pelvis w IV contrast  Result Date: 7/6/2025  Interpreted By:  Shaggy Mcgovern, STUDY: CT ABDOMEN PELVIS W IV CONTRAST;  7/6/2025 2:35 pm   INDICATION: Signs/Symptoms:pancreatic mass?.     COMPARISON: None.   ACCESSION NUMBER(S): [id number]   ORDERING CLINICIAN: DONG BULLOCK   TECHNIQUE: Contiguous axial images of the abdomen and pelvis were obtained after the intravenous administration of iodinated contrast. Coronal and sagittal reformatted images were reconstructed from the axial data.   FINDINGS: LOWER CHEST: No acute abnormality.     ABDOMEN/PELVIS:   ABDOMINAL WALL: No significant abnormality.   LIVER: The liver measures 17.3 cm in length. No suspicious lesions. Hypoattenuation adjacent the falciform ligament likely representing focal fatty infiltration.   BILE DUCTS: No significant intrahepatic or extrahepatic dilatation.   GALLBLADDER: No significant  abnormality.   PANCREAS: There are extensive pancreatic parenchymal calcifications. There is irregular ductal dilatation from the uncinate process to the tail. The duct measures up to 1.5 cm. Superimposed upon main ductal dilatation are dilatation of numerous side branch ducts resulting in parenchymal cysts, for example measuring 1.5 cm in the uncinate process (axial image 55, series 501). There is no peripancreatic inflammatory changes. There is no definite evidence of a solid parenchymal mass.   SPLEEN: No significant abnormality.   ADRENALS: Stable mild thickening of the adrenal glands.   KIDNEYS, URETERS, BLADDER: There is redemonstration of focal wedge-shaped defect in the upper pole left kidney that could represent remote infarct. There is multifocal bilateral renal cortical scarring most pronounced in the upper pole the right kidney. There are bilateral nonobstructing renal calculi measuring up to 1 cm in each kidney. No hydronephrosis.   REPRODUCTIVE ORGANS: No significant abnormality.   VESSELS: Severe aortoiliac atherosclerosis without abdominal aortic aneurysm.   RETROPERITONEUM/LYMPH NODES: No acute retroperitoneal abnormality. No enlarged lymph nodes.   BOWEL/PERITONEUM: There is a large amount of impacted stool in rectum which is distended up to 8.7 cm x 8.4 cm. There also a large upstream colonic stool burden in the sigmoid colon and distal descending colon. The remainder of the colon is relatively decompressed but the colonic wall is inflamed with mucosal thickening and hyperenhancement throughout its length, most pronounced in the proximal ascending colon and descending colon. No evidence of bowel obstruction. No pneumatosis intestinalis, portal venous gas, or free intraperitoneal air. No loculated collections. Diffuse mesenteric edema and trace ascites.     MUSCULOSKELETAL: Moderate bilateral hip osteoarthrosis. Chronic compression deformity of L2 and L4 upper endplates. No suspicious osseous  lesion.       1. Massive amount of stool impacted in the rectum which is dilated up to 8.7 cm x 8.4 cm with also large amount of stool in the sigmoid colon and distal descending colon. Recommend intervention to avoid stercoral colitis (pressure ischemia of the colonic wall). This is on a back diffuse infectious/inflammatory colitis of the ascending through descending colon.   2. Extensive pancreatic parenchymal calcifications. There is irregular ductal dilatation from the uncinate process to the tail. The duct measures up to 1.5 cm. Superimposed upon main ductal dilatation are dilatation of numerous side branch ducts resulting in parenchymal simple cysts. No solid parenchymal mass. The differential diagnosis includes both or either of the following: Sequela of chronic pancreatitis and/or combined side-branch/main duct intraductal papillary mucinous neoplasm. A follow-up MRI pancreas protocol with contrast/MRCP in 6 months is recommended to ensure stability and further characterize as necessary.   3. Mild bilateral nonobstructing renal calculi and cortical scarring.   MACRO: None.   Signed by: Shaggy Mcgovern 7/6/2025 4:24 PM Dictation workstation:   [id number]    CT angio chest for pulmonary embolism  Result Date: 7/6/2025  Interpreted By:  Shaggy Mcgovern, STUDY: CT ANGIO CHEST FOR PULMONARY EMBOLISM;  7/6/2025 2:35 pm   INDICATION: Signs/Symptoms:rule out PE.     COMPARISON: 07/02/2025   ACCESSION NUMBER(S): [id number]   ORDERING CLINICIAN: DONG BULLOCK   TECHNIQUE: Contiguous axial images of the chest were obtained after the intravenous administration of iodinated contrast using angiographic PE protocol. Coronal and sagittal reformatted images were reconstructed from the axial data. MIP images were created on an independent workstation and reviewed.   FINDINGS:   MEDIASTINUM AND LYMPH NODES:  The esophageal wall appears within normal limits.  No enlarged intrathoracic or axillary lymph nodes by imaging  criteria. No pneumomediastinum.   VESSELS:  Normal caliber thoracic aorta without dissection. Mild aortic atherosclerosis.  No acute pulmonary embolism.   HEART: There is concentric left ventricular hypertrophy.  Moderate coronary artery calcifications. No significant pericardial effusion.   LUNG, AIRWAYS, PLEURA: Complete left lower lobe collapse. There is debris occluding the essentially all the left lower lobe segmental and subsegmental bronchi and the left lower lobar bronchus. There also debris in the inferior lingular bronchi with diffuse bronchial wall thickening. There is a small amount of debris in the right mainstem bronchus, right upper lobe bronchus, and right lower lobe segmental and subsegmental bronchi. There is the tree-in-bud opacity in the medial right lower lobe consistent with aspiration pneumonia/pneumonitis, associated with atelectatic opacities as well. There are trace bilateral pleural effusions. ET tube terminates in the mid trachea.   OSSEOUS STRUCTURES: No acute osseous abnormality.   CHEST WALL SOFT TISSUES: No discernible acute abnormality.   UPPER ABDOMEN/OTHER: Please see separate report.       1. Complete left lower lobe collapse due to debris occluding all of the left lower lobe segmental and subsegmental bronchi and the lower lobar bronchus. There is also debris in the left upper lobe and lingular bronchi and right lower lobe segmental subsegmental bronchi associated with aspiration pneumonitis in the right lower lobe.   2. No acute pulmonary embolism.   3. Concentric left ventricular hypertrophy.   MACRO: None.   Signed by: Shaggy Mcgovern 7/6/2025 4:10 PM Dictation workstation:   [id number]    XR chest 1 view  Result Date: 7/6/2025  Interpreted By:  Lorenzo Monahan, STUDY: XR CHEST 1 VIEW;  7/6/2025 12:04 pm   INDICATION: Signs/Symptoms:Hypoxia.   COMPARISON: 07/06/2025   ACCESSION NUMBER(S): [id number]   ORDERING CLINICIAN: URI ISLAS   FINDINGS: CARDIOMEDIASTINAL SILHOUETTE  AND VASCULATURE:   Cardiac size:  Within normal limits. Aortic shadow:  Within normal limits.   Mediastinal contours: Within normal limits.   Pulmonary vasculature:  The central vasculature is unremarkable   LUNGS: There is improved left lower lung retrocardiac opacification. The lungs otherwise are clear.   ABDOMEN AND OTHER FINDINGS: No remarkable upper abdominal findings.   BONES: No acute osseous changes.       1.  Improving left lower lung consolidative infiltrate.   Signed by: Lorenzo Monahan 7/6/2025 12:15 PM Dictation workstation:   [id number]    XR chest 1 view  Result Date: 7/6/2025  Interpreted By:  Andrea Carter, STUDY: XR CHEST 1 VIEW;  7/6/2025 11:05 am   INDICATION: Signs/Symptoms:Hypoxia.   COMPARISON: 07/02/2025   ACCESSION NUMBER(S): [id number]   ORDERING CLINICIAN: URI ISLAS   FINDINGS: CHEST/LUNGS: The cardiac and mediastinal silhouettes are unchanged in size and configuration. area of consolidation at the left base obscuring the left hemidiaphragm. This appears worse when compared to the previous exam.   UPPER ABDOMEN: No remarkable upper abdominal findings.   OSSEOUS STRUCTURES: No acute changes.       Consolidation at the left base which is worse when compared to the previous study.   MACRO: None.   Signed by: Andrea Carter 7/6/2025 11:55 AM Dictation workstation:   [id number]    MRCP pancreas w and wo IV contrast  Result Date: 7/3/2025  Interpreted By:  Moriah Seals, STUDY: MRCP PANCREAS W AND WO IV CONTRAST;  7/3/2025 3:25 pm   INDICATION: Signs/Symptoms:c/f pancreatic neoplasm on CT.   COMPARISON: CT abdomen and pelvis exams 07/02/2025, 12/06/2023, 06/05/2017.   ACCESSION NUMBER(S): [id number]   ORDERING CLINICIAN: MAHESH SINGH   TECHNIQUE: MRI PANCREAS; Multiplanar magnetic resonance images of the abdomen were obtained including the following sequences; T2-weighted SSFSE with and without fat saturation, T1-weighted GRE in/opposed phase, DWI, fat saturated 3D-T1w GRE pre and  dynamically post contrast. Radial thick slab T2w RARE MRCP and coronally reconstructed navigator gated high resolution 3-D T2w RESTORE MRCP with MIP reconstruction were also performed for MRCP.  11 mL of Dotarem were administered intravenously without immediate complication.   FINDINGS: LIVER: Liver is enlarged, with the right liver lobe measuring 18.8 cm in craniocaudal length. Liver surface contour is smooth. Diffuse hepatic signal decrease on inphase imaging is suggestive of hepatic iron deposition.   There is a subcentimeter focus of arterial phase hyperenhancement in the inferior right liver lobe (series 15, image 30), which becomes isoenhancing to background liver parenchyma on subsequent phases and not visualized on T1 or T2 weighted images. This likely represents a transient hepatic intensity difference (THID).   A couple of additional subcentimeter foci of arterial phase hyperenhancement in the dome of the right liver lobe (series 15, image 92) and anteriorly at the junction of segments 8 and 4A (series 15, image 61) remain hyperenhancing throughout all dynamic postcontrast phases. These lesions are moderately T2 hyperintense and T1 hypointense, likely representing flash filling hemangiomas.   Tiny 3 mm T2 hyperintense and T1 hypointense lesion in the posterior right liver lobe without enhancement on subtraction images is consistent with a tiny simple cyst.   BILE DUCTS: There is subtle diffuse scattered irregularity/narrowing throughout the intrahepatic bile ducts without dilation (for example series 5, images 80 and 90). There are also focal areas of discontinuity of some intrahepatic bile ducts (for example series 5, images 82 and 83).   Common bile duct is dilated to 10 mm in diameter followed by a short segment of smooth narrowing of the common bile duct to 3 mm in diameter (series 5, image 68), and subsequent mild dilation of the common hepatic duct to 7 mm before gradually tapering distally. No  intraductal filling defect is seen.   GALLBLADDER: Within normal limits.   PANCREAS: There is marked diffuse pancreatic parenchymal atrophy associated with marked diffuse dilation and irregularity of the main pancreatic duct measuring up to 1.8 cm in diameter, as well as dilation of numerous contiguous pancreatic duct side branches throughout the entire pancreas. A few small intraductal filling defects are noted, for example a 6 mm filling defect in the region of the pancreatic head or neck (series 5, image 65). These likely correlate with coarse calcifications seen on CT. No enhancing solid components or solid masses are seen. Note that this appearance and main pancreatic ductal dilation has been present and not significantly changed since December 2023.   SPLEEN: Normal spleen size without focal lesion. Diffuse slight signal loss on inphase imaging is suggestive of iron deposition.   ADRENAL GLANDS: There is nodular thickening of the right adrenal gland, with a dominant 1.1 cm round nodule demonstrating signal dropout on out of phase imaging, consistent with lipid rich adrenal adenoma. Mild left adrenal gland thickening noted.   KIDNEYS: Patchy areas of renal cortical thinning/scarring in both kidneys. Multiple small simple bilateral renal cysts are present, largest measures 1 cm in the left upper pole. A band-like area of T2 hypointensity with no enhancement in the left posterior perinephric region is consistent with a chronic area of scarring, which has been present and not significantly changed since 2017. No solid renal mass or hydronephrosis.   LYMPH NODES: No lymphadenopathy.   ABDOMINAL VESSELS: There is moderate irregular atherosclerotic plaque of the abdominal aorta and common iliac arteries. No abdominal aortic aneurysmal dilation. Portal veins, hepatic veins, splenic vein and superior mesenteric vein are patent.   BOWEL: Limited evaluation due to motion. No dilated bowel loops. Moderate stool burden  throughout the colon, which may be correlated for symptoms of constipation.   PERITONEUM: Trace free fluid in the perihepatic and left upper quadrant regions.   BONES AND LOWER THORAX: There are areas of T2 hyperintensity and enhancing consolidation in the left-greater-than-right lower lobes, which may represent atelectasis or pneumonia.   No acute bony abnormality or suspicious bone lesion.       1.  Marked diffuse pancreatic parenchymal atrophy associated with marked diffuse main pancreatic ductal dilation and dilation of numerous contiguous pancreatic duct side branches. Few small intraductal filling defects, likely correlating with associated coarse calcifications seen on CT. This constellation of findings favors sequela of chronic pancreatitis. Superimposed mixed (main and branch duct) type IPMN is in the differential, but considered less likely given that diffuse pancreatic ductal dilation has been present and not significantly changed since December 2023. 2. Subtle diffuse scattered irregularity/narrowing throughout intrahepatic bile ducts without dilation. There is also dilation of the common bile duct with a short-segment area of smooth narrowing in the midportion. These findings are nonspecific, but may be seen with primary sclerosing cholangitis versus secondary or other cholangitis. No intraductal filling defect to suggest choledocholithiasis. Advise correlation with LFTs. 3. Hepatomegaly with findings suggestive of hepatic and splenic iron deposition. 4. Few small arterially enhancing liver lesions described above likely representing a combination of flash filling hemangiomas and THID. 5. Simple bilateral renal cysts. Stable nonenhancing chronic area of scarring in the left posterior perinephric region. 6. Moderate stool burden throughout the colon, which may be correlated for symptoms of constipation. 7. Opacities in the left-greater-than-right lower lobes, which may represent atelectasis or pneumonia.  8. Additional chronic findings, as detailed     Signed by: Moriah Seals 7/3/2025 6:15 PM Dictation workstation:   [id number]    CT angio chest for pulmonary embolism  Result Date: 7/2/2025  Interpreted By:  Erin Moreira, STUDY: CT ANGIO CHEST FOR PULMONARY EMBOLISM; CT ABDOMEN PELVIS W IV CONTRAST;  7/2/2025 12:16 pm   INDICATION: Signs/Symptoms:sob, hypoxia, concern for undiagnosed cancer causing pe; Signs/Symptoms:abd ttp.   COMPARISON: None   ACCESSION NUMBER(S): [id number]; [id number]   ORDERING CLINICIAN: ADAN CHRISTIAN   TECHNIQUE: CT of the chest, abdomen, and pelvis was performed. Contiguous axial images were obtained at 3 mm slice thickness through the chest, abdomen and pelvis. Coronal and sagittal reconstructions at 3 mm slice thickness were performed. 75 ML of Omnipaque 350 was administered intravenously without immediate complication.   FINDINGS: CHEST:   LUNG/PLEURA/LARGE AIRWAYS: There is bibasilar pneumonia with dense consolidation in the left lower lobe that. There is no pleural fluid. There is no pulmonary nodule.   VESSELS: There is no contrast within the thoracic aorta to evaluate for dissection. There is atherosclerotic calcification of the thoracic aorta. There is no pulmonary embolism.   HEART: The heart is unremarkable.   MEDIASTINUM AND TALIB: There is no hilar or mediastinal adenopathy.   CHEST WALL AND LOWER NECK: The chest wall is unremarkable. There is no abnormality of the lower neck.   ABDOMEN:   LIVER: There is no hepatic mass.   BILE DUCTS: There is no intrahepatic, common hepatic or common bile ductal dilatation.   GALLBLADDER: The gallbladder is unremarkable.   PANCREAS: There are extensive pancreatic calcifications. There is pancreatic ductal dilatation and beading. Findings are consistent with chronic pancreatitis. There are multiple cystic lesions in the pancreas which could represent cirrhosis, small cystic neoplasms or intraductal mucinous papillary neoplasms.   SPLEEN:  The spleen is unremarkable. There is no splenic mass. There is no splenomegaly   ADRENAL GLANDS: The adrenal glands are unremarkable.   KIDNEYS AND URETERS:. The kidneys function symmetrically. There is a 1.4 cm cyst projecting posteriorly off the cortex of the left kidney with an extremely calcification consistent with a Bosniak 2 renal cyst. There is a small benign simple left renal cortical cyst. There are bilateral nonobstructing intrarenal calculi.     PELVIS:   BLADDER: The bladder is distended measuring 13.7 x 9.5 x 7.4 cm with a volume of 500 mL 11 cc.   REPRODUCTIVE ORGANS: There is no abnormality of the reproductive organs.   BOWEL: There is no bowel wall thickening, dilatation or obstruction. There is large amount of stool throughout the colon particularly in the rectum-possible fecal impaction.   VESSELS: There is marked atherosclerotic calcification of the abdominal aorta, iliac and femoral arteries.   PERITONEUM/RETROPERITONEUM/LYMPH NODES: There is no retroperitoneal or pelvic adenopathy.  There is no ascites.   ABDOMINAL WALL: The abdominal wall is unremarkable.   BONES AND SOFT TISSUES: There is no acute osseous finding.   There is no soft tissue abnormality.       CHEST: 1.  No pulmonary embolism. 2. Bibasilar pneumonia with dense consolidation particularly in the left lower lobe.   ABDOMEN AND PELVIS: 1.  Extensive pancreatic calcifications with pancreatic ductal dilatation and beading consistent with chronic pancreatitis. 2. Multiple small cystic lesions in the pancreas which could represent small pseudocysts, small cystic neoplasms or intraductal mucinous papillary neoplasms. 3. Bilateral nonobstructing intrarenal calculi. 4. 1.4 cm Bosniak 2 left renal cyst and simple left renal cyst. 5. Distended urinary bladder with a volume of 501 cc. 6. Marked constipation and possible fecal impaction.   MACRO: None   Signed by: Erin Moreira 7/2/2025 12:29 PM Dictation workstation:   [id number]    CT  abdomen pelvis w IV contrast  Result Date: 7/2/2025  Interpreted By:  Erin Moreira, STUDY: CT ANGIO CHEST FOR PULMONARY EMBOLISM; CT ABDOMEN PELVIS W IV CONTRAST;  7/2/2025 12:16 pm   INDICATION: Signs/Symptoms:sob, hypoxia, concern for undiagnosed cancer causing pe; Signs/Symptoms:abd ttp.   COMPARISON: None   ACCESSION NUMBER(S): [id number]; [id number]   ORDERING CLINICIAN: ADAN CHRISTIAN   TECHNIQUE: CT of the chest, abdomen, and pelvis was performed. Contiguous axial images were obtained at 3 mm slice thickness through the chest, abdomen and pelvis. Coronal and sagittal reconstructions at 3 mm slice thickness were performed. 75 ML of Omnipaque 350 was administered intravenously without immediate complication.   FINDINGS: CHEST:   LUNG/PLEURA/LARGE AIRWAYS: There is bibasilar pneumonia with dense consolidation in the left lower lobe that. There is no pleural fluid. There is no pulmonary nodule.   VESSELS: There is no contrast within the thoracic aorta to evaluate for dissection. There is atherosclerotic calcification of the thoracic aorta. There is no pulmonary embolism.   HEART: The heart is unremarkable.   MEDIASTINUM AND TALIB: There is no hilar or mediastinal adenopathy.   CHEST WALL AND LOWER NECK: The chest wall is unremarkable. There is no abnormality of the lower neck.   ABDOMEN:   LIVER: There is no hepatic mass.   BILE DUCTS: There is no intrahepatic, common hepatic or common bile ductal dilatation.   GALLBLADDER: The gallbladder is unremarkable.   PANCREAS: There are extensive pancreatic calcifications. There is pancreatic ductal dilatation and beading. Findings are consistent with chronic pancreatitis. There are multiple cystic lesions in the pancreas which could represent cirrhosis, small cystic neoplasms or intraductal mucinous papillary neoplasms.   SPLEEN: The spleen is unremarkable. There is no splenic mass. There is no splenomegaly   ADRENAL GLANDS: The adrenal glands are unremarkable.    KIDNEYS AND URETERS:. The kidneys function symmetrically. There is a 1.4 cm cyst projecting posteriorly off the cortex of the left kidney with an extremely calcification consistent with a Bosniak 2 renal cyst. There is a small benign simple left renal cortical cyst. There are bilateral nonobstructing intrarenal calculi.     PELVIS:   BLADDER: The bladder is distended measuring 13.7 x 9.5 x 7.4 cm with a volume of 500 mL 11 cc.   REPRODUCTIVE ORGANS: There is no abnormality of the reproductive organs.   BOWEL: There is no bowel wall thickening, dilatation or obstruction. There is large amount of stool throughout the colon particularly in the rectum-possible fecal impaction.   VESSELS: There is marked atherosclerotic calcification of the abdominal aorta, iliac and femoral arteries.   PERITONEUM/RETROPERITONEUM/LYMPH NODES: There is no retroperitoneal or pelvic adenopathy.  There is no ascites.   ABDOMINAL WALL: The abdominal wall is unremarkable.   BONES AND SOFT TISSUES: There is no acute osseous finding.   There is no soft tissue abnormality.       CHEST: 1.  No pulmonary embolism. 2. Bibasilar pneumonia with dense consolidation particularly in the left lower lobe.   ABDOMEN AND PELVIS: 1.  Extensive pancreatic calcifications with pancreatic ductal dilatation and beading consistent with chronic pancreatitis. 2. Multiple small cystic lesions in the pancreas which could represent small pseudocysts, small cystic neoplasms or intraductal mucinous papillary neoplasms. 3. Bilateral nonobstructing intrarenal calculi. 4. 1.4 cm Bosniak 2 left renal cyst and simple left renal cyst. 5. Distended urinary bladder with a volume of 501 cc. 6. Marked constipation and possible fecal impaction.   MACRO: None   Signed by: Erin Moreira 7/2/2025 12:29 PM Dictation workstation:   [id number]    XR chest 1 view  Result Date: 7/2/2025  Interpreted By:  Shaggy Morris, STUDY: XR CHEST 1 VIEW;  7/2/2025 10:48 am   INDICATION:  Signs/Symptoms:sob.   COMPARISON: CT scan abdomen and pelvis from 12/06/2023.   ACCESSION NUMBER(S): [id number]   ORDERING CLINICIAN: ADAN CHRISTIAN   TECHNIQUE: Single AP portable view of the chest was obtained.   FINDINGS: MEDIASTINUM/ LUNGS/ TALIB: No cardiomegaly, vascular congestion, or pleural effusion. Scant linear opacity horizontally oriented at the left lung base just above the left diaphragm. Calcifications throughout the thoracic aorta. No abnormal opacity in either lung worrisome for tumor or pneumonia. No pneumothorax. No tracheal deviation. No abnormal hilar fullness or gross mass on either side.   BONES: No lytic or blastic destructive bone lesion. Mild multilevel mid and distal thoracic spine endplate osteophytosis. There are also some syndesmophytes present.   UPPER ABDOMEN: Grossly intact.       Scant linear atelectasis versus scarring at the left lung base.   Arthritic changes in the thoracic spine as described.     MACRO: None   Signed by: Shaggy Morris 7/2/2025 11:01 AM Dictation workstation:   [id number]        Assessment/Plan   IMP:  Met with friend of patient Ekta Wills [phone]. They have lived together 20 years. Discussed that patient had been intubated multiple times and that he was too weak to manage his own secretions. Ekta stated he would not wish to live on machines. Ekta states that patient has a brother named Jorge A and that he lives in [place] but is currently on vacation in [facility], hiking and hard to get in touch with him. Ekta states she will attempt to talk with him this evening. Number given for contact.   Code status was discussed and Ekta states they would not want CPR as he is too frail. DNRCCA placed on the chart.   Referral made to [facility] for meeting tomorrow at 1:30 to discuss hospice services and WOC.     UPDATE    Brother Britton called from vacation. He is in agreement with having Ekta make medical decisions for his  brother. She has been his partner for >20 years and knows his values. Brother lives in [place] and is not able to come to [place]. Jorge A spoke with resident - Dr. Frost as well to verify this decision.     Provider estimate of survival: hours to days  Patient Prognostic Awareness: Patient unable to respond    Patient/proxy preference for information  Prefers full information    Goals of Care  Excellent communication  Support for family   Assist with discharge plan    Is the patient hospice-eligible?   Yes  Was a discussion held re hospice services?   yes  Was a decision made re hospice services?  Plan to meet tomorrow with [facility]    Other Palliative Support  Emotional support for family.   Assist with discharge.    I spent 45 minutes in the review, planning and care of this patient.         Tiffany Connelly, APRN-CNS         [1]   Family History  Problem Relation Name Age of Onset    Prostate cancer Brother     [2] [Held by provider] amLODIPine, 10 mg, oral, Daily  ampicillin-sulbactam, 3 g, intravenous, q6h  [Held by provider] cyanocobalamin, 100 mcg, oral, Daily  dexAMETHasone, 6 mg, intravenous, q8h  [Held by provider] docusate sodium, 100 mg, oral, BID  enoxaparin, 40 mg, subcutaneous, q24h  [Held by provider] hydrALAZINE, 25 mg, oral, TID  [Held by provider] insulin glargine, 5 Units, subcutaneous, q24h  [Held by provider] insulin lispro, 0-10 Units, subcutaneous, Before meals & nightly  insulin lispro, 0-5 Units, subcutaneous, q4h  ipratropium-albuteroL, 3 mL, nebulization, q6h  lidocaine, 1 patch, transdermal, Daily  lidocaine, 1 patch, transdermal, Daily  [Held by provider] losartan, 100 mg, oral, Daily  [Held by provider] metoprolol tartrate, 50 mg, oral, BID  [Held by provider] mirtazapine, 7.5 mg, oral, Nightly  [Held by provider] multivitamin with minerals, 1 tablet, nasoduodenal tube, Daily  oxygen, , inhalation, Continuous - Inhalation  perflutren lipid microspheres, 0.5-10 mL of dilution, intravenous,  Once in imaging  perflutren protein A microsphere, 0.5 mL, intravenous, Once in imaging  [Held by provider] polyethylene glycol, 17 g, oral, Daily  polyethylene glycol, 17 g, nasogastric tube, Daily  [Held by provider] QUEtiapine, 25 mg, oral, Nightly  sodium chloride, 3 mL, nebulization, q6h FABRICIO  sulfur hexafluoride microsphr, 2 mL, intravenous, Once in imaging

## 2025-07-14 NOTE — PROGRESS NOTES
Nutrition Follow Up Assessment:   Nutrition Assessment         Patient is a 75 y.o. male presenting with from home with grandson with weakness, generalized malaise, poor oral intake and SOB over the last few days, additionally patient admits to weight loss over the last several months. CT- pneumonia left lower lobe and pancreatic calcifications. GI was consulted, had MRCP. Chronic pancreatitis, no malignancy, no need for pancreatic enzymes at this time. Pt to follow up with GI outpatient. Plans to discharge to SNF for rehab.     7/7/2025 Follow up/consult for tube feeds: Chart reviewed and events noted. Length of stay complicated by acute hypoxia with RRT on 7/6; pt transitioned to ICU care and subsequently intubated. CXR revealing LLL collapse with concerns for aspiration. Pt remains intubated and currently sedated with precedex and fentanyl; NGT in place and to start tube feeds today.     7/10/2025: Follow up: Chart reviewed and events noted. Pt successfully extubated 7/8; passed ST 7/9 for regular/easy to chew and thin liquids with  feeding strategies. Pt unable to eat breakfast this AM due to need for continuous BIPAP; during CCM rounds, pt was able to transition to HFNC.  Stay complicated by hypertension.     7/11: Nutrition reconsulted for EN recs due to increase respiratory difficulty    7/14/25: Follow up note: Chart reviewed and events noted.  Since last note EN was restarted on 7/12 as pt required intubation.  Tube feed orders at 20ml/hr per nutrition recs as measure to prevent refeeding syndrome recommended for 24hours.  On 7/13 pt was found to have self extubated and had to be reintubated.  Pt required additional bronchoscopy which per MD notation was the third bronchoscopy required over the weekend.  K, Phos, and Mag values all within normal range this morning.      Nutrition History:  Energy Intake:  (NPO)  Food and Nutrient History: 7/5: Reports very good appetite at home. states he eats a lot of food  "but has not been able to gain any weight. Eats 3 meals/day with some snacks between. Lives with his wife and grandchildren. [facility] services and diet order reviewed. No further questions. Will recc. to send Glucerna and Ensure HP for pt to try.  Vitamin/Herbal Supplement Use: B12  Food Allergy:  (NKFA)  Food Intolerance:  (denies)       Anthropometrics:  Height: 177.8 cm (5' 10\")   Weight: 57.3 kg (126 lb 4.8 oz)   BMI (Calculated): 18.12  IBW/kg (Dietitian Calculated): 75.3 kg  Percent of IBW: 65.66 %                      Weight History:   Wt Readings from Last 8 Encounters:   07/14/25 57.3 kg (126 lb 4.8 oz)   06/17/25 53.1 kg (117 lb)   01/28/25 54 kg (119 lb)   09/27/24 54.9 kg (121 lb)   06/20/24 53.5 kg (118 lb)   04/15/24 54.4 kg (120 lb)   02/15/24 54 kg (119 lb)   01/25/24 55.2 kg (121 lb 9.6 oz)   Weight Change %:  Weight History / % Weight Change: Current weight appears to be signicantly higher that weight on 7/13 of 113#, recommend reweight  Significant Weight Loss: Yes  Interpretation of Weight Loss: >5% in 1 month    Nutrition Focused Physical Exam Findings:  Subcutaneous Fat Loss:      Muscle Wasting:     Edema:     Physical Findings:       Nutrition Significant Labs:  BMP Trend:   Results from last 7 days   Lab Units 07/14/25  0719 07/13/25  2038 07/13/25  0645 07/12/25  1638   GLUCOSE mg/dL 227* 100* 100* 89   CALCIUM mg/dL 8.5* 8.8 8.9 9.0   SODIUM mmol/L 142 140 137 136   POTASSIUM mmol/L 4.1 3.7 3.9 4.0   CO2 mmol/L 26 29 25 28   CHLORIDE mmol/L 103 102 101 100   BUN mg/dL 31* 27* 29* 27*   CREATININE mg/dL 1.18 1.15 1.16 1.02    , A1C:  Lab Results   Component Value Date    HGBA1C 6.1 06/17/2025       Nutrition Specific Medications:  Scheduled medications  Scheduled Medications[1]  Continuous medications  Continuous Medications[2]    I/O:   Last BM Date: 07/14/25; Stool Appearance: Loose (07/12/25 0500)    Dietary Orders (From admission, onward)       Start     Ordered    07/14/25 1037  Enteral " feeding with NPO OG (orogastic tube); 50; 150; Water; Sterile water; Every 4 hours  Diet effective now        Comments: increase EN rate by 10ml/hr every 6-8 hours with tolerance to goal of 50mL/hr for 1800kcal, 75g pro, and 914mL formula free water or 100% kcal needs and 1.3g pro/kg. Suggest 150mL water flush 6x/day for total water of 1814mL water (formula + flush)   Question Answer Comment   Tube feeding formula: Osmolite 1.5    Feeding route: OG (orogastic tube)    Tube feeding continuous rate (mL/hr): 50    Tube feeding flush (mL): 150    Flush type: Water    Water type: Sterile water    Flush frequency: Every 4 hours        07/14/25 1036    07/06/25 1309  May Participate in Room Service  ( ROOM SERVICE MAY PARTICIPATE)  Once        Question:  .  Answer:  Yes    07/06/25 1308                     Estimated Needs:   Total Energy Estimated Needs in 24 hours (kCal):  (1485-1733kcal)  Method for Estimating Needs: 1500-1750kcal or 30-35kcal/kg of 50kg  Total Protein Estimated Needs in 24 Hours (g):  (59-74g)  Method for Estimating 24 Hour Protein Needs: 65-75g pro or 1.3-1.6g/kg of 50kg  Method for Estimating 24 Hour Fluid Needs: 1mL/kcal/d or as per physician  Patient on Order Fluid Restriction: No        Nutrition Diagnosis   Malnutrition Diagnosis  Patient has Malnutrition Diagnosis: Yes  Diagnosis Status: Active  Malnutrition Diagnosis: Severe malnutrition related to chronic disease or condition  As Evidenced by: >5% weight loss over 30 days, Severe subcutaneous fat loss and muscle wastinging per NFPE  Additional Assessment Information: 7/10: Case discussed during CCM rounds. Hopeful to decrease HFNC needs so pt can eat today. Pt intermittently pulling off HFNC and/or BIPAP. 7/14 Case discussed in CCM rounds, plan to advance tube feeding to new goal rate with tolerance            Nutrition Interventions/Recommendations   Nutrition prescription for enteral nutrition    Nutrition Recommendations:  Individualized  Nutrition Prescription Provided for : Recommend increase EN rate by 10ml/hr every 6-8 hours with tolerance to goal of 50mL/hr for 1800kcal, 75g pro, and 914mL formula free water or 100% kcal needs and 1.3g pro/kg. Suggest 150mL water flush 6x/day for total water of 1814mL water (formula + flush).    Nutrition Interventions/Goals:   Enteral Intake: Management of volume of enteral nutrition  Goal: Recommend increase EN rate by 10ml/hr every 6-8 hours with tolerance to goal of 50mL/hr for 1800kcal, 75g pro, and 914mL formula free water or 100% kcal needs and 1.3g pro/kg. Suggest 150mL water flush 6x/day for total water of 1814mL water (formula + flush).  Coordination of Care with Providers: Nursing, Provider, SLP        Nutrition Monitoring and Evaluation   Intake / Amount of food: Consumes at least 50% or more of meals/snacks/supplements  Enteral and Parenteral Nutrition Intake Determination: Enteral nutrition intake - Tolerate TF at goal rate, Enteral nutrition intake - Prevent refeeding, Enteral nutrition intake - To meet > 75% estimated energy needs    Body Weight: Body weight - Promote weight restoration    Electrolyte and Renal Panel: Electrolytes within normal limits  Glucose/Endocrine Profile: Glucose within normal limits - ICU (140-180 mg/dL)         Goal Status: Some progress toward goal(s)    Time Spent (min): 60 minutes              [1] [Held by provider] amLODIPine, 10 mg, oral, Daily  ampicillin-sulbactam, 3 g, intravenous, q6h  [Held by provider] cyanocobalamin, 100 mcg, oral, Daily  dexAMETHasone, 6 mg, intravenous, q8h  [Held by provider] docusate sodium, 100 mg, oral, BID  enoxaparin, 40 mg, subcutaneous, q24h  [Held by provider] hydrALAZINE, 25 mg, oral, TID  [Held by provider] insulin glargine, 5 Units, subcutaneous, q24h  [Held by provider] insulin lispro, 0-10 Units, subcutaneous, Before meals & nightly  insulin lispro, 0-5 Units, subcutaneous, q4h  ipratropium-albuteroL, 3 mL, nebulization,  q6h  lidocaine, 1 patch, transdermal, Daily  lidocaine, 1 patch, transdermal, Daily  [Held by provider] losartan, 100 mg, oral, Daily  [Held by provider] metoprolol tartrate, 50 mg, oral, BID  [Held by provider] mirtazapine, 7.5 mg, oral, Nightly  [Held by provider] multivitamin with minerals, 1 tablet, nasoduodenal tube, Daily  oxygen, , inhalation, Continuous - Inhalation  perflutren lipid microspheres, 0.5-10 mL of dilution, intravenous, Once in imaging  perflutren protein A microsphere, 0.5 mL, intravenous, Once in imaging  [Held by provider] polyethylene glycol, 17 g, oral, Daily  polyethylene glycol, 17 g, nasogastric tube, Daily  [Held by provider] QUEtiapine, 25 mg, oral, Nightly  sodium chloride, 3 mL, nebulization, q6h FABRICIO  sulfur hexafluoride microsphr, 2 mL, intravenous, Once in imaging     [2] fentaNYL, 0-200 mcg/hr, Last Rate: 75 mcg/hr (07/14/25 1400)  norepinephrine, 0-0.5 mcg/kg/min, Last Rate: Stopped (07/12/25 1940)  propofol, 0-20 mcg/kg/min, Last Rate: 15 mcg/kg/min (07/14/25 1400)

## 2025-07-14 NOTE — PROGRESS NOTES
Reviewed chart and patient is intubated at this time. At this time, discharge plan is still SNF at [facility]. CT team to follow patient for all discharge needs.

## 2025-07-14 NOTE — SIGNIFICANT EVENT
Spoke with the patient's brother, Britton Madden, on the phone.  Answered his questions in relation to his brother's health, current condition on the ventilator, and likely prognosis.  The patient is comfortable with having his cousin, Ekta Wills, make the decision for placing the patient, Jorge A Madden, on hospice care. Britton Madden is the next of kin for the patient.  Bridger Frost, DO  PGY-2, emergency medicine resident   101

## 2025-07-14 NOTE — PROGRESS NOTES
Speech-Language Pathology                 Therapy Communication Note    Patient Name: Jorge A Madden  MRN: [id number]  Department: [facility] ICU  Room: 2127/2127-A  Today's Date: 7/14/2025     Discipline: Speech Language Pathology      SLP Missed Visit: Yes       Missed Visit Reason: Patient ill (Intubated)    Missed Time: Attempt    Comment: Patient with change in medical status; had self-extubated last night and was emergently re-intubated, per chart. Will continue to follow to resume treatment when appropriate.

## 2025-07-14 NOTE — PROGRESS NOTES
Critical Care Daily Progress        Subjective   Patient is a 75 y.o. male admitted on 7/2/2025 10:01 AM with the following indication(s) for ICU care of the patient's hypoxic respiratory failure requiring mechanical ventilation.     Overnight Events: Overnight the patient self extubated and a moment of agitation, he desatted down to 70% level.  Due to the patient's edema after his self extubation he had to be reintubated with a 7.0 tube using a bougie for assistance.  After the patient was successfully intubated he required additional intervention with a bronchoscope, there was mucous plugging occurring over the entire lungs and the mucus was suctioned out with the bronchoscope.  3 doses of Decadron IV 6 mg were given to help with the edema and the patient's airway.  The patient was able to recover with his vitals.    Complaints: has none..     MEDICATIONS:  Scheduled: PRN: Continuous:   Scheduled Medications[1] PRN Medications[2] Continuous Medications[3]     Objective   Vitals:  Temp  Min: 35.8 °C (96.4 °F)  Max: 36.3 °C (97.3 °F)  Pulse  Min: 64  Max: 112  BP  Min: 104/66  Max: 206/94  Resp  Min: 12  Max: 54  SpO2  Min: 63 %  Max: 100 %    Physical Exam:   Physical Exam   Physical Exam  Vitals and nursing note reviewed.   Constitutional:       Appearance: He is ill-appearing.   HENT:      Head: Normocephalic and atraumatic.      Right Ear: External ear normal.      Left Ear: External ear normal.      Nose: Nose normal. No congestion or rhinorrhea.      Mouth/Throat:      Mouth: Mucous membranes are moist.      Pharynx: Oropharynx is clear.   Eyes:      General: No scleral icterus.        Right eye: No discharge.         Left eye: No discharge.      Extraocular Movements: Extraocular movements intact.      Pupils: Pupils are unequal (Left pupil size millimeter, right pupil 3 mm).      Right eye: Pupil is sluggish.      Left eye: Pupil is sluggish.   Cardiovascular:      Rate and Rhythm: Regular rhythm.  Tachycardia present.      Pulses: Normal pulses.      Heart sounds: Normal heart sounds.   Pulmonary:      Effort: Pulmonary effort is normal.      Comments: Intubated  Abdominal:      General: There is no distension.      Palpations: Abdomen is soft.      Tenderness: There is no right CVA tenderness or left CVA tenderness.   Musculoskeletal:         General: Normal range of motion.      Cervical back: Normal range of motion and neck supple. No rigidity.      Right lower leg: No edema.      Left lower leg: No edema.   Skin:     General: Skin is warm and dry.      Capillary Refill: Capillary refill takes less than 2 seconds.   Neurological:      Mental Status: He is alert.      Comments: Intubated, minimally interactive, able to open eyes and is alert, can acknowledge that he is being spoken to   Psychiatric:      Comments: Intubated, difficult to assess.  Patient is less agitated than he has been compared to previous days.                Assessment/Plan   Overall Assessment:  #Acute delirium  #Acute hypoxic hypercapnic respiratory failure  #JULIO that has since been resolved  #Azotemia that is since been resolved  #Left lower lobe pneumonia     75 year old male presenting with hypoxic and hypercapneic respiratory failure to the ICU requiring previous intubation, now extubated and requiring BiPAP. On 7/11/2025 overnight a CT of the chest was performed showing mucus plugging, lobe collapse, and atelectasis.     The patient will require intubation, bronchoscope, and testing of the bronchoscope alveolar washings.  The patient will undergo continuous respiratory physiotherapy due to an inability to clear his airway whether he is intubated or not.  With the patient's self extubation and injury to his airway that occurred overnight and will not be undergoing extubation on 7/14/2025.  Due to the patient's excessive need for requiring intubations and clearance of his lungs from the mucous plugging it is being suggested that he  undergo a tracheostomy, palliative care will be speaking with the family of this patient and undergo further goals of care.    Neuro:   - History: Anxiety  - Home meds: Mirtazapine, Seroquel  - Pain: Controlled 50 mcg of fentanyl per hour  - Interventions: Intubated  - CAM ICU  - Palliative care consult     Cardiovascular:   - History: Hypertension, hyperlipidemia  - MAP goal greater than 65  - HR goal between 60 and 100  - Home meds: Amlodipine, atorvastatin, losartan, Lopressor  - Last echo: 7/11/2025, 45% ejection fraction, global hypokinesis of the left ventricle, impaired relaxation of left ventricular diastolic filling  - Interventions: Vasopressors  - Pressors (if shock present) : Levophed as needed while intubated, stopped overnight  - The 10-year ASCVD risk score (Fermín BENAVIDEZ, et al., 2019) is: 32.3%    Values used to calculate the score:      Age: 75 years      Sex: Male      Is Non- : No      Diabetic: Yes      Tobacco smoker: Yes      Systolic Blood Pressure: 111 mmHg      Is BP treated: Yes      HDL Cholesterol: 66 mg/dL      Total Cholesterol: 146 mg/dL     Pulmonary:   Vent Mode: Assist control/Volume control plus  FiO2 (%):  [40 %-80 %] 80 %  S RR:  [20] 20  S VT:  [450 mL] 450 mL  PEEP/CPAP (cm H2O):  [5 cm H20-10 cm H20] 10 cm H20  MAP (cm H2O):  [9.3-16] 15   - History: Acute hypoxic hypercapnic respiratory failure  - Home meds: Albuterol  Continuous pulse oximetry   O2 PRN to maintain SpO2 > 94%, wean as tolerated  - Imaging: CT angio of the chest  - Interventions: Intubated  - Required a higher level of FiO2 from 35 to 40%.    Gastrointestinal:   Results from last 7 days   Lab Units 07/13/25  2038 07/13/25  0645 07/12/25  1638 07/12/25  0649   ALK PHOS U/L 60 57 56 58   AST U/L 12 12 11 9   ALT U/L 10 8* 8* 9*       -History: None  -Home meds: None  - Diet: Using tube feeds through Corpak, 50 mL Osmolyte /hr and 150 mL sterile water flush every 4th hour  - Supplementation:  None  - Prophylaxis: protonix [indicated if plt<50, INR>1.5, PTT>2x UNL, pressors, liver disease, TBI, ICH]  - Bowel regimen: miralax  - Last BM: Last BM Date: 07/11/25  - Additional interventions: Using Corpak    Renal:   Results from last 7 days   Lab Units 07/14/25 0425 07/13/25 2038 07/13/25  0645 07/13/25  0317 07/12/25  1638 07/12/25  0649 07/12/25  0351 07/11/25  1710 07/11/25  0328   SODIUM mmol/L  --  140 137  --  136 138  --    < > 140   POTASSIUM mmol/L  --  3.7 3.9  --  4.0 4.2  --    < > 3.1*   CHLORIDE mmol/L  --  102 101  --  100 102  --    < > 101   MAGNESIUM mg/dL  --  2.17 2.27  --  2.19 2.25  --    < > 2.21   PHOSPHORUS mg/dL 3.9  --   --  2.4*  --   --  3.6  --  2.6   CO2 mmol/L  --  29 25  --  28 28  --    < > 28   BUN mg/dL  --  27* 29*  --  27* 27*  --    < > 26*   CREATININE mg/dL  --  1.15 1.16  --  1.02 0.98  --    < > 1.04   CALCIUM mg/dL  --  8.8 8.9  --  9.0 9.2  --    < > 9.3    < > = values in this interval not displayed.       Net IO Since Admission: 4,887.84 mL [07/14/25 0728]  - Daily CMP,Mg,Phos  - History: None  - Home meds: None  - Fluids: On tube feeding 50 mL/h, 150 mL flush every 4 hours  - Electrolytes: Replete per protocol, goal K>4 Phos >3 Mg >2  - Estimated Creatinine Clearance: 46.4 mL/min (by C-G formula based on SCr of 1.15 mg/dL).    Endocrine:   Results from last 7 days   Lab Units 07/14/25 0424 07/13/25  2355 07/13/25 2038 07/13/25  2001 07/13/25  1529 07/13/25  1159 07/13/25  0736 07/13/25  0645   POCT GLUCOSE mg/dL 158* 186*  --  104* 154* 170* 127*  --    GLUCOSE mg/dL  --   --  100*  --   --   --   --  100*      -History: Type 2 diabetes  -Home meds: Lantus  -sliding scale: Point-of-care glucose with corrections as needed  -BG < 180 goal    Hematology:   Results from last 7 days   Lab Units 07/14/25  0425 07/13/25  0317 07/12/25  0351 07/11/25  0328   HEMOGLOBIN g/dL 10.4* 10.0* 10.8* 11.5*   HEMATOCRIT % 32.6* 30.8* 33.6* 33.5*   MCV fL 105* 103* 107* 101*    MCHC g/dL 31.9* 32.5 32.1 34.3   PLATELETS AUTO x10*3/uL 198 233 186 238     - Daily CBC  -History: Chronic anemia  -Home meds: None  - DVT Prophylaxis: Lovenox, SCDs    Infectious Disease:   Results from last 7 days   Lab Units 25  0425 25  0317 25  0351 25  0328   WBC AUTO x10*3/uL 9.8 9.7 9.7 9.7     Temp (24hrs), Av.1 °C (97 °F), Min:35.8 °C (96.4 °F), Max:36.3 °C (97.3 °F)     -History: None  -Home meds: None  -Cultures: No growth in blood cultures, strep pneumo antigen found and urine culture, multiple polymorphonuclear sites seen and alveolar washings, MRSA negative  -Abx:   Ampicillin-sulbactam      LDA:  ETT  7 mm (Active)   Placement Date/Time: 25 2140   Hand Hygiene Completed: Yes  Single Lumen Tube Size: 7 mm  Cuffed: Yes  Location: Oral  Placement Verification: Capnometry;Radiography  Placed By:     Number of days: 0       NG/OG/Feeding Tube Nasoduodenal 12 Fr Right nostril (Active)   Placement Date/Time: 25 1245   Placed by: stephanie LORA  Hand Hygiene Completed: Yes  Type of Tube: Feeding Tube  Tube Length (cm): 80 cm  Tube Type: Nasoduodenal  NG/OG Tube Size: 12 Fr  Tube Location: Right nostril  Difficulty with Placement: No   Number of days: 2       External Urinary Catheter Male (Active)   Placement Date/Time: 25 1400   Placed by: Neda Sam RN  Hand Hygiene Completed: Yes  External Catheter Type: Male   Number of days: 5         CODE STATUS: Full Code    Disposition: Will remain in the ICU    RESTRAINTS: type: I agree with nursing assessment of the patient´s need for restraints to protect the patient from injury and facilitate healing. The patient is unable to cooperate with the plan of care and at risk for disrupting critical therapy (i.e., removing medical devices, lines, tubes and/or dressings).  Please see order for specifics. Restraints can be removed when the patient is able to cooperate with plan of care and allow healing to occur, or  the medical devices at risk are discontinued by the medical team.    ABCDEF Checklist  Analgesia: Spontaneous awakening trial to be pursued if clinically appropriate. RASS goal reviewed  Breathing: Spontaneous breathing trial to be pursued if clinically appropriate. Mechanical power of assisted ventilation reviewed  Choice of analgesia/sedation: Analgesic and sedative agents adjusted per clinical context.   Delirium assessed by CAM, will avoid exacerbating factors  Early mobility and exercise: Physical and occupational therapy engaged  Family: Plan of care, overall trajectory of patient shared with family. Questions elicited and answered as appropriate.     Due to the high probability of life threatening clinical decompensation, the patient required critical care time evaluating and managing this patient.  Critical care time included obtaining a history, examining the patient, ordering and reviewing studies, discussing, developing, and implementing a management plan, evaluating the patient's response to treatment, and discussion with other care team providers. I saw and evaluated the patient myself.  Critical care time was performed exclusive of billable procedures.      Case discussed with attending , Dr. Ye Frost DO    Attending Addendum:    I saw and evaluated the patient. I personally obtained the key and critical portions of the history and physical exam or was physically present for key and critical portions performed by the resident/fellow. I reviewed the resident/fellow's documentation and discussed the patient with the resident/fellow. I agree with the resident/fellow's medical decision making as documented in the note.     Critical care time is 35 minutes.         [1] [Held by provider] amLODIPine, 10 mg, oral, Daily  ampicillin-sulbactam, 3 g, intravenous, q6h  [Held by provider] cyanocobalamin, 100 mcg, oral, Daily  dexAMETHasone, 6 mg, intravenous, q8h  [Held by provider] docusate sodium,  100 mg, oral, BID  enoxaparin, 40 mg, subcutaneous, q24h  etomidate, 10 mg, intravenous, Once  [Held by provider] hydrALAZINE, 25 mg, oral, TID  [Held by provider] insulin glargine, 5 Units, subcutaneous, q24h  [Held by provider] insulin lispro, 0-10 Units, subcutaneous, Before meals & nightly  insulin lispro, 0-5 Units, subcutaneous, q4h  ipratropium-albuteroL, 3 mL, nebulization, q6h  lidocaine, 1 patch, transdermal, Daily  lidocaine, 1 patch, transdermal, Daily  [Held by provider] losartan, 100 mg, oral, Daily  [Held by provider] metoprolol tartrate, 50 mg, oral, BID  [Held by provider] mirtazapine, 7.5 mg, oral, Nightly  [Held by provider] multivitamin with minerals, 1 tablet, nasoduodenal tube, Daily  oxygen, , inhalation, Continuous - Inhalation  perflutren lipid microspheres, 0.5-10 mL of dilution, intravenous, Once in imaging  perflutren protein A microsphere, 0.5 mL, intravenous, Once in imaging  [Held by provider] polyethylene glycol, 17 g, oral, Daily  polyethylene glycol, 17 g, nasogastric tube, Daily  [Held by provider] QUEtiapine, 25 mg, oral, Nightly  sodium chloride, 3 mL, nebulization, q6h FABRICIO  sulfur hexafluoride microsphr, 2 mL, intravenous, Once in imaging  [2] PRN medications: bisacodyl, dextromethorphan-guaifenesin, dextrose, dextrose, glucagon, glucagon, ipratropium-albuteroL  [3] fentaNYL, 0-200 mcg/hr, Last Rate: 75 mcg/hr (07/14/25 0600)  lactated Ringer's, 75 mL/hr, Last Rate: 75 mL/hr (07/14/25 0600)  norepinephrine, 0-0.5 mcg/kg/min, Last Rate: Stopped (07/12/25 1940)  propofol, 0-20 mcg/kg/min, Last Rate: 10 mcg/kg/min (07/14/25 0600)

## 2025-07-14 NOTE — NURSING NOTE
2125 Pt found to have self extubated. O2 Sat in the 80's, Dr Reyes, Charge nurse, bedside nurse and Respiratory Therapist at bedside bagging patient, copius amount of thick sputum noted on patient's face and gown from self extubation. 2130 pt medicated for extubation, 2140 patient successfully intubated O2 SAT>94% and Xray ordered. Shortly after intubation pt began to desaturate, O2 SAT in the 70's. Pt suctioned, bagged and no improvement noted. Pt continued to desaturate, Dr Belcher called in and at beside to perform bedside bronchoscopy. Pt tolerated procedure, O2 SAT 95% s/p  bronchoscopy. Will continue to monitor.

## 2025-07-14 NOTE — CARE PLAN
Per Palliative CNS, referral sent to [facility], family requests to meet tmrw. Await call back from [facility] on Hospice mtg time, will follow.    [facility] to meet bedside with family Tues 7.15 at 130/2p. Await results of Hospice mtg, will follow.

## 2025-07-14 NOTE — CARE PLAN
The patient's goals for the shift include      The clinical goals for the shift include Patient will remain hemodynamically stable throughout the shift    Over the shift, the patient did not make progress toward the following goals. Barriers to progression include pt self extubated, pt was noted to be agitated and hypertensive during this time. Recommendations to address these barriers include continue current plan of care.  Problem: Pain - Adult  Goal: Verbalizes/displays adequate comfort level or baseline comfort level  Outcome: Progressing     Problem: Chronic Conditions and Co-morbidities  Goal: Patient's chronic conditions and co-morbidity symptoms are monitored and maintained or improved  Outcome: Progressing     Problem: Skin  Goal: Prevent/minimize sheer/friction injuries  Outcome: Progressing  Flowsheets (Taken 7/14/2025 0400)  Prevent/minimize sheer/friction injuries:   Use pull sheet   HOB 30 degrees or less   Turn/reposition every 2 hours/use positioning/transfer devices   Increase activity/out of bed for meals   Complete micro-shifts as needed if patient unable. Adjust patient position to relieve pressure points, not a full turn  Goal: Promote/optimize nutrition  Outcome: Progressing  Flowsheets (Taken 7/14/2025 0400)  Promote/optimize nutrition:   Discuss with provider if NPO > 2 days   Monitor/record intake including meals   Consume > 50% meals/supplements  Goal: Promote skin healing  Outcome: Progressing  Flowsheets (Taken 7/14/2025 0400)  Promote skin healing:   Turn/reposition every 2 hours/use positioning/transfer devices   Assess skin/pad under line(s)/device(s)   Protective dressings over bony prominences   Rotate device position/do not position patient on device     Problem: Safety - Medical Restraint  Goal: Remains free of injury from restraints (Restraint for Interference with Medical Device)  Recent Flowsheet Documentation  Taken 7/14/2025 0400 by Blake Leavitt RN  Remains free of injury  from restraints (restraint for interference with medical device):   Every 2 hours: Monitor safety, psychosocial status, comfort, nutrition and hydration   Evaluate the patient's condition at the time of restraint application   Inform patient/family regarding the reason for restraint   Determine that other, less restrictive measures have been tried or would not be effective before applying the restraint  Goal: Free from restraint(s) (Restraint for Interference with Medical Device)  Recent Flowsheet Documentation  Taken 7/14/2025 0400 by Blake Leavitt RN  Free from restraint(s) (restraint for interference with medical device):   ONCE/SHIFT or MINIMUM Every 12 hours: Assess and document the continuing need for restraints   Every 24 hours: Continued use of restraint requires Licensed Independent Practitioner to perform face to face examination and written order   Identify and implement measures to help patient regain control     Problem: Diabetes  Goal: Achieve decreasing blood glucose levels by end of shift  Outcome: Progressing  Goal: Maintain electrolyte levels within acceptable range throughout shift  Outcome: Progressing  Goal: No changes in neurological exam by end of shift  Outcome: Progressing       Problem: Nutrition  Goal: Nutrient intake appropriate for maintaining nutritional needs  Outcome: Not Progressing

## 2025-07-14 NOTE — PROCEDURES
Intubation    Date/Time: 7/13/2025 11:28 PM    Performed by: Renan Reyes MD  Authorized by: Renan Reyes MD    Consent:     Consent obtained:  Emergent situation  Universal protocol:     Required blood products, implants, devices, and special equipment available: yes      Patient identity confirmed:  Arm band and hospital-assigned identification number  Pre-procedure details:     Indications: airway protection and respiratory failure      Patient status:  Altered mental status    Look externally: no concerns      Pharmacologic strategy: RSI      Induction agents:  Etomidate    Paralytics:  Rocuronium  Procedure details:     Preoxygenation:  Bag valve mask    CPR in progress: no      Number of attempts:  3  Successful intubation attempt details:     Intubation method:  Oral    Intubation technique: video assisted      Laryngoscope blade:  Hypercurved    Bougie used: yes      Tube size (mm):  7.0    Tube type:  Cuffed    Tube visualized through cords: yes    First unsuccessful intubation attempt details:     Intubation method:  Oral    Intubation technique:  Video assisted    Laryngoscope blade:  Hypercurved    Bougie used: no      Tube size (mm):  7.0    Tube type:  Cuffed    Ventilation between 1st and 2nd attempt: yes with mask      Tube visualized through cords: no    Second unsuccessful intubation attempt details:     Intubation method:  Oral    Intubation technique:  Video assisted    Laryngoscope blade:  Hypercurved    Bougie used: no      Tube size (mm):  7.5    Tube type:  Cuffed    Ventilation between 2nd and 3rd attempt: yes with mask      Tubes visualized through cords: no    Placement assessment:     Tube secured with:  Adhesive tape and ETT han    Breath sounds:  Reduced on left    Placement verification: colorimetric ETCO2 and CXR verification      CXR findings:  Appropriate position  Post-procedure details:     Procedure completion:  Tolerated well, no immediate complications

## 2025-07-14 NOTE — SIGNIFICANT EVENT
I was called to bedside around 9:15 PM on 7/13/2025 as patient had managed to self extubate.  He was in profound respiratory distress saturating approximately 70%.  RT was called to bedside.  We initially tried nonrebreather but then started bagging him due to persistent hypoxia.  I immediately grabbed the GlideScope and repaired for RSI.  See the intubation note for additional details.  Despite successful intubation with confirmation of placement on chest x-ray, he continued to desaturate even on 100% FiO2.  I alerted the attending intensivist who came from home to bedside to perform bronchoscopy.  There is notable mucous plugging in both lobes which was suctioned.  After that, we are able to titrate down on his FiO2 to 60% with oxygen saturation now 98%.  Given the significant edema around the cords and the difficulty of the intubation, the decision was made to give 3 doses of Decadron IV 6 mg every 8 hours.  Additionally, given that this is the third bronchoscopy in the past few days, we will reapproach family and palliative care tomorrow.

## 2025-07-15 VITALS
WEIGHT: 124.56 LBS | HEIGHT: 70 IN | HEART RATE: 54 BPM | BODY MASS INDEX: 17.83 KG/M2 | SYSTOLIC BLOOD PRESSURE: 87 MMHG | DIASTOLIC BLOOD PRESSURE: 56 MMHG | RESPIRATION RATE: 19 BRPM | TEMPERATURE: 97.9 F | OXYGEN SATURATION: 100 %

## 2025-07-15 PROBLEM — Z51.5 ADMISSION FOR END OF LIFE CARE: Status: ACTIVE | Noted: 2025-01-01

## 2025-07-15 LAB
ALBUMIN SERPL BCP-MCNC: 2.6 G/DL (ref 3.4–5)
ALBUMIN SERPL BCP-MCNC: 2.6 G/DL (ref 3.4–5)
ALP SERPL-CCNC: 54 U/L (ref 33–136)
ALP SERPL-CCNC: 55 U/L (ref 33–136)
ALT SERPL W P-5'-P-CCNC: 10 U/L (ref 10–52)
ALT SERPL W P-5'-P-CCNC: 9 U/L (ref 10–52)
ANION GAP BLDA CALCULATED.4IONS-SCNC: 11 MMO/L (ref 10–25)
ANION GAP SERPL CALC-SCNC: 13 MMOL/L (ref 10–20)
ANION GAP SERPL CALC-SCNC: 14 MMOL/L (ref 10–20)
APPEARANCE UR: CLEAR
ARTERIAL PATENCY WRIST A: ABNORMAL
AST SERPL W P-5'-P-CCNC: 8 U/L (ref 9–39)
AST SERPL W P-5'-P-CCNC: 8 U/L (ref 9–39)
B-OH-BUTYR SERPL-SCNC: 0.08 MMOL/L (ref 0.02–0.27)
BACTERIA SPEC RESP CULT: ABNORMAL
BASE EXCESS BLDA CALC-SCNC: 3.2 MMOL/L (ref -2–3)
BASOPHILS # BLD AUTO: 0.01 X10*3/UL (ref 0–0.1)
BASOPHILS # BLD AUTO: 0.01 X10*3/UL (ref 0–0.1)
BASOPHILS NFR BLD AUTO: 0.1 %
BASOPHILS NFR BLD AUTO: 0.1 %
BILIRUB SERPL-MCNC: 0.3 MG/DL (ref 0–1.2)
BILIRUB SERPL-MCNC: 0.3 MG/DL (ref 0–1.2)
BILIRUB UR STRIP.AUTO-MCNC: NEGATIVE MG/DL
BODY TEMPERATURE: ABNORMAL
BUN SERPL-MCNC: 43 MG/DL (ref 6–23)
BUN SERPL-MCNC: 46 MG/DL (ref 6–23)
CA-I BLDA-SCNC: 1.28 MMOL/L (ref 1.1–1.33)
CALCIUM SERPL-MCNC: 9 MG/DL (ref 8.6–10.3)
CALCIUM SERPL-MCNC: 9 MG/DL (ref 8.6–10.3)
CHLORIDE BLDA-SCNC: 103 MMOL/L (ref 98–107)
CHLORIDE SERPL-SCNC: 102 MMOL/L (ref 98–107)
CHLORIDE SERPL-SCNC: 105 MMOL/L (ref 98–107)
CO2 SERPL-SCNC: 27 MMOL/L (ref 21–32)
CO2 SERPL-SCNC: 28 MMOL/L (ref 21–32)
COLOR UR: ABNORMAL
CREAT SERPL-MCNC: 1.31 MG/DL (ref 0.5–1.3)
CREAT SERPL-MCNC: 1.32 MG/DL (ref 0.5–1.3)
CRITICAL CALL TIME: 224
CRITICAL CALLED BY: ABNORMAL
CRITICAL CALLED TO: ABNORMAL
CRITICAL READ BACK: ABNORMAL
EGFRCR SERPLBLD CKD-EPI 2021: 57 ML/MIN/1.73M*2
EGFRCR SERPLBLD CKD-EPI 2021: 57 ML/MIN/1.73M*2
EOSINOPHIL # BLD AUTO: 0 X10*3/UL (ref 0–0.4)
EOSINOPHIL # BLD AUTO: 0 X10*3/UL (ref 0–0.4)
EOSINOPHIL NFR BLD AUTO: 0 %
EOSINOPHIL NFR BLD AUTO: 0 %
ERYTHROCYTE [DISTWIDTH] IN BLOOD BY AUTOMATED COUNT: 12.9 % (ref 11.5–14.5)
ERYTHROCYTE [DISTWIDTH] IN BLOOD BY AUTOMATED COUNT: 13.1 % (ref 11.5–14.5)
GLUCOSE BLD MANUAL STRIP-MCNC: 263 MG/DL (ref 74–99)
GLUCOSE BLD MANUAL STRIP-MCNC: 269 MG/DL (ref 74–99)
GLUCOSE BLD MANUAL STRIP-MCNC: 355 MG/DL (ref 74–99)
GLUCOSE BLD MANUAL STRIP-MCNC: 420 MG/DL (ref 74–99)
GLUCOSE BLD MANUAL STRIP-MCNC: 437 MG/DL (ref 74–99)
GLUCOSE BLD MANUAL STRIP-MCNC: 447 MG/DL (ref 74–99)
GLUCOSE BLDA-MCNC: 463 MG/DL (ref 74–99)
GLUCOSE SERPL-MCNC: 283 MG/DL (ref 74–99)
GLUCOSE SERPL-MCNC: 450 MG/DL (ref 74–99)
GLUCOSE UR STRIP.AUTO-MCNC: ABNORMAL MG/DL
GRAM STN SPEC: ABNORMAL
GRAM STN SPEC: ABNORMAL
HCO3 BLDA-SCNC: 28.5 MMOL/L (ref 22–26)
HCT VFR BLD AUTO: 24.6 % (ref 41–52)
HCT VFR BLD AUTO: 26.4 % (ref 41–52)
HCT VFR BLD EST: 28 % (ref 41–52)
HGB BLD-MCNC: 8 G/DL (ref 13.5–17.5)
HGB BLD-MCNC: 8.7 G/DL (ref 13.5–17.5)
HGB BLDA-MCNC: 9.3 G/DL (ref 13.5–17.5)
HOLD SPECIMEN: NORMAL
HOLD SPECIMEN: NORMAL
IMM GRANULOCYTES # BLD AUTO: 0.07 X10*3/UL (ref 0–0.5)
IMM GRANULOCYTES # BLD AUTO: 0.07 X10*3/UL (ref 0–0.5)
IMM GRANULOCYTES NFR BLD AUTO: 0.8 % (ref 0–0.9)
IMM GRANULOCYTES NFR BLD AUTO: 0.8 % (ref 0–0.9)
INHALED O2 CONCENTRATION: 45 %
KETONES UR STRIP.AUTO-MCNC: ABNORMAL MG/DL
LACTATE BLDA-SCNC: 1.5 MMOL/L (ref 0.4–2)
LEUKOCYTE ESTERASE UR QL STRIP.AUTO: NEGATIVE
LYMPHOCYTES # BLD AUTO: 0.53 X10*3/UL (ref 0.8–3)
LYMPHOCYTES # BLD AUTO: 0.57 X10*3/UL (ref 0.8–3)
LYMPHOCYTES NFR BLD AUTO: 5.9 %
LYMPHOCYTES NFR BLD AUTO: 6.4 %
MAGNESIUM SERPL-MCNC: 2.4 MG/DL (ref 1.6–2.4)
MAGNESIUM SERPL-MCNC: 2.57 MG/DL (ref 1.6–2.4)
MCH RBC QN AUTO: 33.8 PG (ref 26–34)
MCH RBC QN AUTO: 34.4 PG (ref 26–34)
MCHC RBC AUTO-ENTMCNC: 32.5 G/DL (ref 32–36)
MCHC RBC AUTO-ENTMCNC: 33 G/DL (ref 32–36)
MCV RBC AUTO: 104 FL (ref 80–100)
MCV RBC AUTO: 104 FL (ref 80–100)
MONOCYTES # BLD AUTO: 0.52 X10*3/UL (ref 0.05–0.8)
MONOCYTES # BLD AUTO: 0.66 X10*3/UL (ref 0.05–0.8)
MONOCYTES NFR BLD AUTO: 5.8 %
MONOCYTES NFR BLD AUTO: 7.3 %
NEUTROPHILS # BLD AUTO: 7.72 X10*3/UL (ref 1.6–5.5)
NEUTROPHILS # BLD AUTO: 7.75 X10*3/UL (ref 1.6–5.5)
NEUTROPHILS NFR BLD AUTO: 85.9 %
NEUTROPHILS NFR BLD AUTO: 86.9 %
NITRITE UR QL STRIP.AUTO: NEGATIVE
NRBC BLD-RTO: 0 /100 WBCS (ref 0–0)
NRBC BLD-RTO: 0 /100 WBCS (ref 0–0)
OXYHGB MFR BLDA: 93.2 % (ref 94–98)
PCO2 BLDA: 46 MM HG (ref 38–42)
PEEP CMH2O: 10 CM H2O
PH BLDA: 7.4 PH (ref 7.38–7.42)
PH UR STRIP.AUTO: 5.5 [PH]
PHOSPHATE SERPL-MCNC: 3.4 MG/DL (ref 2.5–4.9)
PHOSPHATE SERPL-MCNC: 3.6 MG/DL (ref 2.5–4.9)
PLATELET # BLD AUTO: 203 X10*3/UL (ref 150–450)
PLATELET # BLD AUTO: 207 X10*3/UL (ref 150–450)
PO2 BLDA: 65 MM HG (ref 85–95)
POTASSIUM BLDA-SCNC: 4.1 MMOL/L (ref 3.5–5.3)
POTASSIUM SERPL-SCNC: 3.9 MMOL/L (ref 3.5–5.3)
POTASSIUM SERPL-SCNC: 4.1 MMOL/L (ref 3.5–5.3)
PROT SERPL-MCNC: 5.6 G/DL (ref 6.4–8.2)
PROT SERPL-MCNC: 5.6 G/DL (ref 6.4–8.2)
PROT UR STRIP.AUTO-MCNC: ABNORMAL MG/DL
RBC # BLD AUTO: 2.37 X10*6/UL (ref 4.5–5.9)
RBC # BLD AUTO: 2.53 X10*6/UL (ref 4.5–5.9)
RBC # UR STRIP.AUTO: NEGATIVE MG/DL
RBC #/AREA URNS AUTO: NORMAL /HPF
SAO2 % BLDA: 95 % (ref 94–100)
SITE OF ARTERIAL PUNCTURE: ABNORMAL
SODIUM BLDA-SCNC: 138 MMOL/L (ref 136–145)
SODIUM SERPL-SCNC: 139 MMOL/L (ref 136–145)
SODIUM SERPL-SCNC: 142 MMOL/L (ref 136–145)
SP GR UR STRIP.AUTO: 1.02
TIDAL VOLUME: 450 ML
UROBILINOGEN UR STRIP.AUTO-MCNC: NORMAL MG/DL
VENTILATOR MODE: ABNORMAL
VENTILATOR RATE: 20 BPM
WBC # BLD AUTO: 8.9 X10*3/UL (ref 4.4–11.3)
WBC # BLD AUTO: 9 X10*3/UL (ref 4.4–11.3)
WBC #/AREA URNS AUTO: NORMAL /HPF

## 2025-07-15 PROCEDURE — 2500000004 HC RX 250 GENERAL PHARMACY W/ HCPCS (ALT 636 FOR OP/ED)

## 2025-07-15 PROCEDURE — 2500000005 HC RX 250 GENERAL PHARMACY W/O HCPCS

## 2025-07-15 PROCEDURE — 84100 ASSAY OF PHOSPHORUS: CPT

## 2025-07-15 PROCEDURE — 94003 VENT MGMT INPAT SUBQ DAY: CPT

## 2025-07-15 PROCEDURE — 83735 ASSAY OF MAGNESIUM: CPT

## 2025-07-15 PROCEDURE — 36415 COLL VENOUS BLD VENIPUNCTURE: CPT

## 2025-07-15 PROCEDURE — 94669 MECHANICAL CHEST WALL OSCILL: CPT

## 2025-07-15 PROCEDURE — 84132 ASSAY OF SERUM POTASSIUM: CPT

## 2025-07-15 PROCEDURE — 99291 CRITICAL CARE FIRST HOUR: CPT | Performed by: INTERNAL MEDICINE

## 2025-07-15 PROCEDURE — 82010 KETONE BODYS QUAN: CPT

## 2025-07-15 PROCEDURE — 2500000002 HC RX 250 W HCPCS SELF ADMINISTERED DRUGS (ALT 637 FOR MEDICARE OP, ALT 636 FOR OP/ED)

## 2025-07-15 PROCEDURE — 85025 COMPLETE CBC W/AUTO DIFF WBC: CPT

## 2025-07-15 PROCEDURE — 82947 ASSAY GLUCOSE BLOOD QUANT: CPT

## 2025-07-15 PROCEDURE — 36600 WITHDRAWAL OF ARTERIAL BLOOD: CPT

## 2025-07-15 PROCEDURE — 94640 AIRWAY INHALATION TREATMENT: CPT

## 2025-07-15 PROCEDURE — 81003 URINALYSIS AUTO W/O SCOPE: CPT

## 2025-07-15 RX ORDER — VANCOMYCIN HYDROCHLORIDE 1 G/200ML
1000 INJECTION, SOLUTION INTRAVENOUS ONCE
Status: COMPLETED | OUTPATIENT
Start: 2025-07-15 | End: 2025-07-15

## 2025-07-15 RX ORDER — DOCUSATE SODIUM 50 MG/5ML
100 LIQUID ORAL 2 TIMES DAILY
Status: DISCONTINUED | OUTPATIENT
Start: 2025-07-15 | End: 2025-07-15 | Stop reason: HOSPADM

## 2025-07-15 RX ORDER — VANCOMYCIN HYDROCHLORIDE 1 G/20ML
INJECTION, POWDER, LYOPHILIZED, FOR SOLUTION INTRAVENOUS DAILY PRN
Status: DISCONTINUED | OUTPATIENT
Start: 2025-07-15 | End: 2025-07-15 | Stop reason: HOSPADM

## 2025-07-15 RX ADMIN — INSULIN LISPRO 6 UNITS: 100 INJECTION, SOLUTION INTRAVENOUS; SUBCUTANEOUS at 12:07

## 2025-07-15 RX ADMIN — INSULIN LISPRO 6 UNITS: 100 INJECTION, SOLUTION INTRAVENOUS; SUBCUTANEOUS at 08:39

## 2025-07-15 RX ADMIN — Medication 50 PERCENT: at 08:37

## 2025-07-15 RX ADMIN — VANCOMYCIN HYDROCHLORIDE 1000 MG: 1 INJECTION, SOLUTION INTRAVENOUS at 12:07

## 2025-07-15 RX ADMIN — IPRATROPIUM BROMIDE AND ALBUTEROL SULFATE 3 ML: 2.5; .5 SOLUTION RESPIRATORY (INHALATION) at 08:51

## 2025-07-15 RX ADMIN — SODIUM CHLORIDE SOLN NEBU 3% 3 ML: 3 NEBU SOLN at 08:51

## 2025-07-15 RX ADMIN — PROPOFOL 5 MCG/KG/MIN: 10 INJECTION, EMULSION INTRAVENOUS at 12:18

## 2025-07-15 RX ADMIN — IPRATROPIUM BROMIDE AND ALBUTEROL SULFATE 3 ML: 2.5; .5 SOLUTION RESPIRATORY (INHALATION) at 01:51

## 2025-07-15 RX ADMIN — Medication 75 MCG/HR: at 08:38

## 2025-07-15 RX ADMIN — INSULIN LISPRO 10 UNITS: 100 INJECTION, SOLUTION INTRAVENOUS; SUBCUTANEOUS at 01:00

## 2025-07-15 RX ADMIN — LIDOCAINE 4% 1 PATCH: 40 PATCH TOPICAL at 08:39

## 2025-07-15 RX ADMIN — POLYETHYLENE GLYCOL 3350 17 G: 17 POWDER, FOR SOLUTION ORAL at 08:38

## 2025-07-15 RX ADMIN — SODIUM CHLORIDE SOLN NEBU 3% 3 ML: 3 NEBU SOLN at 01:51

## 2025-07-15 RX ADMIN — INSULIN HUMAN 10 UNITS: 100 INJECTION, SOLUTION PARENTERAL at 03:18

## 2025-07-15 RX ADMIN — INSULIN LISPRO 10 UNITS: 100 INJECTION, SOLUTION INTRAVENOUS; SUBCUTANEOUS at 04:17

## 2025-07-15 RX ADMIN — AMPICILLIN SODIUM AND SULBACTAM SODIUM 3 G: 2; 1 INJECTION, POWDER, FOR SOLUTION INTRAMUSCULAR; INTRAVENOUS at 03:22

## 2025-07-15 ASSESSMENT — PAIN - FUNCTIONAL ASSESSMENT
PAIN_FUNCTIONAL_ASSESSMENT: CPOT (CRITICAL CARE PAIN OBSERVATION TOOL)

## 2025-07-15 ASSESSMENT — COGNITIVE AND FUNCTIONAL STATUS - GENERAL
TURNING FROM BACK TO SIDE WHILE IN FLAT BAD: TOTAL
MOBILITY SCORE: 6
PERSONAL GROOMING: TOTAL
DRESSING REGULAR LOWER BODY CLOTHING: TOTAL
STANDING UP FROM CHAIR USING ARMS: TOTAL
TOILETING: TOTAL
WALKING IN HOSPITAL ROOM: TOTAL
CLIMB 3 TO 5 STEPS WITH RAILING: TOTAL
EATING MEALS: TOTAL
DAILY ACTIVITIY SCORE: 6
MOVING FROM LYING ON BACK TO SITTING ON SIDE OF FLAT BED WITH BEDRAILS: TOTAL
MOVING TO AND FROM BED TO CHAIR: TOTAL
DRESSING REGULAR UPPER BODY CLOTHING: TOTAL
HELP NEEDED FOR BATHING: TOTAL

## 2025-07-15 NOTE — PROGRESS NOTES
Vancomycin Dosing by Pharmacy  Cessation of Therapy    Consult to pharmacy for vancomycin dosing has been discontinued by the prescriber, pharmacy will sign off at this time.    Please call pharmacy if there are further questions or re-enter a consult if vancomycin is resumed.     Oma Britton, PharmD, [facility]  7/15/2025 4:03 PM

## 2025-07-15 NOTE — CONSULTS
Vancomycin Dosing by Pharmacy- INITIAL    Jorge A Madden is a 75 y.o. year old male who Pharmacy has been consulted for vancomycin dosing for pneumonia. Based on the patient's indication and renal status this patient will be dosed based on a goal trough/random level of 15-20.     Renal function is currently declining.    Visit Vitals  BP 95/59   Pulse 64   Temp 36.6 °C (97.9 °F)   Resp 20        Lab Results   Component Value Date    CREATININE 1.31 (H) 07/15/2025    CREATININE 1.32 (H) 07/15/2025    CREATININE 1.30 2025    CREATININE 1.18 2025    CREATININE 1.31 (H) 2025        Patient weight is as follows:   Vitals:    07/15/25 0600   Weight: 56.5 kg (124 lb 9 oz)       Cultures:  Susceptibility data for the encounter in last 14 days.  Collected Specimen Info Organism Ceftriaxone Ciprofloxacin Gentamicin Penicillin Tetracycline Vancomycin   25 Fluid from Bronchial Washings Corynebacterium striatum group  R  R  S  R  R  S         I/O last 3 completed shifts:  In: 3692.4 (65.4 mL/kg) [I.V.:1517.4 (26.9 mL/kg); NG/GT:1875; IV Piggyback:300]  Out: 1400 (24.8 mL/kg) [Urine:1400 (0.7 mL/kg/hr)]  Weight: 56.5 kg   I/O during current shift:  I/O this shift:  In: 331.2 [I.V.:31.2; NG/GT:300]  Out: -     Temp (24hrs), Av.4 °C (97.6 °F), Min:36 °C (96.8 °F), Max:36.8 °C (98.2 °F)         Assessment/Plan     Patient will not be given a loading dose.  Will initiate vancomycin maintenance, a one time dose of 1000 mg future DBL  Follow-up level will be ordered on  at 1200 unless clinically indicated sooner.  Will continue to monitor renal function daily while on vancomycin and order serum creatinine at least every 48 hours if not already ordered.  Follow for continued vancomycin needs, clinical response, and signs/symptoms of toxicity.       Andres Veloz, PharmD

## 2025-07-15 NOTE — PROGRESS NOTES
Speech-Language Pathology                 Therapy Communication Note    Patient Name: Jorge A Madden  MRN: [id number]  Department: [facility] ICU  Room: 2127/2127-A  Today's Date: 7/15/2025     Discipline: Speech Language Pathology    Missed Visit Reason:  Change in medical status    Missed Time: Attempt    Comment: Patient remains intubated, with no immediate plans for extubation per RN. Hospice meeting pending. Will remain available as needed during inpatient stay.

## 2025-07-15 NOTE — PROGRESS NOTES
Critical Care Daily Progress        Subjective   Patient is a 75 y.o. male admitted on 7/2/2025 10:01 AM with the following indication(s) for ICU care of the patient's hypoxic respiratory failure requiring mechanical ventilation.     Overnight Events: No acute events overnight.    Complaints: has none..     MEDICATIONS:  Scheduled: PRN: Continuous:   Scheduled Medications[1] PRN Medications[2] Continuous Medications[3]     Objective   Vitals:  Temp  Min: 36 °C (96.8 °F)  Max: 36.8 °C (98.2 °F)  Pulse  Min: 54  Max: 88  BP  Min: 85/55  Max: 152/83  Resp  Min: 18  Max: 52  SpO2  Min: 99 %  Max: 100 %    Physical Exam:   Physical Exam   Physical Exam  Vitals and nursing note reviewed.   Constitutional:       General: He is not in acute distress.     Appearance: He is not ill-appearing, toxic-appearing or diaphoretic.      Comments: Patient intubated and sedated, BMI 17.87   HENT:      Head: Normocephalic and atraumatic.      Right Ear: External ear normal.      Left Ear: External ear normal.      Nose: Nose normal. No congestion or rhinorrhea.      Mouth/Throat:      Mouth: Mucous membranes are moist.      Pharynx: Oropharynx is clear.   Eyes:      General: No scleral icterus.        Right eye: No discharge.         Left eye: No discharge.      Conjunctiva/sclera: Conjunctivae normal.   Cardiovascular:      Rate and Rhythm: Normal rate and regular rhythm.      Pulses: Normal pulses.      Heart sounds: Normal heart sounds.   Pulmonary:      Breath sounds: Normal breath sounds. No wheezing or rhonchi.   Abdominal:      General: There is no distension.      Palpations: Abdomen is soft.      Tenderness: There is no abdominal tenderness. There is no right CVA tenderness, left CVA tenderness, guarding or rebound.   Musculoskeletal:         General: No swelling, tenderness, deformity or signs of injury.      Cervical back: Normal range of motion and neck supple.      Right lower leg: No edema.      Left lower leg: No edema.    Skin:     General: Skin is warm and dry.      Capillary Refill: Capillary refill takes less than 2 seconds.   Neurological:      Comments: Patient intubated and sedated   Psychiatric:      Comments: Patient intubated and sedated                Assessment/Plan   Overall Assessment:  #Acute delirium  #Acute hypoxic hypercapnic respiratory failure  #JULIO that has since been resolved  #Azotemia that is since been resolved  #Left lower lobe pneumonia  #Corynebacterium striatum pneumonia     75 year old male presenting with hypoxic and hypercapneic respiratory failure to the ICU requiring previous intubation, now extubated and requiring BiPAP. On 7/11/2025 overnight a CT of the chest was performed showing mucus plugging, lobe collapse, and atelectasis.     The patient will require intubation, bronchoscope, and testing of the bronchoscope alveolar washings.  The patient will undergo continuous respiratory physiotherapy due to an inability to clear his airway whether he is intubated or not.  With the patient's self extubation and injury to his airway that occurred overnight and will not be undergoing extubation on 7/14/2025.  Due to the patient's excessive need for requiring intubations and clearance of his lungs from the mucous plugging it is being suggested that he undergo a tracheostomy, palliative care will be speaking with the family of this patient and undergo further goals of care. Corynebacterium grown on respiratory cultures, vancomycin will be started for him.    Neuro:   - History: Anxiety  - Home meds: Mirtazapine, Seroquel  - Pain: Controlled 75 mcg of fentanyl per hour, propofol  - Interventions: Intubated  - CAM ICU  - Palliative care consult  - Hospice care consideration with patient's cousin, patient's next of kin is his brother and has deferred decision making to cousin     Cardiovascular:   - History: Hypertension, hyperlipidemia  - MAP goal greater than 65  - HR goal between 60 and 100  - Home meds:  Amlodipine, atorvastatin, losartan, Lopressor  - Last echo: 7/11/2025, 45% ejection fraction, global hypokinesis of the left ventricle, impaired relaxation of left ventricular diastolic filling  - Interventions: Vasopressors  - Pressors (if shock present) : Levophed as needed while intubated, stopped overnight  - The 10-year ASCVD risk score (Fermín BENAVIDEZ, et al., 2019) is: 24.7%    Values used to calculate the score:      Age: 75 years      Sex: Male      Is Non- : No      Diabetic: Yes      Tobacco smoker: Yes      Systolic Blood Pressure: 93 mmHg      Is BP treated: Yes      HDL Cholesterol: 66 mg/dL      Total Cholesterol: 146 mg/dL     Pulmonary:   Vent Mode: Assist control/Volume control plus  FiO2 (%):  [45 %-50 %] 50 %  S RR:  [20] 20  S VT:  [450 mL] 450 mL  PEEP/CPAP (cm H2O):  [8 cm H20-10 cm H20] 8 cm H20  MAP (cm H2O):  [12-14] 12   - History: Acute hypoxic hypercapnic respiratory failure  - Home meds: Albuterol  Continuous pulse oximetry   O2 PRN to maintain SpO2 > 94%, wean as tolerated  - Imaging: CT angio of the chest  - Interventions: Intubated  - FiO2 percent has progressed to 50% on ventilator    Gastrointestinal:   Results from last 7 days   Lab Units 07/15/25  0650 07/15/25  0215 07/14/25  1657 07/14/25  0719   ALK PHOS U/L 54 55 59 62   AST U/L 8* 8* 9 11   ALT U/L 9* 10 10 10       -History: None  -Home meds: None  - Diet: Using tube feeds through Corpak, 50 mL Osmolyte /hr and 150 mL sterile water flush every 4th hour  - Supplementation: None  - Prophylaxis: protonix [indicated if plt<50, INR>1.5, PTT>2x UNL, pressors, liver disease, TBI, ICH]  - Bowel regimen: miralax  - Last BM: Last BM Date: 07/11/25  - Additional interventions: Using Corpak  - Last BM: Last BM Date: 07/14/25      Renal:   Results from last 7 days   Lab Units 07/15/25  0650 07/15/25  0359 07/15/25  0215 07/14/25  1657 07/14/25  0719 07/14/25  0425 07/13/25  0645 07/13/25  0317   SODIUM mmol/L 142  --   139 139 142  --    < >  --    POTASSIUM mmol/L 4.1  --  3.9 4.2 4.1  --    < >  --    CHLORIDE mmol/L 105  --  102 102 103  --    < >  --    MAGNESIUM mg/dL 2.57*  --  2.40 2.37 2.18  --    < >  --    PHOSPHORUS mg/dL  --  3.4 3.6  --   --  3.9  --  2.4*   CO2 mmol/L 27  --  28 27 26  --    < >  --    BUN mg/dL 46*  --  43* 39* 31*  --    < >  --    CREATININE mg/dL 1.31*  --  1.32* 1.30 1.18  --    < >  --    CALCIUM mg/dL 9.0  --  9.0 8.5* 8.5*  --    < >  --     < > = values in this interval not displayed.       Net IO Since Admission: 6,819.97 mL [07/15/25 1146]  - Daily CMP,Mg,Phos  - History: None  - Home meds: None  - Fluids: On tube feeding 50 mL/h, 150 mL flush every 4 hours  - Electrolytes: Replete per protocol, goal K>4 Phos >3 Mg >2  - Estimated Creatinine Clearance: 40.1 mL/min (A) (by C-G formula based on SCr of 1.31 mg/dL (H)).  -     Endocrine:   Results from last 7 days   Lab Units 07/15/25  0732 07/15/25  0650 07/15/25  0415 07/15/25  0215 07/15/25  0201 07/15/25  0159 07/15/25  0057 25   POCT GLUCOSE mg/dL 269*  --  355*  --  420* 437* 447* 388*   GLUCOSE mg/dL  --  283*  --  450*  --   --   --   --       -History: Type 2 diabetes  -Home meds: Lantus  -sliding scale: Has increases demand for sliding scale and as needed bolus of additional insulin  -BG < 180 goal    Hematology:   Results from last 7 days   Lab Units 07/15/25  0359 07/15/25  0215 25  0425 25  0317   HEMOGLOBIN g/dL 8.0* 8.7* 10.4* 10.0*   HEMATOCRIT % 24.6* 26.4* 32.6* 30.8*   MCV fL 104* 104* 105* 103*   MCHC g/dL 32.5 33.0 31.9* 32.5   PLATELETS AUTO x10*3/uL 203 207 198 233     - Daily CBC  -History: Chronic anemia  -Home meds: None  - DVT Prophylaxis: Lovenox, SCDs    Infectious Disease:   Results from last 7 days   Lab Units 07/15/25  0359 07/15/25  0215 25  0425 25  0317   WBC AUTO x10*3/uL 9.0 8.9 9.8 9.7     Temp (24hrs), Av.4 °C (97.6 °F), Min:36 °C (96.8 °F), Max:36.8 °C (98.2 °F)      -History: None  -Home meds: None  -Cultures: Corynebacterium grown on culture  -Abx: Vancomycin    Musculoskeletal:   -History: None  -Home meds: None  - PT to see   - OT to see     Integumentary:   -History: None  -Home meds: None    LDA:  ETT  7 mm (Active)   Placement Date/Time: 07/13/25 2140   Hand Hygiene Completed: Yes  Single Lumen Tube Size: 7 mm  Cuffed: Yes  Location: Oral  Placement Verification: Capnometry;Radiography  Placed By:     Number of days: 1       NG/OG/Feeding Tube Nasoduodenal 12 Fr Right nostril (Active)   Placement Date/Time: 07/11/25 1245   Placed by: stephanie LORA  Hand Hygiene Completed: Yes  Type of Tube: Feeding Tube  Tube Length (cm): 80 cm  Tube Type: Nasoduodenal  NG/OG Tube Size: 12 Fr  Tube Location: Right nostril  Difficulty with Placement: No   Number of days: 3       External Urinary Catheter Male (Active)   Placement Date/Time: 07/08/25 1400   Placed by: Neda Sam RN  Hand Hygiene Completed: Yes  External Catheter Type: Male   Number of days: 6         CODE STATUS: Full Code    Disposition: Patient will remain in the ICU    RESTRAINTS: type: I agree with nursing assessment of the patient´s need for restraints to protect the patient from injury and facilitate healing. The patient is unable to cooperate with the plan of care and at risk for disrupting critical therapy (i.e., removing medical devices, lines, tubes and/or dressings).  Please see order for specifics. Restraints can be removed when the patient is able to cooperate with plan of care and allow healing to occur, or the medical devices at risk are discontinued by the medical team.    ABCDEF Checklist  Analgesia: Spontaneous awakening trial to be pursued if clinically appropriate. RASS goal reviewed  Breathing: Spontaneous breathing trial to be pursued if clinically appropriate. Mechanical power of assisted ventilation reviewed  Choice of analgesia/sedation: Analgesic and sedative agents adjusted per clinical  context.   Delirium assessed by CAM, will avoid exacerbating factors  Early mobility and exercise: Physical and occupational therapy engaged  Family: Plan of care, overall trajectory of patient shared with family. Questions elicited and answered as appropriate.     Due to the high probability of life threatening clinical decompensation, the patient required critical care time evaluating and managing this patient.  Critical care time included obtaining a history, examining the patient, ordering and reviewing studies, discussing, developing, and implementing a management plan, evaluating the patient's response to treatment, and discussion with other care team providers. I saw and evaluated the patient myself.  Critical care time was performed exclusive of billable procedures.        Case discussed with attending , Dr. Doron Frost,     Attending Addendum:    I saw and evaluated the patient. I personally obtained the key and critical portions of the history and physical exam or was physically present for key and critical portions performed by the resident/fellow. I reviewed the resident/fellow's documentation and discussed the patient with the resident/fellow. I agree with the resident/fellow's medical decision making as documented in the note.     Critical care time is 35 minutes.         [1] [Held by provider] amLODIPine, 10 mg, oral, Daily  [Held by provider] cyanocobalamin, 100 mcg, oral, Daily  docusate sodium, 100 mg, nasogastric tube, BID  enoxaparin, 40 mg, subcutaneous, q24h  [Held by provider] hydrALAZINE, 25 mg, oral, TID  [Held by provider] insulin glargine, 5 Units, subcutaneous, q24h  [Held by provider] insulin lispro, 0-10 Units, subcutaneous, Before meals & nightly  insulin lispro, 0-10 Units, subcutaneous, q4h  ipratropium-albuteroL, 3 mL, nebulization, q6h  lidocaine, 1 patch, transdermal, Daily  [Held by provider] losartan, 100 mg, oral, Daily  [Held by provider] metoprolol tartrate, 50  mg, oral, BID  [Held by provider] mirtazapine, 7.5 mg, oral, Nightly  [Held by provider] multivitamin with minerals, 1 tablet, nasoduodenal tube, Daily  oxygen, , inhalation, Continuous - Inhalation  perflutren lipid microspheres, 0.5-10 mL of dilution, intravenous, Once in imaging  perflutren protein A microsphere, 0.5 mL, intravenous, Once in imaging  [Held by provider] polyethylene glycol, 17 g, oral, Daily  polyethylene glycol, 17 g, nasogastric tube, Daily  [Held by provider] QUEtiapine, 25 mg, oral, Nightly  sodium chloride, 3 mL, nebulization, q6h FABRICIO  sulfur hexafluoride microsphr, 2 mL, intravenous, Once in imaging  vancomycin, 1,000 mg, intravenous, Once     [2] PRN medications: bisacodyl, dextromethorphan-guaifenesin, dextrose, dextrose, glucagon, glucagon, ipratropium-albuteroL, vancomycin  [3] fentaNYL, 0-200 mcg/hr, Last Rate: 75 mcg/hr (07/15/25 0900)  norepinephrine, 0-0.5 mcg/kg/min, Last Rate: Stopped (07/12/25 1940)  propofol, 0-20 mcg/kg/min, Last Rate: 5 mcg/kg/min (07/15/25 0900)

## 2025-07-15 NOTE — ASSESSMENT & PLAN NOTE
- Admission for GIP  - End-of-life order set has been employed  - Patient unlikely to be stable enough to transfer to inpatient hospice  - Mechanical ventilation has been withdrawn

## 2025-07-15 NOTE — PROGRESS NOTES
Hospice meeting scheduled for today at 130/2p bedside with [facility]. Plan for GIP/WOC.    250P  Hospice consents signed, pt DNRCC, plan for readmit to GIP with [facility] and for a compassionate withdrawal of care.      Lisa Logan RN

## 2025-07-15 NOTE — DISCHARGE SUMMARY
Discharge Diagnosis  #Acute delirium  #Acute hypoxic hypercapnic respiratory failure  #JULIO that has since been resolved  #Azotemia that is since been resolved  #Left lower lobe pneumonia  #Corynebacterium striatum pneumonia    Issues Requiring Follow-Up  None, the patient is now on hospice care.    Test Results Pending At Discharge  Pending Labs       No current pending labs.            Hospital Course   75 year old male presenting to ED on July 2, 2025 from home with weakness, generalized malaise, poor oral intake and SOB over the previous few days, additionally he admitted to weight loss over the last several months. Stated SOB had increased over the previous 2 days, denies respiratory disease but does currently smoke cigarettes, denies chest pain.  Admitted to the ICU on July 6, 2025 with hypoxic and hypercapneic respiratory failure to the ICU requiring previous intubation, now extubated and requiring BiPAP.  Throughout the patient's stay in the ICU he required to be intubated 3 separate times and have 2 bronchoscope's performed.  The third intubation was due to the patient being agitated and confused and having a self extubation, this is when the second bronchoscope was performed after that ET tube was placed.  After the patient had his third intubation where he had a desaturation event into 70% was found was contacted in relation to his CODE STATUS and his prognosis.  The patient was experiencing agitation and confusion while he was intubated and the same for being on BiPAP, he was also requiring increased levels of oxygenation.  Due to the prognosis and the patient's decompensation the patient's family decided to place him on hospice care.  After he was placed on hospice care the patient was discharged to be readmitted and hospice care in the hospital and he will be extubated and undergo the hospice care necessary for him to be comfortable.        Visit Vitals  /73   Pulse 60   Resp 26     There were no  vitals filed for this visit.    Immunization History   Administered Date(s) Administered    Pneumococcal conjugate vaccine, 20-valent (PREVNAR 20) 08/04/2023    Tdap vaccine, age 7 year and older (BOOSTRIX, ADACEL) 08/04/2023         Pertinent Physical Exam At Time of Discharge  Physical Exam  Vitals and nursing note reviewed.   Constitutional:       General: He is not in acute distress.     Appearance: He is ill-appearing. He is not toxic-appearing or diaphoretic.      Comments: Generalized weakness and BMI of 17.87   HENT:      Head: Normocephalic and atraumatic.      Right Ear: External ear normal.      Left Ear: External ear normal.      Nose: Nose normal. No congestion or rhinorrhea.      Mouth/Throat:      Mouth: Mucous membranes are moist.      Pharynx: Oropharynx is clear.   Eyes:      General: No scleral icterus.        Right eye: No discharge.         Left eye: No discharge.      Extraocular Movements: Extraocular movements intact.      Conjunctiva/sclera: Conjunctivae normal.      Pupils: Pupils are equal, round, and reactive to light.   Cardiovascular:      Rate and Rhythm: Normal rate and regular rhythm.      Pulses: Normal pulses.      Heart sounds: Normal heart sounds.   Pulmonary:      Effort: Pulmonary effort is normal.      Breath sounds: Normal breath sounds.   Abdominal:      General: There is no distension.      Palpations: Abdomen is soft.      Tenderness: There is no abdominal tenderness. There is no right CVA tenderness, left CVA tenderness, guarding or rebound.   Musculoskeletal:         General: No swelling, tenderness, deformity or signs of injury.      Cervical back: Normal range of motion and neck supple.      Right lower leg: No edema.      Left lower leg: No edema.   Skin:     General: Skin is warm and dry.      Capillary Refill: Capillary refill takes less than 2 seconds.   Neurological:      General: No focal deficit present.      Mental Status: He is alert and oriented to person,  "place, and time.   Psychiatric:         Mood and Affect: Mood normal.         Behavior: Behavior normal.         Home Medications     Medication List      CONTINUE taking these medications     Unifine Pentips 31 gauge x 3/16\" needle; Generic drug: pen needle,   diabetic; Inject 1 each under the skin 4 times a day. Use 3-4 times daily   with insulin pen     ASK your doctor about these medications     albuterol 90 mcg/actuation inhaler; Inhale 2 puffs every 6 hours if   needed for wheezing.   amLODIPine 10 mg tablet; Commonly known as: Norvasc; Take 1 tablet (10   mg) by mouth early in the morning..   atorvastatin 10 mg tablet; Commonly known as: Lipitor; Take 1 tablet (10   mg) by mouth once daily.   cyanocobalamin 100 mcg tablet; Commonly known as: Vitamin B-12   Glucagon Emergency Kit (human) 1 mg injection; Generic drug: glucagon;   Inject 1 mg into the muscle 1 time if needed (hypoglycemia) for up to 1   dose.   insulin glargine 100 unit/mL (3 mL) pen; Commonly known as: Lantus;   Inject 5 Units under the skin 2 times a day.   insulin lispro 100 unit/mL pen; Commonly known as: HumaLOG KwikPen   Insulin; Inject 10 Units under the skin 3 times daily (morning, midday,   late afternoon). Use 3x/day per sliding scale. Max 30 U per day   losartan 100 mg tablet; Commonly known as: Cozaar; Take 1 tablet by   mouth once daily.   metFORMIN 1,000 mg tablet; Commonly known as: Glucophage; Take 1 tablet   (1,000 mg) by mouth 2 times a day.   metoprolol tartrate 25 mg tablet; Commonly known as: Lopressor; Take 1   tablet (25 mg) by mouth 2 times a day.   naproxen 500 mg tablet; Commonly known as: Naprosyn; Take 1 tablet (500   mg) by mouth 2 times a day as needed (pain).   tamsulosin 0.4 mg 24 hr capsule; Commonly known as: Flomax; Take 1   capsule (0.4 mg) by mouth once daily. Take 30 min after same meal       Outpatient Follow-Up  Future Appointments   Date Time Provider Department Center   9/19/2025 10:20 AM Antolin Thao DO " [id number] [place]       Bridger Frost, DO

## 2025-07-15 NOTE — H&P
History of Present Illness  This is a very pleasant 75 y.o. gentleman with a known history of pancreatitis, T2DM insulin dependent, HLD and HTN with prolonged ICU course involving corynebacterium striatum pneumonia complicated by acute hypoxic, hypercapneic respiratory failure requiring mechanical ventilation, hospital course complicated by acute delerium and JULIO, patient family met with palliative care and ultimately hospice electing for withdrawal of care and patient now being re-admitted to [facility] for end of life care under hospitalist service.     Past Medical History  He has no past medical history on file.    Surgical History  He has a past surgical history that includes Other surgical history (11/14/2019) and Intubation (7/6/2025).     Social History  He reports that he has been smoking cigarettes. He has a 30 pack-year smoking history. He has never used smokeless tobacco. He reports that he does not drink alcohol and does not use drugs.    Family History  Family History[1]     Allergies  Patient has no known allergies.    Scheduled medications  Scheduled Medications[2]  Continuous medications  Continuous Medications[3]  PRN medications  PRN Medications[4]        Last Recorded Vitals  /63   Pulse 50   Resp 19   SpO2 100%     Relevant Results  Scheduled medications  Scheduled Medications[5]  Continuous medications  Continuous Medications[6]  PRN medications  PRN Medications[7]     Results for orders placed or performed during the hospital encounter of 07/02/25 (from the past 24 hours)   Comprehensive metabolic panel   Result Value Ref Range    Glucose 354 (H) 74 - 99 mg/dL    Sodium 139 136 - 145 mmol/L    Potassium 4.2 3.5 - 5.3 mmol/L    Chloride 102 98 - 107 mmol/L    Bicarbonate 27 21 - 32 mmol/L    Anion Gap 14 10 - 20 mmol/L    Urea Nitrogen 39 (H) 6 - 23 mg/dL    Creatinine 1.30 0.50 - 1.30 mg/dL    eGFR 58 (L) >60 mL/min/1.73m*2    Calcium 8.5 (L) 8.6 - 10.3 mg/dL    Albumin 2.7 (L) 3.4 - 5.0  g/dL    Alkaline Phosphatase 59 33 - 136 U/L    Total Protein 5.8 (L) 6.4 - 8.2 g/dL    AST 9 9 - 39 U/L    Bilirubin, Total 0.4 0.0 - 1.2 mg/dL    ALT 10 10 - 52 U/L   Magnesium   Result Value Ref Range    Magnesium 2.37 1.60 - 2.40 mg/dL   POCT GLUCOSE   Result Value Ref Range    POCT Glucose 388 (H) 74 - 99 mg/dL   POCT GLUCOSE   Result Value Ref Range    POCT Glucose 447 (H) 74 - 99 mg/dL   POCT GLUCOSE   Result Value Ref Range    POCT Glucose 437 (H) 74 - 99 mg/dL   POCT GLUCOSE   Result Value Ref Range    POCT Glucose 420 (H) 74 - 99 mg/dL   Urinalysis with Reflex Culture and Microscopic   Result Value Ref Range    Color, Urine Light-Yellow Light-Yellow, Yellow, Dark-Yellow    Appearance, Urine Clear Clear    Specific Gravity, Urine 1.025 1.005 - 1.035    pH, Urine 5.5 5.0, 5.5, 6.0, 6.5, 7.0, 7.5, 8.0    Protein, Urine 30 (1+) (A) NEGATIVE, 10 (TRACE) mg/dL    Glucose, Urine 300 (3+) (A) NEGATIVE mg/dL    Blood, Urine NEGATIVE NEGATIVE mg/dL    Ketones, Urine TRACE (A) NEGATIVE mg/dL    Bilirubin, Urine NEGATIVE NEGATIVE mg/dL    Urobilinogen, Urine Normal Normal mg/dL    Nitrite, Urine NEGATIVE NEGATIVE    Leukocyte Esterase, Urine NEGATIVE NEGATIVE   Extra Urine Gray Tube   Result Value Ref Range    Extra Tube     Urinalysis Microscopic   Result Value Ref Range    WBC, Urine 1-5 1-5, NONE /HPF    RBC, Urine 3-5 NONE, 1-2, 3-5 /HPF   Beta Hydroxybutyrate   Result Value Ref Range    Beta-Hydroxybutyrate 0.08 0.02 - 0.27 mmol/L   Comprehensive metabolic panel   Result Value Ref Range    Glucose 450 (HH) 74 - 99 mg/dL    Sodium 139 136 - 145 mmol/L    Potassium 3.9 3.5 - 5.3 mmol/L    Chloride 102 98 - 107 mmol/L    Bicarbonate 28 21 - 32 mmol/L    Anion Gap 13 10 - 20 mmol/L    Urea Nitrogen 43 (H) 6 - 23 mg/dL    Creatinine 1.32 (H) 0.50 - 1.30 mg/dL    eGFR 57 (L) >60 mL/min/1.73m*2    Calcium 9.0 8.6 - 10.3 mg/dL    Albumin 2.6 (L) 3.4 - 5.0 g/dL    Alkaline Phosphatase 55 33 - 136 U/L    Total Protein 5.6  (L) 6.4 - 8.2 g/dL    AST 8 (L) 9 - 39 U/L    Bilirubin, Total 0.3 0.0 - 1.2 mg/dL    ALT 10 10 - 52 U/L   Magnesium   Result Value Ref Range    Magnesium 2.40 1.60 - 2.40 mg/dL   Phosphorus   Result Value Ref Range    Phosphorus 3.6 2.5 - 4.9 mg/dL   CBC and Auto Differential   Result Value Ref Range    WBC 8.9 4.4 - 11.3 x10*3/uL    nRBC 0.0 0.0 - 0.0 /100 WBCs    RBC 2.53 (L) 4.50 - 5.90 x10*6/uL    Hemoglobin 8.7 (L) 13.5 - 17.5 g/dL    Hematocrit 26.4 (L) 41.0 - 52.0 %     (H) 80 - 100 fL    MCH 34.4 (H) 26.0 - 34.0 pg    MCHC 33.0 32.0 - 36.0 g/dL    RDW 12.9 11.5 - 14.5 %    Platelets 207 150 - 450 x10*3/uL    Neutrophils % 86.9 40.0 - 80.0 %    Immature Granulocytes %, Automated 0.8 0.0 - 0.9 %    Lymphocytes % 6.4 13.0 - 44.0 %    Monocytes % 5.8 2.0 - 10.0 %    Eosinophils % 0.0 0.0 - 6.0 %    Basophils % 0.1 0.0 - 2.0 %    Neutrophils Absolute 7.72 (H) 1.60 - 5.50 x10*3/uL    Immature Granulocytes Absolute, Automated 0.07 0.00 - 0.50 x10*3/uL    Lymphocytes Absolute 0.57 (L) 0.80 - 3.00 x10*3/uL    Monocytes Absolute 0.52 0.05 - 0.80 x10*3/uL    Eosinophils Absolute 0.00 0.00 - 0.40 x10*3/uL    Basophils Absolute 0.01 0.00 - 0.10 x10*3/uL   Blood Gas Arterial Full Panel   Result Value Ref Range    POCT pH, Arterial 7.40 7.38 - 7.42 pH    POCT pCO2, Arterial 46 (H) 38 - 42 mm Hg    POCT pO2, Arterial 65 (L) 85 - 95 mm Hg    POCT SO2, Arterial 95 94 - 100 %    POCT Oxy Hemoglobin, Arterial 93.2 (L) 94.0 - 98.0 %    POCT Hematocrit Calculated, Arterial 28.0 (L) 41.0 - 52.0 %    POCT Sodium, Arterial 138 136 - 145 mmol/L    POCT Potassium, Arterial 4.1 3.5 - 5.3 mmol/L    POCT Chloride, Arterial 103 98 - 107 mmol/L    POCT Ionized Calcium, Arterial 1.28 1.10 - 1.33 mmol/L    POCT Glucose, Arterial 463 (HH) 74 - 99 mg/dL    POCT Lactate, Arterial 1.5 0.4 - 2.0 mmol/L    POCT Base Excess, Arterial 3.2 (H) -2.0 - 3.0 mmol/L    POCT HCO3 Calculated, Arterial 28.5 (H) 22.0 - 26.0 mmol/L    POCT Hemoglobin,  Arterial 9.3 (L) 13.5 - 17.5 g/dL    POCT Anion Gap, Arterial 11 10 - 25 mmo/L    Patient Temperature      FiO2 45 %    Ventilator Mode A/C     Ventilator Rate 20 bpm    Tidal Volume 450 mL    Peep CHM2O 10.0 cm H2O    Critical Called By DAVE     Critical Called To DR CASILLAS     Critical Call Time 224     Critical Read Back Y     Site of Arterial Puncture R RAD     Kuldip's Test Y    Phosphorus   Result Value Ref Range    Phosphorus 3.4 2.5 - 4.9 mg/dL   CBC and Auto Differential   Result Value Ref Range    WBC 9.0 4.4 - 11.3 x10*3/uL    nRBC 0.0 0.0 - 0.0 /100 WBCs    RBC 2.37 (L) 4.50 - 5.90 x10*6/uL    Hemoglobin 8.0 (L) 13.5 - 17.5 g/dL    Hematocrit 24.6 (L) 41.0 - 52.0 %     (H) 80 - 100 fL    MCH 33.8 26.0 - 34.0 pg    MCHC 32.5 32.0 - 36.0 g/dL    RDW 13.1 11.5 - 14.5 %    Platelets 203 150 - 450 x10*3/uL    Neutrophils % 85.9 40.0 - 80.0 %    Immature Granulocytes %, Automated 0.8 0.0 - 0.9 %    Lymphocytes % 5.9 13.0 - 44.0 %    Monocytes % 7.3 2.0 - 10.0 %    Eosinophils % 0.0 0.0 - 6.0 %    Basophils % 0.1 0.0 - 2.0 %    Neutrophils Absolute 7.75 (H) 1.60 - 5.50 x10*3/uL    Immature Granulocytes Absolute, Automated 0.07 0.00 - 0.50 x10*3/uL    Lymphocytes Absolute 0.53 (L) 0.80 - 3.00 x10*3/uL    Monocytes Absolute 0.66 0.05 - 0.80 x10*3/uL    Eosinophils Absolute 0.00 0.00 - 0.40 x10*3/uL    Basophils Absolute 0.01 0.00 - 0.10 x10*3/uL   POCT GLUCOSE   Result Value Ref Range    POCT Glucose 355 (H) 74 - 99 mg/dL   Comprehensive metabolic panel   Result Value Ref Range    Glucose 283 (H) 74 - 99 mg/dL    Sodium 142 136 - 145 mmol/L    Potassium 4.1 3.5 - 5.3 mmol/L    Chloride 105 98 - 107 mmol/L    Bicarbonate 27 21 - 32 mmol/L    Anion Gap 14 10 - 20 mmol/L    Urea Nitrogen 46 (H) 6 - 23 mg/dL    Creatinine 1.31 (H) 0.50 - 1.30 mg/dL    eGFR 57 (L) >60 mL/min/1.73m*2    Calcium 9.0 8.6 - 10.3 mg/dL    Albumin 2.6 (L) 3.4 - 5.0 g/dL    Alkaline Phosphatase 54 33 - 136 U/L    Total Protein 5.6 (L) 6.4  - 8.2 g/dL    AST 8 (L) 9 - 39 U/L    Bilirubin, Total 0.3 0.0 - 1.2 mg/dL    ALT 9 (L) 10 - 52 U/L   Magnesium   Result Value Ref Range    Magnesium 2.57 (H) 1.60 - 2.40 mg/dL   Lavender Top   Result Value Ref Range    Extra Tube Hold for add-ons.    POCT GLUCOSE   Result Value Ref Range    POCT Glucose 269 (H) 74 - 99 mg/dL   POCT GLUCOSE   Result Value Ref Range    POCT Glucose 263 (H) 74 - 99 mg/dL        CT head wo IV contrast  Result Date: 7/14/2025  Interpreted By:  Martinez Santiago, STUDY: CT HEAD WO IV CONTRAST;  7/14/2025 9:24 am   INDICATION: Signs/Symptoms:Unequal pupils with sluggish reaction.     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 in upper left cerebellar hemisphere   INCLUDED PARANASAL SINUSES: All clear   INCLUDED MASTOID AIR CELLS: All clear   SKULL:  No lytic or blastic lesion   EXTRACRANIAL SOFT TISSUES:  Scalp and occular globes grossly normal by CT       Asymmetric large hypodensity in subcortical and deep parietal lobe white matter but no evidence of acute cortical/territorial infarct and no acute intracranial hemorrhage   Old infarct/encephalomalacia in small area of upper left cerebellar hemisphere   MACRO: None   Signed by: Martinez Santiago 7/14/2025 9:43 AM Dictation workstation:   [id number]    XR chest 1 view  Result Date: 7/14/2025  Interpreted By:  Chuck Ivan, STUDY: XR CHEST 1 VIEW;  7/14/2025 8:02 am   INDICATION: Signs/Symptoms:concern for pneumothorax.    COMPARISON: 07/14/2025   ACCESSION NUMBER(S): [id number]   ORDERING CLINICIAN: NHUNG ARREOLA   FINDINGS: AP radiograph of the chest was provided.   DEVICES: Stable endotracheal and feeding tubes.   CARDIOMEDIASTINAL SILHOUETTE: Cardiomediastinal silhouette is normal in size and configuration.No significant atherosclerotic calcification.   LUNGS: No focal consolidation. No pneumothorax. No pleural effusion.   BONES: No acute osseous changes.       1.  No evidence of a pneumothorax.     Signed by: Chuck Ivan 7/14/2025 8:23 AM Dictation workstation:   [id number]    XR chest 1 view  Result Date: 7/14/2025  Interpreted By:  Charlie Shin, STUDY: XR CHEST 1 VIEW;  7/14/2025 6:21 am   INDICATION: Signs/Symptoms:follow up left lower lobe collapse.   COMPARISON: Chest x-ray from yesterday   ACCESSION NUMBER(S): [id number]   ORDERING CLINICIAN: DONG BULLOCK   FINDINGS: Support tube still present. Left mid to lower lung opacities persist. There is linear lucency along the left mid lung. Right basilar opacities have decreased. Cardiomediastinal silhouette unchanged. No pulmonary vascular congestion.       Small linear lucency along the left mid lung questionably pneumothorax. There appear to be lung markings extending beyond this site which favors an artifact perhaps related to skin fold. Suggested repeat radiograph in the right lateral decubitus position or on expiration for further evaluation. Alternatively, consider CT. Left mid to lower lung opacities persist.   MACRO: None   Signed by: Charlie Shin 7/14/2025 7:31 AM Dictation workstation:   [id number]    XR chest 1 view  Result Date: 7/14/2025  Interpreted By:  Charlie Shin, STUDY: XR CHEST 1 VIEW;  7/13/2025 6:31 am   INDICATION: Signs/Symptoms:serial exam while intubated.   COMPARISON: 07/12/2025   ACCESSION NUMBER(S): [id number]   ORDERING CLINICIAN: NHUNG ARREOLA   FINDINGS: Support tubes still present. Left mid lung opacities have  decreased. Bibasilar opacities persist. Cardiomediastinal silhouette unchanged. No pulmonary vascular congestion.       Improved aeration of left mid lung. Bibasilar opacities otherwise persist.   MACRO: None   Signed by: Charlie Shin 7/14/2025 7:27 AM Dictation workstation:   [id number]    XR chest 1 view  Result Date: 7/13/2025  Interpreted By:  Chuck Ivan, STUDY: XR CHEST 1 VIEW;  7/13/2025 9:53 pm   INDICATION: Signs/Symptoms:post intubation.   COMPARISON: Chest radiograph 07/13/2025   ACCESSION NUMBER(S): [id number]   ORDERING CLINICIAN: NHUNG ARREOLA   FINDINGS: AP radiograph of the chest was provided.   DEVICES: Endotracheal tube 3.9 cm from the tammi. Stable Dobbhoff catheter.   CARDIOMEDIASTINAL SILHOUETTE: Cardiomediastinal silhouette is stable in size and configuration.No significant atherosclerotic calcification.   LUNGS: No focal consolidation. Bibasilar atelectasis. No pneumothorax. No pleural effusion   BONES: No acute osseous changes.       1.  No evidence of acute cardiopulmonary process.     Signed by: Chuck Ivan 7/13/2025 10:04 PM Dictation workstation:   [id number]    ECG 12 lead  Result Date: 7/12/2025  Normal sinus rhythm Left axis deviation Left ventricular hypertrophy with repolarization abnormality Cannot rule out Septal infarct , age undetermined Abnormal ECG No previous ECGs available Confirmed by Socrates Yu ([id number]) on 7/12/2025 6:02:21 PM    XR chest 1 view  Result Date: 7/12/2025  Interpreted By:  Trice Hughes, STUDY: XR CHEST 1 VIEW;  7/12/2025 12:00 pm   INDICATION: Signs/Symptoms:ET tube placement.     COMPARISON: 07/11/2025 chest x-ray and collapse   ACCESSION NUMBER(S): [id number]   ORDERING CLINICIAN: DONG BULLOCK   TECHNIQUE: Portable AP upright   FINDINGS: Endotracheal tube projects about 5 cm above the tammi. Feeding tube extends below the diaphragm and beyond the image.   Cardiac silhouette is normal in size. Left basilar opacity is worse  than the prior study and corresponds to the left lower lobe consolidation and collapse on the prior CT. Patchy infiltrate is present medial right lung base and appears similar to the prior study. No pleural effusion is noted. No acute interval osseous abnormality is identified.       Slightly worse left basilar opacity correlates with the left lower lobe consolidation and volume loss from the prior CT   Unchanged patchy infiltrate medial right lung base   MACRO: None.   Signed by: Trice Hughes 7/12/2025 1:32 PM Dictation workstation:   [id number]    CT angio chest for pulmonary embolism  Result Date: 7/11/2025  Interpreted By:  Sue Cantrell, STUDY: CT ANGIO CHEST FOR PULMONARY EMBOLISM;  7/11/2025 8:36 pm   INDICATION: Signs/Symptoms:worsening left sided rhonchi, hypoxia requiring bipap   COMPARISON: Chest x-ray 07/11/2025. CT angiogram chest and CT abdomen and pelvis 07/06/2025.   ACCESSION NUMBER(S): [id number]   ORDERING CLINICIAN: NHUNG ARREOLA   TECHNIQUE: Helical data acquisition of the chest was obtained following the uneventful administration of intravenous contrast material. Images were reformatted in axial, coronal, and sagittal planes. MIP images were created and reviewed.   FINDINGS: POTENTIAL LIMITATIONS OF THE STUDY: Motion artifact.   HEART AND VESSELS: No discrete filling defects within the main pulmonary artery or its branches.   No thoracic aortic aneurysm.  Atherosclerotic calcifications are present at the thoracic aorta.   The heart is not enlarged.  Coronary artery calcifications are present. No evidence of pericardial effusion.   MEDIASTINUM AND TALIB, LOWER NECK AND AXILLA: The visualized thyroid gland is within normal limits.   No evidence of thoracic lymphadenopathy by CT criteria.   An enteric tube courses along the esophagus.   LUNGS AND AIRWAYS: Layering mucus noted at the bilateral mainstem bronchi. There is mucoid impaction of the bronchi at the right lower lobe. Debris  within the right mainstem bronchus and right middle lobe bronchi. There is mucoid impaction of bronchi at the left lower lobe. Debris within the left upper lobe and lingular bronchi.   Persistent complete collapse of the left lower lobe. Interval increase in the tree-in-bud airspace opacities and consolidation at the right lower lobe. No pleural effusion or pneumothorax.   UPPER ABDOMEN: The enteric tube terminates at the gastric antrum. Redemonstration of pancreatic calcifications with irregular ductal dilation at the visualized pancreas, better evaluated on prior CT abdomen and pelvis 07/06/2025.   CHEST WALL AND OSSEOUS STRUCTURES: Multilevel degenerative changes at the spine.       1.  No evidence of pulmonary emboli. 2.  Persistent complete collapse of the left lower lobe with mucoid impaction of the left lower lobe bronchi. Debris within the left upper lobe and lingular bronchi. 3. Interval worsening aspiration pneumonitis at the right lower lobe with mucoid impaction at the bronchi of the right lower lobe. Debris within the right mainstem bronchus and right middle lobe bronchi.     MACRO: None.   Signed by: Sue Cantrell 7/11/2025 10:07 PM Dictation workstation:   [id number]    ECG 12 lead  Result Date: 7/11/2025  Normal sinus rhythm Left axis deviation Septal infarct (cited on or before 06-JUL-2025) Abnormal ECG When compared with ECG of 07-JUL-2025 11:26, Vent. rate has increased BY  23 BPM QRS axis Shifted left Questionable change in initial forces of Septal leads Non-specific change in ST segment in Lateral leads    XR abdomen 1 view  Result Date: 7/11/2025  Interpreted By:  Charlie Shin, STUDY: XR ABDOMEN 1 VIEW;  7/11/2025 1:34 pm   INDICATION: Signs/Symptoms:corpak placement.   COMPARISON: None.   ACCESSION NUMBER(S): [id number]   ORDERING CLINICIAN: NHUNG ARREOLA   FINDINGS: Feeding tube tip in the distal stomach. Gas scattered throughout nondilated small bowel in the upper abdomen.        Feeding tube terminates in the distal stomach.   MACRO: None   Signed by: Charlie Shin 7/11/2025 2:20 PM Dictation workstation:   [id number]    XR chest 1 view  Result Date: 7/11/2025  Interpreted By:  Charlie Shin, STUDY: XR CHEST 1 VIEW;  7/11/2025 1:34 pm   INDICATION: Signs/Symptoms:Sob.   COMPARISON: 07/10/2025   ACCESSION NUMBER(S): [id number]   ORDERING CLINICIAN: NHUNG ARREOLA   FINDINGS: Hazy bibasilar opacities are unchanged. No apparent pleural effusion. Cardiomediastinal silhouette unchanged. No pulmonary vascular congestion. Feeding tube has been placed.       No significant change compared to yesterday. Hazy bibasilar opacities persist.   MACRO: None   Signed by: Charlie Shin 7/11/2025 2:19 PM Dictation workstation:   [id number]    XR abdomen 1 view  Result Date: 7/11/2025  Interpreted By:  Charlie Shin, STUDY: XR ABDOMEN 1 VIEW;  7/11/2025 12:24 pm   INDICATION: Signs/Symptoms:Corpak placement.   COMPARISON: None.   ACCESSION NUMBER(S): [id number]   ORDERING CLINICIAN: SHILPA ARAUZ   FINDINGS: Feeding tube is looped in the gastric fundus with the tip directed distally, terminating in the mid gastric body.       Feeding tube tip in the mid stomach.   MACRO: None   Signed by: Charlie Shin 7/11/2025 12:45 PM Dictation workstation:   [id number]    XR chest 1 view  Result Date: 7/10/2025  Interpreted By:  Sara Torres, STUDY: XR CHEST 1 VIEW;  7/10/2025 11:06 am   INDICATION: Signs/Symptoms:increasing o2 requirements.   COMPARISON: Chest radiograph 07/08/2025 CT 07/06/2025   ACCESSION NUMBER(S): [id number]   ORDERING CLINICIAN: NHUNG ARREOLA   FINDINGS: AP radiograph of the chest was provided.       CARDIOMEDIASTINAL SILHOUETTE: Cardiomediastinal silhouette is stable in size and configuration. Atherosclerotic calcifications of the aortic arch.   LUNGS: Similar to mildly increased right lower lobe patchy airspace opacities. Unchanged retrocardiac opacification. No pneumothorax  or pulmonary edema.   ABDOMEN: No remarkable upper abdominal findings.   BONES: No acute osseous changes.       Similar to mildly increased right lower lobe patchy airspace opacities. Unchanged retrocardiac opacification. Given the debris/mucous plugging noted on prior CT, findings are suspicious for aspiration pneumonia. Continued collapse of the left lower lobe is suspected; no new lobar collapse is evident.   Signed by: Sara Torres 7/10/2025 11:15 AM Dictation workstation:   [id number]    XR chest 1 view  Result Date: 7/8/2025  Interpreted By:  Rohit Lynch, STUDY: XR CHEST 1 VIEW;  7/8/2025 7:08 pm   INDICATION: Signs/Symptoms:sob.   COMPARISON: Chest x-ray 07/08/2025   ACCESSION NUMBER(S): [id number]   ORDERING CLINICIAN: NHUNG ARREOLA   FINDINGS: Hardware overlying the left lung base slightly limits evaluation. Interval removal of endotracheal tube. Enteric tube terminates in the left upper quadrant with side hole at slightly above the GE junction similar to prior exam. This can be advanced slightly. Multiple overlying leads are present.   CARDIOMEDIASTINAL SILHOUETTE: Cardiomediastinal silhouette is stable in size and configuration. Atherosclerotic calcification of the aorta.   LUNGS: Redemonstration of retrocardiac opacity which corresponds to complete left lower lobe collapse as seen on prior CT. Right lung is clear. No pneumothorax.   ABDOMEN: No remarkable upper abdominal findings.   BONES: Multilevel degenerative changes of the spine.       Persistent retrocardiac opacity corresponding to left lower lobe atelectasis with superimposed infection not excluded. Clinical correlation and continued follow-up to ensure complete resolution is advised.   Support hardware as described above.   MACRO: None   Signed by: Rohit Lynch 7/8/2025 7:14 PM Dictation workstation:   [id number]    ECG 12 lead  Result Date: 7/8/2025  Marked sinus bradycardia Septal infarct (cited on or before 06-JUL-2025) When  compared with ECG of 06-JUL-2025 11:04, (unconfirmed) Vent. rate has decreased BY  54 BPM QRS axis Shifted right Reconfirmed by Fernando Newell ([id number]) on 7/8/2025 2:12:27 PM    XR chest 1 view  Result Date: 7/8/2025  Interpreted By:  Chuck Ivan, STUDY: XR CHEST 1 VIEW;  7/8/2025 5:44 am   INDICATION: Signs/Symptoms:intubated, interval cxr.   COMPARISON: CT chest 07/06/2025 and chest radiograph 07/07/2025   ACCESSION NUMBER(S): [id number]   ORDERING CLINICIAN: NHUNG ARREOLA   FINDINGS: AP radiograph of the chest was provided.   DEVICES: Unchanged endotracheal and feeding tubes.   CARDIOMEDIASTINAL SILHOUETTE: Cardiomediastinal silhouette is normal in size and configuration.No significant atherosclerotic calcification.   LUNGS: Stable bibasilar atelectasis. Unchanged retrocardiac opacity. No pneumothorax. No pleural effusion. D   BONES: No acute osseous changes.       1.  Stable retrocardiac opacity.     Signed by: Chuck Ivan 7/8/2025 8:11 AM Dictation workstation:   [id number]    Transthoracic Echo Complete  Result Date: 7/7/2025            [facility] [place] [address] [address]    Tel [phone] Fax [phone] TRANSTHORACIC ECHOCARDIOGRAM REPORT Patient Name:       ABBY BYNUM SDY     Reading Physician:    [id number] Socrates Yu MD Study Date:         7/7/2025             Ordering Provider:    [id number] AYLIN SCHULER MRN/PID:            [id number]             Fellow: Accession#:         [id number]         Nurse: Date of Birth/Age:  1950 / 75 years Sonographer:          Anette Phoenix RDCS Gender Assigned at  M                    Additional Staff: Birth: Height:             177.80 cm            Admit Date: Weight:             49.44 kg             Admission Status:      Inpatient -                                                                Routine BSA / BMI:          1.61 m2 / 15.64      Department Location:  [facility] ICU Back                     kg/m2                                      (27-34) Blood Pressure: 101 /69 mmHg Study Type:    TRANSTHORACIC ECHO (TTE) COMPLETE Diagnosis/ICD: Acute respiratory failure with hypoxia-J96.01 Indication:    acute respiratory failure with hypoxia and hypercapnia CPT Codes:     Echo Complete w Full [id number] Patient History: Pertinent History: HTN. Study Detail: The following Echo studies were performed: 2D, M-Mode, Doppler and               color flow.  PHYSICIAN INTERPRETATION: Left Ventricle: The left ventricular systolic function is mildly decreased with a Lezama's biplane calculated ejection fraction of 45%. There is global hypokinesis of the left ventricle with minor regional variations. The left ventricular cavity size is normal. There is mild increased septal and mildly increased posterior left ventricular wall thickness. There is left ventricular concentric remodeling. Spectral Doppler shows a Grade I (impaired relaxation pattern) of left ventricular diastolic filling with normal left atrial filling pressure. Left Atrium: The left atrial size is normal. Right Ventricle: The right ventricle is normal in size. Unable to determine right ventricular systolic function. Right Atrium: The right atrial size was not well visualized. Aortic Valve: The aortic valve is trileaflet. There is no evidence of aortic valve regurgitation. Mitral Valve: The mitral valve is normal in structure. There is trace mitral valve regurgitation. The E Vmax is 0.33 m/s. Tricuspid Valve: The tricuspid valve is structurally normal. There is trace to mild tricuspid regurgitation. The Doppler estimated right ventricular systolic pressure (RVSP) is within normal limits at 22 mmHg. Pulmonic Valve: The pulmonic valve is not well visualized. The pulmonic valve  regurgitation was not assessed. Pericardium: No pericardial effusion noted. Aorta: The aortic root is abnormal. There is mild dilatation of the aortic root. Systemic Veins: The inferior vena cava appears normal in size, IVC inspiratory collapse not assessed due to mechanical ventilation. In comparison to the previous echocardiogram(s): There are no prior studies on this patient for comparison purposes.  CONCLUSIONS:  1. The left ventricular systolic function is mildly decreased with a Lezama's biplane calculated ejection fraction of 45%.  2. There is global hypokinesis of the left ventricle with minor regional variations.  3. Spectral Doppler shows a Grade I (impaired relaxation pattern) of left ventricular diastolic filling with normal left atrial filling pressure.  4. The Doppler estimated RVSP is within normal limits at 22 mmHg. QUANTITATIVE DATA SUMMARY:  2D MEASUREMENTS:             Normal Ranges: LAs:             2.92 cm     (2.7-4.0cm) IVSd:            1.10 cm     (0.6-1.1cm) LVPWd:           1.13 cm     (0.6-1.1cm) LVIDd:           3.90 cm     (3.9-5.9cm) LVIDs:           2.99 cm LV Mass Index:   88.7 g/m2 LVEDV Index:     35.92 ml/m2 LV % FS          23.3 %  LEFT ATRIUM:                 Normal Ranges: LA Area A4C:      10.0 cm2 LA Area A2C:      9.0 cm2 LA Volume Index:  14.0 ml/m2 LA Vol A4C:       19.7 ml LA Vol A2C:       16.9 ml LA Vol Index BSA: 11.3 ml/m2  M-MODE MEASUREMENTS:         Normal Ranges: AoV Exc:             2.23 cm (1.5-2.5cm)  AORTA MEASUREMENTS:         Normal Ranges: AoV Exc:            2.23 cm (1.5-2.5cm) Ao Sinus, d:        4.10 cm (2.1-3.5cm)  LV SYSTOLIC FUNCTION:                      Normal Ranges: EF-A4C View:    46 % (>=55%) EF-A2C View:    45 % EF-Biplane:     45 % LV EF Reported: 45 %  LV DIASTOLIC FUNCTION:           Normal Ranges: MV Peak E:             0.33 m/s  (0.7-1.2 m/s) MV Peak A:             0.42 m/s  (0.42-0.7 m/s) E/A Ratio:             0.78      (1.0-2.2) MV e'                   0.045 m/s (>8.0) MV lateral e'          0.02 m/s MV medial e'           0.07 m/s E/e' Ratio:            7.37      (<8.0)  MITRAL VALVE:          Normal Ranges: MV DT:        283 msec (150-240msec)  AORTIC VALVE:           Normal Ranges: AoV Vmax:      0.65 m/s (<=1.7m/s) AoV Peak P.7 mmHg (<20mmHg) LVOT Max Azar:  0.40 m/s (<=1.1m/s) LVOT Diameter: 2.33 cm  (1.8-2.4cm) AoV Area,Vmax: 2.62 cm2 (2.5-4.5cm2)  RIGHT VENTRICLE: RV Basal 3.00 cm RV Mid   2.10 cm RV Major 5.4 cm TAPSE:   17.0 mm RV s'    0.08 m/s  TRICUSPID VALVE/RVSP:          Normal Ranges: Peak TR Velocity:     2.16 m/s Est. RA Pressure:     3 mmHg RV Syst Pressure:     22 mmHg  (< 30mmHg) IVC Diam:             2.04 cm  AORTA: Asc Ao Diam 4.37 cm  [id number] Socrates Yu MD Electronically signed on 2025 at 8:08:13 PM  ** Final **     XR chest 1 view  Result Date: 2025  Interpreted By:  Sara Torres, STUDY: XR CHEST 1 VIEW;  2025 5:19 am   INDICATION: Signs/Symptoms:intubated.   COMPARISON: 2025   ACCESSION NUMBER(S): [id number]   ORDERING CLINICIAN: AYLIN SCHULER   FINDINGS: AP radiograph of the chest was provided.   The enteric tube distal tip projects over the gastric body, in the side-hole projects near the GE junction.   CARDIOMEDIASTINAL SILHOUETTE: Cardiomediastinal silhouette is normal in size and configuration. Atherosclerotic calcifications of the aortic arch.   LUNGS: Unchanged left basilar opacification. No pneumothorax or pulmonary edema   ABDOMEN: No remarkable upper abdominal findings.   BONES: No acute osseous changes.       The enteric tube distal tip projects over the gastric body, in the side-hole projects near the GE junction. Advancement by 5 cm and confirmation of repositioning with upper abdominal radiograph can be obtained as per clinical need. Unchanged left basilar opacification.   Signed by: Sara Torres 2025 7:49 AM Dictation workstation:   [id number]    XR abdomen 1  view  Result Date: 7/6/2025  Interpreted By:  Schoenberger, Joseph, STUDY: XR ABDOMEN 1 VIEW;  7/6/2025 4:09 pm   INDICATION: Signs/Symptoms:NG placement.     COMPARISON: CT performed 07/06/2025   ACCESSION NUMBER(S): [id number]   ORDERING CLINICIAN: AYILN SCHULER   FINDINGS: A nasogastric tube is noted. The catheter likely terminates in the gastric body. There is posterior eventration of the left hemidiaphragm with extension of the stomach into the lower left hemithorax accounting for the apparent position of the tube on the radiograph. There is opacity in the retrocardiac region consistent with this. There is gaseous distension of small bowel loops in the visible ports of the upper abdomen caliber approximating 2.6 cm. Limited evaluation of pneumoperitoneum on supine imaging, however no gross evidence of free air is noted.     Osseous structures demonstrate no acute bony changes.       1.  See discussion above   MACRO: None   Signed by: Joseph Schoenberger 7/6/2025 5:43 PM Dictation workstation:   [id number]    CT abdomen pelvis w IV contrast  Result Date: 7/6/2025  Interpreted By:  Shaggy Mcgovern, STUDY: CT ABDOMEN PELVIS W IV CONTRAST;  7/6/2025 2:35 pm   INDICATION: Signs/Symptoms:pancreatic mass?.     COMPARISON: None.   ACCESSION NUMBER(S): [id number]   ORDERING CLINICIAN: DONG BULLOCK   TECHNIQUE: Contiguous axial images of the abdomen and pelvis were obtained after the intravenous administration of iodinated contrast. Coronal and sagittal reformatted images were reconstructed from the axial data.   FINDINGS: LOWER CHEST: No acute abnormality.     ABDOMEN/PELVIS:   ABDOMINAL WALL: No significant abnormality.   LIVER: The liver measures 17.3 cm in length. No suspicious lesions. Hypoattenuation adjacent the falciform ligament likely representing focal fatty infiltration.   BILE DUCTS: No significant intrahepatic or extrahepatic dilatation.   GALLBLADDER: No significant abnormality.   PANCREAS:  There are extensive pancreatic parenchymal calcifications. There is irregular ductal dilatation from the uncinate process to the tail. The duct measures up to 1.5 cm. Superimposed upon main ductal dilatation are dilatation of numerous side branch ducts resulting in parenchymal cysts, for example measuring 1.5 cm in the uncinate process (axial image 55, series 501). There is no peripancreatic inflammatory changes. There is no definite evidence of a solid parenchymal mass.   SPLEEN: No significant abnormality.   ADRENALS: Stable mild thickening of the adrenal glands.   KIDNEYS, URETERS, BLADDER: There is redemonstration of focal wedge-shaped defect in the upper pole left kidney that could represent remote infarct. There is multifocal bilateral renal cortical scarring most pronounced in the upper pole the right kidney. There are bilateral nonobstructing renal calculi measuring up to 1 cm in each kidney. No hydronephrosis.   REPRODUCTIVE ORGANS: No significant abnormality.   VESSELS: Severe aortoiliac atherosclerosis without abdominal aortic aneurysm.   RETROPERITONEUM/LYMPH NODES: No acute retroperitoneal abnormality. No enlarged lymph nodes.   BOWEL/PERITONEUM: There is a large amount of impacted stool in rectum which is distended up to 8.7 cm x 8.4 cm. There also a large upstream colonic stool burden in the sigmoid colon and distal descending colon. The remainder of the colon is relatively decompressed but the colonic wall is inflamed with mucosal thickening and hyperenhancement throughout its length, most pronounced in the proximal ascending colon and descending colon. No evidence of bowel obstruction. No pneumatosis intestinalis, portal venous gas, or free intraperitoneal air. No loculated collections. Diffuse mesenteric edema and trace ascites.     MUSCULOSKELETAL: Moderate bilateral hip osteoarthrosis. Chronic compression deformity of L2 and L4 upper endplates. No suspicious osseous lesion.       1. Massive  amount of stool impacted in the rectum which is dilated up to 8.7 cm x 8.4 cm with also large amount of stool in the sigmoid colon and distal descending colon. Recommend intervention to avoid stercoral colitis (pressure ischemia of the colonic wall). This is on a back diffuse infectious/inflammatory colitis of the ascending through descending colon.   2. Extensive pancreatic parenchymal calcifications. There is irregular ductal dilatation from the uncinate process to the tail. The duct measures up to 1.5 cm. Superimposed upon main ductal dilatation are dilatation of numerous side branch ducts resulting in parenchymal simple cysts. No solid parenchymal mass. The differential diagnosis includes both or either of the following: Sequela of chronic pancreatitis and/or combined side-branch/main duct intraductal papillary mucinous neoplasm. A follow-up MRI pancreas protocol with contrast/MRCP in 6 months is recommended to ensure stability and further characterize as necessary.   3. Mild bilateral nonobstructing renal calculi and cortical scarring.   MACRO: None.   Signed by: Shaggy Mcgovern 7/6/2025 4:24 PM Dictation workstation:   [id number]    CT angio chest for pulmonary embolism  Result Date: 7/6/2025  Interpreted By:  Shaggy Mcgovern, STUDY: CT ANGIO CHEST FOR PULMONARY EMBOLISM;  7/6/2025 2:35 pm   INDICATION: Signs/Symptoms:rule out PE.     COMPARISON: 07/02/2025   ACCESSION NUMBER(S): [id number]   ORDERING CLINICIAN: DONG BULLOCK   TECHNIQUE: Contiguous axial images of the chest were obtained after the intravenous administration of iodinated contrast using angiographic PE protocol. Coronal and sagittal reformatted images were reconstructed from the axial data. MIP images were created on an independent workstation and reviewed.   FINDINGS:   MEDIASTINUM AND LYMPH NODES:  The esophageal wall appears within normal limits.  No enlarged intrathoracic or axillary lymph nodes by imaging criteria. No  pneumomediastinum.   VESSELS:  Normal caliber thoracic aorta without dissection. Mild aortic atherosclerosis.  No acute pulmonary embolism.   HEART: There is concentric left ventricular hypertrophy.  Moderate coronary artery calcifications. No significant pericardial effusion.   LUNG, AIRWAYS, PLEURA: Complete left lower lobe collapse. There is debris occluding the essentially all the left lower lobe segmental and subsegmental bronchi and the left lower lobar bronchus. There also debris in the inferior lingular bronchi with diffuse bronchial wall thickening. There is a small amount of debris in the right mainstem bronchus, right upper lobe bronchus, and right lower lobe segmental and subsegmental bronchi. There is the tree-in-bud opacity in the medial right lower lobe consistent with aspiration pneumonia/pneumonitis, associated with atelectatic opacities as well. There are trace bilateral pleural effusions. ET tube terminates in the mid trachea.   OSSEOUS STRUCTURES: No acute osseous abnormality.   CHEST WALL SOFT TISSUES: No discernible acute abnormality.   UPPER ABDOMEN/OTHER: Please see separate report.       1. Complete left lower lobe collapse due to debris occluding all of the left lower lobe segmental and subsegmental bronchi and the lower lobar bronchus. There is also debris in the left upper lobe and lingular bronchi and right lower lobe segmental subsegmental bronchi associated with aspiration pneumonitis in the right lower lobe.   2. No acute pulmonary embolism.   3. Concentric left ventricular hypertrophy.   MACRO: None.   Signed by: Shaggy Mcgovern 7/6/2025 4:10 PM Dictation workstation:   [id number]    XR chest 1 view  Result Date: 7/6/2025  Interpreted By:  Lorenzo Monahan, STUDY: XR CHEST 1 VIEW;  7/6/2025 12:04 pm   INDICATION: Signs/Symptoms:Hypoxia.   COMPARISON: 07/06/2025   ACCESSION NUMBER(S): [id number]   ORDERING CLINICIAN: URI ISLAS   FINDINGS: CARDIOMEDIASTINAL SILHOUETTE AND  VASCULATURE:   Cardiac size:  Within normal limits. Aortic shadow:  Within normal limits.   Mediastinal contours: Within normal limits.   Pulmonary vasculature:  The central vasculature is unremarkable   LUNGS: There is improved left lower lung retrocardiac opacification. The lungs otherwise are clear.   ABDOMEN AND OTHER FINDINGS: No remarkable upper abdominal findings.   BONES: No acute osseous changes.       1.  Improving left lower lung consolidative infiltrate.   Signed by: Lorenzo Monahan 7/6/2025 12:15 PM Dictation workstation:   [id number]    XR chest 1 view  Result Date: 7/6/2025  Interpreted By:  Andrea Carter, STUDY: XR CHEST 1 VIEW;  7/6/2025 11:05 am   INDICATION: Signs/Symptoms:Hypoxia.   COMPARISON: 07/02/2025   ACCESSION NUMBER(S): [id number]   ORDERING CLINICIAN: URI ISLAS   FINDINGS: CHEST/LUNGS: The cardiac and mediastinal silhouettes are unchanged in size and configuration. area of consolidation at the left base obscuring the left hemidiaphragm. This appears worse when compared to the previous exam.   UPPER ABDOMEN: No remarkable upper abdominal findings.   OSSEOUS STRUCTURES: No acute changes.       Consolidation at the left base which is worse when compared to the previous study.   MACRO: None.   Signed by: Andrea Carter 7/6/2025 11:55 AM Dictation workstation:   [id number]    MRCP pancreas w and wo IV contrast  Result Date: 7/3/2025  Interpreted By:  Moriah Seals, STUDY: MRCP PANCREAS W AND WO IV CONTRAST;  7/3/2025 3:25 pm   INDICATION: Signs/Symptoms:c/f pancreatic neoplasm on CT.   COMPARISON: CT abdomen and pelvis exams 07/02/2025, 12/06/2023, 06/05/2017.   ACCESSION NUMBER(S): [id number]   ORDERING CLINICIAN: MAHESH SINGH   TECHNIQUE: MRI PANCREAS; Multiplanar magnetic resonance images of the abdomen were obtained including the following sequences; T2-weighted SSFSE with and without fat saturation, T1-weighted GRE in/opposed phase, DWI, fat saturated 3D-T1w GRE pre and dynamically  post contrast. Radial thick slab T2w RARE MRCP and coronally reconstructed navigator gated high resolution 3-D T2w RESTORE MRCP with MIP reconstruction were also performed for MRCP.  11 mL of Dotarem were administered intravenously without immediate complication.   FINDINGS: LIVER: Liver is enlarged, with the right liver lobe measuring 18.8 cm in craniocaudal length. Liver surface contour is smooth. Diffuse hepatic signal decrease on inphase imaging is suggestive of hepatic iron deposition.   There is a subcentimeter focus of arterial phase hyperenhancement in the inferior right liver lobe (series 15, image 30), which becomes isoenhancing to background liver parenchyma on subsequent phases and not visualized on T1 or T2 weighted images. This likely represents a transient hepatic intensity difference (THID).   A couple of additional subcentimeter foci of arterial phase hyperenhancement in the dome of the right liver lobe (series 15, image 92) and anteriorly at the junction of segments 8 and 4A (series 15, image 61) remain hyperenhancing throughout all dynamic postcontrast phases. These lesions are moderately T2 hyperintense and T1 hypointense, likely representing flash filling hemangiomas.   Tiny 3 mm T2 hyperintense and T1 hypointense lesion in the posterior right liver lobe without enhancement on subtraction images is consistent with a tiny simple cyst.   BILE DUCTS: There is subtle diffuse scattered irregularity/narrowing throughout the intrahepatic bile ducts without dilation (for example series 5, images 80 and 90). There are also focal areas of discontinuity of some intrahepatic bile ducts (for example series 5, images 82 and 83).   Common bile duct is dilated to 10 mm in diameter followed by a short segment of smooth narrowing of the common bile duct to 3 mm in diameter (series 5, image 68), and subsequent mild dilation of the common hepatic duct to 7 mm before gradually tapering distally. No intraductal  filling defect is seen.   GALLBLADDER: Within normal limits.   PANCREAS: There is marked diffuse pancreatic parenchymal atrophy associated with marked diffuse dilation and irregularity of the main pancreatic duct measuring up to 1.8 cm in diameter, as well as dilation of numerous contiguous pancreatic duct side branches throughout the entire pancreas. A few small intraductal filling defects are noted, for example a 6 mm filling defect in the region of the pancreatic head or neck (series 5, image 65). These likely correlate with coarse calcifications seen on CT. No enhancing solid components or solid masses are seen. Note that this appearance and main pancreatic ductal dilation has been present and not significantly changed since December 2023.   SPLEEN: Normal spleen size without focal lesion. Diffuse slight signal loss on inphase imaging is suggestive of iron deposition.   ADRENAL GLANDS: There is nodular thickening of the right adrenal gland, with a dominant 1.1 cm round nodule demonstrating signal dropout on out of phase imaging, consistent with lipid rich adrenal adenoma. Mild left adrenal gland thickening noted.   KIDNEYS: Patchy areas of renal cortical thinning/scarring in both kidneys. Multiple small simple bilateral renal cysts are present, largest measures 1 cm in the left upper pole. A band-like area of T2 hypointensity with no enhancement in the left posterior perinephric region is consistent with a chronic area of scarring, which has been present and not significantly changed since 2017. No solid renal mass or hydronephrosis.   LYMPH NODES: No lymphadenopathy.   ABDOMINAL VESSELS: There is moderate irregular atherosclerotic plaque of the abdominal aorta and common iliac arteries. No abdominal aortic aneurysmal dilation. Portal veins, hepatic veins, splenic vein and superior mesenteric vein are patent.   BOWEL: Limited evaluation due to motion. No dilated bowel loops. Moderate stool burden throughout the  colon, which may be correlated for symptoms of constipation.   PERITONEUM: Trace free fluid in the perihepatic and left upper quadrant regions.   BONES AND LOWER THORAX: There are areas of T2 hyperintensity and enhancing consolidation in the left-greater-than-right lower lobes, which may represent atelectasis or pneumonia.   No acute bony abnormality or suspicious bone lesion.       1.  Marked diffuse pancreatic parenchymal atrophy associated with marked diffuse main pancreatic ductal dilation and dilation of numerous contiguous pancreatic duct side branches. Few small intraductal filling defects, likely correlating with associated coarse calcifications seen on CT. This constellation of findings favors sequela of chronic pancreatitis. Superimposed mixed (main and branch duct) type IPMN is in the differential, but considered less likely given that diffuse pancreatic ductal dilation has been present and not significantly changed since December 2023. 2. Subtle diffuse scattered irregularity/narrowing throughout intrahepatic bile ducts without dilation. There is also dilation of the common bile duct with a short-segment area of smooth narrowing in the midportion. These findings are nonspecific, but may be seen with primary sclerosing cholangitis versus secondary or other cholangitis. No intraductal filling defect to suggest choledocholithiasis. Advise correlation with LFTs. 3. Hepatomegaly with findings suggestive of hepatic and splenic iron deposition. 4. Few small arterially enhancing liver lesions described above likely representing a combination of flash filling hemangiomas and THID. 5. Simple bilateral renal cysts. Stable nonenhancing chronic area of scarring in the left posterior perinephric region. 6. Moderate stool burden throughout the colon, which may be correlated for symptoms of constipation. 7. Opacities in the left-greater-than-right lower lobes, which may represent atelectasis or pneumonia. 8. Additional  chronic findings, as detailed     Signed by: Moriah Saels 7/3/2025 6:15 PM Dictation workstation:   [id number]    CT angio chest for pulmonary embolism  Result Date: 7/2/2025  Interpreted By:  Erin Moreira, STUDY: CT ANGIO CHEST FOR PULMONARY EMBOLISM; CT ABDOMEN PELVIS W IV CONTRAST;  7/2/2025 12:16 pm   INDICATION: Signs/Symptoms:sob, hypoxia, concern for undiagnosed cancer causing pe; Signs/Symptoms:abd ttp.   COMPARISON: None   ACCESSION NUMBER(S): [id number]; [id number]   ORDERING CLINICIAN: ADAN CHRISTIAN   TECHNIQUE: CT of the chest, abdomen, and pelvis was performed. Contiguous axial images were obtained at 3 mm slice thickness through the chest, abdomen and pelvis. Coronal and sagittal reconstructions at 3 mm slice thickness were performed. 75 ML of Omnipaque 350 was administered intravenously without immediate complication.   FINDINGS: CHEST:   LUNG/PLEURA/LARGE AIRWAYS: There is bibasilar pneumonia with dense consolidation in the left lower lobe that. There is no pleural fluid. There is no pulmonary nodule.   VESSELS: There is no contrast within the thoracic aorta to evaluate for dissection. There is atherosclerotic calcification of the thoracic aorta. There is no pulmonary embolism.   HEART: The heart is unremarkable.   MEDIASTINUM AND TALIB: There is no hilar or mediastinal adenopathy.   CHEST WALL AND LOWER NECK: The chest wall is unremarkable. There is no abnormality of the lower neck.   ABDOMEN:   LIVER: There is no hepatic mass.   BILE DUCTS: There is no intrahepatic, common hepatic or common bile ductal dilatation.   GALLBLADDER: The gallbladder is unremarkable.   PANCREAS: There are extensive pancreatic calcifications. There is pancreatic ductal dilatation and beading. Findings are consistent with chronic pancreatitis. There are multiple cystic lesions in the pancreas which could represent cirrhosis, small cystic neoplasms or intraductal mucinous papillary neoplasms.   SPLEEN: The spleen is  unremarkable. There is no splenic mass. There is no splenomegaly   ADRENAL GLANDS: The adrenal glands are unremarkable.   KIDNEYS AND URETERS:. The kidneys function symmetrically. There is a 1.4 cm cyst projecting posteriorly off the cortex of the left kidney with an extremely calcification consistent with a Bosniak 2 renal cyst. There is a small benign simple left renal cortical cyst. There are bilateral nonobstructing intrarenal calculi.     PELVIS:   BLADDER: The bladder is distended measuring 13.7 x 9.5 x 7.4 cm with a volume of 500 mL 11 cc.   REPRODUCTIVE ORGANS: There is no abnormality of the reproductive organs.   BOWEL: There is no bowel wall thickening, dilatation or obstruction. There is large amount of stool throughout the colon particularly in the rectum-possible fecal impaction.   VESSELS: There is marked atherosclerotic calcification of the abdominal aorta, iliac and femoral arteries.   PERITONEUM/RETROPERITONEUM/LYMPH NODES: There is no retroperitoneal or pelvic adenopathy.  There is no ascites.   ABDOMINAL WALL: The abdominal wall is unremarkable.   BONES AND SOFT TISSUES: There is no acute osseous finding.   There is no soft tissue abnormality.       CHEST: 1.  No pulmonary embolism. 2. Bibasilar pneumonia with dense consolidation particularly in the left lower lobe.   ABDOMEN AND PELVIS: 1.  Extensive pancreatic calcifications with pancreatic ductal dilatation and beading consistent with chronic pancreatitis. 2. Multiple small cystic lesions in the pancreas which could represent small pseudocysts, small cystic neoplasms or intraductal mucinous papillary neoplasms. 3. Bilateral nonobstructing intrarenal calculi. 4. 1.4 cm Bosniak 2 left renal cyst and simple left renal cyst. 5. Distended urinary bladder with a volume of 501 cc. 6. Marked constipation and possible fecal impaction.   MACRO: None   Signed by: Erin Moreira 7/2/2025 12:29 PM Dictation workstation:   [id number]    CT abdomen pelvis w  IV contrast  Result Date: 7/2/2025  Interpreted By:  Erin Moreira, STUDY: CT ANGIO CHEST FOR PULMONARY EMBOLISM; CT ABDOMEN PELVIS W IV CONTRAST;  7/2/2025 12:16 pm   INDICATION: Signs/Symptoms:sob, hypoxia, concern for undiagnosed cancer causing pe; Signs/Symptoms:abd ttp.   COMPARISON: None   ACCESSION NUMBER(S): [id number]; [id number]   ORDERING CLINICIAN: ADAN CHRISTIAN   TECHNIQUE: CT of the chest, abdomen, and pelvis was performed. Contiguous axial images were obtained at 3 mm slice thickness through the chest, abdomen and pelvis. Coronal and sagittal reconstructions at 3 mm slice thickness were performed. 75 ML of Omnipaque 350 was administered intravenously without immediate complication.   FINDINGS: CHEST:   LUNG/PLEURA/LARGE AIRWAYS: There is bibasilar pneumonia with dense consolidation in the left lower lobe that. There is no pleural fluid. There is no pulmonary nodule.   VESSELS: There is no contrast within the thoracic aorta to evaluate for dissection. There is atherosclerotic calcification of the thoracic aorta. There is no pulmonary embolism.   HEART: The heart is unremarkable.   MEDIASTINUM AND TALIB: There is no hilar or mediastinal adenopathy.   CHEST WALL AND LOWER NECK: The chest wall is unremarkable. There is no abnormality of the lower neck.   ABDOMEN:   LIVER: There is no hepatic mass.   BILE DUCTS: There is no intrahepatic, common hepatic or common bile ductal dilatation.   GALLBLADDER: The gallbladder is unremarkable.   PANCREAS: There are extensive pancreatic calcifications. There is pancreatic ductal dilatation and beading. Findings are consistent with chronic pancreatitis. There are multiple cystic lesions in the pancreas which could represent cirrhosis, small cystic neoplasms or intraductal mucinous papillary neoplasms.   SPLEEN: The spleen is unremarkable. There is no splenic mass. There is no splenomegaly   ADRENAL GLANDS: The adrenal glands are unremarkable.   KIDNEYS AND  URETERS:. The kidneys function symmetrically. There is a 1.4 cm cyst projecting posteriorly off the cortex of the left kidney with an extremely calcification consistent with a Bosniak 2 renal cyst. There is a small benign simple left renal cortical cyst. There are bilateral nonobstructing intrarenal calculi.     PELVIS:   BLADDER: The bladder is distended measuring 13.7 x 9.5 x 7.4 cm with a volume of 500 mL 11 cc.   REPRODUCTIVE ORGANS: There is no abnormality of the reproductive organs.   BOWEL: There is no bowel wall thickening, dilatation or obstruction. There is large amount of stool throughout the colon particularly in the rectum-possible fecal impaction.   VESSELS: There is marked atherosclerotic calcification of the abdominal aorta, iliac and femoral arteries.   PERITONEUM/RETROPERITONEUM/LYMPH NODES: There is no retroperitoneal or pelvic adenopathy.  There is no ascites.   ABDOMINAL WALL: The abdominal wall is unremarkable.   BONES AND SOFT TISSUES: There is no acute osseous finding.   There is no soft tissue abnormality.       CHEST: 1.  No pulmonary embolism. 2. Bibasilar pneumonia with dense consolidation particularly in the left lower lobe.   ABDOMEN AND PELVIS: 1.  Extensive pancreatic calcifications with pancreatic ductal dilatation and beading consistent with chronic pancreatitis. 2. Multiple small cystic lesions in the pancreas which could represent small pseudocysts, small cystic neoplasms or intraductal mucinous papillary neoplasms. 3. Bilateral nonobstructing intrarenal calculi. 4. 1.4 cm Bosniak 2 left renal cyst and simple left renal cyst. 5. Distended urinary bladder with a volume of 501 cc. 6. Marked constipation and possible fecal impaction.   MACRO: None   Signed by: Erin Moreira 7/2/2025 12:29 PM Dictation workstation:   [id number]    XR chest 1 view  Result Date: 7/2/2025  Interpreted By:  Shaggy Morris, STUDY: XR CHEST 1 VIEW;  7/2/2025 10:48 am   INDICATION: Signs/Symptoms:sob.    COMPARISON: CT scan abdomen and pelvis from 12/06/2023.   ACCESSION NUMBER(S): [id number]   ORDERING CLINICIAN: ADAN CHRISTIAN   TECHNIQUE: Single AP portable view of the chest was obtained.   FINDINGS: MEDIASTINUM/ LUNGS/ TALIB: No cardiomegaly, vascular congestion, or pleural effusion. Scant linear opacity horizontally oriented at the left lung base just above the left diaphragm. Calcifications throughout the thoracic aorta. No abnormal opacity in either lung worrisome for tumor or pneumonia. No pneumothorax. No tracheal deviation. No abnormal hilar fullness or gross mass on either side.   BONES: No lytic or blastic destructive bone lesion. Mild multilevel mid and distal thoracic spine endplate osteophytosis. There are also some syndesmophytes present.   UPPER ABDOMEN: Grossly intact.       Scant linear atelectasis versus scarring at the left lung base.   Arthritic changes in the thoracic spine as described.     MACRO: None   Signed by: Shaggy Morris 7/2/2025 11:01 AM Dictation workstation:   [id number]      Assessment:  This is a very pleasant 75 y.o. gentleman with a known history of pancreatitis, T2DM insulin dependent, HLD and HTN with prolonged ICU course involving corynebacterium striatum pneumonia complicated by acute hypoxic, hypercapneic respiratory failure requiring mechanical ventilation, hospital course complicated by acute delerium and JULIO, patient family met with palliative care and ultimately hospice electing for withdrawal of care and patient now being re-admitted to [facility] for end of life care under hospitalist service.    Plan:  Assessment & Plan  Acute respiratory failure with hypoxia and hypercapnia  Admission for end of life care  Pneumonia of left lower lobe due to infectious organism  - Admission for [facility]  - End-of-life order set has been employed  - Patient unlikely to be stable enough to transfer to inpatient hospice  - Mechanical ventilation has been withdrawn  Type 2 diabetes mellitus with  ketoacidosis without coma      Diet: pleasure feeding  VTE ppx: not indicated, end of life  Code: DNAR-CC    Lorenzo Mireles MD  [facility]    This document was generated in whole or in part using the Dragon One medical voice recognition software and there may be some incorrect words/wording, spelling, or punctuation errors that were not corrected prior to finalization in the medical record.       [1]   Family History  Problem Relation Name Age of Onset    Prostate cancer Brother     [2] morphine, 2 mg, intravenous, q4h  [3]    [4] PRN medications: glycopyrrolate, haloperidol lactate, haloperidol lactate, ketorolac, midazolam, morphine, oxygen  [5] morphine, 2 mg, intravenous, q4h  [6]    [7] PRN medications: glycopyrrolate, haloperidol lactate, haloperidol lactate, ketorolac, midazolam, morphine, oxygen

## 2025-07-16 NOTE — DISCHARGE SUMMARY
"Discharge Diagnosis  Acute hypoxic respiratory failure secondary to aspiration pneumonia            Issues Requiring Follow-Up       Discharge Meds     Medication List      CONTINUE taking these medications     Unifine Pentips 31 gauge x 3/16\" needle; Generic drug: pen needle,   diabetic; Inject 1 each under the skin 4 times a day. Use 3-4 times daily   with insulin pen     ASK your doctor about these medications     albuterol 90 mcg/actuation inhaler; Inhale 2 puffs every 6 hours if   needed for wheezing.   amLODIPine 10 mg tablet; Commonly known as: Norvasc; Take 1 tablet (10   mg) by mouth early in the morning..   atorvastatin 10 mg tablet; Commonly known as: Lipitor; Take 1 tablet (10   mg) by mouth once daily.   cyanocobalamin 100 mcg tablet; Commonly known as: Vitamin B-12   Glucagon Emergency Kit (human) 1 mg injection; Generic drug: glucagon;   Inject 1 mg into the muscle 1 time if needed (hypoglycemia) for up to 1   dose.   insulin glargine 100 unit/mL (3 mL) pen; Commonly known as: Lantus;   Inject 5 Units under the skin 2 times a day.   insulin lispro 100 unit/mL pen; Commonly known as: HumaLOG KwikPen   Insulin; Inject 10 Units under the skin 3 times daily (morning, midday,   late afternoon). Use 3x/day per sliding scale. Max 30 U per day   losartan 100 mg tablet; Commonly known as: Cozaar; Take 1 tablet by   mouth once daily.   metFORMIN 1,000 mg tablet; Commonly known as: Glucophage; Take 1 tablet   (1,000 mg) by mouth 2 times a day.   metoprolol tartrate 25 mg tablet; Commonly known as: Lopressor; Take 1   tablet (25 mg) by mouth 2 times a day.   naproxen 500 mg tablet; Commonly known as: Naprosyn; Take 1 tablet (500   mg) by mouth 2 times a day as needed (pain).   tamsulosin 0.4 mg 24 hr capsule; Commonly known as: Flomax; Take 1   capsule (0.4 mg) by mouth once daily. Take 30 min after same meal; Ask   about: Should I take this medication?       Test Results Pending At Discharge  Pending " Labs       No current pending labs.            Hospital Course  75 year old male presenting to ED on July 2, 2025 from home with weakness, generalized malaise, poor oral intake and SOB over the previous few days, additionally he admitted to weight loss over the last several months. Stated SOB had increased over the previous 2 days, denies respiratory disease but does currently smoke cigarettes, denies chest pain.  Admitted to the ICU on July 6, 2025 with hypoxic and hypercapneic respiratory failure to the ICU requiring previous intubation, now extubated and requiring BiPAP.  Throughout the patient's stay in the ICU he required to be intubated 3 separate times and have 2 bronchoscope's performed.  The third intubation was due to the patient being agitated and confused and having a self extubation, this is when the second bronchoscope was performed after that ET tube was placed.  After the patient had his third intubation where he had a desaturation event into 70% was found was contacted in relation to his CODE STATUS and his prognosis.  The patient was experiencing agitation and confusion while he was intubated and the same for being on BiPAP, he was also requiring increased levels of oxygenation.  Due to the prognosis and the patient's decompensation the patient's family decided to place him on hospice care.  After he was placed on hospice care the patient was discharged to be readmitted and hospice care in the hospital and he will be extubated and undergo the hospice care necessary for him to be comfortable. Patient peacefully passed away on 07/16/25. Time of death 03:45. Next of kin notified.    Pertinent Physical Exam At Time of Discharge  Physical Exam  Constitutional: Pale, extremities cool to touch, not responsive to physical stimuli   Eyes: pupils fixed & dilated, non-reactive to light  Lungs: no breath sounds, no chest rise  Heart: no audible heart sounds, absent radial pulse    Outpatient Follow-Up  Future  Appointments   Date Time Provider Department Center   9/19/2025 10:20 AM Antolin Thao DO [email or web address] [place]     < 30 mins spent coordinating/preparing discharge     Mohamud Rodriguez DO

## 2025-07-16 NOTE — CARE PLAN
Problem: Pain - Adult  Goal: Verbalizes/displays adequate comfort level or baseline comfort level  Outcome: Progressing     Problem: Safety - Adult  Goal: Free from fall injury  Outcome: Progressing     Problem: Discharge Planning  Goal: Discharge to home or other facility with appropriate resources  Outcome: Progressing     Problem: Chronic Conditions and Co-morbidities  Goal: Patient's chronic conditions and co-morbidity symptoms are monitored and maintained or improved  Outcome: Progressing     Problem: Nutrition  Goal: Nutrient intake appropriate for maintaining nutritional needs  Outcome: Progressing     Problem: Skin  Goal: Decreased wound size/increased tissue granulation at next dressing change  Outcome: Progressing  Flowsheets (Taken 7/15/2025 2005)  Decreased wound size/increased tissue granulation at next dressing change:   Protective dressings over bony prominences   Promote sleep for wound healing  Goal: Participates in plan/prevention/treatment measures  Outcome: Progressing  Flowsheets (Taken 7/15/2025 2005)  Participates in plan/prevention/treatment measures: Elevate heels  Goal: Prevent/manage excess moisture  Outcome: Progressing  Flowsheets (Taken 7/15/2025 2005)  Prevent/manage excess moisture:   Monitor for/manage infection if present   Cleanse incontinence/protect with barrier cream   Moisturize dry skin  Goal: Prevent/minimize sheer/friction injuries  Outcome: Progressing  Flowsheets (Taken 7/15/2025 2005)  Prevent/minimize sheer/friction injuries:   Use pull sheet   HOB 30 degrees or less   Turn/reposition every 2 hours/use positioning/transfer devices  Goal: Promote/optimize nutrition  Outcome: Progressing  Flowsheets (Taken 7/15/2025 2005)  Promote/optimize nutrition: Offer water/supplements/favorite foods  Goal: Promote skin healing  Outcome: Progressing  Flowsheets (Taken 7/15/2025 2005)  Promote skin healing:   Turn/reposition every 2 hours/use positioning/transfer devices   Protective  dressings over bony prominences   The patient's goals for the shift include      The clinical goals for the shift include to keep patient comfortable.

## 2025-07-16 NOTE — NURSING NOTE
0335: Pt found to be unresponsive, no pulse or heart beat at this time. Pt code status is DNR comfort measures only. This RN notified Dr. Rodriguez who will be up to see pt.     0345: Dr. Rodriguez on the floor at this time and pronounced pt time of death at 0345. Dr. Rodriguez notified pt's family who will not be coming to the hospital.

## 2025-07-16 NOTE — SIGNIFICANT EVENT
Alerted by RN staff to patient's bedside. Patient was evaluated and was unresponsive to voice or painful stimuli. Pupils were fixed, dilated, and non-reactive to light. No carotid or radial pulses palpated. No audible cardiac or breath sounds heard on auscultation. Asystole was observed on cardiac monitor. Patient declared  at 03:45. Patient's emergency contact, Ekta, notified.    Mohamud Rodriguez DO  Internal Medicine

## 2025-07-16 NOTE — NURSING NOTE
0430: This RN attempted to call [facility] at this time, no answer, message left with this floor's phone number.     0448: This RN attempted to call [facility] at this time, no answer, message left with this floor's phone number.     0510: This RN attempted to call [facility] at this time, no answer, message left with this floor's phone number.     0522: This RN attempted to call [facility] at this time, no answer, message left with this floor's phone number.     0540: This RN attempted to call [facility] at this time, no answer, message left with this floor's phone number.     0555: This RN attempted to call [facility] at this time, no answer, message left with this floor's phone number.     0605: This RN attempted to call [facility] at this time, no answer, message left with this floor's phone number.

## 2025-07-16 NOTE — NURSING NOTE
Tarun just called back, gave report and information to the best of my knowledge. They did put a hold on the body, will follow up with th supervisor.

## 2025-07-18 LAB
ATRIAL RATE: 68 BPM
P AXIS: 52 DEGREES
P OFFSET: 185 MS
P ONSET: 132 MS
PR INTERVAL: 166 MS
Q ONSET: 215 MS
QRS COUNT: 11 BEATS
QRS DURATION: 84 MS
QT INTERVAL: 430 MS
QTC CALCULATION(BAZETT): 457 MS
QTC FREDERICIA: 448 MS
R AXIS: -31 DEGREES
T AXIS: 26 DEGREES
T OFFSET: 430 MS
VENTRICULAR RATE: 68 BPM

## 2025-09-18 ENCOUNTER — APPOINTMENT (OUTPATIENT)
Dept: PRIMARY CARE | Facility: CLINIC | Age: 75
End: 2025-09-18
Payer: MEDICARE

## 2025-09-19 ENCOUNTER — APPOINTMENT (OUTPATIENT)
Dept: PRIMARY CARE | Facility: CLINIC | Age: 75
End: 2025-09-19
Payer: MEDICARE